# Patient Record
Sex: MALE | Race: BLACK OR AFRICAN AMERICAN | NOT HISPANIC OR LATINO | URBAN - METROPOLITAN AREA
[De-identification: names, ages, dates, MRNs, and addresses within clinical notes are randomized per-mention and may not be internally consistent; named-entity substitution may affect disease eponyms.]

---

## 2022-07-05 ENCOUNTER — EMERGENCY (EMERGENCY)
Facility: HOSPITAL | Age: 77
LOS: 0 days | Discharge: HOME | End: 2022-07-06
Attending: STUDENT IN AN ORGANIZED HEALTH CARE EDUCATION/TRAINING PROGRAM

## 2022-07-05 VITALS
WEIGHT: 169.98 LBS | HEIGHT: 70 IN | TEMPERATURE: 98 F | OXYGEN SATURATION: 97 % | HEART RATE: 72 BPM | RESPIRATION RATE: 17 BRPM | SYSTOLIC BLOOD PRESSURE: 161 MMHG | DIASTOLIC BLOOD PRESSURE: 82 MMHG

## 2022-07-05 DIAGNOSIS — G40.909 EPILEPSY, UNSPECIFIED, NOT INTRACTABLE, WITHOUT STATUS EPILEPTICUS: ICD-10-CM

## 2022-07-05 DIAGNOSIS — R07.9 CHEST PAIN, UNSPECIFIED: ICD-10-CM

## 2022-07-05 DIAGNOSIS — R42 DIZZINESS AND GIDDINESS: ICD-10-CM

## 2022-07-05 DIAGNOSIS — R94.31 ABNORMAL ELECTROCARDIOGRAM [ECG] [EKG]: ICD-10-CM

## 2022-07-05 DIAGNOSIS — Z20.822 CONTACT WITH AND (SUSPECTED) EXPOSURE TO COVID-19: ICD-10-CM

## 2022-07-05 DIAGNOSIS — Z98.890 OTHER SPECIFIED POSTPROCEDURAL STATES: ICD-10-CM

## 2022-07-05 LAB
ALBUMIN SERPL ELPH-MCNC: 3.5 G/DL — SIGNIFICANT CHANGE UP (ref 3.3–5)
ALP SERPL-CCNC: 69 U/L — SIGNIFICANT CHANGE UP (ref 40–120)
ALT FLD-CCNC: 25 U/L — SIGNIFICANT CHANGE UP (ref 12–78)
ANION GAP SERPL CALC-SCNC: 6 MMOL/L — SIGNIFICANT CHANGE UP (ref 5–17)
AST SERPL-CCNC: 40 U/L — HIGH (ref 15–37)
BASOPHILS # BLD AUTO: 0.03 K/UL — SIGNIFICANT CHANGE UP (ref 0–0.2)
BASOPHILS NFR BLD AUTO: 0.3 % — SIGNIFICANT CHANGE UP (ref 0–2)
BILIRUB SERPL-MCNC: 0.4 MG/DL — SIGNIFICANT CHANGE UP (ref 0.2–1.2)
BUN SERPL-MCNC: 15 MG/DL — SIGNIFICANT CHANGE UP (ref 7–23)
CALCIUM SERPL-MCNC: 8.9 MG/DL — SIGNIFICANT CHANGE UP (ref 8.5–10.1)
CHLORIDE SERPL-SCNC: 106 MMOL/L — SIGNIFICANT CHANGE UP (ref 96–108)
CO2 SERPL-SCNC: 24 MMOL/L — SIGNIFICANT CHANGE UP (ref 22–31)
CREAT SERPL-MCNC: 1.13 MG/DL — SIGNIFICANT CHANGE UP (ref 0.5–1.3)
EGFR: 67 ML/MIN/1.73M2 — SIGNIFICANT CHANGE UP
EOSINOPHIL # BLD AUTO: 0.08 K/UL — SIGNIFICANT CHANGE UP (ref 0–0.5)
EOSINOPHIL NFR BLD AUTO: 0.9 % — SIGNIFICANT CHANGE UP (ref 0–6)
GLUCOSE SERPL-MCNC: 156 MG/DL — HIGH (ref 70–99)
HCT VFR BLD CALC: 38.4 % — LOW (ref 39–50)
HGB BLD-MCNC: 12.6 G/DL — LOW (ref 13–17)
IMM GRANULOCYTES NFR BLD AUTO: 0.2 % — SIGNIFICANT CHANGE UP (ref 0–1.5)
LIDOCAIN IGE QN: 74 U/L — SIGNIFICANT CHANGE UP (ref 73–393)
LYMPHOCYTES # BLD AUTO: 1.12 K/UL — SIGNIFICANT CHANGE UP (ref 1–3.3)
LYMPHOCYTES # BLD AUTO: 13 % — SIGNIFICANT CHANGE UP (ref 13–44)
MAGNESIUM SERPL-MCNC: 2.2 MG/DL — SIGNIFICANT CHANGE UP (ref 1.6–2.6)
MCHC RBC-ENTMCNC: 32.1 PG — SIGNIFICANT CHANGE UP (ref 27–34)
MCHC RBC-ENTMCNC: 32.8 G/DL — SIGNIFICANT CHANGE UP (ref 32–36)
MCV RBC AUTO: 98 FL — SIGNIFICANT CHANGE UP (ref 80–100)
MONOCYTES # BLD AUTO: 0.47 K/UL — SIGNIFICANT CHANGE UP (ref 0–0.9)
MONOCYTES NFR BLD AUTO: 5.4 % — SIGNIFICANT CHANGE UP (ref 2–14)
NEUTROPHILS # BLD AUTO: 6.92 K/UL — SIGNIFICANT CHANGE UP (ref 1.8–7.4)
NEUTROPHILS NFR BLD AUTO: 80.2 % — HIGH (ref 43–77)
NRBC # BLD: 0 /100 WBCS — SIGNIFICANT CHANGE UP (ref 0–0)
NT-PROBNP SERPL-SCNC: 51 PG/ML — SIGNIFICANT CHANGE UP (ref 0–450)
PLATELET # BLD AUTO: 166 K/UL — SIGNIFICANT CHANGE UP (ref 150–400)
POTASSIUM SERPL-MCNC: 5.7 MMOL/L — HIGH (ref 3.5–5.3)
POTASSIUM SERPL-SCNC: 5.7 MMOL/L — HIGH (ref 3.5–5.3)
PROT SERPL-MCNC: 7.6 GM/DL — SIGNIFICANT CHANGE UP (ref 6–8.3)
RAPID RVP RESULT: SIGNIFICANT CHANGE UP
RBC # BLD: 3.92 M/UL — LOW (ref 4.2–5.8)
RBC # FLD: 12.2 % — SIGNIFICANT CHANGE UP (ref 10.3–14.5)
SARS-COV-2 RNA SPEC QL NAA+PROBE: SIGNIFICANT CHANGE UP
SODIUM SERPL-SCNC: 136 MMOL/L — SIGNIFICANT CHANGE UP (ref 135–145)
TROPONIN I, HIGH SENSITIVITY RESULT: 7.5 NG/L — SIGNIFICANT CHANGE UP
WBC # BLD: 8.64 K/UL — SIGNIFICANT CHANGE UP (ref 3.8–10.5)
WBC # FLD AUTO: 8.64 K/UL — SIGNIFICANT CHANGE UP (ref 3.8–10.5)

## 2022-07-05 PROCEDURE — 70450 CT HEAD/BRAIN W/O DYE: CPT | Mod: 26,MA

## 2022-07-05 PROCEDURE — 93010 ELECTROCARDIOGRAM REPORT: CPT

## 2022-07-05 PROCEDURE — 71045 X-RAY EXAM CHEST 1 VIEW: CPT | Mod: 26

## 2022-07-05 PROCEDURE — 99285 EMERGENCY DEPT VISIT HI MDM: CPT

## 2022-07-05 RX ORDER — SODIUM CHLORIDE 9 MG/ML
1000 INJECTION INTRAMUSCULAR; INTRAVENOUS; SUBCUTANEOUS ONCE
Refills: 0 | Status: COMPLETED | OUTPATIENT
Start: 2022-07-05 | End: 2022-07-05

## 2022-07-05 RX ADMIN — SODIUM CHLORIDE 1000 MILLILITER(S): 9 INJECTION INTRAMUSCULAR; INTRAVENOUS; SUBCUTANEOUS at 21:04

## 2022-07-05 NOTE — ED ADULT TRIAGE NOTE - PRO INTERPRETER NEED 2
Patient discharged in stable condition. Wife present and driving. All belongings sent home. Dressing to right groin clean/dry/intact. Discharge instructions reviewed and paperwork sent home. Verbalizes understanding of instructions. No questions/concerns at discharge.   English

## 2022-07-05 NOTE — ED ADULT TRIAGE NOTE - CHIEF COMPLAINT QUOTE
BIBA from home as per EMS patient c/o "burning" chest pain at 8am, wife gave pepcid and chest pain relieved. 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 denies dizziness in triage. ambulatory to ems stretcher with cane. f/s 138 as per ems

## 2022-07-05 NOTE — ED PROVIDER NOTE - CLINICAL SUMMARY MEDICAL DECISION MAKING FREE TEXT BOX
77-year-old male with history of seizure disorder and noted EKG with LVH with strain pattern presented to the ED with chest pain described as burning sensation alleviated with Pepcid.  Plan to get cardiac enzymes chest x-ray rule out ACS, patient also complained of dizziness upon standing described as lightheadedness.  Patient has dry mucous membranes on exam neurologically appears intact.  Patient does not have any signs of external trauma no syncope.  Patient did receive IV fluid bolus for likely dehydration as he has admitted to poor hydration at baseline

## 2022-07-05 NOTE — ED PROVIDER NOTE - CARE PROVIDER_API CALL
Emmanuel Neal)  Internal Medicine  [address] [place] [address]  Phone: [phone]  Fax: [phone])-  Follow Up Time: 4-6 Days

## 2022-07-05 NOTE — ED PROVIDER NOTE - OBJECTIVE STATEMENT
77-year-old male history of seizure disorder distant brain surgery on Keppra and Topamax presenting to the ED with chest pain that was earlier noted earlier today described as burning sensation nonexertional nonpleuritic nonpositional alleviated with Pepcid.  Patient states chest pain has been since been resolved, denies any shortness of breath with exertion denies any abdominal pain.  No blood in stools no nausea vomiting diarrhea.  No cough congestion patient also reports dizziness upon standing patient reports he has poor hydration at baseline and does not drink enough water throughout the day.

## 2022-07-05 NOTE — ED PROVIDER NOTE - PATIENT PORTAL LINK FT
You can access the FollowMyHealth Patient Portal offered by [facility] by registering at the following website: [email or web address]. By joining [employer]’s FollowMyHealth portal, you will also be able to view your health information using other applications (apps) compatible with our system.

## 2022-07-05 NOTE — ED PROVIDER NOTE - PHYSICAL EXAMINATION
VITAL SIGNS: I have reviewed nursing notes and confirm.  CONSTITUTIONAL: well-appearing, non-toxic, NAD  SKIN: Warm dry, normal skin turgor  HEAD: NCAT  EYES: EOMI, PERRLA, no scleral icterus  ENT: dry mucous membranes, normal pharynx   NECK: Supple; non tender. Full ROM.   CARD: RRR, no murmurs, rubs or gallops  RESP: clear to ausculation b/l.  No rales, rhonchi, or wheezing.  ABD: soft, + BS, non-tender, non-distended, no rebound or guarding. No CVA tenderness  EXT: Full ROM, no bony tenderness, no pedal edema, no calf tenderness  NEURO: normal motor. normal sensory. CN II-XII intact. Cerebellar testing normal. Normal gait.  PSYCH: Cooperative, appropriate.

## 2022-07-05 NOTE — ED PROVIDER NOTE - NS ED ROS FT
Constitutional: See HPI.  Eyes: No visual changes, eye pain or discharge. No Photophobia  ENMT: No hearing changes, pain, discharge or infections. No neck pain or stiffness. No limited ROM  Cardiac: see hpi   Respiratory: No cough or respiratory distress.   GI: No nausea, vomiting, diarrhea or abdominal pain.  : No dysuria, frequency or burning. No Discharge  MS: No myalgia, muscle weakness, joint pain or back pain.  Neuro: see hpi   Skin: No skin rash.  Except as documented in the HPI, all other systems are negative.

## 2022-07-05 NOTE — ED ADULT NURSE NOTE - OBJECTIVE STATEMENT
Patient A& o x3 came in with complaints of chest pain that started this morning and was relieved by Pepcid. Patient had chest pain later in the day that went away on it's own. Patient then complained of dizziness, weakness, and was shaking. Pt is not currently dizzy but is lying down. Pt ambulates with a cane. Pt had a stroke about 20 years ago and has had seizures but hasn't had one in 5 years but takes medication for seizures.

## 2022-07-06 VITALS — DIASTOLIC BLOOD PRESSURE: 82 MMHG | SYSTOLIC BLOOD PRESSURE: 141 MMHG

## 2022-07-06 LAB — TROPONIN I, HIGH SENSITIVITY RESULT: 8.3 NG/L — SIGNIFICANT CHANGE UP

## 2024-06-21 ENCOUNTER — EMERGENCY (EMERGENCY)
Facility: HOSPITAL | Age: 79
LOS: 0 days | Discharge: ANOTHER HOSPITAL | End: 2024-06-22
Attending: STUDENT IN AN ORGANIZED HEALTH CARE EDUCATION/TRAINING PROGRAM
Payer: MEDICARE

## 2024-06-21 ENCOUNTER — TRANSCRIPTION ENCOUNTER (OUTPATIENT)
Age: 79
End: 2024-06-21

## 2024-06-21 VITALS
RESPIRATION RATE: 18 BRPM | SYSTOLIC BLOOD PRESSURE: 168 MMHG | HEART RATE: 93 BPM | TEMPERATURE: 100 F | OXYGEN SATURATION: 97 % | HEIGHT: 69 IN | WEIGHT: 169.98 LBS | DIASTOLIC BLOOD PRESSURE: 100 MMHG

## 2024-06-21 DIAGNOSIS — R53.1 WEAKNESS: ICD-10-CM

## 2024-06-21 DIAGNOSIS — Z85.841 PERSONAL HISTORY OF MALIGNANT NEOPLASM OF BRAIN: ICD-10-CM

## 2024-06-21 DIAGNOSIS — E78.5 HYPERLIPIDEMIA, UNSPECIFIED: ICD-10-CM

## 2024-06-21 DIAGNOSIS — R35.0 FREQUENCY OF MICTURITION: ICD-10-CM

## 2024-06-21 DIAGNOSIS — Z79.84 LONG TERM (CURRENT) USE OF ORAL HYPOGLYCEMIC DRUGS: ICD-10-CM

## 2024-06-21 DIAGNOSIS — I62.01 NONTRAUMATIC ACUTE SUBDURAL HEMORRHAGE: ICD-10-CM

## 2024-06-21 DIAGNOSIS — I49.3 VENTRICULAR PREMATURE DEPOLARIZATION: ICD-10-CM

## 2024-06-21 DIAGNOSIS — R05.1 ACUTE COUGH: ICD-10-CM

## 2024-06-21 DIAGNOSIS — Z86.69 PERSONAL HISTORY OF OTHER DISEASES OF THE NERVOUS SYSTEM AND SENSE ORGANS: ICD-10-CM

## 2024-06-21 DIAGNOSIS — E11.9 TYPE 2 DIABETES MELLITUS WITHOUT COMPLICATIONS: ICD-10-CM

## 2024-06-21 LAB
ALBUMIN SERPL ELPH-MCNC: 3.4 G/DL — SIGNIFICANT CHANGE UP (ref 3.3–5)
ALP SERPL-CCNC: 61 U/L — SIGNIFICANT CHANGE UP (ref 40–120)
ALT FLD-CCNC: 13 U/L — SIGNIFICANT CHANGE UP (ref 12–78)
ANION GAP SERPL CALC-SCNC: 8 MMOL/L — SIGNIFICANT CHANGE UP (ref 5–17)
APPEARANCE UR: CLEAR — SIGNIFICANT CHANGE UP
APTT BLD: 32.7 SEC — SIGNIFICANT CHANGE UP (ref 24.5–35.6)
AST SERPL-CCNC: 11 U/L — LOW (ref 15–37)
BASOPHILS # BLD AUTO: 0.02 K/UL — SIGNIFICANT CHANGE UP (ref 0–0.2)
BASOPHILS NFR BLD AUTO: 0.3 % — SIGNIFICANT CHANGE UP (ref 0–2)
BILIRUB SERPL-MCNC: 0.5 MG/DL — SIGNIFICANT CHANGE UP (ref 0.2–1.2)
BILIRUB UR-MCNC: NEGATIVE — SIGNIFICANT CHANGE UP
BUN SERPL-MCNC: 15 MG/DL — SIGNIFICANT CHANGE UP (ref 7–23)
CALCIUM SERPL-MCNC: 9.1 MG/DL — SIGNIFICANT CHANGE UP (ref 8.5–10.1)
CHLORIDE SERPL-SCNC: 105 MMOL/L — SIGNIFICANT CHANGE UP (ref 96–108)
CO2 SERPL-SCNC: 24 MMOL/L — SIGNIFICANT CHANGE UP (ref 22–31)
COLOR SPEC: YELLOW — SIGNIFICANT CHANGE UP
CREAT SERPL-MCNC: 1.29 MG/DL — SIGNIFICANT CHANGE UP (ref 0.5–1.3)
DIFF PNL FLD: NEGATIVE — SIGNIFICANT CHANGE UP
EGFR: 56 ML/MIN/1.73M2 — LOW
EOSINOPHIL # BLD AUTO: 0.03 K/UL — SIGNIFICANT CHANGE UP (ref 0–0.5)
EOSINOPHIL NFR BLD AUTO: 0.4 % — SIGNIFICANT CHANGE UP (ref 0–6)
FLUAV AG NPH QL: SIGNIFICANT CHANGE UP
FLUBV AG NPH QL: SIGNIFICANT CHANGE UP
GLUCOSE SERPL-MCNC: 146 MG/DL — HIGH (ref 70–99)
GLUCOSE UR QL: NEGATIVE MG/DL — SIGNIFICANT CHANGE UP
HCT VFR BLD CALC: 36 % — LOW (ref 39–50)
HGB BLD-MCNC: 11.9 G/DL — LOW (ref 13–17)
IMM GRANULOCYTES NFR BLD AUTO: 0.3 % — SIGNIFICANT CHANGE UP (ref 0–0.9)
INR BLD: 0.99 RATIO — SIGNIFICANT CHANGE UP (ref 0.85–1.18)
KETONES UR-MCNC: NEGATIVE MG/DL — SIGNIFICANT CHANGE UP
LACTATE SERPL-SCNC: 1.2 MMOL/L — SIGNIFICANT CHANGE UP (ref 0.7–2)
LEUKOCYTE ESTERASE UR-ACNC: NEGATIVE — SIGNIFICANT CHANGE UP
LYMPHOCYTES # BLD AUTO: 1.46 K/UL — SIGNIFICANT CHANGE UP (ref 1–3.3)
LYMPHOCYTES # BLD AUTO: 18.6 % — SIGNIFICANT CHANGE UP (ref 13–44)
MAGNESIUM SERPL-MCNC: 1.9 MG/DL — SIGNIFICANT CHANGE UP (ref 1.6–2.6)
MCHC RBC-ENTMCNC: 32.2 PG — SIGNIFICANT CHANGE UP (ref 27–34)
MCHC RBC-ENTMCNC: 33.1 G/DL — SIGNIFICANT CHANGE UP (ref 32–36)
MCV RBC AUTO: 97.3 FL — SIGNIFICANT CHANGE UP (ref 80–100)
MONOCYTES # BLD AUTO: 0.77 K/UL — SIGNIFICANT CHANGE UP (ref 0–0.9)
MONOCYTES NFR BLD AUTO: 9.8 % — SIGNIFICANT CHANGE UP (ref 2–14)
NEUTROPHILS # BLD AUTO: 5.53 K/UL — SIGNIFICANT CHANGE UP (ref 1.8–7.4)
NEUTROPHILS NFR BLD AUTO: 70.6 % — SIGNIFICANT CHANGE UP (ref 43–77)
NITRITE UR-MCNC: NEGATIVE — SIGNIFICANT CHANGE UP
NRBC # BLD: 0 /100 WBCS — SIGNIFICANT CHANGE UP (ref 0–0)
PH UR: 7.5 — SIGNIFICANT CHANGE UP (ref 5–8)
PLATELET # BLD AUTO: 203 K/UL — SIGNIFICANT CHANGE UP (ref 150–400)
POTASSIUM SERPL-MCNC: 3.5 MMOL/L — SIGNIFICANT CHANGE UP (ref 3.5–5.3)
POTASSIUM SERPL-SCNC: 3.5 MMOL/L — SIGNIFICANT CHANGE UP (ref 3.5–5.3)
PROT SERPL-MCNC: 7.5 GM/DL — SIGNIFICANT CHANGE UP (ref 6–8.3)
PROT UR-MCNC: NEGATIVE MG/DL — SIGNIFICANT CHANGE UP
PROTHROM AB SERPL-ACNC: 11.9 SEC — SIGNIFICANT CHANGE UP (ref 9.5–13)
RBC # BLD: 3.7 M/UL — LOW (ref 4.2–5.8)
RBC # FLD: 12.1 % — SIGNIFICANT CHANGE UP (ref 10.3–14.5)
SARS-COV-2 RNA SPEC QL NAA+PROBE: SIGNIFICANT CHANGE UP
SODIUM SERPL-SCNC: 137 MMOL/L — SIGNIFICANT CHANGE UP (ref 135–145)
SP GR SPEC: 1.01 — SIGNIFICANT CHANGE UP (ref 1–1.03)
UROBILINOGEN FLD QL: 0.2 MG/DL — SIGNIFICANT CHANGE UP (ref 0.2–1)
WBC # BLD: 7.83 K/UL — SIGNIFICANT CHANGE UP (ref 3.8–10.5)
WBC # FLD AUTO: 7.83 K/UL — SIGNIFICANT CHANGE UP (ref 3.8–10.5)

## 2024-06-21 PROCEDURE — 71045 X-RAY EXAM CHEST 1 VIEW: CPT | Mod: 26

## 2024-06-21 PROCEDURE — 72170 X-RAY EXAM OF PELVIS: CPT | Mod: 26

## 2024-06-21 PROCEDURE — 93010 ELECTROCARDIOGRAM REPORT: CPT

## 2024-06-21 PROCEDURE — 99285 EMERGENCY DEPT VISIT HI MDM: CPT

## 2024-06-21 PROCEDURE — 70450 CT HEAD/BRAIN W/O DYE: CPT | Mod: 26,MC

## 2024-06-21 RX ORDER — LEVETIRACETAM 250 MG/1
1 TABLET, FILM COATED ORAL
Refills: 0 | DISCHARGE

## 2024-06-21 RX ORDER — TOPIRAMATE 25 MG
1 TABLET ORAL
Refills: 0 | DISCHARGE

## 2024-06-21 RX ORDER — PIPERACILLIN AND TAZOBACTAM 4; .5 G/20ML; G/20ML
3.38 INJECTION, POWDER, LYOPHILIZED, FOR SOLUTION INTRAVENOUS ONCE
Refills: 0 | Status: COMPLETED | OUTPATIENT
Start: 2024-06-21 | End: 2024-06-21

## 2024-06-21 RX ORDER — SODIUM CHLORIDE 9 MG/ML
1000 INJECTION INTRAMUSCULAR; INTRAVENOUS; SUBCUTANEOUS ONCE
Refills: 0 | Status: DISCONTINUED | OUTPATIENT
Start: 2024-06-21 | End: 2024-06-21

## 2024-06-21 RX ORDER — ACETAMINOPHEN 500 MG
975 TABLET ORAL ONCE
Refills: 0 | Status: COMPLETED | OUTPATIENT
Start: 2024-06-21 | End: 2024-06-21

## 2024-06-21 RX ORDER — SODIUM CHLORIDE 9 MG/ML
500 INJECTION INTRAMUSCULAR; INTRAVENOUS; SUBCUTANEOUS ONCE
Refills: 0 | Status: COMPLETED | OUTPATIENT
Start: 2024-06-21 | End: 2024-06-21

## 2024-06-21 RX ORDER — METFORMIN HYDROCHLORIDE 850 MG/1
1 TABLET ORAL
Refills: 0 | DISCHARGE

## 2024-06-21 RX ORDER — ROSUVASTATIN CALCIUM 5 MG/1
1 TABLET ORAL
Refills: 0 | DISCHARGE

## 2024-06-21 RX ADMIN — Medication 975 MILLIGRAM(S): at 17:57

## 2024-06-21 RX ADMIN — PIPERACILLIN AND TAZOBACTAM 200 GRAM(S): 4; .5 INJECTION, POWDER, LYOPHILIZED, FOR SOLUTION INTRAVENOUS at 18:00

## 2024-06-21 RX ADMIN — SODIUM CHLORIDE 500 MILLILITER(S): 9 INJECTION INTRAMUSCULAR; INTRAVENOUS; SUBCUTANEOUS at 18:03

## 2024-06-21 NOTE — ED PROVIDER NOTE - PROGRESS NOTE DETAILS
Attending Henry: labs and CXR w/ no emergent findings. had walked pt and found him w/ unsteady gait. daughter Kaykay (ER physician in [place] [phone]) called and provided collateral information. reported that pt has been falling a lot at home. has been occurring for at least the past week, but suspects has been longer and pt has not been telling family. CTH ordered based on this information. CT w/ b/l SDH, acute on chronic. informed pt. reports he has been falling, but denies head strike. Confirms no AC use. Updated daughter and wife of findings and need for NSGY eval. Spoke w/ NSGY at [facility]. Pt accepted ED to ED under Dr. Stockton. Recommded AEDs be given, pt's home evening dose of keppra and topamax ordered. Pt Tier 2 transfer to [facility]. Paperwork signed.

## 2024-06-21 NOTE — ED ADULT NURSE REASSESSMENT NOTE - NS ED NURSE REASSESS COMMENT FT1
cane provided for pt to ambulate. pt taking short shuffling steps with slow, unsteady gait. pt would tip backwards. pt states legs feel weak and feels pain when ambulating. md hari soto. cane provided for pt to ambulate. pt taking short shuffling steps with slow, unsteady gait. pt would tip backwards. pt states legs feel weak and feels pain when ambulating. not dyspneic on exertion. md hari soto.

## 2024-06-21 NOTE — ED ADULT TRIAGE NOTE - CHIEF COMPLAINT QUOTE
patient with generalized weakness x1 week, new onset of incontinent of urine as per EMS. Complaining of bilateral leg pain, denies any trauma.

## 2024-06-21 NOTE — ED PROVIDER NOTE - OBJECTIVE STATEMENT
80 y/o M w/ PMH of DM, HLD, seizures, brain tumor s/p resection presenting w/ weakness. Seen w/ wife. Reports since yesterday has been feeling weak and having difficulty walking as a result. Has been urinating more frequently during this time also. Reports has been having a cough for the past few days as well. Wife is concerned that pt seems to be a little confused as well. Pt reports feeling OK at this time. Denies fevers, chills, headache, dizziness, blurred vision, chest pain, shortness of breath, abdominal pain, n/v/d/c, MSK pain, rash.

## 2024-06-21 NOTE — ED PROVIDER NOTE - CLINICAL SUMMARY MEDICAL DECISION MAKING FREE TEXT BOX
80 y/o M w/ PMH of DM, HLD, seizures, brain tumor s/p resection presenting w/ weakness. Seen w/ wife. Reports since yesterday has been feeling weak and having difficulty walking as a result. Has been urinating more frequently during this time also. Reports has been having a cough for the past few days as well. Wife is concerned that pt seems to be a little confused as well. Pt reports feeling OK at this time. Denies fevers, chills, headache, dizziness, blurred vision, chest pain, shortness of breath, abdominal pain, n/v/d/c, MSK pain, rash. Pt overall well appearing, no acute distress. Neuro intact. Found to be rectally febrile. Suspect UTI vs. viral URI vs. PNA given hx and symptoms. Will perform infectious work up. Do not suspect acute intracranial pathology. Plan for labs, imaging, EKG, IVF, meds. Will reassess the need for additional interventions as clinically warranted. Refer to any progress notes for updates on clinical course and as a continuation of this MDM.

## 2024-06-21 NOTE — ED ADULT NURSE REASSESSMENT NOTE - NS ED NURSE REASSESS COMMENT FT1
per wife, pt takes night time meds: Toprol 100 mg, Keppra 500 mg, Rovastatin 40 mg per wife, pt takes night time meds: Toprol 100 mg, Keppra 500 mg, Rovastatin 40 mg  Per wife, pt had a fall yesterday.

## 2024-06-21 NOTE — ED ADULT NURSE NOTE - OBJECTIVE STATEMENT
Patient AOX4 presents to the ED with wife at bedside for generalized weakness x1 week with difficulty walking, new onset of incontinent of urine as per EMS. Complaining of bilateral leg pain, denies any trauma. Patient's wife states the patient's symptoms started yesterday and worsened today. Patient's wife states pt has a HX of seizures. Pt's wife states pt takes Keppra 250mg, metformin 500, and Rosuvastatin 40mg. Rectal temp 101 F. Pt states he sometimes walks with a cane at home.

## 2024-06-21 NOTE — ED ADULT NURSE REASSESSMENT NOTE - NS ED NURSE REASSESS COMMENT FT1
Handoff report received from [facility] for shift change. Plan of care reviewed. Pt received resting on stretcher. [facility] but un[facility] to at the moment due to bilat leg weakness and pain when ambulating. Continuous cardiac and SpO2 monitoring in progress. VSS. IV site & patency inspected and integrity maintained. left top of hand 18g patent, right ac 20g patent. Denies numbness tingling to bilat lower legs. Pendign dispo. Will continue to monitor. Handoff report received from [facility] for shift change. Plan of care reviewed. Pt received resting on stretcher. [facility] un[facility] to at the moment due to bilat leg weakness and pain when ambulating. Continuous cardiac and SpO2 monitoring in progress. VSS. IV site & patency inspected and integrity maintained. left top of hand 18g patent, right ac 20g patent. Denies numbness tingling to bilat legs. Pendign dispo. Will continue to monitor.

## 2024-06-22 ENCOUNTER — APPOINTMENT (OUTPATIENT)
Dept: NEUROSURGERY | Facility: HOSPITAL | Age: 79
End: 2024-06-22

## 2024-06-22 ENCOUNTER — INPATIENT (INPATIENT)
Facility: HOSPITAL | Age: 79
LOS: 17 days | Discharge: NURSING, REHAB OR LONG-TERM CARE | DRG: 25 | End: 2024-07-10
Attending: NEUROLOGICAL SURGERY | Admitting: NEUROLOGICAL SURGERY
Payer: MEDICARE

## 2024-06-22 ENCOUNTER — RESULT REVIEW (OUTPATIENT)
Age: 79
End: 2024-06-22

## 2024-06-22 VITALS
OXYGEN SATURATION: 97 % | RESPIRATION RATE: 15 BRPM | SYSTOLIC BLOOD PRESSURE: 137 MMHG | DIASTOLIC BLOOD PRESSURE: 84 MMHG | HEART RATE: 65 BPM | TEMPERATURE: 98 F

## 2024-06-22 VITALS
RESPIRATION RATE: 18 BRPM | WEIGHT: 169.98 LBS | HEART RATE: 77 BPM | SYSTOLIC BLOOD PRESSURE: 172 MMHG | OXYGEN SATURATION: 96 % | DIASTOLIC BLOOD PRESSURE: 89 MMHG | TEMPERATURE: 99 F

## 2024-06-22 DIAGNOSIS — I60.9 NONTRAUMATIC SUBARACHNOID HEMORRHAGE, UNSPECIFIED: ICD-10-CM

## 2024-06-22 LAB
ALBUMIN SERPL ELPH-MCNC: 4 G/DL — SIGNIFICANT CHANGE UP (ref 3.3–5)
ALP SERPL-CCNC: 54 U/L — SIGNIFICANT CHANGE UP (ref 40–120)
ALT FLD-CCNC: 9 U/L — LOW (ref 10–45)
ANION GAP SERPL CALC-SCNC: 13 MMOL/L — SIGNIFICANT CHANGE UP (ref 5–17)
ANION GAP SERPL CALC-SCNC: 14 MMOL/L — SIGNIFICANT CHANGE UP (ref 5–17)
APTT BLD: 32.2 SEC — SIGNIFICANT CHANGE UP (ref 24.5–35.6)
AST SERPL-CCNC: 12 U/L — SIGNIFICANT CHANGE UP (ref 10–40)
BASOPHILS # BLD AUTO: 0.03 K/UL — SIGNIFICANT CHANGE UP (ref 0–0.2)
BASOPHILS NFR BLD AUTO: 0.4 % — SIGNIFICANT CHANGE UP (ref 0–2)
BILIRUB SERPL-MCNC: 0.6 MG/DL — SIGNIFICANT CHANGE UP (ref 0.2–1.2)
BLD GP AB SCN SERPL QL: NEGATIVE — SIGNIFICANT CHANGE UP
BUN SERPL-MCNC: 14 MG/DL — SIGNIFICANT CHANGE UP (ref 7–23)
BUN SERPL-MCNC: 17 MG/DL — SIGNIFICANT CHANGE UP (ref 7–23)
CALCIUM SERPL-MCNC: 8.4 MG/DL — SIGNIFICANT CHANGE UP (ref 8.4–10.5)
CALCIUM SERPL-MCNC: 9.1 MG/DL — SIGNIFICANT CHANGE UP (ref 8.4–10.5)
CHLORIDE SERPL-SCNC: 102 MMOL/L — SIGNIFICANT CHANGE UP (ref 96–108)
CHLORIDE SERPL-SCNC: 98 MMOL/L — SIGNIFICANT CHANGE UP (ref 96–108)
CO2 SERPL-SCNC: 19 MMOL/L — LOW (ref 22–31)
CO2 SERPL-SCNC: 19 MMOL/L — LOW (ref 22–31)
CREAT SERPL-MCNC: 1.09 MG/DL — SIGNIFICANT CHANGE UP (ref 0.5–1.3)
CREAT SERPL-MCNC: 1.09 MG/DL — SIGNIFICANT CHANGE UP (ref 0.5–1.3)
EGFR: 69 ML/MIN/1.73M2 — SIGNIFICANT CHANGE UP
EGFR: 69 ML/MIN/1.73M2 — SIGNIFICANT CHANGE UP
EOSINOPHIL # BLD AUTO: 0.08 K/UL — SIGNIFICANT CHANGE UP (ref 0–0.5)
EOSINOPHIL NFR BLD AUTO: 1 % — SIGNIFICANT CHANGE UP (ref 0–6)
GLUCOSE BLDC GLUCOMTR-MCNC: 105 MG/DL — HIGH (ref 70–99)
GLUCOSE BLDC GLUCOMTR-MCNC: 134 MG/DL — HIGH (ref 70–99)
GLUCOSE SERPL-MCNC: 163 MG/DL — HIGH (ref 70–99)
GLUCOSE SERPL-MCNC: 169 MG/DL — HIGH (ref 70–99)
HCT VFR BLD CALC: 35 % — LOW (ref 39–50)
HCT VFR BLD CALC: 35.8 % — LOW (ref 39–50)
HGB BLD-MCNC: 11.1 G/DL — LOW (ref 13–17)
HGB BLD-MCNC: 11.7 G/DL — LOW (ref 13–17)
IMM GRANULOCYTES NFR BLD AUTO: 0.2 % — SIGNIFICANT CHANGE UP (ref 0–0.9)
INR BLD: 1.09 RATIO — SIGNIFICANT CHANGE UP (ref 0.85–1.18)
LYMPHOCYTES # BLD AUTO: 1.24 K/UL — SIGNIFICANT CHANGE UP (ref 1–3.3)
LYMPHOCYTES # BLD AUTO: 15.3 % — SIGNIFICANT CHANGE UP (ref 13–44)
MAGNESIUM SERPL-MCNC: 2.1 MG/DL — SIGNIFICANT CHANGE UP (ref 1.6–2.6)
MCHC RBC-ENTMCNC: 31.6 PG — SIGNIFICANT CHANGE UP (ref 27–34)
MCHC RBC-ENTMCNC: 31.7 GM/DL — LOW (ref 32–36)
MCHC RBC-ENTMCNC: 32.1 PG — SIGNIFICANT CHANGE UP (ref 27–34)
MCHC RBC-ENTMCNC: 32.7 GM/DL — SIGNIFICANT CHANGE UP (ref 32–36)
MCV RBC AUTO: 98.1 FL — SIGNIFICANT CHANGE UP (ref 80–100)
MCV RBC AUTO: 99.7 FL — SIGNIFICANT CHANGE UP (ref 80–100)
MONOCYTES # BLD AUTO: 0.84 K/UL — SIGNIFICANT CHANGE UP (ref 0–0.9)
MONOCYTES NFR BLD AUTO: 10.4 % — SIGNIFICANT CHANGE UP (ref 2–14)
NEUTROPHILS # BLD AUTO: 5.89 K/UL — SIGNIFICANT CHANGE UP (ref 1.8–7.4)
NEUTROPHILS NFR BLD AUTO: 72.7 % — SIGNIFICANT CHANGE UP (ref 43–77)
NRBC # BLD: 0 /100 WBCS — SIGNIFICANT CHANGE UP (ref 0–0)
NRBC # BLD: 0 /100 WBCS — SIGNIFICANT CHANGE UP (ref 0–0)
PHOSPHATE SERPL-MCNC: 4.5 MG/DL — SIGNIFICANT CHANGE UP (ref 2.5–4.5)
PLATELET # BLD AUTO: 192 K/UL — SIGNIFICANT CHANGE UP (ref 150–400)
PLATELET # BLD AUTO: 210 K/UL — SIGNIFICANT CHANGE UP (ref 150–400)
PLATELET RESPONSE ASPIRIN RESULT: 633 ARU — SIGNIFICANT CHANGE UP
POTASSIUM SERPL-MCNC: 3.5 MMOL/L — SIGNIFICANT CHANGE UP (ref 3.5–5.3)
POTASSIUM SERPL-MCNC: 3.9 MMOL/L — SIGNIFICANT CHANGE UP (ref 3.5–5.3)
POTASSIUM SERPL-SCNC: 3.5 MMOL/L — SIGNIFICANT CHANGE UP (ref 3.5–5.3)
POTASSIUM SERPL-SCNC: 3.9 MMOL/L — SIGNIFICANT CHANGE UP (ref 3.5–5.3)
PROT SERPL-MCNC: 7.3 G/DL — SIGNIFICANT CHANGE UP (ref 6–8.3)
PROTHROM AB SERPL-ACNC: 12 SEC — SIGNIFICANT CHANGE UP (ref 9.5–13)
RBC # BLD: 3.51 M/UL — LOW (ref 4.2–5.8)
RBC # BLD: 3.65 M/UL — LOW (ref 4.2–5.8)
RBC # FLD: 12 % — SIGNIFICANT CHANGE UP (ref 10.3–14.5)
RBC # FLD: 12.2 % — SIGNIFICANT CHANGE UP (ref 10.3–14.5)
RH IG SCN BLD-IMP: POSITIVE — SIGNIFICANT CHANGE UP
SODIUM SERPL-SCNC: 130 MMOL/L — LOW (ref 135–145)
SODIUM SERPL-SCNC: 135 MMOL/L — SIGNIFICANT CHANGE UP (ref 135–145)
WBC # BLD: 8.1 K/UL — SIGNIFICANT CHANGE UP (ref 3.8–10.5)
WBC # BLD: 9.45 K/UL — SIGNIFICANT CHANGE UP (ref 3.8–10.5)
WBC # FLD AUTO: 8.1 K/UL — SIGNIFICANT CHANGE UP (ref 3.8–10.5)
WBC # FLD AUTO: 9.45 K/UL — SIGNIFICANT CHANGE UP (ref 3.8–10.5)

## 2024-06-22 PROCEDURE — 70450 CT HEAD/BRAIN W/O DYE: CPT | Mod: 26

## 2024-06-22 PROCEDURE — 93010 ELECTROCARDIOGRAM REPORT: CPT

## 2024-06-22 PROCEDURE — 61781 SCAN PROC CRANIAL INTRA: CPT

## 2024-06-22 PROCEDURE — 88304 TISSUE EXAM BY PATHOLOGIST: CPT | Mod: 26

## 2024-06-22 PROCEDURE — 61312 CRNEC/CRNOT STTL XDRL/SDRL: CPT

## 2024-06-22 PROCEDURE — 70450 CT HEAD/BRAIN W/O DYE: CPT | Mod: 26,MC,77

## 2024-06-22 PROCEDURE — 99291 CRITICAL CARE FIRST HOUR: CPT

## 2024-06-22 PROCEDURE — 93970 EXTREMITY STUDY: CPT | Mod: 26

## 2024-06-22 DEVICE — IMPLANTABLE DEVICE: Type: IMPLANTABLE DEVICE | Site: RIGHT | Status: FUNCTIONAL

## 2024-06-22 DEVICE — SURGIFLO MATRIX WITH THROMBIN KIT: Type: IMPLANTABLE DEVICE | Site: RIGHT | Status: FUNCTIONAL

## 2024-06-22 DEVICE — SURGICEL FIBRILLAR 2 X 4": Type: IMPLANTABLE DEVICE | Site: RIGHT | Status: FUNCTIONAL

## 2024-06-22 DEVICE — COVER LARGE BUR HOLE 18.5MM: Type: IMPLANTABLE DEVICE | Site: RIGHT | Status: FUNCTIONAL

## 2024-06-22 DEVICE — KIT A-LINE 1LUM 20G X 12CM SAFE KIT: Type: IMPLANTABLE DEVICE | Site: RIGHT | Status: FUNCTIONAL

## 2024-06-22 DEVICE — PLATE THINFLAP SHUNT MED 18.5MM: Type: IMPLANTABLE DEVICE | Site: RIGHT | Status: FUNCTIONAL

## 2024-06-22 DEVICE — SURGICEL 2 X 14": Type: IMPLANTABLE DEVICE | Site: RIGHT | Status: FUNCTIONAL

## 2024-06-22 DEVICE — SURGIFOAM PAD 8CM X 12.5CM X 10MM (100): Type: IMPLANTABLE DEVICE | Site: RIGHT | Status: FUNCTIONAL

## 2024-06-22 DEVICE — SCRX-DRIVE 1.5X4MM: Type: IMPLANTABLE DEVICE | Site: RIGHT | Status: FUNCTIONAL

## 2024-06-22 RX ORDER — SODIUM CHLORIDE 0.9 % (FLUSH) 0.9 %
1000 SYRINGE (ML) INJECTION
Refills: 0 | Status: DISCONTINUED | OUTPATIENT
Start: 2024-06-22 | End: 2024-06-23

## 2024-06-22 RX ORDER — LABETALOL HYDROCHLORIDE 300 MG/1
10 TABLET ORAL ONCE
Refills: 0 | Status: COMPLETED | OUTPATIENT
Start: 2024-06-22 | End: 2024-06-22

## 2024-06-22 RX ORDER — SENNOSIDES 8.6 MG
2 TABLET ORAL AT BEDTIME
Refills: 0 | Status: DISCONTINUED | OUTPATIENT
Start: 2024-06-22 | End: 2024-06-22

## 2024-06-22 RX ORDER — ACETAMINOPHEN 325 MG
650 TABLET ORAL EVERY 6 HOURS
Refills: 0 | Status: DISCONTINUED | OUTPATIENT
Start: 2024-06-22 | End: 2024-06-29

## 2024-06-22 RX ORDER — POTASSIUM CHLORIDE 600 MG/1
20 TABLET, FILM COATED, EXTENDED RELEASE ORAL ONCE
Refills: 0 | Status: COMPLETED | OUTPATIENT
Start: 2024-06-22 | End: 2024-06-22

## 2024-06-22 RX ORDER — TOPIRAMATE 50 MG/1
100 TABLET, FILM COATED ORAL
Refills: 0 | Status: DISCONTINUED | OUTPATIENT
Start: 2024-06-22 | End: 2024-07-10

## 2024-06-22 RX ORDER — TOPIRAMATE 25 MG
100 TABLET ORAL ONCE
Refills: 0 | Status: COMPLETED | OUTPATIENT
Start: 2024-06-22 | End: 2024-06-22

## 2024-06-22 RX ORDER — ATORVASTATIN CALCIUM 20 MG/1
80 TABLET, FILM COATED ORAL AT BEDTIME
Refills: 0 | Status: DISCONTINUED | OUTPATIENT
Start: 2024-06-22 | End: 2024-07-10

## 2024-06-22 RX ORDER — POLYETHYLENE GLYCOL 3350 1 G/G
17 POWDER ORAL DAILY
Refills: 0 | Status: DISCONTINUED | OUTPATIENT
Start: 2024-06-22 | End: 2024-06-26

## 2024-06-22 RX ORDER — OXYCODONE HYDROCHLORIDE 100 MG/5ML
5 SOLUTION ORAL EVERY 4 HOURS
Refills: 0 | Status: DISCONTINUED | OUTPATIENT
Start: 2024-06-22 | End: 2024-06-29

## 2024-06-22 RX ORDER — INSULIN LISPRO 100 [IU]/ML
INJECTION, SOLUTION SUBCUTANEOUS EVERY 6 HOURS
Refills: 0 | Status: DISCONTINUED | OUTPATIENT
Start: 2024-06-22 | End: 2024-06-28

## 2024-06-22 RX ORDER — LACOSAMIDE 100 MG/1
50 TABLET, FILM COATED ORAL EVERY 12 HOURS
Refills: 0 | Status: DISCONTINUED | OUTPATIENT
Start: 2024-06-22 | End: 2024-07-03

## 2024-06-22 RX ORDER — SENNOSIDES 8.6 MG
2 TABLET ORAL AT BEDTIME
Refills: 0 | Status: DISCONTINUED | OUTPATIENT
Start: 2024-06-22 | End: 2024-06-26

## 2024-06-22 RX ORDER — LEVETIRACETAM 250 MG/1
500 TABLET, FILM COATED ORAL ONCE
Refills: 0 | Status: COMPLETED | OUTPATIENT
Start: 2024-06-22 | End: 2024-06-22

## 2024-06-22 RX ORDER — CEFAZOLIN 10 G/1
2000 INJECTION, POWDER, FOR SOLUTION INTRAVENOUS EVERY 8 HOURS
Refills: 0 | Status: COMPLETED | OUTPATIENT
Start: 2024-06-22 | End: 2024-06-23

## 2024-06-22 RX ORDER — LEVETIRACETAM 100 MG/ML
250 INJECTION INTRAVENOUS
Refills: 0 | Status: DISCONTINUED | OUTPATIENT
Start: 2024-06-22 | End: 2024-07-10

## 2024-06-22 RX ORDER — BISACODYL 5 MG
5 TABLET, DELAYED RELEASE (ENTERIC COATED) ORAL DAILY
Refills: 0 | Status: DISCONTINUED | OUTPATIENT
Start: 2024-06-22 | End: 2024-07-01

## 2024-06-22 RX ORDER — ONDANSETRON HYDROCHLORIDE 2 MG/ML
4 INJECTION INTRAMUSCULAR; INTRAVENOUS EVERY 6 HOURS
Refills: 0 | Status: DISCONTINUED | OUTPATIENT
Start: 2024-06-22 | End: 2024-07-10

## 2024-06-22 RX ADMIN — Medication 2 TABLET(S): at 21:49

## 2024-06-22 RX ADMIN — LEVETIRACETAM 250 MILLIGRAM(S): 100 INJECTION INTRAVENOUS at 21:36

## 2024-06-22 RX ADMIN — Medication 100 MILLIGRAM(S): at 00:12

## 2024-06-22 RX ADMIN — LACOSAMIDE 110 MILLIGRAM(S): 100 TABLET, FILM COATED ORAL at 08:12

## 2024-06-22 RX ADMIN — CEFAZOLIN 100 MILLIGRAM(S): 10 INJECTION, POWDER, FOR SOLUTION INTRAVENOUS at 21:11

## 2024-06-22 RX ADMIN — TOPIRAMATE 100 MILLIGRAM(S): 50 TABLET, FILM COATED ORAL at 21:49

## 2024-06-22 RX ADMIN — ATORVASTATIN CALCIUM 80 MILLIGRAM(S): 20 TABLET, FILM COATED ORAL at 21:36

## 2024-06-22 RX ADMIN — LACOSAMIDE 110 MILLIGRAM(S): 100 TABLET, FILM COATED ORAL at 18:43

## 2024-06-22 RX ADMIN — LEVETIRACETAM 500 MILLIGRAM(S): 250 TABLET, FILM COATED ORAL at 00:12

## 2024-06-22 RX ADMIN — POTASSIUM CHLORIDE 20 MILLIEQUIVALENT(S): 600 TABLET, FILM COATED, EXTENDED RELEASE ORAL at 21:34

## 2024-06-22 RX ADMIN — Medication 75 MILLILITER(S): at 18:43

## 2024-06-22 RX ADMIN — Medication 1 APPLICATION(S): at 07:33

## 2024-06-22 RX ADMIN — LABETALOL HYDROCHLORIDE 10 MILLIGRAM(S): 300 TABLET ORAL at 07:34

## 2024-06-22 NOTE — ED ADULT NURSE NOTE - OBJECTIVE STATEMENT
78 yo male PMH DM, HLD, Seizures, CVA, Brain tumor, A&ox3 BIBEMS as SDH transfer.  PT family reports pt has been having generalized weakness x few days, had a fall yesterday unknown LOC, has been incontinent since fall.  PT went to [facility] for symptoms, found to have SDH Right side 8mm with midline shift.  {T was transferred for Neurosurgery.  PT has 20G right ac and 18G left hand.  Pt received tylenol, zosyn and keppra at [facility].  Breathing even and unlabored, abdomen soft nontender, no pedal edema, able to speak in clear sentences, no facial droop, pulses, motor, sensory intact. Pt denies chest pain, palpitations, shortness of breath, headache, visual disturbances, numbness/tingling, fever, chills, diaphoresis,  nausea, vomiting, constipation, diarrhea, or urinary symptoms.

## 2024-06-22 NOTE — PROGRESS NOTE ADULT - SUBJECTIVE AND OBJECTIVE BOX
NSICU PROGRESS NOTE     HPI   78yo male with pmhx SOC for benign ependymoma, seizures, DM, HLD, aspirin non compliant, presented to VS for AMS, gait difficulties and falls for a week, found with acute on chronic R SDH, L SDH, parafalcince acute SDH.     12h events   admitted to nsicu     Exam   Alert, awake, oriented on self, place and year, PERRL, EOMI, gaze midline, face symmetric   RUE 5/5  LUE pronator drift not touching bed  RLE 5/5  LLE 5/5    VITALS:   Vital Signs Last 24 Hrs  T(C): 36.9 (22 Jun 2024 06:15), Max: 37.2 (22 Jun 2024 03:23)  T(F): 98.4 (22 Jun 2024 06:15), Max: 99 (22 Jun 2024 03:23)  HR: 77 (22 Jun 2024 06:15) (72 - 77)  BP: 154/92 (22 Jun 2024 06:15) (154/92 - 172/89)  BP(mean): 117 (22 Jun 2024 06:15) (117 - 117)  RR: 13 (22 Jun 2024 06:15) (13 - 18)  SpO2: 95% (22 Jun 2024 06:15) (95% - 97%)    Parameters below as of 22 Jun 2024 06:15  Patient On (Oxygen Delivery Method): room air      CAPILLARY BLOOD GLUCOSE        I&O's Summary      Respiratory:        LABS:                        11.7   8.10  )-----------( 210      ( 22 Jun 2024 04:43 )             35.8     06-22    135  |  102  |  14  ----------------------------<  163<H>  3.5   |  19<L>  |  1.09        MEDICATION LEVELS:     IVF FLUIDS/MEDICATIONS:   MEDICATIONS  (STANDING):  chlorhexidine 4% Liquid 1 Application(s) Topical daily  lacosamide IVPB 50 milliGRAM(s) IV Intermittent every 12 hours  polyethylene glycol 3350 17 Gram(s) Oral daily  senna 2 Tablet(s) Oral at bedtime    MEDICATIONS  (PRN):  acetaminophen     Tablet .. 650 milliGRAM(s) Oral every 6 hours PRN Temp greater or equal to 38C (100.4F), Mild Pain (1 - 3)       NSICU PROGRESS NOTE     HPI   80yo male with pmhx SOC for benign ependymoma, seizures, DM, HLD, aspirin non compliant, presented to VS for AMS, gait difficulties and falls for a week, found with acute on chronic R SDH, L SDH, parafalcince acute SDH.     12h events   admitted to nsicu   pending OR today     Exam   Alert, awake, oriented on self, place and year, PERRL, EOMI, gaze midline, L ptosis chronic, face symmetric   RUE 5/5  LUE pronator drift not touching bed  RLE 5/5  LLE 5/5    VITALS:   Vital Signs Last 24 Hrs  T(C): 36.9 (22 Jun 2024 06:15), Max: 37.2 (22 Jun 2024 03:23)  T(F): 98.4 (22 Jun 2024 06:15), Max: 99 (22 Jun 2024 03:23)  HR: 77 (22 Jun 2024 06:15) (72 - 77)  BP: 154/92 (22 Jun 2024 06:15) (154/92 - 172/89)  BP(mean): 117 (22 Jun 2024 06:15) (117 - 117)  RR: 13 (22 Jun 2024 06:15) (13 - 18)  SpO2: 95% (22 Jun 2024 06:15) (95% - 97%)    Parameters below as of 22 Jun 2024 06:15  Patient On (Oxygen Delivery Method): room air      CAPILLARY BLOOD GLUCOSE        I&O's Summary      Respiratory:        LABS:                        11.7   8.10  )-----------( 210      ( 22 Jun 2024 04:43 )             35.8     06-22    135  |  102  |  14  ----------------------------<  163<H>  3.5   |  19<L>  |  1.09        MEDICATION LEVELS:     IVF FLUIDS/MEDICATIONS:   MEDICATIONS  (STANDING):  chlorhexidine 4% Liquid 1 Application(s) Topical daily  lacosamide IVPB 50 milliGRAM(s) IV Intermittent every 12 hours  polyethylene glycol 3350 17 Gram(s) Oral daily  senna 2 Tablet(s) Oral at bedtime    MEDICATIONS  (PRN):  acetaminophen     Tablet .. 650 milliGRAM(s) Oral every 6 hours PRN Temp greater or equal to 38C (100.4F), Mild Pain (1 - 3)       NSICU PROGRESS NOTE     HPI   78yo male with pmhx SOC for benign ependymoma, seizures, DM, HLD, aspirin non compliant, presented to VS for AMS, gait difficulties and falls for a week, found with acute on chronic R SDH, L SDH, parafalcince acute SDH.     12h events   admitted to nsicu   pending OR today     Exam   Alert, awake, oriented on self, place and year, PERRL, EOMI, gaze midline, L ptosis chronic, face symmetric   RUE 5/5  LUE pronator drift not touching bed  RLE flexors 4/5, extensors 5/5  LLE flexors 4/5, extensors 5/5    VITALS:   Vital Signs Last 24 Hrs  T(C): 36.9 (22 Jun 2024 06:15), Max: 37.2 (22 Jun 2024 03:23)  T(F): 98.4 (22 Jun 2024 06:15), Max: 99 (22 Jun 2024 03:23)  HR: 77 (22 Jun 2024 06:15) (72 - 77)  BP: 154/92 (22 Jun 2024 06:15) (154/92 - 172/89)  BP(mean): 117 (22 Jun 2024 06:15) (117 - 117)  RR: 13 (22 Jun 2024 06:15) (13 - 18)  SpO2: 95% (22 Jun 2024 06:15) (95% - 97%)    Parameters below as of 22 Jun 2024 06:15  Patient On (Oxygen Delivery Method): room air      CAPILLARY BLOOD GLUCOSE        I&O's Summary      Respiratory:        LABS:                        11.7   8.10  )-----------( 210      ( 22 Jun 2024 04:43 )             35.8     06-22    135  |  102  |  14  ----------------------------<  163<H>  3.5   |  19<L>  |  1.09        MEDICATION LEVELS:     IVF FLUIDS/MEDICATIONS:   MEDICATIONS  (STANDING):  chlorhexidine 4% Liquid 1 Application(s) Topical daily  lacosamide IVPB 50 milliGRAM(s) IV Intermittent every 12 hours  polyethylene glycol 3350 17 Gram(s) Oral daily  senna 2 Tablet(s) Oral at bedtime    MEDICATIONS  (PRN):  acetaminophen     Tablet .. 650 milliGRAM(s) Oral every 6 hours PRN Temp greater or equal to 38C (100.4F), Mild Pain (1 - 3)

## 2024-06-22 NOTE — CHART NOTE - NSCHARTNOTEFT_GEN_A_CORE
CAPRINI SCORE [CLOT] Score on Admission for     AGE RELATED RISK FACTORS                                                       MOBILITY RELATED FACTORS  [ ] Age 41-60 years                                            (1 Point)                  [ ] Bed rest                                                        (1 Point)  [ ] Age: 61-74 years                                           (2 Points)                 [ ] Plaster cast                                                   (2 Points)  [ xxxxx] Age= 75 years                                              (3 Points)                 [ ] Bed bound for more than 72 hours                 (2 Points)    DISEASE RELATED RISK FACTORS                                               GENDER SPECIFIC FACTORS  [ ] Edema in the lower extremities                       (1 Point)                  [ ] Pregnancy                                                     (1 Point)  [ ] Varicose veins                                               (1 Point)                  [ ] Post-partum < 6 weeks                                   (1 Point)             [ ] BMI > 25 Kg/m2                                            (1 Point)                  [ ] Hormonal therapy  or oral contraception          (1 Point)                 [ ] Sepsis (in the previous month)                        (1 Point)                  [ ] History of pregnancy complications                 (1 point)  [ ] Pneumonia or serious lung disease                                               [ ] Unexplained or recurrent                     (1 Point)           (in the previous month)                               (1 Point)  [ ] Abnormal pulmonary function test                     (1 Point)                 SURGERY RELATED RISK FACTORS (include planned surgeries)  [ ] Acute myocardial infarction                              (1 Point)                 [ ]  Section                                             (1 Point)  [ ] Congestive heart failure (in the previous month)  (1 Point)         [ ] Minor surgery                                                  (1 Point)   [ ] Inflammatory bowel disease                             (1 Point)                 [ ] Arthroscopic surgery                                        (2 Points)  [ ] Central venous access                                      (2 Points)                [ ] General surgery lasting more than 45 minutes   (2 Points)       [ ] Stroke (in the previous month)                          (5 Points)               [ ] Elective arthroplasty                                         (5 Points)            [ ] current or past malignancy                              (2 Points)                                                                                                       HEMATOLOGY RELATED FACTORS                                                 TRAUMA RELATED RISK FACTORS  [ ] Prior episodes of VTE                                     (3 Points)                [ ] Fracture of the hip, pelvis, or leg                       (5 Points)  [ ] Positive family history for VTE                         (3 Points)                 [ ] Acute spinal cord injury (in the previous month)  (5 Points)  [ ] Prothrombin 54584 A                                     (3 Points)                 [ ] Paralysis  (less than 1 month)                             (5 Points)  [ ] Factor V Leiden                                             (3 Points)                  [ ] Multiple Trauma within 1 month                        (5 Points)  [ ] Lupus anticoagulants                                     (3 Points)                                                           [ ] Anticardiolipin antibodies                               (3 Points)                                                       [ ] High homocysteine in the blood                      (3 Points)                                             [ ] Other congenital or acquired thrombophilia      (3 Points)                                                [ ] Heparin induced thrombocytopenia                  (3 Points)                                          Total Score [      3    ]    Risk:  Very low 0   Low 1 to 2   Moderate 3 to 4   High =5       VTE Prophylasix Recommednations:  [ ] mechanical pneumatic compression devices                                      [ ] contraindicated: _____________________  [ ] chemo prophylasix                                                                                   [ ] contraindicated _____________________    **** HIGH LIKELIHOOD DVT PRESENT ON ADMISSION  [ ] (please order LE dopplers within 24 hours of admission)

## 2024-06-22 NOTE — ED PROVIDER NOTE - PHYSICAL EXAMINATION
General: well-appearing, no acute distress  Head: Atraumatic, normocephalic  Eyes: EOM grossly in tact, no scleral icterus, no discharge  Neurology: A&Ox 3  Respiratory: normal respiratory effort  CV: Extremities warm and well perfused  Extremities: no deformities  Skin: warm and dry. No rashes

## 2024-06-22 NOTE — H&P ADULT - ATTENDING COMMENTS
Pt seen and examined with family at bedside.  Agree with above resident evaluation and assessment.  Pt is alert and conversant, BEST well with no drift.  Pt with one fall that the family is aware of.  Pt presents with gait difficulty and said that he has had some headaches in the past week. 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  There is acute blood in the interhemispheric fissure.  Plan is for right george holes and drainage of SDH and likely postoperative MMAE in the attempt to reduce risk of recurrence.  R/B/A discussed by team. Pt and family agree to proceed,

## 2024-06-22 NOTE — PROGRESS NOTE ADULT - ASSESSMENT
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     PLAN     N  # R acute on chronic SDH, L acute SDH, R parafalcine acute SDH with mass effect from the R with global atrophy   MMAE at some point per NS   vimpat per NS  #Pain: Tylenol and oxycodone  #Activity: [] OOB as tolerated [] Bedrest [] PT [] OT [] PMNR    CV   #-160mmHg  #TTE     P   #    R   #Strict I+Os   #IVL   #3%     GI   #Diet:   #Senna miralax  Last BM PTA     ID  afebrile    E  Goal euglycemia (-180)  #A1c     H  #DVT ppx:   SCDs  Chemoppx held   LED pending       #CODE STATUS: FULL CODE     #DISPOSITION: ICU    #CRITICAL  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     PLAN     N  # R acute on chronic SDH, L acute SDH, R parafalcine acute SDH with mass effect from the R with global atrophy   MMAE at some point per NS   vimpat per NS  #Pain: Tylenol and oxycodone  #Activity: [] OOB as tolerated [x] Bedrest [] PT [] OT [] PMNR    CV   #-160mmHg  #TTE     P   #RA    R   #Strict I+Os   #IVF    GI   #Diet: npo   #Senna miralax  Last BM PTA     ID  afebrile    E  Goal euglycemia (-180)  #A1c pending     H  #DVT ppx:   SCDs  Chemoppx held   LED pending       #CODE STATUS: FULL CODE     #DISPOSITION: ICU    #CRITICAL

## 2024-06-22 NOTE — BRIEF OPERATIVE NOTE - NSICDXBRIEFPROCEDURE_GEN_ALL_CORE_FT
PROCEDURES:  Craniotomy, emergent, for subdural hematoma evacuation 22-Jun-2024 17:42:23  Annalisa Joshi

## 2024-06-22 NOTE — H&P ADULT - HISTORY OF PRESENT ILLNESS
Karly Anderson   79M Hx SOC for tumor (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 No AC/AP, coags/plt wnl  Exam: AOx3, PERRL, EOMI, no facial, ?/very slight L drift, BEST 5/5, gait testing deferred

## 2024-06-22 NOTE — ED PROVIDER NOTE - ATTENDING CONTRIBUTION TO CARE
I, Shmuel Almeida, performed a history and physical exam of the patient and discussed their management with the resident and/or advanced care provider. I reviewed the resident and/or advanced care provider's note and agree with the documented findings and plan of care. I was present and available for all procedures.    80 y/o M w/ PMH of DM, HLD, seizures, brain tumor s/p resection presented to [facility] initially with generalized weakness.  Reports since yesterday has been feeling weak and having difficulty walking as a result. He had a fall yesterday. Pt states it was mechanical, but family states he complained of weakness. unknown LOC but was responsive immediately after. Has been urinating more frequently during this time also. Reports has been having a cough for the past few days as well. Wife is concerned that pt seems to be a little confused as well. No fever, chills, dizziness or chest pain. Pt transferred for a SDH found on CT imaging.     labs and CXR w/ no emergent findings. had walked pt and found him w/ unsteady gait. daughter Kaykay (ER physician in [place] [phone]) called and provided collateral information. reported that pt has been falling a lot at home. has been occurring for at least the past week, but suspects has been longer and pt has not been telling family. CTH ordered based on this information. CT w/ b/l SDH, acute on chronic. informed pt. reports he has been falling, but denies head strike. Confirms no AC use. Updated daughter and wife of findings and need for NSGY eval. Spoke w/ NSGY at [facility]. Pt accepted ED to ED under Dr. Stockton. Recommded AEDs be given, pt's home evening dose of keppra and topamax ordered. Pt Tier 2 transfer to [facility]. Paperwork signed.    Well appearing and in NAD, head normal appearing atraumatic, trachea midline, no respiratory distress, lungs cta bilaterally, rrr no murmurs, soft NT ND abdomen, no visible extremity deformities, Alert and oriented, non focal neuro exam, skin warm and dry, normal affect and mood, no leg swelling, calf ttp or jvd    Patient presenting with subdural with shift otherwise neuro intact exam mild and otherwise patient ANO 3 will evaluate with neurosurgery with labs including play response unit reverse aspirin if needed anticipate admission to neurosurgical service for further management discussed with daughter at bedside who is discussing with family agreed with plan, repeat CT head

## 2024-06-22 NOTE — ED PROVIDER NOTE - CLINICAL SUMMARY MEDICAL DECISION MAKING FREE TEXT BOX
80 y/o M w/ PMH of DM, HLD, seizures, brain tumor s/p resection presented to [facility] initially with generalized weakness. known SDH found at S. neurosurgery aware of pt. plan for likely surgery and admission under neurosurgical team. will send pre-op labs. ICH order set ordered. TBA,. 80 y/o M w/ PMH of DM, HLD, seizures, brain tumor s/p resection presented to [facility] initially with generalized weakness. known SDH found at S. neurosurgery aware of pt. plan for likely surgery and admission under neurosurgical team. will send pre-op labs. ICH order set ordered. TBA,.    pettet attending- see attending attestation for my mdm

## 2024-06-22 NOTE — ED ADULT NURSE NOTE - NSFALLHARMRISKINTERV_ED_ALL_ED

## 2024-06-22 NOTE — ED ADULT NURSE NOTE - NS ED NURSE DISCH DISPOSITION
"Chief Complaint   Patient presents with     Physical       Initial BP (!) 158/106   Pulse 83   Temp 97.7  F (36.5  C) (Tympanic)   Ht 1.581 m (5' 2.25\")   Wt 59 kg (130 lb)   SpO2 99%   BMI 23.59 kg/m   Estimated body mass index is 23.59 kg/m  as calculated from the following:    Height as of this encounter: 1.581 m (5' 2.25\").    Weight as of this encounter: 59 kg (130 lb).  Medication Reconciliation: complete       SAAD Fabian MA    " Admitted

## 2024-06-22 NOTE — H&P ADULT - ASSESSMENT
Karly Anderson   79M Hx SOC for tumor (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 No AC/AP, coags/plt wnl  Exam: AOx3, PERRL, EOMI, no facial, ?/very slight L drift, BEST 5/5, gait testing deferred  -interval CTH, ARU  -add on for R george holes vs mini craniotomy in AM, staff with Dr. tim for consideration of MMAE   -vimpat load 50 bid x7d  -hold AC/AP, notify provider for -180

## 2024-06-22 NOTE — ED PROVIDER NOTE - NSICDXPASTMEDICALHX_GEN_ALL_CORE_FT
PAST MEDICAL HISTORY:  Brain tumor     DM2 (diabetes mellitus, type 2)     HTN (hypertension)     Pulmonary embolism     Seizures

## 2024-06-22 NOTE — PRE-ANESTHESIA EVALUATION ADULT - NSANTHPMHFT_GEN_ALL_CORE
78 yo M w/ PMHx of benign ependymoma s/p SOC, seizures, DM, HLD was told to start ASA by cardiologist but likely not taking presented to [facility] for confusion/difficulty ambulating and falls since last week xfer for CTH w/ 1.9cm R mixed density convexity SDH, and smaller L convexity and interhemispheric aSDHs. Admitted to NSICU w/ acute on chronic SDH's. Now to OR for [place] Holes vs. Mini-craniotomy.    Denies active CP/SOB/Orthopnea  Denies hx of MI/CHF

## 2024-06-23 LAB
A1C WITH ESTIMATED AVERAGE GLUCOSE RESULT: 6.4 % — HIGH (ref 4–5.6)
ANION GAP SERPL CALC-SCNC: 13 MMOL/L — SIGNIFICANT CHANGE UP (ref 5–17)
BUN SERPL-MCNC: 17 MG/DL — SIGNIFICANT CHANGE UP (ref 7–23)
CALCIUM SERPL-MCNC: 8.4 MG/DL — SIGNIFICANT CHANGE UP (ref 8.4–10.5)
CHLORIDE SERPL-SCNC: 103 MMOL/L — SIGNIFICANT CHANGE UP (ref 96–108)
CHOLEST SERPL-MCNC: 243 MG/DL — HIGH
CO2 SERPL-SCNC: 20 MMOL/L — LOW (ref 22–31)
CREAT SERPL-MCNC: 1.09 MG/DL — SIGNIFICANT CHANGE UP (ref 0.5–1.3)
CULTURE RESULTS: SIGNIFICANT CHANGE UP
EGFR: 69 ML/MIN/1.73M2 — SIGNIFICANT CHANGE UP
ESTIMATED AVERAGE GLUCOSE: 137 MG/DL — HIGH (ref 68–114)
GLUCOSE BLDC GLUCOMTR-MCNC: 108 MG/DL — HIGH (ref 70–99)
GLUCOSE BLDC GLUCOMTR-MCNC: 115 MG/DL — HIGH (ref 70–99)
GLUCOSE BLDC GLUCOMTR-MCNC: 124 MG/DL — HIGH (ref 70–99)
GLUCOSE BLDC GLUCOMTR-MCNC: 179 MG/DL — HIGH (ref 70–99)
GLUCOSE SERPL-MCNC: 134 MG/DL — HIGH (ref 70–99)
HDLC SERPL-MCNC: 54 MG/DL — SIGNIFICANT CHANGE UP
LIPID PNL WITH DIRECT LDL SERPL: 175 MG/DL — HIGH
MAGNESIUM SERPL-MCNC: 2.2 MG/DL — SIGNIFICANT CHANGE UP (ref 1.6–2.6)
NON HDL CHOLESTEROL: 190 MG/DL — HIGH
PHOSPHATE SERPL-MCNC: 4.8 MG/DL — HIGH (ref 2.5–4.5)
POTASSIUM SERPL-MCNC: 4 MMOL/L — SIGNIFICANT CHANGE UP (ref 3.5–5.3)
POTASSIUM SERPL-SCNC: 4 MMOL/L — SIGNIFICANT CHANGE UP (ref 3.5–5.3)
SODIUM SERPL-SCNC: 136 MMOL/L — SIGNIFICANT CHANGE UP (ref 135–145)
SPECIMEN SOURCE: SIGNIFICANT CHANGE UP
TRIGL SERPL-MCNC: 85 MG/DL — SIGNIFICANT CHANGE UP
TSH SERPL-MCNC: 1.69 UIU/ML — SIGNIFICANT CHANGE UP (ref 0.27–4.2)

## 2024-06-23 PROCEDURE — 70450 CT HEAD/BRAIN W/O DYE: CPT | Mod: 26

## 2024-06-23 PROCEDURE — 99291 CRITICAL CARE FIRST HOUR: CPT

## 2024-06-23 RX ORDER — DEXTROSE MONOHYDRATE AND SODIUM CHLORIDE 5; .3 G/100ML; G/100ML
1000 INJECTION, SOLUTION INTRAVENOUS
Refills: 0 | Status: DISCONTINUED | OUTPATIENT
Start: 2024-06-23 | End: 2024-06-24

## 2024-06-23 RX ORDER — NEOMYCIN/POLYMYXIN B/DEXAMETHA 3.5-10K-.1
1 OINTMENT (GRAM) OPHTHALMIC (EYE) EVERY 8 HOURS
Refills: 0 | Status: DISCONTINUED | OUTPATIENT
Start: 2024-06-23 | End: 2024-07-08

## 2024-06-23 RX ORDER — LABETALOL HYDROCHLORIDE 300 MG/1
10 TABLET ORAL ONCE
Refills: 0 | Status: COMPLETED | OUTPATIENT
Start: 2024-06-23 | End: 2024-06-23

## 2024-06-23 RX ORDER — CAPTOPRIL 25 MG
12.5 TABLET ORAL EVERY 8 HOURS
Refills: 0 | Status: DISCONTINUED | OUTPATIENT
Start: 2024-06-23 | End: 2024-06-26

## 2024-06-23 RX ORDER — PILOCARPINE HCL 4 %
1 DROPS OPHTHALMIC (EYE)
Refills: 0 | Status: DISCONTINUED | OUTPATIENT
Start: 2024-06-23 | End: 2024-07-10

## 2024-06-23 RX ADMIN — TOPIRAMATE 100 MILLIGRAM(S): 50 TABLET, FILM COATED ORAL at 05:29

## 2024-06-23 RX ADMIN — TOPIRAMATE 100 MILLIGRAM(S): 50 TABLET, FILM COATED ORAL at 17:04

## 2024-06-23 RX ADMIN — LACOSAMIDE 110 MILLIGRAM(S): 100 TABLET, FILM COATED ORAL at 05:42

## 2024-06-23 RX ADMIN — LACOSAMIDE 110 MILLIGRAM(S): 100 TABLET, FILM COATED ORAL at 17:04

## 2024-06-23 RX ADMIN — LABETALOL HYDROCHLORIDE 10 MILLIGRAM(S): 300 TABLET ORAL at 14:29

## 2024-06-23 RX ADMIN — Medication 75 MILLILITER(S): at 07:11

## 2024-06-23 RX ADMIN — Medication 1 DROP(S): at 13:09

## 2024-06-23 RX ADMIN — ATORVASTATIN CALCIUM 80 MILLIGRAM(S): 20 TABLET, FILM COATED ORAL at 21:49

## 2024-06-23 RX ADMIN — Medication 1 APPLICATION(S): at 11:16

## 2024-06-23 RX ADMIN — INSULIN LISPRO 2: 100 INJECTION, SOLUTION SUBCUTANEOUS at 17:07

## 2024-06-23 RX ADMIN — CEFAZOLIN 100 MILLIGRAM(S): 10 INJECTION, POWDER, FOR SOLUTION INTRAVENOUS at 05:24

## 2024-06-23 RX ADMIN — Medication 1 DROP(S): at 10:48

## 2024-06-23 RX ADMIN — LEVETIRACETAM 250 MILLIGRAM(S): 100 INJECTION INTRAVENOUS at 05:29

## 2024-06-23 RX ADMIN — LABETALOL HYDROCHLORIDE 10 MILLIGRAM(S): 300 TABLET ORAL at 08:25

## 2024-06-23 RX ADMIN — LEVETIRACETAM 250 MILLIGRAM(S): 100 INJECTION INTRAVENOUS at 17:03

## 2024-06-23 RX ADMIN — Medication 2 TABLET(S): at 21:49

## 2024-06-23 RX ADMIN — Medication 1 DROP(S): at 17:03

## 2024-06-23 RX ADMIN — Medication 1 DROP(S): at 21:49

## 2024-06-23 RX ADMIN — Medication 12.5 MILLIGRAM(S): at 21:49

## 2024-06-23 RX ADMIN — Medication 12.5 MILLIGRAM(S): at 14:29

## 2024-06-23 NOTE — PROGRESS NOTE ADULT - SUBJECTIVE AND OBJECTIVE BOX
NSICU PROGRESS NOTE     HPI   80yo male with pmhx SOC for benign ependymoma, seizures, DM, HLD, aspirin non compliant, presented to VS for AMS, gait difficulties and falls for a week, found with acute on chronic R SDH, L SDH, parafalcince acute SDH.     12h events   admitted to nsicu   06/22: R crani for subdural hematoma evac    Exam   Alert, awake, oriented on self, place and year, PERRL, EOMI, gaze midline, L ptosis chronic, face symmetric   RUE 5/5  LUE pronator drift not touching bed  RLE flexors 4/5, extensors 5/5  LLE flexors 4/5, extensors 5/5  RESP: No respiratory distress, no use of accessory muscles; CTA b/l, no WRR  CV: RRR, +S1S2, no MRG; no JVD; no peripheral edema  GI: Soft, NT, ND, no rebound, no guarding; no palpable masses; no hepatosplenomegaly; no hernia palpated    ICU Vital Signs Last 24 Hrs  T(C): 36.5 (22 Jun 2024 23:00), Max: 37.2 (22 Jun 2024 03:23)  T(F): 97.7 (22 Jun 2024 23:00), Max: 99 (22 Jun 2024 03:23)  HR: 67 (23 Jun 2024 00:00) (66 - 83)  BP: 167/72 (22 Jun 2024 14:00) (151/72 - 172/89)  BP(mean): 103 (22 Jun 2024 14:00) (102 - 117)  ABP: 117/50 (23 Jun 2024 00:00) (117/50 - 171/83)  ABP(mean): 70 (23 Jun 2024 00:00) (70 - 114)  RR: 14 (23 Jun 2024 00:00) (12 - 23)  SpO2: 98% (23 Jun 2024 00:00) (92% - 100%)    O2 Parameters below as of 22 Jun 2024 23:00  Patient On (Oxygen Delivery Method): nasal cannula  O2 Flow (L/min): 2      I&O's Summary    21 Jun 2024 07:01  -  22 Jun 2024 07:00  --------------------------------------------------------  IN: 0 mL / OUT: 600 mL / NET: -600 mL    22 Jun 2024 07:01  -  23 Jun 2024 00:41  --------------------------------------------------------  IN: 675 mL / OUT: 800 mL / NET: -125 mL                          11.1   9.45  )-----------( 192      ( 22 Jun 2024 19:37 )             35.0   06-22    130<L>  |  98  |  17  ----------------------------<  169<H>  3.9   |  19<L>  |  1.09    Ca    8.4      22 Jun 2024 19:37  Phos  4.5     06-22  Mg     2.1     06-22    TPro  7.3  /  Alb  4.0  /  TBili  0.6  /  DBili  x   /  AST  12  /  ALT  9<L>  /  AlkPhos  54  06-22          MEDICATION LEVELS:     IVF FLUIDS/MEDICATIONS:   MEDICATIONS  (STANDING):  chlorhexidine 4% Liquid 1 Application(s) Topical daily  lacosamide IVPB 50 milliGRAM(s) IV Intermittent every 12 hours  polyethylene glycol 3350 17 Gram(s) Oral daily  senna 2 Tablet(s) Oral at bedtime    MEDICATIONS  (PRN):  acetaminophen     Tablet .. 650 milliGRAM(s) Oral every 6 hours PRN Temp greater or equal to 38C (100.4F), Mild Pain (1 - 3)

## 2024-06-23 NOTE — PROGRESS NOTE ADULT - ASSESSMENT
Karly Anderson   79M Hx SOC for tumor (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 No AC/AP, coags/plt wnl  Exam: AOx3, PERRL, EOMI, no facial, ?/very slight L drift, BEST 5/5, gait testing deferred    Now s/p R crani for SDH evacuation     CTH in AM   keep NPO until AM   MMAE w/ TL Mon 6/24 vs Tuesday   LED-p   TTE-p

## 2024-06-23 NOTE — PROGRESS NOTE ADULT - ASSESSMENT
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     PLAN     N  # R acute on chronic SDH, L acute SDH, R parafalcine acute SDH with mass effect from the R with global atrophy   s/p right crani for SDH evac, 1 SD drain  MMAE at some point per NS   vimpat per NS  #Pain: Tylenol and oxycodone  #Activity: [] OOB as tolerated [x] Bedrest [] PT [] OT [] PMNR    CV   #-160mmHg  #TTE     P   #RA    R   #Strict I+Os   #IVF    GI   #Diet: npo   #Senna miralax  Last BM PTA     ID  afebrile    E  Goal euglycemia (-180)  #A1c pending     H  #DVT ppx:   SCDs  Chemoppx held   LED pending       #CODE STATUS: FULL CODE     #DISPOSITION: ICU    #CRITICAL  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     PLAN     N  NeuroQ2, VS Q2  # R acute on chronic SDH, L acute SDH, R parafalcine acute SDH with mass effect from the R with global atrophy   s/p right crani for SDH evac, 1 SD drain  MMAE at some point per NS   vimpat per NS  #Pain: Tylenol and oxycodone  #Activity: [] OOB as tolerated [x] Bedrest [] PT [] OT [] PMNR    CV   #-160mmHg  #TTE     P   #RA    R   #Strict I+Os   #IVF    GI   #Diet: npo for MMAE tomorrow   #Senna miralax  Last BM PTA     ID  afebrile    E  Goal euglycemia (-180)  #A1c pending     H  #DVT ppx:   SCDs  Chemoppx held   LED pending       #CODE STATUS: FULL CODE     #DISPOSITION: ICU    #CRITICAL  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     PLAN     N  NeuroQ2, VS Q2  rCTH: acute heme in the post-op bed  # R acute on chronic SDH, L acute SDH, R parafalcine acute SDH with mass effect from the R with global atrophy   s/p right crani for SDH evac, 1 SD drain  MMAE at some point per NS   vimpat per NS  #Pain: Tylenol and oxycodone  #Activity: [] OOB as tolerated [x] Bedrest [] PT [] OT [] PMNR    CV   #-160mmHg  #TTE     P   #RA    R   #Strict I+Os   #IVF    GI   #Diet: npo for MMAE tomorrow   #Senna miralax  Last BM PTA     ID  afebrile    E  Goal euglycemia (-180)  #A1c pending     H  #DVT ppx:   SCDs  Chemoppx held   LED pending       #CODE STATUS: FULL CODE     #DISPOSITION: ICU    #CRITICAL

## 2024-06-23 NOTE — PROGRESS NOTE ADULT - SUBJECTIVE AND OBJECTIVE BOX
Patient seen and examined at bedside.    --Anticoagulation--    T(C): 36.5 (06-22-24 @ 23:00), Max: 37.2 (06-22-24 @ 03:23)  HR: 67 (06-23-24 @ 00:00) (66 - 83)  BP: 167/72 (06-22-24 @ 14:00) (151/72 - 172/89)  RR: 14 (06-23-24 @ 00:00) (12 - 23)  SpO2: 98% (06-23-24 @ 00:00) (92% - 100%)  Wt(kg): --    Exam: AOx3, EOMI, no facial, midline gaze, L: eye ptosis (baseline), BEST 5/5

## 2024-06-23 NOTE — PROGRESS NOTE ADULT - SUBJECTIVE AND OBJECTIVE BOX
NSCU ATTENDING -- ADDITIONAL PROGRESS NOTE    Nighttime rounds were performed -- please refer to earlier Progress Note for HPI details.    T(C): 38 (06-23-24 @ 19:00), Max: 38 (06-23-24 @ 19:00)  HR: 90 (06-23-24 @ 22:00) (66 - 92)  BP: --  RR: 20 (06-23-24 @ 22:00) (12 - 22)  SpO2: 93% (06-23-24 @ 22:00) (93% - 99%)  Wt(kg): --    Relevant labwork and imaging reviewed.    pre-op for MMA embo in AM.  NPO after midnight, send labs.  CT brain in AM.  restart IVF when NPO.

## 2024-06-24 ENCOUNTER — RESULT REVIEW (OUTPATIENT)
Age: 79
End: 2024-06-24

## 2024-06-24 ENCOUNTER — APPOINTMENT (OUTPATIENT)
Dept: NEUROSURGERY | Facility: HOSPITAL | Age: 79
End: 2024-06-24

## 2024-06-24 LAB
ANION GAP SERPL CALC-SCNC: 14 MMOL/L — SIGNIFICANT CHANGE UP (ref 5–17)
ANION GAP SERPL CALC-SCNC: 16 MMOL/L — SIGNIFICANT CHANGE UP (ref 5–17)
APTT BLD: 27.9 SEC — SIGNIFICANT CHANGE UP (ref 24.5–35.6)
BUN SERPL-MCNC: 15 MG/DL — SIGNIFICANT CHANGE UP (ref 7–23)
BUN SERPL-MCNC: 18 MG/DL — SIGNIFICANT CHANGE UP (ref 7–23)
CALCIUM SERPL-MCNC: 8.5 MG/DL — SIGNIFICANT CHANGE UP (ref 8.4–10.5)
CALCIUM SERPL-MCNC: 8.5 MG/DL — SIGNIFICANT CHANGE UP (ref 8.4–10.5)
CHLORIDE SERPL-SCNC: 101 MMOL/L — SIGNIFICANT CHANGE UP (ref 96–108)
CHLORIDE SERPL-SCNC: 99 MMOL/L — SIGNIFICANT CHANGE UP (ref 96–108)
CO2 SERPL-SCNC: 16 MMOL/L — LOW (ref 22–31)
CO2 SERPL-SCNC: 19 MMOL/L — LOW (ref 22–31)
CREAT SERPL-MCNC: 0.96 MG/DL — SIGNIFICANT CHANGE UP (ref 0.5–1.3)
CREAT SERPL-MCNC: 0.98 MG/DL — SIGNIFICANT CHANGE UP (ref 0.5–1.3)
EGFR: 78 ML/MIN/1.73M2 — SIGNIFICANT CHANGE UP
EGFR: 80 ML/MIN/1.73M2 — SIGNIFICANT CHANGE UP
GLUCOSE BLDC GLUCOMTR-MCNC: 119 MG/DL — HIGH (ref 70–99)
GLUCOSE BLDC GLUCOMTR-MCNC: 119 MG/DL — HIGH (ref 70–99)
GLUCOSE BLDC GLUCOMTR-MCNC: 132 MG/DL — HIGH (ref 70–99)
GLUCOSE BLDC GLUCOMTR-MCNC: 148 MG/DL — HIGH (ref 70–99)
GLUCOSE BLDC GLUCOMTR-MCNC: 149 MG/DL — HIGH (ref 70–99)
GLUCOSE SERPL-MCNC: 138 MG/DL — HIGH (ref 70–99)
GLUCOSE SERPL-MCNC: 166 MG/DL — HIGH (ref 70–99)
HCT VFR BLD CALC: 32.7 % — LOW (ref 39–50)
HCT VFR BLD CALC: 35.6 % — LOW (ref 39–50)
HGB BLD-MCNC: 11 G/DL — LOW (ref 13–17)
HGB BLD-MCNC: 11 G/DL — LOW (ref 13–17)
INR BLD: 1.1 RATIO — SIGNIFICANT CHANGE UP (ref 0.85–1.18)
MAGNESIUM SERPL-MCNC: 2.3 MG/DL — SIGNIFICANT CHANGE UP (ref 1.6–2.6)
MAGNESIUM SERPL-MCNC: 2.3 MG/DL — SIGNIFICANT CHANGE UP (ref 1.6–2.6)
MCHC RBC-ENTMCNC: 30.9 GM/DL — LOW (ref 32–36)
MCHC RBC-ENTMCNC: 31.1 PG — SIGNIFICANT CHANGE UP (ref 27–34)
MCHC RBC-ENTMCNC: 31.8 PG — SIGNIFICANT CHANGE UP (ref 27–34)
MCHC RBC-ENTMCNC: 33.6 GM/DL — SIGNIFICANT CHANGE UP (ref 32–36)
MCV RBC AUTO: 100.6 FL — HIGH (ref 80–100)
MCV RBC AUTO: 94.5 FL — SIGNIFICANT CHANGE UP (ref 80–100)
MRSA PCR RESULT.: SIGNIFICANT CHANGE UP
NRBC # BLD: 0 /100 WBCS — SIGNIFICANT CHANGE UP (ref 0–0)
NRBC # BLD: 0 /100 WBCS — SIGNIFICANT CHANGE UP (ref 0–0)
OSMOLALITY SERPL: 276 MOSMOL/KG — LOW (ref 280–301)
OSMOLALITY UR: 565 MOS/KG — SIGNIFICANT CHANGE UP (ref 300–900)
PHOSPHATE SERPL-MCNC: 1.6 MG/DL — LOW (ref 2.5–4.5)
PHOSPHATE SERPL-MCNC: 2.8 MG/DL — SIGNIFICANT CHANGE UP (ref 2.5–4.5)
PLATELET # BLD AUTO: 174 K/UL — SIGNIFICANT CHANGE UP (ref 150–400)
PLATELET # BLD AUTO: 175 K/UL — SIGNIFICANT CHANGE UP (ref 150–400)
POTASSIUM SERPL-MCNC: 3.8 MMOL/L — SIGNIFICANT CHANGE UP (ref 3.5–5.3)
POTASSIUM SERPL-MCNC: 4.1 MMOL/L — SIGNIFICANT CHANGE UP (ref 3.5–5.3)
POTASSIUM SERPL-SCNC: 3.8 MMOL/L — SIGNIFICANT CHANGE UP (ref 3.5–5.3)
POTASSIUM SERPL-SCNC: 4.1 MMOL/L — SIGNIFICANT CHANGE UP (ref 3.5–5.3)
PROTHROM AB SERPL-ACNC: 12.1 SEC — SIGNIFICANT CHANGE UP (ref 9.5–13)
RBC # BLD: 3.46 M/UL — LOW (ref 4.2–5.8)
RBC # BLD: 3.54 M/UL — LOW (ref 4.2–5.8)
RBC # FLD: 11.9 % — SIGNIFICANT CHANGE UP (ref 10.3–14.5)
RBC # FLD: 12 % — SIGNIFICANT CHANGE UP (ref 10.3–14.5)
S AUREUS DNA NOSE QL NAA+PROBE: SIGNIFICANT CHANGE UP
SODIUM SERPL-SCNC: 132 MMOL/L — LOW (ref 135–145)
SODIUM SERPL-SCNC: 133 MMOL/L — LOW (ref 135–145)
SODIUM UR-SCNC: 136 MMOL/L — SIGNIFICANT CHANGE UP
TROPONIN T, HIGH SENSITIVITY RESULT: 46 NG/L — SIGNIFICANT CHANGE UP (ref 0–51)
WBC # BLD: 11.6 K/UL — HIGH (ref 3.8–10.5)
WBC # BLD: 13.38 K/UL — HIGH (ref 3.8–10.5)
WBC # FLD AUTO: 11.6 K/UL — HIGH (ref 3.8–10.5)
WBC # FLD AUTO: 13.38 K/UL — HIGH (ref 3.8–10.5)

## 2024-06-24 PROCEDURE — 70450 CT HEAD/BRAIN W/O DYE: CPT | Mod: 26

## 2024-06-24 PROCEDURE — 36224 PLACE CATH CAROTD ART: CPT | Mod: 58,RT

## 2024-06-24 PROCEDURE — 75898 FOLLOW-UP ANGIOGRAPHY: CPT | Mod: 26

## 2024-06-24 PROCEDURE — 93306 TTE W/DOPPLER COMPLETE: CPT | Mod: 26

## 2024-06-24 PROCEDURE — 99291 CRITICAL CARE FIRST HOUR: CPT

## 2024-06-24 PROCEDURE — 36227 PLACE CATH XTRNL CAROTID: CPT | Mod: 58,RT

## 2024-06-24 PROCEDURE — 75894 X-RAYS TRANSCATH THERAPY: CPT | Mod: 26

## 2024-06-24 PROCEDURE — 93010 ELECTROCARDIOGRAM REPORT: CPT

## 2024-06-24 PROCEDURE — 61624 TCAT PERM OCCLS/EMBOLJ CNS: CPT | Mod: 58

## 2024-06-24 PROCEDURE — 76376 3D RENDER W/INTRP POSTPROCES: CPT | Mod: 26

## 2024-06-24 RX ORDER — SODIUM CHLORIDE 0.9 % (FLUSH) 0.9 %
500 SYRINGE (ML) INJECTION ONCE
Refills: 0 | Status: COMPLETED | OUTPATIENT
Start: 2024-06-24 | End: 2024-06-24

## 2024-06-24 RX ORDER — AMIODARONE HYDROCHLORIDE 50 MG/ML
150 INJECTION, SOLUTION INTRAVENOUS ONCE
Refills: 0 | Status: DISCONTINUED | OUTPATIENT
Start: 2024-06-24 | End: 2024-06-25

## 2024-06-24 RX ORDER — POTASSIUM PHOSPHATE, MONOBASIC POTASSIUM PHOSPHATE, DIBASIC INJECTION, 236; 224 MG/ML; MG/ML
30 SOLUTION, CONCENTRATE INTRAVENOUS ONCE
Refills: 0 | Status: COMPLETED | OUTPATIENT
Start: 2024-06-24 | End: 2024-06-24

## 2024-06-24 RX ORDER — AMIODARONE HYDROCHLORIDE 50 MG/ML
1 INJECTION, SOLUTION INTRAVENOUS
Qty: 450 | Refills: 0 | Status: DISCONTINUED | OUTPATIENT
Start: 2024-06-24 | End: 2024-06-25

## 2024-06-24 RX ORDER — DILTIAZEM HYDROCHLORIDE 240 MG/1
30 CAPSULE, EXTENDED RELEASE ORAL EVERY 6 HOURS
Refills: 0 | Status: DISCONTINUED | OUTPATIENT
Start: 2024-06-24 | End: 2024-06-25

## 2024-06-24 RX ORDER — SODIUM CHLORIDE 0.9 % (FLUSH) 0.9 %
1000 SYRINGE (ML) INJECTION
Refills: 0 | Status: DISCONTINUED | OUTPATIENT
Start: 2024-06-24 | End: 2024-06-24

## 2024-06-24 RX ORDER — AMIODARONE HYDROCHLORIDE 50 MG/ML
0.5 INJECTION, SOLUTION INTRAVENOUS
Qty: 450 | Refills: 0 | Status: DISCONTINUED | OUTPATIENT
Start: 2024-06-24 | End: 2024-06-25

## 2024-06-24 RX ORDER — POTASSIUM CHLORIDE 600 MG/1
20 TABLET, FILM COATED, EXTENDED RELEASE ORAL ONCE
Refills: 0 | Status: COMPLETED | OUTPATIENT
Start: 2024-06-24 | End: 2024-06-24

## 2024-06-24 RX ORDER — DILTIAZEM HYDROCHLORIDE 240 MG/1
10 CAPSULE, EXTENDED RELEASE ORAL ONCE
Refills: 0 | Status: COMPLETED | OUTPATIENT
Start: 2024-06-24 | End: 2024-06-24

## 2024-06-24 RX ADMIN — POTASSIUM CHLORIDE 20 MILLIEQUIVALENT(S): 600 TABLET, FILM COATED, EXTENDED RELEASE ORAL at 02:39

## 2024-06-24 RX ADMIN — Medication 60 MILLILITER(S): at 03:47

## 2024-06-24 RX ADMIN — Medication 12.5 MILLIGRAM(S): at 05:15

## 2024-06-24 RX ADMIN — Medication 1 DROP(S): at 17:34

## 2024-06-24 RX ADMIN — Medication 1 APPLICATION(S): at 11:21

## 2024-06-24 RX ADMIN — DEXTROSE MONOHYDRATE AND SODIUM CHLORIDE 75 MILLILITER(S): 5; .3 INJECTION, SOLUTION INTRAVENOUS at 00:10

## 2024-06-24 RX ADMIN — Medication 12.5 MILLIGRAM(S): at 20:50

## 2024-06-24 RX ADMIN — LEVETIRACETAM 250 MILLIGRAM(S): 100 INJECTION INTRAVENOUS at 05:15

## 2024-06-24 RX ADMIN — TOPIRAMATE 100 MILLIGRAM(S): 50 TABLET, FILM COATED ORAL at 05:15

## 2024-06-24 RX ADMIN — Medication 1 DROP(S): at 05:15

## 2024-06-24 RX ADMIN — POTASSIUM PHOSPHATE, MONOBASIC POTASSIUM PHOSPHATE, DIBASIC INJECTION, 83.33 MILLIMOLE(S): 236; 224 SOLUTION, CONCENTRATE INTRAVENOUS at 11:23

## 2024-06-24 RX ADMIN — DILTIAZEM HYDROCHLORIDE 10 MILLIGRAM(S): 240 CAPSULE, EXTENDED RELEASE ORAL at 22:11

## 2024-06-24 RX ADMIN — LACOSAMIDE 110 MILLIGRAM(S): 100 TABLET, FILM COATED ORAL at 17:34

## 2024-06-24 RX ADMIN — LEVETIRACETAM 250 MILLIGRAM(S): 100 INJECTION INTRAVENOUS at 17:34

## 2024-06-24 RX ADMIN — POTASSIUM PHOSPHATE, MONOBASIC POTASSIUM PHOSPHATE, DIBASIC INJECTION, 83.33 MILLIMOLE(S): 236; 224 SOLUTION, CONCENTRATE INTRAVENOUS at 02:40

## 2024-06-24 RX ADMIN — TOPIRAMATE 100 MILLIGRAM(S): 50 TABLET, FILM COATED ORAL at 17:34

## 2024-06-24 RX ADMIN — LACOSAMIDE 110 MILLIGRAM(S): 100 TABLET, FILM COATED ORAL at 05:15

## 2024-06-24 RX ADMIN — DILTIAZEM HYDROCHLORIDE 30 MILLIGRAM(S): 240 CAPSULE, EXTENDED RELEASE ORAL at 22:29

## 2024-06-24 RX ADMIN — Medication 1 DROP(S): at 21:52

## 2024-06-24 RX ADMIN — Medication 500 MILLILITER(S): at 22:10

## 2024-06-24 RX ADMIN — Medication 1 DROP(S): at 05:16

## 2024-06-24 RX ADMIN — ATORVASTATIN CALCIUM 80 MILLIGRAM(S): 20 TABLET, FILM COATED ORAL at 21:52

## 2024-06-24 NOTE — PROGRESS NOTE ADULT - SUBJECTIVE AND OBJECTIVE BOX
NSCU ATTENDING -- ADDITIONAL PROGRESS NOTE    Nighttime rounds were performed -- please refer to earlier Progress Note for HPI details.    T(C): 38 (06-24-24 @ 19:00), Max: 38 (06-24-24 @ 19:00)  HR: 88 (06-24-24 @ 20:00) (77 - 97)  BP: 179/82 (06-24-24 @ 20:00) (141/67 - 179/82)  RR: 25 (06-24-24 @ 20:00) (12 - 25)  SpO2: 96% (06-24-24 @ 20:00) (90% - 100%)  Wt(kg): --    Relevant labwork and imaging reviewed.    CHIEF COMPLAINT: SDH    s/p MMAE today.  post-angio CBC, CMP pending.  called to room for tachycardia.  monitor initially showed sinus tach to 120s with frequent PACs, then transitioned to what appears to be a.flutter at 2:1 / locked in at 160 BPM.      s/p IV dilt 10 mg x 2, revert to sinus tach with PACs.  give 30 mg PO dilt now x 1, f/u cardiac enzymes.    ATTENDING STATEMENT:    Patient is critically ill, requiring critical care services.     Attending: I have personally and independently provided 60 minutes of critical care services.  This excludes any time spent on separate procedures or teaching.

## 2024-06-24 NOTE — CHART NOTE - NSCHARTNOTEFT_GEN_A_CORE
Interventional Neuro- Radiology   Procedure Note    Procedure: Catheter Cerebral Angiography with Right MMA embolization   Pre- Procedure Diagnosis: Right subdural hematoma  Post- Procedure Diagnosis: Right subdural hematoma s/p Right MMA embolization     : Dr. Matt Reese  Fellow: Dr. Nieves Mclaughlin  Physician Assistant: Jeb Posey PA-C    RN: Matt Davey   Tech: John Barlow    Anesthesia: (MAC) Dr. Leo Bowser    I/Os:  Fluids: 100 cc  Morgan: 200 cc  Contrast: 58 cc  Estimated Blood Loss: <10cc      Preliminary Report:  The procedure was done under MAC. Initially a 6/5 short sheath was exchanged for a previously placed right radial artery a-line. When an attempt was made to advance the diagnostic catheter this was unsuccessful due to vascular tortuosity. This site was then abandoned and closed.  Radial sheath d/c'ed, hemostasis maintained, no bleeding or hematoma, quickclot radial dressing placed at 15:40 hours. Next, a 5 Ivorian Arrowflex long sheath was placed to the right femoral artery and examination of right internal carotid artery/ right external carotid artery/ and right common carotid artery via selective cerebral angiography was done. The right MMA was embolized with Embosphere particles and coils. Official dictated report to follow.    Patient tolerated procedure well, vital signs stable, hemodynamically stable, no change in neurological status compared to baseline. Results discussed with patient and their family. Groin sheath d/c'ed, manual compression held to hemostasis, no active bleeding, no hematoma, Vascade applied, quick clot and safeguard balloon dressing applied at 16:25 hours.  Patient was transferred back to NSCU for further care/ monitoring.     Vascular access site: right femoral artery, right radial artery  Ultrasound guidance- yes  Fluoroscopy before procedure- yes   Fluoroscopy to confirm correct needle position after puncture and before advancing sheath- yes  Femoral artery angiography before sheath removal- yes  Closure device used- Vascade  Closure device appropriately deployed- yes  Manual pressure- held for 10 minutes until hemostasis, no active bleeding or hematoma (Vascade was deployed and right groin pressure was held by STELLA Posey) (Right wrist pressure was applied by Dr. Mclaughlin)  Safeguard dressing applied- yes, applied at 16:25 hours.

## 2024-06-24 NOTE — PROGRESS NOTE ADULT - SUBJECTIVE AND OBJECTIVE BOX
NSICU PROGRESS NOTE     HPI   78yo male with pmhx SOC for benign ependymoma, seizures, DM, HLD, aspirin non compliant, presented to VS for AMS, gait difficulties and falls for a week, found with acute on chronic R SDH, L SDH, parafalcince acute SDH.     12h events   naeon     Exam   Alert, awake, oriented on self, place and year, PERRL, EOMI, gaze midline, L ptosis chronic, face symmetric   RUE 5/5  LUE pronator drift not touching bed  RLE flexors 4/5, extensors 5/5  LLE flexors 4/5, extensors 5/5    VITALS:   Vital Signs Last 24 Hrs  T(C): 36.9 (24 Jun 2024 07:00), Max: 38 (23 Jun 2024 19:00)  T(F): 98.5 (24 Jun 2024 07:00), Max: 100.4 (23 Jun 2024 19:00)  HR: 97 (24 Jun 2024 07:00) (76 - 97)  BP: 152/65 (24 Jun 2024 07:00) (141/67 - 165/767)  BP(mean): 93 (24 Jun 2024 07:00) (93 - 109)  RR: 16 (24 Jun 2024 07:00) (13 - 22)  SpO2: 98% (24 Jun 2024 07:00) (90% - 98%)    Parameters below as of 24 Jun 2024 07:00  Patient On (Oxygen Delivery Method): room air      CAPILLARY BLOOD GLUCOSE      POCT Blood Glucose.: 149 mg/dL (24 Jun 2024 05:13)  POCT Blood Glucose.: 148 mg/dL (24 Jun 2024 00:09)  POCT Blood Glucose.: 179 mg/dL (23 Jun 2024 17:02)  POCT Blood Glucose.: 115 mg/dL (23 Jun 2024 11:03)    I&O's Summary    23 Jun 2024 07:01  -  24 Jun 2024 07:00  --------------------------------------------------------  IN: 1266.5 mL / OUT: 1671 mL / NET: -404.5 mL        Respiratory:        LABS:                        11.0   11.60 )-----------( 175      ( 24 Jun 2024 00:12 )             32.7     06-24    132<L>  |  99  |  18  ----------------------------<  166<H>  3.8   |  19<L>  |  0.96        MEDICATION LEVELS:     IVF FLUIDS/MEDICATIONS:   MEDICATIONS  (STANDING):  atorvastatin 80 milliGRAM(s) Oral at bedtime  captopril 12.5 milliGRAM(s) Oral every 8 hours  chlorhexidine 4% Liquid 1 Application(s) Topical daily  dexamethasone/neomycin/polymyxin Suspension 1 Drop(s) Both EYES every 8 hours  insulin lispro (ADMELOG) corrective regimen sliding scale   SubCutaneous every 6 hours  lacosamide IVPB 50 milliGRAM(s) IV Intermittent every 12 hours  levETIRAcetam 250 milliGRAM(s) Oral two times a day  pilocarpine 2% Solution 1 Drop(s) Right EYE two times a day  polyethylene glycol 3350 17 Gram(s) Oral daily  senna 2 Tablet(s) Oral at bedtime  topiramate 100 milliGRAM(s) Oral two times a day    MEDICATIONS  (PRN):  acetaminophen     Tablet .. 650 milliGRAM(s) Oral every 6 hours PRN Temp greater or equal to 38C (100.4F), Mild Pain (1 - 3)  bisacodyl 5 milliGRAM(s) Oral daily PRN Constipation  ondansetron Injectable 4 milliGRAM(s) IV Push every 6 hours PRN Nausea and/or Vomiting  oxyCODONE    IR 5 milliGRAM(s) Oral every 4 hours PRN Moderate Pain (4 - 6)       NSICU PROGRESS NOTE     HPI   78yo male with pmhx SOC for benign ependymoma, seizures, DM, HLD, aspirin non compliant, presented to VS for AMS, gait difficulties and falls for a week, found with acute on chronic R SDH, L SDH, parafalcince acute SDH.     NSICU course:  6/22 R SDH evacuation     12h events   naeon     Exam   Alert, awake, oriented on self, place and year, PERRL, EOMI, gaze midline, L ptosis chronic, face symmetric   RUE 5/5  LUE pronator drift not touching bed  RLE flexors 4/5, extensors 5/5  LLE flexors 4/5, extensors 5/5    VITALS:   Vital Signs Last 24 Hrs  T(C): 36.9 (24 Jun 2024 07:00), Max: 38 (23 Jun 2024 19:00)  T(F): 98.5 (24 Jun 2024 07:00), Max: 100.4 (23 Jun 2024 19:00)  HR: 97 (24 Jun 2024 07:00) (76 - 97)  BP: 152/65 (24 Jun 2024 07:00) (141/67 - 165/767)  BP(mean): 93 (24 Jun 2024 07:00) (93 - 109)  RR: 16 (24 Jun 2024 07:00) (13 - 22)  SpO2: 98% (24 Jun 2024 07:00) (90% - 98%)    Parameters below as of 24 Jun 2024 07:00  Patient On (Oxygen Delivery Method): room air      CAPILLARY BLOOD GLUCOSE      POCT Blood Glucose.: 149 mg/dL (24 Jun 2024 05:13)  POCT Blood Glucose.: 148 mg/dL (24 Jun 2024 00:09)  POCT Blood Glucose.: 179 mg/dL (23 Jun 2024 17:02)  POCT Blood Glucose.: 115 mg/dL (23 Jun 2024 11:03)    I&O's Summary    23 Jun 2024 07:01  -  24 Jun 2024 07:00  --------------------------------------------------------  IN: 1266.5 mL / OUT: 1671 mL / NET: -404.5 mL        Respiratory:        LABS:                        11.0   11.60 )-----------( 175      ( 24 Jun 2024 00:12 )             32.7     06-24    132<L>  |  99  |  18  ----------------------------<  166<H>  3.8   |  19<L>  |  0.96        MEDICATION LEVELS:     IVF FLUIDS/MEDICATIONS:   MEDICATIONS  (STANDING):  atorvastatin 80 milliGRAM(s) Oral at bedtime  captopril 12.5 milliGRAM(s) Oral every 8 hours  chlorhexidine 4% Liquid 1 Application(s) Topical daily  dexamethasone/neomycin/polymyxin Suspension 1 Drop(s) Both EYES every 8 hours  insulin lispro (ADMELOG) corrective regimen sliding scale   SubCutaneous every 6 hours  lacosamide IVPB 50 milliGRAM(s) IV Intermittent every 12 hours  levETIRAcetam 250 milliGRAM(s) Oral two times a day  pilocarpine 2% Solution 1 Drop(s) Right EYE two times a day  polyethylene glycol 3350 17 Gram(s) Oral daily  senna 2 Tablet(s) Oral at bedtime  topiramate 100 milliGRAM(s) Oral two times a day    MEDICATIONS  (PRN):  acetaminophen     Tablet .. 650 milliGRAM(s) Oral every 6 hours PRN Temp greater or equal to 38C (100.4F), Mild Pain (1 - 3)  bisacodyl 5 milliGRAM(s) Oral daily PRN Constipation  ondansetron Injectable 4 milliGRAM(s) IV Push every 6 hours PRN Nausea and/or Vomiting  oxyCODONE    IR 5 milliGRAM(s) Oral every 4 hours PRN Moderate Pain (4 - 6)

## 2024-06-24 NOTE — PROGRESS NOTE ADULT - ASSESSMENT
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     PLAN     N  # R acute on chronic SDH, L acute SDH, R parafalcine acute SDH with mass effect from the R with global atrophy   MMAE at some point per NS   vimpat per NS  #Pain: Tylenol and oxycodone  #Activity: [] OOB as tolerated [x] Bedrest [] PT [] OT [] PMNR    CV   #-160mmHg  #TTE     P   #RA    R   #Strict I+Os   #IVF    GI   #Diet: npo   #Senna miralax  Last BM PTA     ID  afebrile    E  Goal euglycemia (-180)  #A1c pending     H  #DVT ppx:   SCDs  Chemoppx held   LED pending       #CODE STATUS: FULL CODE     #DISPOSITION: ICU    #CRITICAL  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     PLAN     N  # R acute on chronic SDH, L acute SDH, R parafalcine acute SDH with mass effect from the R with global atrophy   MMAE at some point per NS   drain per NS  vimpat per NS  #Pain: Tylenol and oxycodone  #Activity: [] OOB as tolerated [] Bedrest [x] PT [x] OT [] PMNR    CV   #-160mmHg  #TTE     P   #RA    R   #Strict I+Os   #IVF    GI   #Diet: npo   #Senna miralax  Last BM PTA     ID  afebrile    E  Goal euglycemia (-180)  #A1c 6.4    H  #DVT ppx:   SCDs  Chemoppx held per NS  LED 6/22 no dvt      #CODE STATUS: FULL CODE     #DISPOSITION: ICU    #CRITICAL

## 2024-06-24 NOTE — PRE-ANESTHESIA EVALUATION ADULT - NSANTHOSAYNRD_GEN_A_CORE
No. YENNIFER screening performed.  STOP BANG Legend: 0-2 = LOW Risk; 3-4 = INTERMEDIATE Risk; 5-8 = HIGH Risk
No. YENNIFER screening performed.  STOP BANG Legend: 0-2 = LOW Risk; 3-4 = INTERMEDIATE Risk; 5-8 = HIGH Risk

## 2024-06-24 NOTE — PROGRESS NOTE ADULT - ASSESSMENT
Karly Anderson   79M Hx SOC for tumor (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 No AC/AP, coags/plt wnl  Exam: AOx3, PERRL, EOMI, no facial, ?/very slight L drift, BEST 5/5, gait testing deferred    Now s/p R crani for SDH evacuation     Preop for MMAE w/ TL Mon 6/24 vs Tuesday   TTE-p  Karly Anderson   79M Hx SOC for tumor (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 No AC/AP, coags/plt wnl  Exam: AOx3, PERRL, EOMI, no facial, ?/very slight L drift, BEST 5/5, gait testing deferred    Now s/p R crani for SDH evacuation     Monitor drain output  Preop for MMAE w/ TL Mon 6/24 vs Tuesday   TTE-p

## 2024-06-25 DIAGNOSIS — R94.31 ABNORMAL ELECTROCARDIOGRAM [ECG] [EKG]: ICD-10-CM

## 2024-06-25 DIAGNOSIS — E11.9 TYPE 2 DIABETES MELLITUS WITHOUT COMPLICATIONS: ICD-10-CM

## 2024-06-25 DIAGNOSIS — S06.5XAA TRAUMATIC SUBDURAL HEMORRHAGE WITH LOSS OF CONSCIOUSNESS STATUS UNKNOWN, INITIAL ENCOUNTER: ICD-10-CM

## 2024-06-25 DIAGNOSIS — I48.0 PAROXYSMAL ATRIAL FIBRILLATION: ICD-10-CM

## 2024-06-25 LAB
ANION GAP SERPL CALC-SCNC: 14 MMOL/L — SIGNIFICANT CHANGE UP (ref 5–17)
BUN SERPL-MCNC: 21 MG/DL — SIGNIFICANT CHANGE UP (ref 7–23)
CA-I BLD-SCNC: 1.09 MMOL/L — LOW (ref 1.15–1.33)
CALCIUM SERPL-MCNC: 8.7 MG/DL — SIGNIFICANT CHANGE UP (ref 8.4–10.5)
CHLORIDE SERPL-SCNC: 102 MMOL/L — SIGNIFICANT CHANGE UP (ref 96–108)
CO2 SERPL-SCNC: 19 MMOL/L — LOW (ref 22–31)
CREAT SERPL-MCNC: 1.06 MG/DL — SIGNIFICANT CHANGE UP (ref 0.5–1.3)
EGFR: 71 ML/MIN/1.73M2 — SIGNIFICANT CHANGE UP
GAS PNL BLDA: SIGNIFICANT CHANGE UP
GLUCOSE BLDC GLUCOMTR-MCNC: 147 MG/DL — HIGH (ref 70–99)
GLUCOSE BLDC GLUCOMTR-MCNC: 149 MG/DL — HIGH (ref 70–99)
GLUCOSE BLDC GLUCOMTR-MCNC: 188 MG/DL — HIGH (ref 70–99)
GLUCOSE BLDC GLUCOMTR-MCNC: 213 MG/DL — HIGH (ref 70–99)
GLUCOSE SERPL-MCNC: 143 MG/DL — HIGH (ref 70–99)
HCT VFR BLD CALC: 32.2 % — LOW (ref 39–50)
HGB BLD-MCNC: 10.6 G/DL — LOW (ref 13–17)
MAGNESIUM SERPL-MCNC: 2.5 MG/DL — SIGNIFICANT CHANGE UP (ref 1.6–2.6)
MCHC RBC-ENTMCNC: 31.8 PG — SIGNIFICANT CHANGE UP (ref 27–34)
MCHC RBC-ENTMCNC: 32.9 GM/DL — SIGNIFICANT CHANGE UP (ref 32–36)
MCV RBC AUTO: 96.7 FL — SIGNIFICANT CHANGE UP (ref 80–100)
NRBC # BLD: 0 /100 WBCS — SIGNIFICANT CHANGE UP (ref 0–0)
PHOSPHATE SERPL-MCNC: 3.3 MG/DL — SIGNIFICANT CHANGE UP (ref 2.5–4.5)
PLATELET # BLD AUTO: 175 K/UL — SIGNIFICANT CHANGE UP (ref 150–400)
POTASSIUM SERPL-MCNC: 3.9 MMOL/L — SIGNIFICANT CHANGE UP (ref 3.5–5.3)
POTASSIUM SERPL-SCNC: 3.9 MMOL/L — SIGNIFICANT CHANGE UP (ref 3.5–5.3)
RBC # BLD: 3.33 M/UL — LOW (ref 4.2–5.8)
RBC # FLD: 12.4 % — SIGNIFICANT CHANGE UP (ref 10.3–14.5)
SODIUM SERPL-SCNC: 135 MMOL/L — SIGNIFICANT CHANGE UP (ref 135–145)
SURGICAL PATHOLOGY STUDY: SIGNIFICANT CHANGE UP
T3 SERPL-MCNC: 54 NG/DL — LOW (ref 80–200)
T4 AB SER-ACNC: 5.3 UG/DL — SIGNIFICANT CHANGE UP (ref 4.6–12)
T4 FREE SERPL-MCNC: 1 NG/DL — SIGNIFICANT CHANGE UP (ref 0.9–1.8)
TSH SERPL-MCNC: 2.45 UIU/ML — SIGNIFICANT CHANGE UP (ref 0.27–4.2)
WBC # BLD: 7.05 K/UL — SIGNIFICANT CHANGE UP (ref 3.8–10.5)
WBC # FLD AUTO: 7.05 K/UL — SIGNIFICANT CHANGE UP (ref 3.8–10.5)

## 2024-06-25 PROCEDURE — 99291 CRITICAL CARE FIRST HOUR: CPT

## 2024-06-25 PROCEDURE — 74018 RADEX ABDOMEN 1 VIEW: CPT | Mod: 26

## 2024-06-25 PROCEDURE — 71275 CT ANGIOGRAPHY CHEST: CPT | Mod: 26

## 2024-06-25 PROCEDURE — 70450 CT HEAD/BRAIN W/O DYE: CPT | Mod: 26

## 2024-06-25 PROCEDURE — 71045 X-RAY EXAM CHEST 1 VIEW: CPT | Mod: 26

## 2024-06-25 RX ORDER — SODIUM CHLORIDE 0.9 % (FLUSH) 0.9 %
500 SYRINGE (ML) INJECTION ONCE
Refills: 0 | Status: DISCONTINUED | OUTPATIENT
Start: 2024-06-25 | End: 2024-06-25

## 2024-06-25 RX ORDER — DILTIAZEM HYDROCHLORIDE 240 MG/1
60 CAPSULE, EXTENDED RELEASE ORAL EVERY 6 HOURS
Refills: 0 | Status: DISCONTINUED | OUTPATIENT
Start: 2024-06-25 | End: 2024-06-28

## 2024-06-25 RX ORDER — SODIUM CHLORIDE 0.9 % (FLUSH) 0.9 %
1000 SYRINGE (ML) INJECTION ONCE
Refills: 0 | Status: COMPLETED | OUTPATIENT
Start: 2024-06-25 | End: 2024-06-25

## 2024-06-25 RX ORDER — PIPERACILLIN SODIUM AND TAZOBACTAM SODIUM 3; .375 G/15ML; G/15ML
3.38 INJECTION, POWDER, LYOPHILIZED, FOR SOLUTION INTRAVENOUS ONCE
Refills: 0 | Status: COMPLETED | OUTPATIENT
Start: 2024-06-26 | End: 2024-06-26

## 2024-06-25 RX ORDER — DILTIAZEM HYDROCHLORIDE 240 MG/1
5 CAPSULE, EXTENDED RELEASE ORAL ONCE
Refills: 0 | Status: COMPLETED | OUTPATIENT
Start: 2024-06-25 | End: 2024-06-25

## 2024-06-25 RX ORDER — CALCIUM GLUCONATE 98 MG/ML
1 INJECTION, SOLUTION INTRAVENOUS ONCE
Refills: 0 | Status: COMPLETED | OUTPATIENT
Start: 2024-06-25 | End: 2024-06-25

## 2024-06-25 RX ORDER — PIPERACILLIN SODIUM AND TAZOBACTAM SODIUM 3; .375 G/15ML; G/15ML
3.38 INJECTION, POWDER, LYOPHILIZED, FOR SOLUTION INTRAVENOUS ONCE
Refills: 0 | Status: COMPLETED | OUTPATIENT
Start: 2024-06-25 | End: 2024-06-25

## 2024-06-25 RX ORDER — ACETAMINOPHEN 325 MG
1000 TABLET ORAL ONCE
Refills: 0 | Status: COMPLETED | OUTPATIENT
Start: 2024-06-25 | End: 2024-06-25

## 2024-06-25 RX ORDER — PIPERACILLIN SODIUM AND TAZOBACTAM SODIUM 3; .375 G/15ML; G/15ML
3.38 INJECTION, POWDER, LYOPHILIZED, FOR SOLUTION INTRAVENOUS EVERY 8 HOURS
Refills: 0 | Status: DISCONTINUED | OUTPATIENT
Start: 2024-06-26 | End: 2024-06-29

## 2024-06-25 RX ORDER — AMIODARONE HYDROCHLORIDE 50 MG/ML
150 INJECTION, SOLUTION INTRAVENOUS ONCE
Refills: 0 | Status: DISCONTINUED | OUTPATIENT
Start: 2024-06-25 | End: 2024-06-25

## 2024-06-25 RX ORDER — HEPARIN SODIUM 50 [USP'U]/ML
5000 INJECTION, SOLUTION INTRAVENOUS EVERY 12 HOURS
Refills: 0 | Status: DISCONTINUED | OUTPATIENT
Start: 2024-06-25 | End: 2024-07-10

## 2024-06-25 RX ORDER — SOD PHOS DI, MONO/K PHOS MONO 250 MG
1 TABLET ORAL ONCE
Refills: 0 | Status: COMPLETED | OUTPATIENT
Start: 2024-06-25 | End: 2024-06-25

## 2024-06-25 RX ORDER — POTASSIUM CHLORIDE 600 MG/1
40 TABLET, FILM COATED, EXTENDED RELEASE ORAL ONCE
Refills: 0 | Status: COMPLETED | OUTPATIENT
Start: 2024-06-25 | End: 2024-06-25

## 2024-06-25 RX ORDER — DILTIAZEM HYDROCHLORIDE 240 MG/1
30 CAPSULE, EXTENDED RELEASE ORAL ONCE
Refills: 0 | Status: COMPLETED | OUTPATIENT
Start: 2024-06-25 | End: 2024-06-25

## 2024-06-25 RX ORDER — SODIUM CHLORIDE 0.9 % (FLUSH) 0.9 %
1000 SYRINGE (ML) INJECTION
Refills: 0 | Status: DISCONTINUED | OUTPATIENT
Start: 2024-06-25 | End: 2024-06-27

## 2024-06-25 RX ADMIN — LEVETIRACETAM 250 MILLIGRAM(S): 100 INJECTION INTRAVENOUS at 05:16

## 2024-06-25 RX ADMIN — Medication 12.5 MILLIGRAM(S): at 05:16

## 2024-06-25 RX ADMIN — HEPARIN SODIUM 5000 UNIT(S): 50 INJECTION, SOLUTION INTRAVENOUS at 17:11

## 2024-06-25 RX ADMIN — INSULIN LISPRO 4: 100 INJECTION, SOLUTION SUBCUTANEOUS at 23:26

## 2024-06-25 RX ADMIN — DILTIAZEM HYDROCHLORIDE 30 MILLIGRAM(S): 240 CAPSULE, EXTENDED RELEASE ORAL at 05:16

## 2024-06-25 RX ADMIN — TOPIRAMATE 100 MILLIGRAM(S): 50 TABLET, FILM COATED ORAL at 05:15

## 2024-06-25 RX ADMIN — Medication 50 MILLILITER(S): at 19:44

## 2024-06-25 RX ADMIN — CALCIUM GLUCONATE 100 GRAM(S): 98 INJECTION, SOLUTION INTRAVENOUS at 23:23

## 2024-06-25 RX ADMIN — Medication 1 DROP(S): at 05:18

## 2024-06-25 RX ADMIN — POLYETHYLENE GLYCOL 3350 17 GRAM(S): 1 POWDER ORAL at 11:18

## 2024-06-25 RX ADMIN — Medication 400 MILLIGRAM(S): at 22:22

## 2024-06-25 RX ADMIN — Medication 1 DROP(S): at 21:19

## 2024-06-25 RX ADMIN — POTASSIUM CHLORIDE 40 MILLIEQUIVALENT(S): 600 TABLET, FILM COATED, EXTENDED RELEASE ORAL at 11:17

## 2024-06-25 RX ADMIN — PIPERACILLIN SODIUM AND TAZOBACTAM SODIUM 200 GRAM(S): 3; .375 INJECTION, POWDER, LYOPHILIZED, FOR SOLUTION INTRAVENOUS at 23:56

## 2024-06-25 RX ADMIN — Medication 12.5 MILLIGRAM(S): at 21:18

## 2024-06-25 RX ADMIN — DILTIAZEM HYDROCHLORIDE 30 MILLIGRAM(S): 240 CAPSULE, EXTENDED RELEASE ORAL at 23:55

## 2024-06-25 RX ADMIN — INSULIN LISPRO 2: 100 INJECTION, SOLUTION SUBCUTANEOUS at 11:17

## 2024-06-25 RX ADMIN — LEVETIRACETAM 250 MILLIGRAM(S): 100 INJECTION INTRAVENOUS at 17:11

## 2024-06-25 RX ADMIN — Medication 1000 MILLIGRAM(S): at 22:37

## 2024-06-25 RX ADMIN — DILTIAZEM HYDROCHLORIDE 30 MILLIGRAM(S): 240 CAPSULE, EXTENDED RELEASE ORAL at 23:21

## 2024-06-25 RX ADMIN — DILTIAZEM HYDROCHLORIDE 30 MILLIGRAM(S): 240 CAPSULE, EXTENDED RELEASE ORAL at 17:14

## 2024-06-25 RX ADMIN — Medication 1 DROP(S): at 05:19

## 2024-06-25 RX ADMIN — Medication 1000 MILLILITER(S): at 22:23

## 2024-06-25 RX ADMIN — Medication 1 DROP(S): at 17:11

## 2024-06-25 RX ADMIN — DILTIAZEM HYDROCHLORIDE 30 MILLIGRAM(S): 240 CAPSULE, EXTENDED RELEASE ORAL at 11:18

## 2024-06-25 RX ADMIN — DILTIAZEM HYDROCHLORIDE 5 MILLIGRAM(S): 240 CAPSULE, EXTENDED RELEASE ORAL at 21:37

## 2024-06-25 RX ADMIN — Medication 1 PACKET(S): at 05:15

## 2024-06-25 RX ADMIN — ATORVASTATIN CALCIUM 80 MILLIGRAM(S): 20 TABLET, FILM COATED ORAL at 21:18

## 2024-06-25 RX ADMIN — Medication 1 DROP(S): at 13:03

## 2024-06-25 RX ADMIN — LACOSAMIDE 110 MILLIGRAM(S): 100 TABLET, FILM COATED ORAL at 05:26

## 2024-06-25 RX ADMIN — LACOSAMIDE 110 MILLIGRAM(S): 100 TABLET, FILM COATED ORAL at 17:11

## 2024-06-25 RX ADMIN — Medication 12.5 MILLIGRAM(S): at 13:04

## 2024-06-25 RX ADMIN — Medication 1 APPLICATION(S): at 11:18

## 2024-06-25 NOTE — PHYSICAL THERAPY INITIAL EVALUATION ADULT - ADDITIONAL COMMENTS
Patient states he lives with his wife in a house with 3 steps to enter, +handrail. Patient reports he has a first floor setup. +reading eyeglasses.

## 2024-06-25 NOTE — CONSULT NOTE ADULT - SUBJECTIVE AND OBJECTIVE BOX
CHIEF COMPLAINT:    HISTORY OF PRESENT ILLNESS:  79M Hx SOC for tumor (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 No AC/AP, coags/plt wnl  Now s/p R crani for SDH evacuation   Afib RVR overnight s/p Dilt IV   Now in SR     PAST MEDICAL & SURGICAL HISTORY:  DM2 (diabetes mellitus, type 2)      HTN (hypertension)      Seizures      Brain tumor      Pulmonary embolism              MEDICATIONS:  captopril 12.5 milliGRAM(s) Oral every 8 hours  diltiazem    Tablet 30 milliGRAM(s) Oral every 6 hours        acetaminophen     Tablet .. 650 milliGRAM(s) Oral every 6 hours PRN  lacosamide IVPB 50 milliGRAM(s) IV Intermittent every 12 hours  levETIRAcetam 250 milliGRAM(s) Oral two times a day  ondansetron Injectable 4 milliGRAM(s) IV Push every 6 hours PRN  oxyCODONE    IR 5 milliGRAM(s) Oral every 4 hours PRN  topiramate 100 milliGRAM(s) Oral two times a day    bisacodyl 5 milliGRAM(s) Oral daily PRN  polyethylene glycol 3350 17 Gram(s) Oral daily  senna 2 Tablet(s) Oral at bedtime    atorvastatin 80 milliGRAM(s) Oral at bedtime  insulin lispro (ADMELOG) corrective regimen sliding scale   SubCutaneous every 6 hours    chlorhexidine 4% Liquid 1 Application(s) Topical daily  dexamethasone/neomycin/polymyxin Suspension 1 Drop(s) Both EYES every 8 hours  pilocarpine 2% Solution 1 Drop(s) Right EYE two times a day      FAMILY HISTORY:      SOCIAL HISTORY:    [ ] Non-smoker  [ ] Smoker  [ ] Alcohol    Allergies    No Known Allergies    Intolerances    	    REVIEW OF SYSTEMS:  CONSTITUTIONAL: No fever, weight loss, + fatigue  EYES: No eye pain, visual disturbances, or discharge  ENMT:  No difficulty hearing, tinnitus, vertigo; No sinus or throat pain  NECK: No pain or stiffness  RESPIRATORY: No cough, wheezing, chills or hemoptysis; No Shortness of Breath  CARDIOVASCULAR: No chest pain, palpitations, passing out, dizziness, or leg swelling  GASTROINTESTINAL: No abdominal or epigastric pain. No nausea, vomiting, or hematemesis; No diarrhea or constipation. No melena or hematochezia.  GENITOURINARY: No dysuria, frequency, hematuria, or incontinence  NEUROLOGICAL: No headaches, memory loss, loss of strength, numbness, or tremors  SKIN: No itching, burning, rashes, or lesions   LYMPH Nodes: No enlarged glands  ENDOCRINE: No heat or cold intolerance; No hair loss  MUSCULOSKELETAL: No joint pain or swelling; No muscle, back, or extremity pain  PSYCHIATRIC: No depression, anxiety, mood swings, or difficulty sleeping  HEME/LYMPH: No easy bruising, or bleeding gums  ALLERY AND IMMUNOLOGIC: No hives or eczema	    [ ] All others negative	  [ ] Unable to obtain    PHYSICAL EXAM:  T(C): 37.1 (06-25-24 @ 03:00), Max: 38 (06-24-24 @ 19:00)  HR: 76 (06-25-24 @ 04:00) (76 - 167)  BP: 133/61 (06-25-24 @ 04:00) (129/58 - 179/82)  RR: 14 (06-25-24 @ 04:00) (12 - 25)  SpO2: 100% (06-25-24 @ 04:00) (90% - 100%)  Wt(kg): --  I&O's Summary    24 Jun 2024 07:01  -  25 Jun 2024 07:00  --------------------------------------------------------  IN: 183 mL / OUT: 950 mL / NET: -767 mL        Appearance: NAD  HEENT:   Dry oral mucosa, PERRL, EOMI	  Lymphatic: No lymphadenopathy  Cardiovascular: Normal S1 S2, No JVD, No murmurs, No edema  Respiratory: Lungs clear to auscultation	  Psychiatry: A & O x 3, sleepy   Gastrointestinal:  Soft, Non-tender, + BS	  Skin: No rashes, No ecchymoses, No cyanosis	  Neurologic: AOx3, EOMI, no facial, midline gaze, L: eye ptosis (baseline), BEST 5/5, R wrist and groin soft/warm/intact, R radial and dp 2+  Extremities: Normal range of motion, No clubbing, cyanosis or edema  Vascular: Peripheral pulses palpable 2+ bilaterally    TELEMETRY: 	  SR, PAF  ECG:  SR Lateral TWI 	  RADIOLOGY:  < from: CT Head No Cont (06.24.24 @ 09:46) >    ACC: [id number] EXAM:  CT BRAIN   ORDERED BY: ADALGISA MONTALVO     PROCEDURE DATE:  06/24/2024          INTERPRETATION:  .    CLINICAL INFORMATION: Subdural hematoma. Follow-up.    TECHNIQUE: Multiple axial CT images of the head were obtained without   contrast portably with the [employer] scanner.    COMPARISON: Prior head CT exam from 6/23/2024.    FINDINGS: There is redemonstration of right-sided parietal craniotomy   changes and placement of a right-sided posterior parietal approach   subdural drainage catheter into a mixed attenuation right-sided convexity   subdural hematoma which is mixed with gas. Overall size and imaging   characteristics of the subdural hemorrhage appear unchanged. Additional   subdural hemorrhage is seen along the rightparafalcine location with   tiny amounts of subdural hemorrhage layering along the right tentorial   leaflet. Overall mass effect upon the right cervical hemisphere is again   seen with shift of the midline structures from right to left appears   grossly unchanged measuring approximately 5.6 mm. No herniation is seen.    Tiny amounts of layering intraventricular hemorrhage appear unchanged.    Encephalomalacia and gliosis within the left inferior cerebellar   hemisphere and cerebellar vermis are again seen. Suboccipital craniotomy   post cervical changes are again seen.    There is no hydrocephalus.    The paranasal sinuses and tympanomastoid cavities are clear. The orbits   appear unremarkable apart from bilateral cataract removal.    IMPRESSION: Grossly stable follow-up CT exam.    --- End of Report ---            HAO COTTON MD; Attending Radiologist  This document has been electronically signed. Jun 24 2024 10:19AM    < end of copied text >  < from: CT Head No Cont (06.22.24 @ 04:37) >    ACC: [id number] EXAM:  CT BRAIN   ORDERED BY: KATT REED     PROCEDURE DATE:  06/22/2024          INTERPRETATION:  CLINICAL INFORMATION: Generalized weakness, found to   have subdural hematoma found on CT imaging. Evaluate known hematoma.   History of brain tumor status post resection.    COMPARISON: CT head 6/21/2024 and 7/5/2022 found on archive.    CONTRAST:  IV Contrast: NONE  Complications: None reported at time of study completion    TECHNIQUE:  Serial axial images were obtained from the skull base to the   vertex using multi-slice helical technique. Sagittal and coronal   reformats were obtained.    FINDINGS:    VENTRICLES AND SULCI: See below.  INTRA-AXIAL AND EXTRA-AXIAL: Stable mixed density right holohemispheric   subdural hematoma measuring up to 1.7 cm in thickness (601-64. Stable   mass effect upon the underlying brain parenchyma stable degree of   effacement of the right lateral ventricle and third ventricle.   Right-to-left subfalcine herniation measures approximately9 mm,   previously 7 mm.    Stable right parafalcine subdural hematoma measuring approximately 7 mm   in thickness anteriorly.    Stable left posterior convexity subdural hematoma measuring 7 mm in   thickness.    No new intracranial hemorrhage.    Encephalomalacia of the left cerebellum with adjacent craniectomy likely   due to prior tumor resection. Probable arachnoid cyst in the left middle   cranial fossa. There are periventricular and subcortical white matter   hypodensities, consistent withmicrovascular type changes.    VISUALIZED SINUSES:  Clear.  TYMPANOMASTOID CAVITIES:  Clear.  VISUALIZED ORBITS: Bilateral lens replacement.  CALVARIUM: See above. Subgaleal lipoma of the right frontal scalp.    MISCELLANEOUS: None.      IMPRESSION:  Stable volume of acute on chronic right convexity subdural hematoma with   9 mm right to left midline shift, previously 7 mm.  Stable left posterior convexity subdural hematoma.          --- End of Report ---          OSCAR BENITES MD; Resident Radiologist  This document has been electronically signed.  GRACIE ALMAZAN MD; Attending Radiologist  This document has been electronically signed. Jun 22 2024  5:24AM    < end of copied text >    OTHER: 	  	  LABS:	 	    CARDIAC MARKERS:      < from: TTE W or WO Ultrasound Enhancing Agent (06.24.24 @ 09:28) >    TRANSTHORACIC ECHOCARDIOGRAM REPORT  ________________________________________________________________________________                                      _______       Pt. Name:       RODRIGUE CARROLL Study Date:    6/24/2024  MRN:            [id number]    YOB: 1945  Accession #:    [id number]     Age:           79 years  Account#:       [id number]  Gender:        M  Visit ID#  Heart Rate:     89 bpm        Height:        69.00 in (175.26 cm)  Rhythm:         sinus rhythm  Weight:      170.00 lb (77.11 kg)  Blood Pressure: 151/82 mmHg   BSA/BMI:       1.93 m² / 25.10 kg/m²  ________________________________________________________________________________________  Referring Physician:    [id number] Fuad Goodman  Interpreting Physician: Nita Hauser  Primary Sonographer:    Lovely BECKER    CPT:               3D RECONST W/O WKSTATION - 45180.m;ECHO TTE WO CON COMP W                     DOPP - 06908.m  Indication(s):     Syncope and collapse - R55  Procedure:         Transthoracic echocardiogram with 2-D, M-mode and complete                     spectral and color flow Doppler. Real time and full volume                     3-dimensional imaging performed at the echo machine.  Ordering Location: [facility] [place]  Admission Status:  Inpatient  Study Information: Image quality for this study is adequate.    _______________________________________________________________________________________     CONCLUSIONS:      1. Left ventricular cavity is normal in size. Left ventricular wall thickness is normal. Left ventricular systolic function is normal with an ejection fraction of 67 % by 3D. There are no regional wall motion abnormalities seen.   2. Normal left ventricular diastolic function, with normal filling pressure.   3. Normal right ventricular cavity size and normal systolic function.   4. Mild mitral regurgitation.   5. Normal left and right atrial size.   6. No pericardial effusion seen.   7. Pulmonary artery systolic pressure could not be estimated.   8. No prior echocardiogram is available for comparison.    < end of copied text >                              11.0   13.38 )-----------( 174      ( 24 Jun 2024 23:27 )             35.6     06-24    133<L>  |  101  |  15  ----------------------------<  138<H>  4.1   |  16<L>  |  0.98    Ca    8.5      24 Jun 2024 23:27  Phos  2.8     06-24  Mg     2.3     06-24      proBNP:   Lipid Profile:   HgA1c:   TSH:

## 2024-06-25 NOTE — PHYSICAL THERAPY INITIAL EVALUATION ADULT - NSPTDISCHREC_GEN_A_CORE
If pt d/c home would require home PT, assist with all mobility/ADLs, & DME: RW, 3:1 commode, & polyfly w/c with: anti-tippers, seat belt, swing away leg rests, & removable arm rests./Sub-acute Rehab

## 2024-06-25 NOTE — PROGRESS NOTE ADULT - SUBJECTIVE AND OBJECTIVE BOX
Patient seen and examined at bedside.    --Anticoagulation--    T(C): 36.5 (06-22-24 @ 23:00), Max: 37.2 (06-22-24 @ 03:23)  HR: 67 (06-23-24 @ 00:00) (66 - 83)  BP: 167/72 (06-22-24 @ 14:00) (151/72 - 172/89)  RR: 14 (06-23-24 @ 00:00) (12 - 23)  SpO2: 98% (06-23-24 @ 00:00) (92% - 100%)  Wt(kg): --    Exam: AOx3, EOMI, no facial, midline gaze, L: eye ptosis (baseline), BEST 5/5, R wrist and groin soft/warm/intact, R radial and dp 2+

## 2024-06-25 NOTE — OCCUPATIONAL THERAPY INITIAL EVALUATION ADULT - ADDITIONAL COMMENTS
Patient states he lives with his wife in a house with 3 steps to enter, +handrail. Patient reports he has a first floor setup. +reading eyeglasses. pt reports lives in private home with spouse, 3 steps to enter. Prior to admission Ind with ADLs/ambulating, no AD/DME. Owns RW.

## 2024-06-25 NOTE — PROGRESS NOTE ADULT - ASSESSMENT
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     PLAN     N  # R acute on chronic SDH, L acute SDH, R parafalcine acute SDH with mass effect from the R with global atrophy   6/24 R MMAE at some point per NS   subdural drain per NS to pull today   #seizures  keppra 250bid   topiramate 100mg bid  vimpat 50mg bid  #Pain: Tylenol and oxycodone  #Activity: [] OOB as tolerated [] Bedrest [x] PT [x] OT [] PMNR    CV   #tachycardia o/n started diltiazem  #-160mmHg  #TTE     P   #RA    R   #Strict I+Os   #IVF    GI   #Diet: CCD  #Senna miralax  Last BM PTA     ID  afebrile    E  Goal euglycemia (-180)  #A1c 6.4    H  #DVT ppx:   SCDs  Chemoppx held per NS  LED 6/22 no dvt      #CODE STATUS: FULL CODE     #DISPOSITION: TO FLOOR    #NOT CRITICAL

## 2024-06-25 NOTE — PHYSICAL THERAPY INITIAL EVALUATION ADULT - PERTINENT HX OF CURRENT PROBLEM, REHAB EVAL
Pt is a 79M Hx SOC for tumor (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 No AC/AP, coags/plt wnl. Now s/p R crani for SDH evacuation on 6/22/24. Afib RVR overnight s/p Dilt IV. Now in SR. Exam: AOx3, PERRL, EOMI, no facial, ?/very slight L drift, BEST 5/5, gait testing deferred.

## 2024-06-25 NOTE — PHYSICAL THERAPY INITIAL EVALUATION ADULT - GENERAL OBSERVATIONS, REHAB EVAL
Pt received semisupine in bed with +cardiac monitor, +pulse ox, +BP cuff, +IV, and +condom catheter. NAD noted.

## 2024-06-25 NOTE — CONSULT NOTE ADULT - ASSESSMENT
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 No AC/AP, coags/plt wnl  Now s/p R crani for SDH evacuation   Afib RVR overnight s/p Dilt IV   Now in SR

## 2024-06-25 NOTE — PROGRESS NOTE ADULT - SUBJECTIVE AND OBJECTIVE BOX
NSCU ATTENDING -- ADDITIONAL PROGRESS NOTE    Nighttime rounds were performed -- please refer to earlier Progress Note for HPI details.    T(C): 37.6 (06-25-24 @ 19:00), Max: 37.7 (06-25-24 @ 15:00)  HR: 95 (06-25-24 @ 19:00) (69 - 163)  BP: 137/61 (06-25-24 @ 19:00) (123/59 - 147/67)  RR: 20 (06-25-24 @ 19:00) (10 - 20)  SpO2: 93% (06-25-24 @ 19:00) (93% - 100%)  Wt(kg): --    Relevant labwork and imaging reviewed.    back in rapid a.fib/flutter tonight, s/p IV dilt push.  also negative >1.5L, bolus 1L x 1 here.  also febrile, give IV tylenol now, send cultures.

## 2024-06-25 NOTE — PROGRESS NOTE ADULT - ASSESSMENT
Karly Anderson   79M Hx SOC for tumor (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 No AC/AP, coags/plt wnl  Exam: AOx3, PERRL, EOMI, no facial, ?/very slight L drift, BEST 5/5, gait testing deferred    Now s/p R crani for SDH evacuation   Post pull CTH pending, delayed 2/2 cardiac instability   Cardiology consulted, appreciate NSCU care

## 2024-06-25 NOTE — PROGRESS NOTE ADULT - SUBJECTIVE AND OBJECTIVE BOX
NSICU PROGRESS NOTE     HPI   78yo male with pmhx SOC for benign ependymoma, seizures, DM, HLD, aspirin non compliant, presented to VS for AMS, gait difficulties and falls for a week, found with acute on chronic R SDH, L SDH, parafalcince acute SDH.     NSICU course:  6/22 R SDH evacuation     12h events   s/p R MMAE with arrythmia during angio whish self resolved   tachycardia o/n started diltiazem    Exam   Alert, awake, oriented on self, place and year, PERRL, EOMI, gaze midline, L ptosis chronic, face symmetric   RUE 5/5  LUE pronator drift not touching bed  RLE flexors 4/5, extensors 5/5  LLE flexors 4/5, extensors 5/5      VITALS:   Vital Signs Last 24 Hrs  T(C): 36.7 (25 Jun 2024 07:00), Max: 38 (24 Jun 2024 19:00)  T(F): 98 (25 Jun 2024 07:00), Max: 100.4 (24 Jun 2024 19:00)  HR: 73 (25 Jun 2024 09:00) (69 - 167)  BP: 129/64 (25 Jun 2024 08:34) (123/59 - 179/82)  BP(mean): 89 (25 Jun 2024 08:00) (82 - 122)  RR: 14 (25 Jun 2024 09:00) (10 - 25)  SpO2: 100% (25 Jun 2024 09:00) (90% - 100%)    Parameters below as of 25 Jun 2024 08:34  Patient On (Oxygen Delivery Method): room air      CAPILLARY BLOOD GLUCOSE      POCT Blood Glucose.: 149 mg/dL (25 Jun 2024 05:14)  POCT Blood Glucose.: 132 mg/dL (24 Jun 2024 23:26)  POCT Blood Glucose.: 119 mg/dL (24 Jun 2024 17:36)  POCT Blood Glucose.: 119 mg/dL (24 Jun 2024 11:25)    I&O's Summary    24 Jun 2024 07:01  -  25 Jun 2024 07:00  --------------------------------------------------------  IN: 233 mL / OUT: 1950 mL / NET: -1717 mL        Respiratory:        LABS:                        11.0   13.38 )-----------( 174      ( 24 Jun 2024 23:27 )             35.6     06-25    135  |  102  |  21  ----------------------------<  143<H>  3.9   |  19<L>  |  1.06        MEDICATION LEVELS:     IVF FLUIDS/MEDICATIONS:   MEDICATIONS  (STANDING):  atorvastatin 80 milliGRAM(s) Oral at bedtime  captopril 12.5 milliGRAM(s) Oral every 8 hours  chlorhexidine 4% Liquid 1 Application(s) Topical daily  dexamethasone/neomycin/polymyxin Suspension 1 Drop(s) Both EYES every 8 hours  diltiazem    Tablet 30 milliGRAM(s) Oral every 6 hours  heparin   Injectable 5000 Unit(s) SubCutaneous every 12 hours  insulin lispro (ADMELOG) corrective regimen sliding scale   SubCutaneous every 6 hours  lacosamide IVPB 50 milliGRAM(s) IV Intermittent every 12 hours  levETIRAcetam 250 milliGRAM(s) Oral two times a day  pilocarpine 2% Solution 1 Drop(s) Right EYE two times a day  polyethylene glycol 3350 17 Gram(s) Oral daily  potassium chloride    Tablet ER 40 milliEquivalent(s) Oral once  senna 2 Tablet(s) Oral at bedtime  topiramate 100 milliGRAM(s) Oral two times a day    MEDICATIONS  (PRN):  acetaminophen     Tablet .. 650 milliGRAM(s) Oral every 6 hours PRN Temp greater or equal to 38C (100.4F), Mild Pain (1 - 3)  bisacodyl 5 milliGRAM(s) Oral daily PRN Constipation  ondansetron Injectable 4 milliGRAM(s) IV Push every 6 hours PRN Nausea and/or Vomiting  oxyCODONE    IR 5 milliGRAM(s) Oral every 4 hours PRN Moderate Pain (4 - 6)

## 2024-06-25 NOTE — OCCUPATIONAL THERAPY INITIAL EVALUATION ADULT - PERTINENT HX OF CURRENT PROBLEM, REHAB EVAL
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 No AC/AP, coags/plt wnl. Exam: AOx3, PERRL, EOMI, no facial, ?/very slight L drift, BEST 5/5, gait testing deferred. Now s/p R crani for SDH evacuation 6/22

## 2024-06-26 LAB
ADD ON TEST-SPECIMEN IN LAB: SIGNIFICANT CHANGE UP
ADD ON TEST-SPECIMEN IN LAB: SIGNIFICANT CHANGE UP
ALBUMIN SERPL ELPH-MCNC: 3 G/DL — LOW (ref 3.3–5)
ALP SERPL-CCNC: 64 U/L — SIGNIFICANT CHANGE UP (ref 40–120)
ALT FLD-CCNC: 25 U/L — SIGNIFICANT CHANGE UP (ref 10–45)
ANION GAP SERPL CALC-SCNC: 15 MMOL/L — SIGNIFICANT CHANGE UP (ref 5–17)
ANION GAP SERPL CALC-SCNC: 16 MMOL/L — SIGNIFICANT CHANGE UP (ref 5–17)
APPEARANCE UR: CLEAR — SIGNIFICANT CHANGE UP
AST SERPL-CCNC: 32 U/L — SIGNIFICANT CHANGE UP (ref 10–40)
BACTERIA # UR AUTO: NEGATIVE /HPF — SIGNIFICANT CHANGE UP
BILIRUB DIRECT SERPL-MCNC: 0.2 MG/DL — SIGNIFICANT CHANGE UP (ref 0–0.3)
BILIRUB INDIRECT FLD-MCNC: 0.4 MG/DL — SIGNIFICANT CHANGE UP (ref 0.2–1)
BILIRUB SERPL-MCNC: 0.6 MG/DL — SIGNIFICANT CHANGE UP (ref 0.2–1.2)
BILIRUB UR-MCNC: NEGATIVE — SIGNIFICANT CHANGE UP
BUN SERPL-MCNC: 40 MG/DL — HIGH (ref 7–23)
BUN SERPL-MCNC: 41 MG/DL — HIGH (ref 7–23)
CALCIUM SERPL-MCNC: 7.8 MG/DL — LOW (ref 8.4–10.5)
CALCIUM SERPL-MCNC: 8.4 MG/DL — SIGNIFICANT CHANGE UP (ref 8.4–10.5)
CAST: 17 /LPF — HIGH (ref 0–4)
CHLORIDE SERPL-SCNC: 103 MMOL/L — SIGNIFICANT CHANGE UP (ref 96–108)
CHLORIDE SERPL-SCNC: 104 MMOL/L — SIGNIFICANT CHANGE UP (ref 96–108)
CO2 SERPL-SCNC: 13 MMOL/L — LOW (ref 22–31)
CO2 SERPL-SCNC: 15 MMOL/L — LOW (ref 22–31)
COLOR SPEC: YELLOW — SIGNIFICANT CHANGE UP
CREAT SERPL-MCNC: 1.39 MG/DL — HIGH (ref 0.5–1.3)
CREAT SERPL-MCNC: 1.59 MG/DL — HIGH (ref 0.5–1.3)
CULTURE RESULTS: SIGNIFICANT CHANGE UP
CULTURE RESULTS: SIGNIFICANT CHANGE UP
DIFF PNL FLD: ABNORMAL
EGFR: 44 ML/MIN/1.73M2 — LOW
EGFR: 52 ML/MIN/1.73M2 — LOW
GAS PNL BLDA: SIGNIFICANT CHANGE UP
GLUCOSE BLDC GLUCOMTR-MCNC: 168 MG/DL — HIGH (ref 70–99)
GLUCOSE BLDC GLUCOMTR-MCNC: 183 MG/DL — HIGH (ref 70–99)
GLUCOSE BLDC GLUCOMTR-MCNC: 186 MG/DL — HIGH (ref 70–99)
GLUCOSE BLDC GLUCOMTR-MCNC: 243 MG/DL — HIGH (ref 70–99)
GLUCOSE BLDC GLUCOMTR-MCNC: 245 MG/DL — HIGH (ref 70–99)
GLUCOSE SERPL-MCNC: 224 MG/DL — HIGH (ref 70–99)
GLUCOSE SERPL-MCNC: 235 MG/DL — HIGH (ref 70–99)
GLUCOSE UR QL: NEGATIVE MG/DL — SIGNIFICANT CHANGE UP
HYALINE CASTS # UR AUTO: PRESENT
KETONES UR-MCNC: NEGATIVE MG/DL — SIGNIFICANT CHANGE UP
LEUKOCYTE ESTERASE UR-ACNC: NEGATIVE — SIGNIFICANT CHANGE UP
MAGNESIUM SERPL-MCNC: 2.4 MG/DL — SIGNIFICANT CHANGE UP (ref 1.6–2.6)
NITRITE UR-MCNC: NEGATIVE — SIGNIFICANT CHANGE UP
PH UR: 5.5 — SIGNIFICANT CHANGE UP (ref 5–8)
PHOSPHATE SERPL-MCNC: 2 MG/DL — LOW (ref 2.5–4.5)
POTASSIUM SERPL-MCNC: 3.9 MMOL/L — SIGNIFICANT CHANGE UP (ref 3.5–5.3)
POTASSIUM SERPL-MCNC: 4 MMOL/L — SIGNIFICANT CHANGE UP (ref 3.5–5.3)
POTASSIUM SERPL-SCNC: 3.9 MMOL/L — SIGNIFICANT CHANGE UP (ref 3.5–5.3)
POTASSIUM SERPL-SCNC: 4 MMOL/L — SIGNIFICANT CHANGE UP (ref 3.5–5.3)
PROT SERPL-MCNC: 6.6 G/DL — SIGNIFICANT CHANGE UP (ref 6–8.3)
PROT UR-MCNC: 30 MG/DL
RBC CASTS # UR COMP ASSIST: 3 /HPF — SIGNIFICANT CHANGE UP (ref 0–4)
SODIUM SERPL-SCNC: 132 MMOL/L — LOW (ref 135–145)
SODIUM SERPL-SCNC: 134 MMOL/L — LOW (ref 135–145)
SP GR SPEC: >1.03 — HIGH (ref 1–1.03)
SPECIMEN SOURCE: SIGNIFICANT CHANGE UP
SPECIMEN SOURCE: SIGNIFICANT CHANGE UP
SQUAMOUS # UR AUTO: 3 /HPF — SIGNIFICANT CHANGE UP (ref 0–5)
TROPONIN T, HIGH SENSITIVITY RESULT: 42 NG/L — SIGNIFICANT CHANGE UP (ref 0–51)
UROBILINOGEN FLD QL: 1 MG/DL — SIGNIFICANT CHANGE UP (ref 0.2–1)
WBC UR QL: 2 /HPF — SIGNIFICANT CHANGE UP (ref 0–5)

## 2024-06-26 PROCEDURE — 93010 ELECTROCARDIOGRAM REPORT: CPT

## 2024-06-26 PROCEDURE — 99291 CRITICAL CARE FIRST HOUR: CPT

## 2024-06-26 RX ORDER — AMIODARONE HYDROCHLORIDE 50 MG/ML
150 INJECTION, SOLUTION INTRAVENOUS ONCE
Refills: 0 | Status: COMPLETED | OUTPATIENT
Start: 2024-06-26 | End: 2024-06-26

## 2024-06-26 RX ORDER — AMIODARONE HYDROCHLORIDE 50 MG/ML
400 INJECTION, SOLUTION INTRAVENOUS
Refills: 0 | Status: DISCONTINUED | OUTPATIENT
Start: 2024-06-27 | End: 2024-07-02

## 2024-06-26 RX ORDER — AMIODARONE HYDROCHLORIDE 50 MG/ML
0.5 INJECTION, SOLUTION INTRAVENOUS
Qty: 450 | Refills: 0 | Status: DISCONTINUED | OUTPATIENT
Start: 2024-06-26 | End: 2024-06-26

## 2024-06-26 RX ORDER — SODIUM CHLORIDE 0.9 % (FLUSH) 0.9 %
500 SYRINGE (ML) INJECTION ONCE
Refills: 0 | Status: COMPLETED | OUTPATIENT
Start: 2024-06-26 | End: 2024-06-26

## 2024-06-26 RX ORDER — DILTIAZEM HYDROCHLORIDE 240 MG/1
5 CAPSULE, EXTENDED RELEASE ORAL ONCE
Refills: 0 | Status: COMPLETED | OUTPATIENT
Start: 2024-06-26 | End: 2024-06-26

## 2024-06-26 RX ORDER — AMIODARONE HYDROCHLORIDE 50 MG/ML
0.5 INJECTION, SOLUTION INTRAVENOUS
Qty: 450 | Refills: 0 | Status: DISCONTINUED | OUTPATIENT
Start: 2024-06-26 | End: 2024-06-27

## 2024-06-26 RX ORDER — INSULIN LISPRO 100 [IU]/ML
2 INJECTION, SOLUTION SUBCUTANEOUS
Refills: 0 | Status: DISCONTINUED | OUTPATIENT
Start: 2024-06-26 | End: 2024-06-28

## 2024-06-26 RX ORDER — AMIODARONE HYDROCHLORIDE 50 MG/ML
1 INJECTION, SOLUTION INTRAVENOUS
Qty: 450 | Refills: 0 | Status: DISCONTINUED | OUTPATIENT
Start: 2024-06-26 | End: 2024-06-26

## 2024-06-26 RX ORDER — ACETAMINOPHEN 325 MG
1000 TABLET ORAL ONCE
Refills: 0 | Status: COMPLETED | OUTPATIENT
Start: 2024-06-26 | End: 2024-06-26

## 2024-06-26 RX ORDER — AMIODARONE HYDROCHLORIDE 50 MG/ML
1 INJECTION, SOLUTION INTRAVENOUS
Qty: 450 | Refills: 0 | Status: DISCONTINUED | OUTPATIENT
Start: 2024-06-26 | End: 2024-06-27

## 2024-06-26 RX ADMIN — DILTIAZEM HYDROCHLORIDE 60 MILLIGRAM(S): 240 CAPSULE, EXTENDED RELEASE ORAL at 11:59

## 2024-06-26 RX ADMIN — Medication 1 DROP(S): at 13:05

## 2024-06-26 RX ADMIN — AMIODARONE HYDROCHLORIDE 16.7 MG/MIN: 50 INJECTION, SOLUTION INTRAVENOUS at 19:20

## 2024-06-26 RX ADMIN — Medication 1 DROP(S): at 17:43

## 2024-06-26 RX ADMIN — PIPERACILLIN SODIUM AND TAZOBACTAM SODIUM 3.38 GRAM(S): 3; .375 INJECTION, POWDER, LYOPHILIZED, FOR SOLUTION INTRAVENOUS at 16:45

## 2024-06-26 RX ADMIN — AMIODARONE HYDROCHLORIDE 600 MILLIGRAM(S): 50 INJECTION, SOLUTION INTRAVENOUS at 01:35

## 2024-06-26 RX ADMIN — DILTIAZEM HYDROCHLORIDE 60 MILLIGRAM(S): 240 CAPSULE, EXTENDED RELEASE ORAL at 22:20

## 2024-06-26 RX ADMIN — TOPIRAMATE 100 MILLIGRAM(S): 50 TABLET, FILM COATED ORAL at 17:06

## 2024-06-26 RX ADMIN — HEPARIN SODIUM 5000 UNIT(S): 50 INJECTION, SOLUTION INTRAVENOUS at 05:22

## 2024-06-26 RX ADMIN — LACOSAMIDE 110 MILLIGRAM(S): 100 TABLET, FILM COATED ORAL at 05:23

## 2024-06-26 RX ADMIN — Medication 500 MILLILITER(S): at 00:37

## 2024-06-26 RX ADMIN — INSULIN LISPRO 4: 100 INJECTION, SOLUTION SUBCUTANEOUS at 05:19

## 2024-06-26 RX ADMIN — Medication 63.75 MILLIMOLE(S): at 04:00

## 2024-06-26 RX ADMIN — ATORVASTATIN CALCIUM 80 MILLIGRAM(S): 20 TABLET, FILM COATED ORAL at 21:21

## 2024-06-26 RX ADMIN — DILTIAZEM HYDROCHLORIDE 60 MILLIGRAM(S): 240 CAPSULE, EXTENDED RELEASE ORAL at 17:06

## 2024-06-26 RX ADMIN — Medication 1 APPLICATION(S): at 11:59

## 2024-06-26 RX ADMIN — INSULIN LISPRO 2: 100 INJECTION, SOLUTION SUBCUTANEOUS at 23:04

## 2024-06-26 RX ADMIN — LACOSAMIDE 110 MILLIGRAM(S): 100 TABLET, FILM COATED ORAL at 17:42

## 2024-06-26 RX ADMIN — PIPERACILLIN SODIUM AND TAZOBACTAM SODIUM 3.38 GRAM(S): 3; .375 INJECTION, POWDER, LYOPHILIZED, FOR SOLUTION INTRAVENOUS at 12:09

## 2024-06-26 RX ADMIN — PIPERACILLIN SODIUM AND TAZOBACTAM SODIUM 25 GRAM(S): 3; .375 INJECTION, POWDER, LYOPHILIZED, FOR SOLUTION INTRAVENOUS at 04:00

## 2024-06-26 RX ADMIN — Medication 400 MILLIGRAM(S): at 19:35

## 2024-06-26 RX ADMIN — Medication 1 DROP(S): at 05:20

## 2024-06-26 RX ADMIN — PIPERACILLIN SODIUM AND TAZOBACTAM SODIUM 25 GRAM(S): 3; .375 INJECTION, POWDER, LYOPHILIZED, FOR SOLUTION INTRAVENOUS at 16:18

## 2024-06-26 RX ADMIN — TOPIRAMATE 100 MILLIGRAM(S): 50 TABLET, FILM COATED ORAL at 05:21

## 2024-06-26 RX ADMIN — Medication 50 MILLILITER(S): at 12:00

## 2024-06-26 RX ADMIN — INSULIN LISPRO 2: 100 INJECTION, SOLUTION SUBCUTANEOUS at 17:05

## 2024-06-26 RX ADMIN — DILTIAZEM HYDROCHLORIDE 60 MILLIGRAM(S): 240 CAPSULE, EXTENDED RELEASE ORAL at 05:22

## 2024-06-26 RX ADMIN — INSULIN LISPRO 2: 100 INJECTION, SOLUTION SUBCUTANEOUS at 11:57

## 2024-06-26 RX ADMIN — HEPARIN SODIUM 5000 UNIT(S): 50 INJECTION, SOLUTION INTRAVENOUS at 17:07

## 2024-06-26 RX ADMIN — INSULIN LISPRO 2 UNIT(S): 100 INJECTION, SOLUTION SUBCUTANEOUS at 17:05

## 2024-06-26 RX ADMIN — Medication 50 MILLILITER(S): at 21:20

## 2024-06-26 RX ADMIN — LEVETIRACETAM 250 MILLIGRAM(S): 100 INJECTION INTRAVENOUS at 17:06

## 2024-06-26 RX ADMIN — PIPERACILLIN SODIUM AND TAZOBACTAM SODIUM 25 GRAM(S): 3; .375 INJECTION, POWDER, LYOPHILIZED, FOR SOLUTION INTRAVENOUS at 21:21

## 2024-06-26 RX ADMIN — AMIODARONE HYDROCHLORIDE 16.7 MG/MIN: 50 INJECTION, SOLUTION INTRAVENOUS at 12:00

## 2024-06-26 RX ADMIN — AMIODARONE HYDROCHLORIDE 33.3 MG/MIN: 50 INJECTION, SOLUTION INTRAVENOUS at 02:00

## 2024-06-26 RX ADMIN — LEVETIRACETAM 250 MILLIGRAM(S): 100 INJECTION INTRAVENOUS at 05:22

## 2024-06-26 RX ADMIN — DILTIAZEM HYDROCHLORIDE 5 MILLIGRAM(S): 240 CAPSULE, EXTENDED RELEASE ORAL at 01:25

## 2024-06-26 RX ADMIN — PIPERACILLIN SODIUM AND TAZOBACTAM SODIUM 25 GRAM(S): 3; .375 INJECTION, POWDER, LYOPHILIZED, FOR SOLUTION INTRAVENOUS at 10:00

## 2024-06-26 RX ADMIN — Medication 1000 MILLIGRAM(S): at 19:50

## 2024-06-26 RX ADMIN — Medication 1 DROP(S): at 21:21

## 2024-06-26 NOTE — DIETITIAN INITIAL EVALUATION ADULT - NSPROEDAABILITYLEARN_GEN_A_NUR
· Baseline hemoglobin 10-12's  Microcytic hypochromic  · Hb stable  · B12,folate, TSH normal   · Monitor  oriented x3, in ICU. will follow up as able oriented x3, in ICU. PO intake encouraged, preferences obtained. will follow up with DM education as able/warranted.

## 2024-06-26 NOTE — DIETITIAN INITIAL EVALUATION ADULT - REASON INDICATOR FOR ASSESSMENT
seen for length of stay. information obtained from pt, team during interdisciplinary rounds, RN, speech pathologist, electronic medical record

## 2024-06-26 NOTE — SWALLOW BEDSIDE ASSESSMENT ADULT - COMMENTS
6/25: CTH: Interval removal of subdural drainage catheter and right MMA embolization since 6/24/2024. Unchanged residual subdural hemorrhage and mild midline shift.    Pt unknown to this service

## 2024-06-26 NOTE — PROGRESS NOTE ADULT - CRITICAL CARE ATTENDING COMMENT
# R acute on chronic SDH, L acute SDH, R parafalcine acute SDH with mass effect from the R with global atrophy s/p R crani for SDH evacuation   6/24 R MMAE at some point per NS   neuro checks q 4 hr   vimpat 50 mg BID , and keppra 500 mg BID    pulm desat, has infiltrated, atelectais, CTE protocol overnight no PE , chest PT   1 episode of vomiting, has diarrhea , abd xray ilius pending official read, stop stool softner  MIKAELA likley contarst induced, NS 50 ml/hr , avoid nephrotoxic medication   Paroxysmal atrial fibrillation. Cont with amiodarone to completion then start 400 PO BID, and diltiazem 60 q 6 hr ,   febrile overnight on zosyn , pancultured   hyperglycemia, ISS, pre-meals 2 units   heprain 5000 units q 12 hr for chemorpophylaxis     30 critical care time

## 2024-06-26 NOTE — PROGRESS NOTE ADULT - SUBJECTIVE AND OBJECTIVE BOX
NSICU PROGRESS NOTE     HPI   80yo male with pmhx SOC for benign ependymoma, seizures, DM, HLD, aspirin non compliant, presented to VS for AMS, gait difficulties and falls for a week, found with acute on chronic R SDH, L SDH, parafalcince acute SDH.     NSICU course:  6/22 R SDH evacuation     12h events   increased diltiazem adn started amiodarone started overnight for afib rvr 160  fever sent culture  rectal tube     Exam   neuro: Alert, awake, oriented on self, place and year, PERRL, EOMI, gaze midline, L ptosis chronic, face symmetric   RUE 5/5  LUE pronator drift not touching bed  RLE flexors 4/5, extensors 5/5  LLE flexors 4/5, extensors 5/5  General:  calm  HEENT:  MMM  Cards:  RRR  Respiratory:  no respiratory distress  Adomen:  soft, distended   Extremities:  no edema  Skin:  warm/dry      VITALS:   Vital Signs Last 24 Hrs  T(C): 36.8 (26 Jun 2024 07:00), Max: 37.7 (25 Jun 2024 15:00)  T(F): 98.2 (26 Jun 2024 07:00), Max: 99.9 (25 Jun 2024 15:00)  HR: 64 (26 Jun 2024 10:15) (61 - 156)  BP: 117/59 (26 Jun 2024 10:15) (86/53 - 147/67)  BP(mean): 82 (26 Jun 2024 10:15) (65 - 96)  RR: 19 (26 Jun 2024 10:15) (12 - 23)  SpO2: 95% (26 Jun 2024 10:15) (91% - 97%)    Parameters below as of 25 Jun 2024 19:00  Patient On (Oxygen Delivery Method): nasal cannula  O2 Flow (L/min): 4    CAPILLARY BLOOD GLUCOSE      POCT Blood Glucose.: 245 mg/dL (26 Jun 2024 05:17)  POCT Blood Glucose.: 243 mg/dL (26 Jun 2024 05:15)  POCT Blood Glucose.: 213 mg/dL (25 Jun 2024 23:20)  POCT Blood Glucose.: 147 mg/dL (25 Jun 2024 16:25)    I&O's Summary    25 Jun 2024 07:01  -  26 Jun 2024 07:00  --------------------------------------------------------  IN: 2646.5 mL / OUT: 1600 mL / NET: 1046.5 mL    26 Jun 2024 07:01  -  26 Jun 2024 11:14  --------------------------------------------------------  IN: 216.7 mL / OUT: 0 mL / NET: 216.7 mL        Respiratory:    ABG - ( 26 Jun 2024 03:56 )  pH, Arterial: 7.32  pH, Blood: x     /  pCO2: 30    /  pO2: 129   / HCO3: 16    / Base Excess: -9.5  /  SaO2: 98.8                LABS:                        10.6   7.05  )-----------( 175      ( 25 Jun 2024 23:35 )             32.2     06-26    132<L>  |  104  |  40<H>  ----------------------------<  224<H>  3.9   |  13<L>  |  1.39<H>        MEDICATION LEVELS:     IVF FLUIDS/MEDICATIONS:   MEDICATIONS  (STANDING):  aMIOdarone Infusion 0.999 mG/Min (33.3 mL/Hr) IV Continuous <Continuous>  aMIOdarone Infusion 0.501 mG/Min (16.7 mL/Hr) IV Continuous <Continuous>  atorvastatin 80 milliGRAM(s) Oral at bedtime  chlorhexidine 4% Liquid 1 Application(s) Topical daily  dexamethasone/neomycin/polymyxin Suspension 1 Drop(s) Both EYES every 8 hours  diltiazem    Tablet 60 milliGRAM(s) Oral every 6 hours  heparin   Injectable 5000 Unit(s) SubCutaneous every 12 hours  insulin lispro (ADMELOG) corrective regimen sliding scale   SubCutaneous every 6 hours  lacosamide IVPB 50 milliGRAM(s) IV Intermittent every 12 hours  levETIRAcetam 250 milliGRAM(s) Oral two times a day  pilocarpine 2% Solution 1 Drop(s) Right EYE two times a day  piperacillin/tazobactam IVPB.- 3.375 Gram(s) IV Intermittent once  piperacillin/tazobactam IVPB.- 3.375 Gram(s) IV Intermittent once  piperacillin/tazobactam IVPB.. 3.375 Gram(s) IV Intermittent every 8 hours  polyethylene glycol 3350 17 Gram(s) Oral daily  senna 2 Tablet(s) Oral at bedtime  sodium chloride 0.9%. 1000 milliLiter(s) (50 mL/Hr) IV Continuous <Continuous>  topiramate 100 milliGRAM(s) Oral two times a day    MEDICATIONS  (PRN):  acetaminophen     Tablet .. 650 milliGRAM(s) Oral every 6 hours PRN Temp greater or equal to 38C (100.4F), Mild Pain (1 - 3)  bisacodyl 5 milliGRAM(s) Oral daily PRN Constipation  ondansetron Injectable 4 milliGRAM(s) IV Push every 6 hours PRN Nausea and/or Vomiting  oxyCODONE    IR 5 milliGRAM(s) Oral every 4 hours PRN Moderate Pain (4 - 6)

## 2024-06-26 NOTE — DIETITIAN INITIAL EVALUATION ADULT - ADD RECOMMEND
1) Will continue to monitor PO intake, weight, labs, skin, GI status and diet 2) RD to add No Sugar Added Mighty Shake supplement twice/daily to aid in meeting nutrient needs

## 2024-06-26 NOTE — DIETITIAN INITIAL EVALUATION ADULT - NSFNSGIIOFT_GEN_A_CORE
06-25-24 @ 07:01  -  06-26-24 @ 07:00  --------------------------------------------------------  OUT:    Rectal Tube (mL): 500 mL  Total OUT: 500 mL    Total NET: -500 mL

## 2024-06-26 NOTE — SWALLOW BEDSIDE ASSESSMENT ADULT - ASR SWALLOW ASPIRATION MONITOR
Monitor for s/s aspiration/laryngeal penetration. If noted:  D/C p.o. intake, provide non-oral nutrition/hydration/meds, and contact this service @ [phone]/change of breathing pattern/cough/gurgly voice/fever/pneumonia/throat clearing/upper respiratory infection

## 2024-06-26 NOTE — DIETITIAN INITIAL EVALUATION ADULT - PERTINENT MEDS FT
MEDICATIONS  (STANDING):  aMIOdarone Infusion 0.999 mG/Min (33.3 mL/Hr) IV Continuous <Continuous>  aMIOdarone Infusion 0.501 mG/Min (16.7 mL/Hr) IV Continuous <Continuous>  atorvastatin 80 milliGRAM(s) Oral at bedtime  chlorhexidine 4% Liquid 1 Application(s) Topical daily  dexamethasone/neomycin/polymyxin Suspension 1 Drop(s) Both EYES every 8 hours  diltiazem    Tablet 60 milliGRAM(s) Oral every 6 hours  heparin   Injectable 5000 Unit(s) SubCutaneous every 12 hours  insulin lispro (ADMELOG) corrective regimen sliding scale   SubCutaneous every 6 hours  lacosamide IVPB 50 milliGRAM(s) IV Intermittent every 12 hours  levETIRAcetam 250 milliGRAM(s) Oral two times a day  pilocarpine 2% Solution 1 Drop(s) Right EYE two times a day  piperacillin/tazobactam IVPB.- 3.375 Gram(s) IV Intermittent once  piperacillin/tazobactam IVPB.- 3.375 Gram(s) IV Intermittent once  piperacillin/tazobactam IVPB.. 3.375 Gram(s) IV Intermittent every 8 hours  polyethylene glycol 3350 17 Gram(s) Oral daily  senna 2 Tablet(s) Oral at bedtime  sodium chloride 0.9%. 1000 milliLiter(s) (50 mL/Hr) IV Continuous <Continuous>  topiramate 100 milliGRAM(s) Oral two times a day    MEDICATIONS  (PRN):  acetaminophen     Tablet .. 650 milliGRAM(s) Oral every 6 hours PRN Temp greater or equal to 38C (100.4F), Mild Pain (1 - 3)  bisacodyl 5 milliGRAM(s) Oral daily PRN Constipation  ondansetron Injectable 4 milliGRAM(s) IV Push every 6 hours PRN Nausea and/or Vomiting  oxyCODONE    IR 5 milliGRAM(s) Oral every 4 hours PRN Moderate Pain (4 - 6)  .  height below per writer observation.

## 2024-06-26 NOTE — SWALLOW BEDSIDE ASSESSMENT ADULT - SWALLOW EVAL: DIAGNOSIS
Pt is 78 y/o M admitted for SDH. Now presenting with a grossly functional oropharyngeal swallow sequence. No overt signs of laryngeal penetration/aspiration observed on exam.

## 2024-06-26 NOTE — DIETITIAN INITIAL EVALUATION ADULT - ORAL INTAKE PTA/DIET HISTORY
visited pt at bedside this morning, oriented x3 per team. reports good PO intake PTA. does not report to follow a therapeutic diet PTA. eats 3 meals/day.   visited pt at bedside this morning, oriented x3 per team. reports good PO intake PTA. does not report to follow a therapeutic diet PTA. eats 3 meals/day, unable to describe specifics at this time.

## 2024-06-26 NOTE — PROGRESS NOTE ADULT - SUBJECTIVE AND OBJECTIVE BOX
SUBJECTIVE:   Mary Strauss is a 61 year old female who presents to clinic today for the following health issues:    HPI  Presents with a cough x 3 weeks. Was into the clinic 1 week ago for BP check and was evaluated for the cough at that time. Has been taking OTC cough medication with no improvement. Reports she may have had a low grade temp. Has a HA, hasn't been eating or drinking well, no belly pain. Has SOB, can't stand, no sinus congestion. No history of COPD, doesn't have inhalers. Is currently in treatment, roommate is ill as well. Is due to get out of treatment in just over a week. Daily smoker. Was binge drinking.     There are no active problems to display for this patient.    History reviewed. No pertinent past medical history.   History reviewed. No pertinent surgical history.  History reviewed. No pertinent family history.  Social History     Tobacco Use     Smoking status: Current Every Day Smoker     Smokeless tobacco: Never Used   Substance Use Topics     Alcohol use: Not Currently     Social History     Social History Narrative     Not on file     Current Outpatient Medications   Medication Sig Dispense Refill     Prenatal Vit-Fe Fumarate-FA (PRENATAL MULTIVITAMIN W/IRON) 27-0.8 MG tablet Take 1 tablet by mouth       Allergies   Allergen Reactions     Bee Venom Swelling     Throat swelled up, unable to talk for 2 days.       Review of Systems   Constitutional: Positive for appetite change, fatigue, fever and unexpected weight change.   HENT: Positive for congestion. Negative for ear pain.    Eyes: Negative.    Respiratory: Positive for cough, shortness of breath and wheezing.    Cardiovascular: Negative for chest pain.   Gastrointestinal: Negative for abdominal pain.   Genitourinary: Negative.    Musculoskeletal: Negative.    Skin: Negative.    Allergic/Immunologic: Negative for environmental allergies.   Neurological: Positive for weakness and headaches.   Hematological: Negative.   Patient seen and examined at bedside.    --Anticoagulation--  heparin   Injectable 5000 Unit(s) SubCutaneous every 12 hours    T(C): 37 (06-25-24 @ 23:00), Max: 37.7 (06-25-24 @ 15:00)  HR: 96 (06-25-24 @ 23:00) (69 - 96)  BP: 113/61 (06-25-24 @ 23:00) (113/61 - 147/67)  RR: 18 (06-25-24 @ 23:00) (10 - 20)  SpO2: 95% (06-25-24 @ 23:00) (93% - 100%)  Wt(kg): --    Exam: AOx3, PERRL, EOMI, no facial, no drift, BEST 5/5 "  Psychiatric/Behavioral:        Currently in treatment for ETOH abuse, recent DUI        OBJECTIVE:     /60   Pulse 101   Temp 98.6  F (37  C) (Tympanic)   Resp 20   Ht 1.6 m (5' 3\")   Wt 43.5 kg (95 lb 14.4 oz)   LMP  (LMP Unknown)   SpO2 99%   BMI 16.99 kg/m    Body mass index is 16.99 kg/m .  Physical Exam  Vitals signs and nursing note reviewed.   Constitutional:       General: She is not in acute distress.     Appearance: She is well-groomed and underweight. She is ill-appearing. She is not toxic-appearing.   HENT:      Head: Normocephalic.      Jaw: There is normal jaw occlusion. No trismus.      Right Ear: Tympanic membrane normal.      Left Ear: Tympanic membrane normal.      Nose: Nose normal.      Mouth/Throat:      Lips: Pink. No lesions.      Mouth: Mucous membranes are moist.      Pharynx: Oropharynx is clear.   Eyes:      General: Lids are normal. No scleral icterus.     Conjunctiva/sclera: Conjunctivae normal.   Neck:      Musculoskeletal: Normal range of motion and neck supple.   Cardiovascular:      Rate and Rhythm: Regular rhythm. Tachycardia present.   Pulmonary:      Effort: No respiratory distress.      Breath sounds: No decreased air movement. No rhonchi.   Lymphadenopathy:      Cervical: No cervical adenopathy.   Skin:     General: Skin is warm and dry.   Neurological:      Mental Status: She is alert and oriented to person, place, and time.   Psychiatric:         Behavior: Behavior is cooperative.         Thought Content: Thought content normal.         Diagnostic Test Results:  No results found for this or any previous visit (from the past 24 hour(s)).    ASSESSMENT/PLAN:       ICD-10-CM    1. Dyspnea, unspecified type R06.00    2. Decreased appetite R63.0         PLAN:  Pt here in follow up for cough from 11/19/19, worsening symptoms with weight loss and SOB.   Will have pt transferred to ED for further evaluation and management.   Report given to triage nurse. LPN to " transport via wheelchair.   I explained my diagnostic considerations and recommendations to pt who voiced understanding and agreement with the treatment plan. All questions were answered. We discussed potential side effects of any prescribed or recommended therapies, as well as expectations for response to treatments.    Disclaimer:  This note consists of words and symbols derived from keyboarding, dictation, or using voice recognition software. As a result, there may be errors in the script that have gone undetected. Please consider this when interpreting information found in this note.      SOO Judge, NP-C  11/25/2019 at 10:46 AM  [facility]

## 2024-06-26 NOTE — SWALLOW BEDSIDE ASSESSMENT ADULT - SLP GENERAL OBSERVATIONS
Pt asleep in bed, woke to verbal stimulation. Able to communicate needs and follow directions, oriented x3. 4L O2 via NC. Pleasant and cooperative. Clear vocal quality.

## 2024-06-26 NOTE — DIETITIAN INITIAL EVALUATION ADULT - OTHER INFO
-PMHx of DM2 noted, Hgb A1c 6.4%. insulin regimen ordered to maintain glycemic control.   -BUN/Cr elevated   -6/22 s/p crani for subdural hematoma

## 2024-06-26 NOTE — DIETITIAN INITIAL EVALUATION ADULT - REASON FOR ADMISSION
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

## 2024-06-26 NOTE — PROGRESS NOTE ADULT - ASSESSMENT
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     PLAN     N  # R acute on chronic SDH, L acute SDH, R parafalcine acute SDH with mass effect from the R with global atrophy   6/24 R MMAE at some point per NS   subdural drain per NS to pull today   #seizures  keppra 250bid   topiramate 100mg bid  vimpat 50mg bid  #Pain: Tylenol and oxycodone  #Activity: [] OOB as tolerated [] Bedrest [x] PT [x] OT [] PMNR    CV   #tachycardia, aflutter RVR 160s, diltiazem with amiodarone drip and per card transition to po  #-160mmHg  #TTE 67%    P   #NC 5L   CTA chest no PE   #pulm infiltrates    R   #Strict I+Os   #IVF  #MIKAELA  contrast induced     GI   #Diet: CCD  #Senna miralax  Last BM PTA   #distended abdomen: kub enlarged bowels     ID  afebrile     E  Goal euglycemia (-180)  #A1c 6.4   iss   add NPH 2u premeals - until off amiodarone drip     H  #DVT ppx:   SCDs  Chemoppx held per NS  LED 6/22 no dvt      #CODE STATUS: FULL CODE     #DISPOSITION: TO FLOOR    #NOT CRITICAL

## 2024-06-26 NOTE — SWALLOW BEDSIDE ASSESSMENT ADULT - H & P REVIEW
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 No AC/AP, coags/plt wnl Exam: AOx3, PERRL, EOMI, no facial, ?/very slight L drift, BEST 5/5, gait testing deferred. Pt admitted for SDH./yes

## 2024-06-26 NOTE — CHART NOTE - NSCHARTNOTEFT_GEN_A_CORE
Patient continues to have intermittent a-flutter with RVR, however non-sustained. This writer discussed risks of benefits of amiodarone with Dr. Greco vs. increased lindsay blockade. Per Dr. Greco will increase Cardizem to 60mg q6 hours PO and amiodarone. Patient remains hemodynamically stable, denying chest pain, shortness of breath. Patient continues to have intermittent a-flutter with RVR, however non-sustained. This writer discussed risks of benefits of amiodarone with Dr. Greco vs. increased lindsay blockade. Per Dr. Greco will increase Cardizem to 60mg q6 hours PO and amiodarone. Patient remains hemodynamically stable, denying chest pain, shortness of breath.      Additional:  0120 hours: Pt. now sustaining a-flutter w/ RBR rate ~160, Dr. Greco advised. Per Dr. Greco will now load with amiodarone 150mg followed by maintenance infusion. Patient continues to have intermittent a-flutter with RVR, however non-sustained. This writer discussed risks of benefits of amiodarone with Dr. Greco vs. increased lindsay blockade. Per Dr. Greco will increase Cardizem to 60mg q6 hours PO and amiodarone. Patient remains hemodynamically stable, denying chest pain, shortness of breath.      Additional:  0120 hours: Pt. now sustaining a-flutter w/ RVR rate ~160, Dr. Greco advised. Per Dr. Greco will now load with amiodarone 150mg followed by maintenance infusion.    Additional:  0209 hours: Patient continuing to sustain a-flutter w/ RVR rate ~160, electrical cardioversion discussed w/ Dr. Greco. This writer advised to continue to monitor pt. on amiodarone gtt and defer electrical cardioversion at this time

## 2024-06-26 NOTE — DIETITIAN INITIAL EVALUATION ADULT - PERTINENT LABORATORY DATA
06-26    132<L>  |  104  |  40<H>  ----------------------------<  224<H>  3.9   |  13<L>  |  1.39<H>    Ca    7.8<L>      26 Jun 2024 02:19  Phos  2.0     06-25  Mg     2.4     06-25    POCT Blood Glucose.: 245 mg/dL (06-26-24 @ 05:17)  A1C with Estimated Average Glucose Result: 6.4 % (06-22-24 @ 19:37)

## 2024-06-26 NOTE — PROGRESS NOTE ADULT - SUBJECTIVE AND OBJECTIVE BOX
Subjective: Patient seen and examined. No new events except as noted.   remains in NSCU   Afib RVR overnight started amdiodarone       REVIEW OF SYSTEMS:    CONSTITUTIONAL: No weakness, fevers or chills  EYES/ENT: No visual changes;  No vertigo or throat pain   NECK: No pain or stiffness  RESPIRATORY: No cough, wheezing, hemoptysis; No shortness of breath  CARDIOVASCULAR: No chest pain or palpitations  GASTROINTESTINAL: No abdominal or epigastric pain. No nausea, vomiting, or hematemesis; No diarrhea or constipation. No melena or hematochezia.  GENITOURINARY: No dysuria, frequency or hematuria  NEUROLOGICAL: No numbness or weakness  SKIN: No itching, burning, rashes, or lesions   All other review of systems is negative unless indicated above.    MEDICATIONS:  MEDICATIONS  (STANDING):  aMIOdarone Infusion 0.999 mG/Min (33.3 mL/Hr) IV Continuous <Continuous>  aMIOdarone Infusion 0.501 mG/Min (16.7 mL/Hr) IV Continuous <Continuous>  atorvastatin 80 milliGRAM(s) Oral at bedtime  chlorhexidine 4% Liquid 1 Application(s) Topical daily  dexamethasone/neomycin/polymyxin Suspension 1 Drop(s) Both EYES every 8 hours  diltiazem    Tablet 60 milliGRAM(s) Oral every 6 hours  heparin   Injectable 5000 Unit(s) SubCutaneous every 12 hours  insulin lispro (ADMELOG) corrective regimen sliding scale   SubCutaneous every 6 hours  lacosamide IVPB 50 milliGRAM(s) IV Intermittent every 12 hours  levETIRAcetam 250 milliGRAM(s) Oral two times a day  pilocarpine 2% Solution 1 Drop(s) Right EYE two times a day  piperacillin/tazobactam IVPB.- 3.375 Gram(s) IV Intermittent once  piperacillin/tazobactam IVPB.- 3.375 Gram(s) IV Intermittent once  piperacillin/tazobactam IVPB.. 3.375 Gram(s) IV Intermittent every 8 hours  polyethylene glycol 3350 17 Gram(s) Oral daily  senna 2 Tablet(s) Oral at bedtime  sodium chloride 0.9%. 1000 milliLiter(s) (50 mL/Hr) IV Continuous <Continuous>  topiramate 100 milliGRAM(s) Oral two times a day      PHYSICAL EXAM:  T(C): 36.8 (06-26-24 @ 07:00), Max: 37.7 (06-25-24 @ 15:00)  HR: 65 (06-26-24 @ 07:30) (65 - 156)  BP: 128/58 (06-26-24 @ 07:30) (86/53 - 147/67)  RR: 13 (06-26-24 @ 07:30) (12 - 23)  SpO2: 95% (06-26-24 @ 07:30) (91% - 100%)  Wt(kg): --  I&O's Summary    25 Jun 2024 07:01  -  26 Jun 2024 07:00  --------------------------------------------------------  IN: 2646.5 mL / OUT: 1600 mL / NET: 1046.5 mL    26 Jun 2024 07:01  -  26 Jun 2024 08:43  --------------------------------------------------------  IN: 83.3 mL / OUT: 0 mL / NET: 83.3 mL            Appearance: NAD  HEENT:   Dry oral mucosa, PERRL, EOMI	  Lymphatic: No lymphadenopathy  Cardiovascular: Normal S1 S2, No JVD, No murmurs, No edema  Respiratory: Lungs clear to auscultation	  Psychiatry: A & O x 3, sleepy   Gastrointestinal:  Soft, Non-tender, + BS	  Skin: No rashes, No ecchymoses, No cyanosis	  Neurologic: AOx3, EOMI, no facial, midline gaze, L: eye ptosis (baseline), BEST 5/5, R wrist and groin soft/warm/intact, R radial and dp 2+  Extremities: Normal range of motion, No clubbing, cyanosis or edema  Vascular: Peripheral pulses palpable 2+ bilaterally      LABS:    CARDIAC MARKERS:                                10.6   7.05  )-----------( 175      ( 25 Jun 2024 23:35 )             32.2     06-26    132<L>  |  104  |  40<H>  ----------------------------<  224<H>  3.9   |  13<L>  |  1.39<H>    Ca    7.8<L>      26 Jun 2024 02:19  Phos  2.0     06-25  Mg     2.4     06-25      proBNP:   Lipid Profile:   HgA1c:   TSH: Thyroid Stimulating Hormone, Serum: 2.45 uIU/mL (06-25 @ 08:53)        TELEMETRY: 	 AF   ECG:  	  RADIOLOGY:   DIAGNOSTIC TESTING:  [ ] Echocardiogram:  [ ]  Catheterization:  [ ] Stress Test:    OTHER:

## 2024-06-26 NOTE — SWALLOW BEDSIDE ASSESSMENT ADULT - SLP PERTINENT HISTORY OF CURRENT PROBLEM
Plan is for right george holes and drainage of SDH and likely postoperative MMAE in the attempt to reduce risk of recurrence.  R/B/A discussed by team. Pt and family agree to proceed,  6/24: Now s/p R crani for SDH evacuation and s/p s/p MMAE today

## 2024-06-26 NOTE — PROGRESS NOTE ADULT - SUBJECTIVE AND OBJECTIVE BOX
NSICU PROGRESS NOTE     HPI   78yo male with pmhx SOC for benign ependymoma, seizures, DM, HLD, aspirin non compliant, presented to VS for AMS, gait difficulties and falls for a week, found with acute on chronic R SDH, L SDH, parafalcince acute SDH.     NSICU course:  6/22 R SDH evacuation     12h events   increased diltiazem adn started amiodarone started overnight for afib rvr 160  fever sent culture  rectal tube     Exam   neuro: Alert, awake, oriented on self, place and year, PERRL, EOMI, gaze midline, L ptosis chronic, face symmetric   RUE 5/5  LUE pronator drift not touching bed  RLE flexors 4/5, extensors 5/5  LLE flexors 4/5, extensors 5/5  General:  calm  HEENT:  MMM  Cards:  RRR  Respiratory:  no respiratory distress  Adomen:  soft, distended   Extremities:  no edema  Skin:  warm/dry      VITALS:   Vital Signs Last 24 Hrs  T(C): 36.8 (26 Jun 2024 07:00), Max: 37.7 (25 Jun 2024 15:00)  T(F): 98.2 (26 Jun 2024 07:00), Max: 99.9 (25 Jun 2024 15:00)  HR: 64 (26 Jun 2024 10:15) (61 - 156)  BP: 117/59 (26 Jun 2024 10:15) (86/53 - 147/67)  BP(mean): 82 (26 Jun 2024 10:15) (65 - 96)  RR: 19 (26 Jun 2024 10:15) (12 - 23)  SpO2: 95% (26 Jun 2024 10:15) (91% - 97%)    Parameters below as of 25 Jun 2024 19:00  Patient On (Oxygen Delivery Method): nasal cannula  O2 Flow (L/min): 4    CAPILLARY BLOOD GLUCOSE      POCT Blood Glucose.: 245 mg/dL (26 Jun 2024 05:17)  POCT Blood Glucose.: 243 mg/dL (26 Jun 2024 05:15)  POCT Blood Glucose.: 213 mg/dL (25 Jun 2024 23:20)  POCT Blood Glucose.: 147 mg/dL (25 Jun 2024 16:25)    I&O's Summary    25 Jun 2024 07:01  -  26 Jun 2024 07:00  --------------------------------------------------------  IN: 2646.5 mL / OUT: 1600 mL / NET: 1046.5 mL    26 Jun 2024 07:01  -  26 Jun 2024 11:14  --------------------------------------------------------  IN: 216.7 mL / OUT: 0 mL / NET: 216.7 mL        Respiratory:    ABG - ( 26 Jun 2024 03:56 )  pH, Arterial: 7.32  pH, Blood: x     /  pCO2: 30    /  pO2: 129   / HCO3: 16    / Base Excess: -9.5  /  SaO2: 98.8                LABS:                        10.6   7.05  )-----------( 175      ( 25 Jun 2024 23:35 )             32.2     06-26    132<L>  |  104  |  40<H>  ----------------------------<  224<H>  3.9   |  13<L>  |  1.39<H>        MEDICATION LEVELS:     IVF FLUIDS/MEDICATIONS:   MEDICATIONS  (STANDING):  aMIOdarone Infusion 0.999 mG/Min (33.3 mL/Hr) IV Continuous <Continuous>  aMIOdarone Infusion 0.501 mG/Min (16.7 mL/Hr) IV Continuous <Continuous>  atorvastatin 80 milliGRAM(s) Oral at bedtime  chlorhexidine 4% Liquid 1 Application(s) Topical daily  dexamethasone/neomycin/polymyxin Suspension 1 Drop(s) Both EYES every 8 hours  diltiazem    Tablet 60 milliGRAM(s) Oral every 6 hours  heparin   Injectable 5000 Unit(s) SubCutaneous every 12 hours  insulin lispro (ADMELOG) corrective regimen sliding scale   SubCutaneous every 6 hours  lacosamide IVPB 50 milliGRAM(s) IV Intermittent every 12 hours  levETIRAcetam 250 milliGRAM(s) Oral two times a day  pilocarpine 2% Solution 1 Drop(s) Right EYE two times a day  piperacillin/tazobactam IVPB.- 3.375 Gram(s) IV Intermittent once  piperacillin/tazobactam IVPB.- 3.375 Gram(s) IV Intermittent once  piperacillin/tazobactam IVPB.. 3.375 Gram(s) IV Intermittent every 8 hours  polyethylene glycol 3350 17 Gram(s) Oral daily  senna 2 Tablet(s) Oral at bedtime  sodium chloride 0.9%. 1000 milliLiter(s) (50 mL/Hr) IV Continuous <Continuous>  topiramate 100 milliGRAM(s) Oral two times a day    MEDICATIONS  (PRN):  acetaminophen     Tablet .. 650 milliGRAM(s) Oral every 6 hours PRN Temp greater or equal to 38C (100.4F), Mild Pain (1 - 3)  bisacodyl 5 milliGRAM(s) Oral daily PRN Constipation  ondansetron Injectable 4 milliGRAM(s) IV Push every 6 hours PRN Nausea and/or Vomiting  oxyCODONE    IR 5 milliGRAM(s) Oral every 4 hours PRN Moderate Pain (4 - 6)

## 2024-06-27 ENCOUNTER — RESULT REVIEW (OUTPATIENT)
Age: 79
End: 2024-06-27

## 2024-06-27 LAB
ANION GAP SERPL CALC-SCNC: 11 MMOL/L — SIGNIFICANT CHANGE UP (ref 5–17)
ANION GAP SERPL CALC-SCNC: 11 MMOL/L — SIGNIFICANT CHANGE UP (ref 5–17)
BUN SERPL-MCNC: 24 MG/DL — HIGH (ref 7–23)
BUN SERPL-MCNC: 28 MG/DL — HIGH (ref 7–23)
CALCIUM SERPL-MCNC: 8.1 MG/DL — LOW (ref 8.4–10.5)
CALCIUM SERPL-MCNC: 8.1 MG/DL — LOW (ref 8.4–10.5)
CHLORIDE SERPL-SCNC: 107 MMOL/L — SIGNIFICANT CHANGE UP (ref 96–108)
CHLORIDE SERPL-SCNC: 109 MMOL/L — HIGH (ref 96–108)
CK MB CFR SERPL CALC: 3.4 NG/ML — SIGNIFICANT CHANGE UP (ref 0–6.7)
CO2 SERPL-SCNC: 16 MMOL/L — LOW (ref 22–31)
CO2 SERPL-SCNC: 18 MMOL/L — LOW (ref 22–31)
CREAT SERPL-MCNC: 1.37 MG/DL — HIGH (ref 0.5–1.3)
CREAT SERPL-MCNC: 1.4 MG/DL — HIGH (ref 0.5–1.3)
EGFR: 51 ML/MIN/1.73M2 — LOW
EGFR: 52 ML/MIN/1.73M2 — LOW
GLUCOSE BLDC GLUCOMTR-MCNC: 176 MG/DL — HIGH (ref 70–99)
GLUCOSE BLDC GLUCOMTR-MCNC: 183 MG/DL — HIGH (ref 70–99)
GLUCOSE BLDC GLUCOMTR-MCNC: 200 MG/DL — HIGH (ref 70–99)
GLUCOSE BLDC GLUCOMTR-MCNC: 205 MG/DL — HIGH (ref 70–99)
GLUCOSE BLDC GLUCOMTR-MCNC: 224 MG/DL — HIGH (ref 70–99)
GLUCOSE SERPL-MCNC: 164 MG/DL — HIGH (ref 70–99)
GLUCOSE SERPL-MCNC: 203 MG/DL — HIGH (ref 70–99)
HCT VFR BLD CALC: 27.3 % — LOW (ref 39–50)
HCT VFR BLD CALC: 29.9 % — LOW (ref 39–50)
HGB BLD-MCNC: 8.6 G/DL — LOW (ref 13–17)
HGB BLD-MCNC: 9.2 G/DL — LOW (ref 13–17)
MAGNESIUM SERPL-MCNC: 2.2 MG/DL — SIGNIFICANT CHANGE UP (ref 1.6–2.6)
MAGNESIUM SERPL-MCNC: 2.4 MG/DL — SIGNIFICANT CHANGE UP (ref 1.6–2.6)
MCHC RBC-ENTMCNC: 30.8 GM/DL — LOW (ref 32–36)
MCHC RBC-ENTMCNC: 31 PG — SIGNIFICANT CHANGE UP (ref 27–34)
MCHC RBC-ENTMCNC: 31.4 PG — SIGNIFICANT CHANGE UP (ref 27–34)
MCHC RBC-ENTMCNC: 31.5 GM/DL — LOW (ref 32–36)
MCV RBC AUTO: 100.7 FL — HIGH (ref 80–100)
MCV RBC AUTO: 99.6 FL — SIGNIFICANT CHANGE UP (ref 80–100)
NRBC # BLD: 0 /100 WBCS — SIGNIFICANT CHANGE UP (ref 0–0)
NRBC # BLD: 0 /100 WBCS — SIGNIFICANT CHANGE UP (ref 0–0)
NT-PROBNP SERPL-SCNC: 1649 PG/ML — HIGH (ref 0–300)
NT-PROBNP SERPL-SCNC: 2352 PG/ML — HIGH (ref 0–300)
PHOSPHATE SERPL-MCNC: 1.5 MG/DL — LOW (ref 2.5–4.5)
PHOSPHATE SERPL-MCNC: 1.7 MG/DL — LOW (ref 2.5–4.5)
PLATELET # BLD AUTO: 168 K/UL — SIGNIFICANT CHANGE UP (ref 150–400)
PLATELET # BLD AUTO: 188 K/UL — SIGNIFICANT CHANGE UP (ref 150–400)
POTASSIUM SERPL-MCNC: 3.8 MMOL/L — SIGNIFICANT CHANGE UP (ref 3.5–5.3)
POTASSIUM SERPL-MCNC: 4.1 MMOL/L — SIGNIFICANT CHANGE UP (ref 3.5–5.3)
POTASSIUM SERPL-SCNC: 3.8 MMOL/L — SIGNIFICANT CHANGE UP (ref 3.5–5.3)
POTASSIUM SERPL-SCNC: 4.1 MMOL/L — SIGNIFICANT CHANGE UP (ref 3.5–5.3)
RBC # BLD: 2.74 M/UL — LOW (ref 4.2–5.8)
RBC # BLD: 2.97 M/UL — LOW (ref 4.2–5.8)
RBC # FLD: 12.7 % — SIGNIFICANT CHANGE UP (ref 10.3–14.5)
RBC # FLD: 12.9 % — SIGNIFICANT CHANGE UP (ref 10.3–14.5)
SODIUM SERPL-SCNC: 136 MMOL/L — SIGNIFICANT CHANGE UP (ref 135–145)
SODIUM SERPL-SCNC: 136 MMOL/L — SIGNIFICANT CHANGE UP (ref 135–145)
TROPONIN T, HIGH SENSITIVITY RESULT: 37 NG/L — SIGNIFICANT CHANGE UP (ref 0–51)
TROPONIN T, HIGH SENSITIVITY RESULT: 41 NG/L — SIGNIFICANT CHANGE UP (ref 0–51)
WBC # BLD: 10.74 K/UL — HIGH (ref 3.8–10.5)
WBC # BLD: 11.72 K/UL — HIGH (ref 3.8–10.5)
WBC # FLD AUTO: 10.74 K/UL — HIGH (ref 3.8–10.5)
WBC # FLD AUTO: 11.72 K/UL — HIGH (ref 3.8–10.5)

## 2024-06-27 PROCEDURE — 99233 SBSQ HOSP IP/OBS HIGH 50: CPT

## 2024-06-27 PROCEDURE — 71045 X-RAY EXAM CHEST 1 VIEW: CPT | Mod: 26

## 2024-06-27 PROCEDURE — 93306 TTE W/DOPPLER COMPLETE: CPT | Mod: 26

## 2024-06-27 RX ORDER — ACETAMINOPHEN 325 MG
1000 TABLET ORAL ONCE
Refills: 0 | Status: COMPLETED | OUTPATIENT
Start: 2024-06-27 | End: 2024-06-27

## 2024-06-27 RX ORDER — PANTOPRAZOLE SODIUM 40 MG/10ML
40 INJECTION, POWDER, FOR SOLUTION INTRAVENOUS EVERY 12 HOURS
Refills: 0 | Status: DISCONTINUED | OUTPATIENT
Start: 2024-06-27 | End: 2024-07-10

## 2024-06-27 RX ORDER — FUROSEMIDE 10 MG/ML
20 INJECTION, SOLUTION INTRAMUSCULAR; INTRAVENOUS ONCE
Refills: 0 | Status: COMPLETED | OUTPATIENT
Start: 2024-06-27 | End: 2024-06-27

## 2024-06-27 RX ORDER — DEXTROSE MONOHYDRATE AND SODIUM CHLORIDE 5; .3 G/100ML; G/100ML
500 INJECTION, SOLUTION INTRAVENOUS ONCE
Refills: 0 | Status: COMPLETED | OUTPATIENT
Start: 2024-06-27 | End: 2024-06-27

## 2024-06-27 RX ORDER — DILTIAZEM HYDROCHLORIDE 240 MG/1
5 CAPSULE, EXTENDED RELEASE ORAL ONCE
Refills: 0 | Status: COMPLETED | OUTPATIENT
Start: 2024-06-27 | End: 2024-06-27

## 2024-06-27 RX ORDER — MAGNESIUM SULFATE 100 %
1 POWDER (GRAM) MISCELLANEOUS ONCE
Refills: 0 | Status: COMPLETED | OUTPATIENT
Start: 2024-06-27 | End: 2024-06-27

## 2024-06-27 RX ORDER — METOPROLOL TARTRATE 50 MG
5 TABLET ORAL ONCE
Refills: 0 | Status: COMPLETED | OUTPATIENT
Start: 2024-06-27 | End: 2024-06-27

## 2024-06-27 RX ORDER — ACETAMINOPHEN 325 MG
1000 TABLET ORAL ONCE
Refills: 0 | Status: COMPLETED | OUTPATIENT
Start: 2024-06-27 | End: 2024-06-28

## 2024-06-27 RX ORDER — SOD PHOS DI, MONO/K PHOS MONO 250 MG
1 TABLET ORAL THREE TIMES A DAY
Refills: 0 | Status: DISCONTINUED | OUTPATIENT
Start: 2024-06-27 | End: 2024-07-10

## 2024-06-27 RX ORDER — METOPROLOL TARTRATE 50 MG
12.5 TABLET ORAL EVERY 12 HOURS
Refills: 0 | Status: DISCONTINUED | OUTPATIENT
Start: 2024-06-27 | End: 2024-06-27

## 2024-06-27 RX ORDER — METOPROLOL TARTRATE 50 MG
25 TABLET ORAL EVERY 12 HOURS
Refills: 0 | Status: DISCONTINUED | OUTPATIENT
Start: 2024-06-27 | End: 2024-06-28

## 2024-06-27 RX ORDER — POTASSIUM CHLORIDE 600 MG/1
20 TABLET, FILM COATED, EXTENDED RELEASE ORAL ONCE
Refills: 0 | Status: COMPLETED | OUTPATIENT
Start: 2024-06-27 | End: 2024-06-27

## 2024-06-27 RX ORDER — POTASSIUM PHOSPHATE, MONOBASIC POTASSIUM PHOSPHATE, DIBASIC INJECTION, 236; 224 MG/ML; MG/ML
30 SOLUTION, CONCENTRATE INTRAVENOUS ONCE
Refills: 0 | Status: COMPLETED | OUTPATIENT
Start: 2024-06-27 | End: 2024-06-27

## 2024-06-27 RX ADMIN — INSULIN LISPRO 4: 100 INJECTION, SOLUTION SUBCUTANEOUS at 12:00

## 2024-06-27 RX ADMIN — AMIODARONE HYDROCHLORIDE 400 MILLIGRAM(S): 50 INJECTION, SOLUTION INTRAVENOUS at 02:56

## 2024-06-27 RX ADMIN — Medication 5 MILLIGRAM(S): at 04:06

## 2024-06-27 RX ADMIN — INSULIN LISPRO 2 UNIT(S): 100 INJECTION, SOLUTION SUBCUTANEOUS at 17:01

## 2024-06-27 RX ADMIN — HEPARIN SODIUM 5000 UNIT(S): 50 INJECTION, SOLUTION INTRAVENOUS at 17:10

## 2024-06-27 RX ADMIN — Medication 100 GRAM(S): at 23:17

## 2024-06-27 RX ADMIN — PIPERACILLIN SODIUM AND TAZOBACTAM SODIUM 25 GRAM(S): 3; .375 INJECTION, POWDER, LYOPHILIZED, FOR SOLUTION INTRAVENOUS at 05:20

## 2024-06-27 RX ADMIN — LACOSAMIDE 110 MILLIGRAM(S): 100 TABLET, FILM COATED ORAL at 17:22

## 2024-06-27 RX ADMIN — Medication 1000 MILLIGRAM(S): at 16:45

## 2024-06-27 RX ADMIN — Medication 1 DROP(S): at 05:20

## 2024-06-27 RX ADMIN — TOPIRAMATE 100 MILLIGRAM(S): 50 TABLET, FILM COATED ORAL at 05:19

## 2024-06-27 RX ADMIN — INSULIN LISPRO 2 UNIT(S): 100 INJECTION, SOLUTION SUBCUTANEOUS at 08:02

## 2024-06-27 RX ADMIN — Medication 1 DROP(S): at 13:36

## 2024-06-27 RX ADMIN — DEXTROSE MONOHYDRATE AND SODIUM CHLORIDE 500 MILLILITER(S): 5; .3 INJECTION, SOLUTION INTRAVENOUS at 05:19

## 2024-06-27 RX ADMIN — Medication 1 DROP(S): at 17:04

## 2024-06-27 RX ADMIN — INSULIN LISPRO 2: 100 INJECTION, SOLUTION SUBCUTANEOUS at 23:11

## 2024-06-27 RX ADMIN — Medication 5 MILLIGRAM(S): at 16:22

## 2024-06-27 RX ADMIN — HEPARIN SODIUM 5000 UNIT(S): 50 INJECTION, SOLUTION INTRAVENOUS at 05:19

## 2024-06-27 RX ADMIN — POTASSIUM PHOSPHATE, MONOBASIC POTASSIUM PHOSPHATE, DIBASIC INJECTION, 83.33 MILLIMOLE(S): 236; 224 SOLUTION, CONCENTRATE INTRAVENOUS at 02:56

## 2024-06-27 RX ADMIN — DILTIAZEM HYDROCHLORIDE 60 MILLIGRAM(S): 240 CAPSULE, EXTENDED RELEASE ORAL at 23:15

## 2024-06-27 RX ADMIN — Medication 1 DROP(S): at 23:16

## 2024-06-27 RX ADMIN — PIPERACILLIN SODIUM AND TAZOBACTAM SODIUM 25 GRAM(S): 3; .375 INJECTION, POWDER, LYOPHILIZED, FOR SOLUTION INTRAVENOUS at 13:37

## 2024-06-27 RX ADMIN — POTASSIUM CHLORIDE 20 MILLIEQUIVALENT(S): 600 TABLET, FILM COATED, EXTENDED RELEASE ORAL at 02:57

## 2024-06-27 RX ADMIN — PANTOPRAZOLE SODIUM 40 MILLIGRAM(S): 40 INJECTION, POWDER, FOR SOLUTION INTRAVENOUS at 18:25

## 2024-06-27 RX ADMIN — DILTIAZEM HYDROCHLORIDE 5 MILLIGRAM(S): 240 CAPSULE, EXTENDED RELEASE ORAL at 17:21

## 2024-06-27 RX ADMIN — Medication 25 MILLIGRAM(S): at 16:51

## 2024-06-27 RX ADMIN — ATORVASTATIN CALCIUM 80 MILLIGRAM(S): 20 TABLET, FILM COATED ORAL at 23:00

## 2024-06-27 RX ADMIN — INSULIN LISPRO 2: 100 INJECTION, SOLUTION SUBCUTANEOUS at 05:21

## 2024-06-27 RX ADMIN — PIPERACILLIN SODIUM AND TAZOBACTAM SODIUM 25 GRAM(S): 3; .375 INJECTION, POWDER, LYOPHILIZED, FOR SOLUTION INTRAVENOUS at 23:00

## 2024-06-27 RX ADMIN — Medication 1 PACKET(S): at 23:00

## 2024-06-27 RX ADMIN — Medication 400 MILLIGRAM(S): at 16:26

## 2024-06-27 RX ADMIN — LEVETIRACETAM 250 MILLIGRAM(S): 100 INJECTION INTRAVENOUS at 05:19

## 2024-06-27 RX ADMIN — DILTIAZEM HYDROCHLORIDE 60 MILLIGRAM(S): 240 CAPSULE, EXTENDED RELEASE ORAL at 11:59

## 2024-06-27 RX ADMIN — LACOSAMIDE 110 MILLIGRAM(S): 100 TABLET, FILM COATED ORAL at 05:20

## 2024-06-27 RX ADMIN — Medication 85 MILLIMOLE(S): at 23:16

## 2024-06-27 RX ADMIN — INSULIN LISPRO 2: 100 INJECTION, SOLUTION SUBCUTANEOUS at 17:02

## 2024-06-27 RX ADMIN — LEVETIRACETAM 250 MILLIGRAM(S): 100 INJECTION INTRAVENOUS at 17:03

## 2024-06-27 RX ADMIN — AMIODARONE HYDROCHLORIDE 400 MILLIGRAM(S): 50 INJECTION, SOLUTION INTRAVENOUS at 17:03

## 2024-06-27 RX ADMIN — FUROSEMIDE 20 MILLIGRAM(S): 10 INJECTION, SOLUTION INTRAMUSCULAR; INTRAVENOUS at 09:30

## 2024-06-27 RX ADMIN — DILTIAZEM HYDROCHLORIDE 60 MILLIGRAM(S): 240 CAPSULE, EXTENDED RELEASE ORAL at 17:06

## 2024-06-27 RX ADMIN — TOPIRAMATE 100 MILLIGRAM(S): 50 TABLET, FILM COATED ORAL at 17:08

## 2024-06-27 RX ADMIN — Medication 1 APPLICATION(S): at 12:04

## 2024-06-27 RX ADMIN — Medication 5 MILLIGRAM(S): at 16:34

## 2024-06-27 RX ADMIN — DILTIAZEM HYDROCHLORIDE 60 MILLIGRAM(S): 240 CAPSULE, EXTENDED RELEASE ORAL at 05:19

## 2024-06-27 NOTE — PROGRESS NOTE ADULT - ASSESSMENT
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     PLAN     N  # R acute on chronic SDH, L acute SDH, R parafalcine acute SDH with mass effect from the R with global atrophy   6/24 R MMAE at some point per NS   subdural drain per NS to pull today   #seizures  keppra 250bid   topiramate 100mg bid  vimpat 50mg bid  #Pain: Tylenol and oxycodone  #Activity: [] OOB as tolerated [] Bedrest [x] PT [x] OT [] PMNR    CV   #tachycardia, afib/flutter, 4 to 1 flutter, with pauses,would need to switch ccb to bb, diltiazem 60mg q6h with amiodarone 400mg bid po  #-160mmHg  #TTE 67%  lasix 20mg daily    P   #NC 5L   CTA chest no PE   #pulm infiltrates    R   #Strict I+Os   #IVF  #MIKAELA contrast induced     GI   #Diet: CCD  #Senna miralax  Last BM rectal tube to remove   #distended abdomen: kub enlarged bowels     ID  afebrile     E  Goal euglycemia (-180)  #A1c 6.4   iss   NPH 2u premeals    H  #DVT ppx:   SCDs  Chemoppx held per NS  LED 6/22 no dvt      #CODE STATUS: FULL CODE     #DISPOSITION: TO FLOOR    #NOT CRITICAL  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     PLAN     N  # R acute on chronic SDH, L acute SDH, R parafalcine acute SDH with mass effect from the R with global atrophy   6/24 R MMAE at some point per NS   subdural drain per NS to pull today   #seizures  keppra 250bid   topiramate 100mg bid  vimpat 50mg bid  #Pain: Tylenol and oxycodone  #Activity: [] OOB as tolerated [] Bedrest [x] PT [x] OT [] PMNR    CV   #tachycardia, afib/flutter, 4 to 1 flutter, with pauses,would need to switch ccb to bb, diltiazem 60mg q6h with amiodarone 400mg bid po, Metoprolol 25 mg BID  Cardiology on board, f/u reccs  EP reccomends to c/w current management   #-160mmHg  #TTE 67%  lasix 20mg daily    P   #NC 3L   CTA chest no PE   #pulm infiltrates    R   #Strict I+Os   #IVF  #MIKAELA contrast induced     GI   #Diet: CCD  #Senna miralax  Last BM rectal tube to remove   #distended abdomen: kub enlarged bowels     ID  afebrile     E  Goal euglycemia (-180)  #A1c 6.4   iss   NPH 2u premeals    H  #DVT ppx: heparin 5000 Q12  SCDs  LED 6/22 no dvt      #CODE STATUS: FULL CODE     #DISPOSITION: TO FLOOR    #NOT CRITICAL

## 2024-06-27 NOTE — PROGRESS NOTE ADULT - SUBJECTIVE AND OBJECTIVE BOX
Patient seen and examined at bedside.    --Anticoagulation--  heparin   Injectable 5000 Unit(s) SubCutaneous every 12 hours    T(C): 37 (06-25-24 @ 23:00), Max: 37.7 (06-25-24 @ 15:00)  HR: 96 (06-25-24 @ 23:00) (69 - 96)  BP: 113/61 (06-25-24 @ 23:00) (113/61 - 147/67)  RR: 18 (06-25-24 @ 23:00) (10 - 20)  SpO2: 95% (06-25-24 @ 23:00) (93% - 100%)  Wt(kg): --    Exam: AOx3, PERRL, EOMI, no facial, no drift, BEST 5/5

## 2024-06-27 NOTE — PROGRESS NOTE ADULT - SUBJECTIVE AND OBJECTIVE BOX
NSICU PROGRESS NOTE     HPI   80yo male with pmhx SOC for benign ependymoma, seizures, DM, HLD, aspirin non compliant, presented to VS for AMS, gait difficulties and falls for a week, found with acute on chronic R SDH, L SDH, parafalcince acute SDH.     NSICU course:  6/22 R SDH evacuation     12h events   overnight afib/flutter, 4 to 1 flutter, with pauses, s/p 1 dose metoprolol with good responses, would need to switch ccb to bb     Exam   neuro: Alert, awake, oriented on self, place and year, PERRL, EOMI, gaze midline, L ptosis chronic, face symmetric   RUE 5/5  LUE pronator drift not touching bed  RLE flexors 4/5, extensors 5/5  LLE flexors 4/5, extensors 5/5  General:  calm  HEENT:  MMM  Cards:  RRR  Respiratory:  no respiratory distress  Adomen:  soft, distended   Extremities:  no edema  Skin:  warm/dry    VITALS:   Vital Signs Last 24 Hrs  T(C): 36.9 (27 Jun 2024 07:00), Max: 38.2 (26 Jun 2024 19:00)  T(F): 98.4 (27 Jun 2024 07:00), Max: 100.8 (26 Jun 2024 19:00)  HR: 63 (27 Jun 2024 07:00) (59 - 154)  BP: 112/65 (27 Jun 2024 07:00) (94/50 - 159/74)  BP(mean): 83 (27 Jun 2024 07:00) (66 - 106)  RR: 20 (27 Jun 2024 07:00) (11 - 25)  SpO2: 95% (27 Jun 2024 07:00) (89% - 97%)    Parameters below as of 26 Jun 2024 19:00  Patient On (Oxygen Delivery Method): nasal cannula  O2 Flow (L/min): 4    CAPILLARY BLOOD GLUCOSE      POCT Blood Glucose.: 224 mg/dL (27 Jun 2024 07:55)  POCT Blood Glucose.: 176 mg/dL (27 Jun 2024 05:18)  POCT Blood Glucose.: 186 mg/dL (26 Jun 2024 22:51)  POCT Blood Glucose.: 168 mg/dL (26 Jun 2024 16:12)  POCT Blood Glucose.: 183 mg/dL (26 Jun 2024 11:37)    I&O's Summary    26 Jun 2024 07:01  -  27 Jun 2024 07:00  --------------------------------------------------------  IN: 2547.2 mL / OUT: 2050 mL / NET: 497.2 mL    27 Jun 2024 07:01  -  27 Jun 2024 09:51  --------------------------------------------------------  IN: 108.3 mL / OUT: 0 mL / NET: 108.3 mL        Respiratory:    ABG - ( 26 Jun 2024 03:56 )  pH, Arterial: 7.32  pH, Blood: x     /  pCO2: 30    /  pO2: 129   / HCO3: 16    / Base Excess: -9.5  /  SaO2: 98.8                LABS:                        9.2    10.74 )-----------( 168      ( 27 Jun 2024 00:54 )             29.9     06-27    136  |  109<H>  |  28<H>  ----------------------------<  164<H>  3.8   |  16<L>  |  1.37<H>        MEDICATION LEVELS:     IVF FLUIDS/MEDICATIONS:   MEDICATIONS  (STANDING):  aMIOdarone    Tablet 400 milliGRAM(s) Oral two times a day  aMIOdarone Infusion 0.999 mG/Min (33.3 mL/Hr) IV Continuous <Continuous>  aMIOdarone Infusion 0.501 mG/Min (16.7 mL/Hr) IV Continuous <Continuous>  atorvastatin 80 milliGRAM(s) Oral at bedtime  chlorhexidine 4% Liquid 1 Application(s) Topical daily  dexamethasone/neomycin/polymyxin Suspension 1 Drop(s) Both EYES every 8 hours  diltiazem    Tablet 60 milliGRAM(s) Oral every 6 hours  furosemide   Injectable 20 milliGRAM(s) IV Push once  heparin   Injectable 5000 Unit(s) SubCutaneous every 12 hours  insulin lispro (ADMELOG) corrective regimen sliding scale   SubCutaneous every 6 hours  insulin lispro Injectable (ADMELOG) 2 Unit(s) SubCutaneous two times a day with meals  lacosamide IVPB 50 milliGRAM(s) IV Intermittent every 12 hours  levETIRAcetam 250 milliGRAM(s) Oral two times a day  pilocarpine 2% Solution 1 Drop(s) Right EYE two times a day  piperacillin/tazobactam IVPB.. 3.375 Gram(s) IV Intermittent every 8 hours  topiramate 100 milliGRAM(s) Oral two times a day    MEDICATIONS  (PRN):  acetaminophen     Tablet .. 650 milliGRAM(s) Oral every 6 hours PRN Temp greater or equal to 38C (100.4F), Mild Pain (1 - 3)  bisacodyl 5 milliGRAM(s) Oral daily PRN Constipation  ondansetron Injectable 4 milliGRAM(s) IV Push every 6 hours PRN Nausea and/or Vomiting  oxyCODONE    IR 5 milliGRAM(s) Oral every 4 hours PRN Moderate Pain (4 - 6)       NSICU PROGRESS NOTE     HPI   78yo male with pmhx SOC for benign ependymoma, seizures, DM, HLD, aspirin non compliant, presented to VS for AMS, gait difficulties and falls for a week, found with acute on chronic R SDH, L SDH, parafalcince acute SDH.     NSICU course:  6/22 R SDH evacuation     12h events   overnight afib/flutter, 4 to 1 flutter, with pauses, s/p 1 dose metoprolol with good responses, would need to switch ccb to bb     Exam   neuro: Alert, awake, oriented on self, place and year, PERRL, EOMI, gaze midline, L ptosis chronic, face symmetric   RUE 5/5  LUE pronator drift not touching bed  RLE flexors 5/5, extensors 5/5  LLE flexors 5/5, extensors 5/5  General:  calm  HEENT:  MMM  Cards:  RRR  Respiratory:  no respiratory distress  Adomen:  soft, distended   Extremities:  no edema  Skin:  warm/dry    VITALS:   Vital Signs Last 24 Hrs  T(C): 36.9 (27 Jun 2024 07:00), Max: 38.2 (26 Jun 2024 19:00)  T(F): 98.4 (27 Jun 2024 07:00), Max: 100.8 (26 Jun 2024 19:00)  HR: 63 (27 Jun 2024 07:00) (59 - 154)  BP: 112/65 (27 Jun 2024 07:00) (94/50 - 159/74)  BP(mean): 83 (27 Jun 2024 07:00) (66 - 106)  RR: 20 (27 Jun 2024 07:00) (11 - 25)  SpO2: 95% (27 Jun 2024 07:00) (89% - 97%)    Parameters below as of 26 Jun 2024 19:00  Patient On (Oxygen Delivery Method): nasal cannula  O2 Flow (L/min): 4    CAPILLARY BLOOD GLUCOSE      POCT Blood Glucose.: 224 mg/dL (27 Jun 2024 07:55)  POCT Blood Glucose.: 176 mg/dL (27 Jun 2024 05:18)  POCT Blood Glucose.: 186 mg/dL (26 Jun 2024 22:51)  POCT Blood Glucose.: 168 mg/dL (26 Jun 2024 16:12)  POCT Blood Glucose.: 183 mg/dL (26 Jun 2024 11:37)    I&O's Summary    26 Jun 2024 07:01  -  27 Jun 2024 07:00  --------------------------------------------------------  IN: 2547.2 mL / OUT: 2050 mL / NET: 497.2 mL    27 Jun 2024 07:01  -  27 Jun 2024 09:51  --------------------------------------------------------  IN: 108.3 mL / OUT: 0 mL / NET: 108.3 mL        Respiratory:    ABG - ( 26 Jun 2024 03:56 )  pH, Arterial: 7.32  pH, Blood: x     /  pCO2: 30    /  pO2: 129   / HCO3: 16    / Base Excess: -9.5  /  SaO2: 98.8                LABS:                        9.2    10.74 )-----------( 168      ( 27 Jun 2024 00:54 )             29.9     06-27    136  |  109<H>  |  28<H>  ----------------------------<  164<H>  3.8   |  16<L>  |  1.37<H>        MEDICATION LEVELS:     IVF FLUIDS/MEDICATIONS:   MEDICATIONS  (STANDING):  aMIOdarone    Tablet 400 milliGRAM(s) Oral two times a day  aMIOdarone Infusion 0.999 mG/Min (33.3 mL/Hr) IV Continuous <Continuous>  aMIOdarone Infusion 0.501 mG/Min (16.7 mL/Hr) IV Continuous <Continuous>  atorvastatin 80 milliGRAM(s) Oral at bedtime  chlorhexidine 4% Liquid 1 Application(s) Topical daily  dexamethasone/neomycin/polymyxin Suspension 1 Drop(s) Both EYES every 8 hours  diltiazem    Tablet 60 milliGRAM(s) Oral every 6 hours  furosemide   Injectable 20 milliGRAM(s) IV Push once  heparin   Injectable 5000 Unit(s) SubCutaneous every 12 hours  insulin lispro (ADMELOG) corrective regimen sliding scale   SubCutaneous every 6 hours  insulin lispro Injectable (ADMELOG) 2 Unit(s) SubCutaneous two times a day with meals  lacosamide IVPB 50 milliGRAM(s) IV Intermittent every 12 hours  levETIRAcetam 250 milliGRAM(s) Oral two times a day  pilocarpine 2% Solution 1 Drop(s) Right EYE two times a day  piperacillin/tazobactam IVPB.. 3.375 Gram(s) IV Intermittent every 8 hours  topiramate 100 milliGRAM(s) Oral two times a day    MEDICATIONS  (PRN):  acetaminophen     Tablet .. 650 milliGRAM(s) Oral every 6 hours PRN Temp greater or equal to 38C (100.4F), Mild Pain (1 - 3)  bisacodyl 5 milliGRAM(s) Oral daily PRN Constipation  ondansetron Injectable 4 milliGRAM(s) IV Push every 6 hours PRN Nausea and/or Vomiting  oxyCODONE    IR 5 milliGRAM(s) Oral every 4 hours PRN Moderate Pain (4 - 6)

## 2024-06-27 NOTE — PROGRESS NOTE ADULT - SUBJECTIVE AND OBJECTIVE BOX
NSICU PROGRESS NOTE     HPI   78yo male with pmhx SOC for benign ependymoma, seizures, DM, HLD, aspirin non compliant, presented to VS for AMS, gait difficulties and falls for a week, found with acute on chronic R SDH, L SDH, parafalcince acute SDH.     NSICU course:  6/22 R SDH evacuation     12h events   overnight afib/flutter, 4 to 1 flutter, with pauses, s/p 1 dose metoprolol with good responses, would need to switch ccb to bb     Exam   neuro: Alert, awake, oriented on self, place and year, PERRL, EOMI, gaze midline, L ptosis chronic, face symmetric   RUE 5/5  LUE pronator drift not touching bed  RLE flexors 4/5, extensors 5/5  LLE flexors 4/5, extensors 5/5  General:  calm  HEENT:  MMM  Cards:  RRR  Respiratory:  no respiratory distress  Adomen:  soft, distended   Extremities:  no edema  Skin:  warm/dry    VITALS:   Vital Signs Last 24 Hrs  T(C): 36.9 (27 Jun 2024 07:00), Max: 38.2 (26 Jun 2024 19:00)  T(F): 98.4 (27 Jun 2024 07:00), Max: 100.8 (26 Jun 2024 19:00)  HR: 63 (27 Jun 2024 07:00) (59 - 154)  BP: 112/65 (27 Jun 2024 07:00) (94/50 - 159/74)  BP(mean): 83 (27 Jun 2024 07:00) (66 - 106)  RR: 20 (27 Jun 2024 07:00) (11 - 25)  SpO2: 95% (27 Jun 2024 07:00) (89% - 97%)    Parameters below as of 26 Jun 2024 19:00  Patient On (Oxygen Delivery Method): nasal cannula  O2 Flow (L/min): 4    CAPILLARY BLOOD GLUCOSE      POCT Blood Glucose.: 224 mg/dL (27 Jun 2024 07:55)  POCT Blood Glucose.: 176 mg/dL (27 Jun 2024 05:18)  POCT Blood Glucose.: 186 mg/dL (26 Jun 2024 22:51)  POCT Blood Glucose.: 168 mg/dL (26 Jun 2024 16:12)  POCT Blood Glucose.: 183 mg/dL (26 Jun 2024 11:37)    I&O's Summary    26 Jun 2024 07:01  -  27 Jun 2024 07:00  --------------------------------------------------------  IN: 2547.2 mL / OUT: 2050 mL / NET: 497.2 mL    27 Jun 2024 07:01  -  27 Jun 2024 09:51  --------------------------------------------------------  IN: 108.3 mL / OUT: 0 mL / NET: 108.3 mL        Respiratory:    ABG - ( 26 Jun 2024 03:56 )  pH, Arterial: 7.32  pH, Blood: x     /  pCO2: 30    /  pO2: 129   / HCO3: 16    / Base Excess: -9.5  /  SaO2: 98.8                LABS:                        9.2    10.74 )-----------( 168      ( 27 Jun 2024 00:54 )             29.9     06-27    136  |  109<H>  |  28<H>  ----------------------------<  164<H>  3.8   |  16<L>  |  1.37<H>        MEDICATION LEVELS:     IVF FLUIDS/MEDICATIONS:   MEDICATIONS  (STANDING):  aMIOdarone    Tablet 400 milliGRAM(s) Oral two times a day  aMIOdarone Infusion 0.999 mG/Min (33.3 mL/Hr) IV Continuous <Continuous>  aMIOdarone Infusion 0.501 mG/Min (16.7 mL/Hr) IV Continuous <Continuous>  atorvastatin 80 milliGRAM(s) Oral at bedtime  chlorhexidine 4% Liquid 1 Application(s) Topical daily  dexamethasone/neomycin/polymyxin Suspension 1 Drop(s) Both EYES every 8 hours  diltiazem    Tablet 60 milliGRAM(s) Oral every 6 hours  furosemide   Injectable 20 milliGRAM(s) IV Push once  heparin   Injectable 5000 Unit(s) SubCutaneous every 12 hours  insulin lispro (ADMELOG) corrective regimen sliding scale   SubCutaneous every 6 hours  insulin lispro Injectable (ADMELOG) 2 Unit(s) SubCutaneous two times a day with meals  lacosamide IVPB 50 milliGRAM(s) IV Intermittent every 12 hours  levETIRAcetam 250 milliGRAM(s) Oral two times a day  pilocarpine 2% Solution 1 Drop(s) Right EYE two times a day  piperacillin/tazobactam IVPB.. 3.375 Gram(s) IV Intermittent every 8 hours  topiramate 100 milliGRAM(s) Oral two times a day    MEDICATIONS  (PRN):  acetaminophen     Tablet .. 650 milliGRAM(s) Oral every 6 hours PRN Temp greater or equal to 38C (100.4F), Mild Pain (1 - 3)  bisacodyl 5 milliGRAM(s) Oral daily PRN Constipation  ondansetron Injectable 4 milliGRAM(s) IV Push every 6 hours PRN Nausea and/or Vomiting  oxyCODONE    IR 5 milliGRAM(s) Oral every 4 hours PRN Moderate Pain (4 - 6)

## 2024-06-27 NOTE — PROGRESS NOTE ADULT - ASSESSMENT
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 No AC/AP, coags/plt wnl Now s/p R crani for SDH evacuation. Afib RVR overnight s/p Dilt IV, now in SR.      #Paroxysmal atrial fibrillation  -sp iv dilt with conversion to SR   -Continue with amiodarone 400 PO BID  -Continue po dilt for now   -Consider adding low dose metoprolol 25mg qd  -Echo nml lv fxn, no wma, mild MR   -Unable to give a/c at this time given acute SDH    #Acute subdural hematoma   -s/p R crani for SDH evacuation  -post op mgmt per neuro    #DM  -Mgmt per med    #Abnormal EKG  -Atrial bigem on tele/ekg   -Asymptomatic at present time      d/w daughter at bedside

## 2024-06-27 NOTE — PROGRESS NOTE ADULT - ASSESSMENT
S&S-p  PT/OT rec'd BRENDON  defer to ICU/Cardiology for management of multifocal atrial tachycardia, xfer to 4C once stable

## 2024-06-27 NOTE — PROGRESS NOTE ADULT - SUBJECTIVE AND OBJECTIVE BOX
CARDIOLOGY FOLLOW UP - Dr. Bass  DATE OF SERVICE: 6/27/24    CC  No cv complaints     REVIEW OF SYSTEMS:  CONSTITUTIONAL: No fever, weight loss, or fatigue  RESPIRATORY: No cough, wheezing, chills or hemoptysis; No Shortness of Breath  CARDIOVASCULAR: No chest pain, palpitations, passing out, dizziness, or leg swelling  GASTROINTESTINAL: No abdominal or epigastric pain. No nausea, vomiting, or hematemesis; No diarrhea or constipation. No melena or hematochezia.  VASCULAR: No edema     PHYSICAL EXAM:  T(C): 36.9 (06-27-24 @ 10:00), Max: 38.2 (06-26-24 @ 19:00)  HR: 81 (06-27-24 @ 10:00) (59 - 154)  BP: 126/69 (06-27-24 @ 10:00) (94/50 - 159/74)  RR: 21 (06-27-24 @ 10:00) (11 - 25)  SpO2: 96% (06-27-24 @ 10:00) (89% - 97%)  Wt(kg): --  I&O's Summary    26 Jun 2024 07:01  -  27 Jun 2024 07:00  --------------------------------------------------------  IN: 2547.2 mL / OUT: 2050 mL / NET: 497.2 mL    27 Jun 2024 07:01  -  27 Jun 2024 11:52  --------------------------------------------------------  IN: 216.6 mL / OUT: 0 mL / NET: 216.6 mL        Appearance: elderly male 	  Cardiovascular: Normal S1 S2,RRR, No JVD, No murmurs  Respiratory: Lungs clear to auscultation b/l 	  Gastrointestinal:  Soft, Non-tender, + BS	  Extremities: Normal range of motion, No clubbing, cyanosis or edema      Home Medications:  Keppra 250 mg oral tablet: 1 tab(s) orally every 12 hours (22 Jun 2024 18:38)  metFORMIN 500 mg oral tablet: 1 tab(s) orally every 12 hours (22 Jun 2024 18:38)  rosuvastatin 40 mg oral capsule: 1 cap(s) orally once a day (22 Jun 2024 18:38)  Topamax 100 mg oral tablet: 1 tab(s) orally every 12 hours (22 Jun 2024 18:37)      MEDICATIONS  (STANDING):  aMIOdarone    Tablet 400 milliGRAM(s) Oral two times a day  atorvastatin 80 milliGRAM(s) Oral at bedtime  chlorhexidine 4% Liquid 1 Application(s) Topical daily  dexamethasone/neomycin/polymyxin Suspension 1 Drop(s) Both EYES every 8 hours  diltiazem    Tablet 60 milliGRAM(s) Oral every 6 hours  heparin   Injectable 5000 Unit(s) SubCutaneous every 12 hours  insulin lispro (ADMELOG) corrective regimen sliding scale   SubCutaneous every 6 hours  insulin lispro Injectable (ADMELOG) 2 Unit(s) SubCutaneous two times a day with meals  lacosamide IVPB 50 milliGRAM(s) IV Intermittent every 12 hours  levETIRAcetam 250 milliGRAM(s) Oral two times a day  pilocarpine 2% Solution 1 Drop(s) Right EYE two times a day  piperacillin/tazobactam IVPB.. 3.375 Gram(s) IV Intermittent every 8 hours  topiramate 100 milliGRAM(s) Oral two times a day      TELEMETRY: Atrial bigeminy 60s 	    ECG:  	  RADIOLOGY:   DIAGNOSTIC TESTING:  [x] Echocardiogram:  < from: TTE W or WO Ultrasound Enhancing Agent (06.24.24 @ 09:28) >  CONCLUSIONS:      1. Left ventricular cavity is normal in size. Left ventricular wall thickness is normal. Left ventricular systolic function is normal with an ejection fraction of 67 % by 3D. There are no regional wall motion abnormalities seen.   2. Normal left ventricular diastolic function, with normal filling pressure.   3. Normal right ventricular cavity size and normal systolic function.   4. Mild mitral regurgitation.   5. Normal left and right atrial size.   6. No pericardial effusion seen.   7. Pulmonary artery systolic pressure could not be estimated.   8. No prior echocardiogram is available for comparison.    < end of copied text >    [ ]  Catheterization:  [ ] Stress Test:    OTHER: 	    LABS:	 	    Troponin T, High Sensitivity Result: 37 ng/L [0 - 51] (06-27 @ 00:54)  CKMB Units: 3.4 ng/mL [0.0 - 6.7] (06-27 @ 00:54)  Troponin T, High Sensitivity Result: 42 ng/L [0 - 51] (06-26 @ 02:19)  Troponin T, High Sensitivity Result: 46 ng/L [0 - 51] (06-24 @ 23:27)                          9.2    10.74 )-----------( 168      ( 27 Jun 2024 00:54 )             29.9     06-27    136  |  109<H>  |  28<H>  ----------------------------<  164<H>  3.8   |  16<L>  |  1.37<H>    Ca    8.1<L>      27 Jun 2024 00:54  Phos  1.7     06-27  Mg     2.4     06-27    TPro  6.6  /  Alb  3.0<L>  /  TBili  0.6  /  DBili  0.2  /  AST  32  /  ALT  25  /  AlkPhos  64  06-26

## 2024-06-28 LAB
ANION GAP SERPL CALC-SCNC: 12 MMOL/L — SIGNIFICANT CHANGE UP (ref 5–17)
ANION GAP SERPL CALC-SCNC: 9 MMOL/L — SIGNIFICANT CHANGE UP (ref 5–17)
APPEARANCE UR: CLEAR — SIGNIFICANT CHANGE UP
BACTERIA # UR AUTO: NEGATIVE /HPF — SIGNIFICANT CHANGE UP
BILIRUB UR-MCNC: NEGATIVE — SIGNIFICANT CHANGE UP
BUN SERPL-MCNC: 14 MG/DL — SIGNIFICANT CHANGE UP (ref 7–23)
BUN SERPL-MCNC: 21 MG/DL — SIGNIFICANT CHANGE UP (ref 7–23)
CALCIUM SERPL-MCNC: 8.1 MG/DL — LOW (ref 8.4–10.5)
CALCIUM SERPL-MCNC: 8.5 MG/DL — SIGNIFICANT CHANGE UP (ref 8.4–10.5)
CAST: 2 /LPF — SIGNIFICANT CHANGE UP (ref 0–4)
CHLORIDE SERPL-SCNC: 104 MMOL/L — SIGNIFICANT CHANGE UP (ref 96–108)
CHLORIDE SERPL-SCNC: 106 MMOL/L — SIGNIFICANT CHANGE UP (ref 96–108)
CO2 SERPL-SCNC: 18 MMOL/L — LOW (ref 22–31)
CO2 SERPL-SCNC: 18 MMOL/L — LOW (ref 22–31)
COLOR SPEC: YELLOW — SIGNIFICANT CHANGE UP
CREAT SERPL-MCNC: 1.14 MG/DL — SIGNIFICANT CHANGE UP (ref 0.5–1.3)
CREAT SERPL-MCNC: 1.2 MG/DL — SIGNIFICANT CHANGE UP (ref 0.5–1.3)
DIFF PNL FLD: NEGATIVE — SIGNIFICANT CHANGE UP
EGFR: 62 ML/MIN/1.73M2 — SIGNIFICANT CHANGE UP
EGFR: 65 ML/MIN/1.73M2 — SIGNIFICANT CHANGE UP
FLUAV AG NPH QL: SIGNIFICANT CHANGE UP
FLUBV AG NPH QL: SIGNIFICANT CHANGE UP
GLUCOSE BLDC GLUCOMTR-MCNC: 139 MG/DL — HIGH (ref 70–99)
GLUCOSE BLDC GLUCOMTR-MCNC: 193 MG/DL — HIGH (ref 70–99)
GLUCOSE BLDC GLUCOMTR-MCNC: 204 MG/DL — HIGH (ref 70–99)
GLUCOSE BLDC GLUCOMTR-MCNC: 248 MG/DL — HIGH (ref 70–99)
GLUCOSE BLDC GLUCOMTR-MCNC: 263 MG/DL — HIGH (ref 70–99)
GLUCOSE SERPL-MCNC: 183 MG/DL — HIGH (ref 70–99)
GLUCOSE SERPL-MCNC: 266 MG/DL — HIGH (ref 70–99)
GLUCOSE UR QL: NEGATIVE MG/DL — SIGNIFICANT CHANGE UP
GRAM STN FLD: ABNORMAL
HCT VFR BLD CALC: 27.8 % — LOW (ref 39–50)
HGB BLD-MCNC: 9.1 G/DL — LOW (ref 13–17)
KETONES UR-MCNC: NEGATIVE MG/DL — SIGNIFICANT CHANGE UP
LEUKOCYTE ESTERASE UR-ACNC: NEGATIVE — SIGNIFICANT CHANGE UP
MAGNESIUM SERPL-MCNC: 2.3 MG/DL — SIGNIFICANT CHANGE UP (ref 1.6–2.6)
MAGNESIUM SERPL-MCNC: 2.7 MG/DL — HIGH (ref 1.6–2.6)
MCHC RBC-ENTMCNC: 32.2 PG — SIGNIFICANT CHANGE UP (ref 27–34)
MCHC RBC-ENTMCNC: 32.7 GM/DL — SIGNIFICANT CHANGE UP (ref 32–36)
MCV RBC AUTO: 98.2 FL — SIGNIFICANT CHANGE UP (ref 80–100)
NITRITE UR-MCNC: NEGATIVE — SIGNIFICANT CHANGE UP
NRBC # BLD: 0 /100 WBCS — SIGNIFICANT CHANGE UP (ref 0–0)
PH UR: 7.5 — SIGNIFICANT CHANGE UP (ref 5–8)
PHOSPHATE SERPL-MCNC: 2.3 MG/DL — LOW (ref 2.5–4.5)
PHOSPHATE SERPL-MCNC: 2.6 MG/DL — SIGNIFICANT CHANGE UP (ref 2.5–4.5)
PLATELET # BLD AUTO: 245 K/UL — SIGNIFICANT CHANGE UP (ref 150–400)
POTASSIUM SERPL-MCNC: 3.7 MMOL/L — SIGNIFICANT CHANGE UP (ref 3.5–5.3)
POTASSIUM SERPL-MCNC: 4.6 MMOL/L — SIGNIFICANT CHANGE UP (ref 3.5–5.3)
POTASSIUM SERPL-SCNC: 3.7 MMOL/L — SIGNIFICANT CHANGE UP (ref 3.5–5.3)
POTASSIUM SERPL-SCNC: 4.6 MMOL/L — SIGNIFICANT CHANGE UP (ref 3.5–5.3)
PROCALCITONIN SERPL-MCNC: 5.98 NG/ML — HIGH (ref 0.02–0.1)
PROT UR-MCNC: 30 MG/DL
RBC # BLD: 2.83 M/UL — LOW (ref 4.2–5.8)
RBC # FLD: 12.8 % — SIGNIFICANT CHANGE UP (ref 10.3–14.5)
RBC CASTS # UR COMP ASSIST: 2 /HPF — SIGNIFICANT CHANGE UP (ref 0–4)
RSV RNA NPH QL NAA+NON-PROBE: SIGNIFICANT CHANGE UP
SARS-COV-2 RNA SPEC QL NAA+PROBE: SIGNIFICANT CHANGE UP
SODIUM SERPL-SCNC: 133 MMOL/L — LOW (ref 135–145)
SODIUM SERPL-SCNC: 134 MMOL/L — LOW (ref 135–145)
SP GR SPEC: 1.02 — SIGNIFICANT CHANGE UP (ref 1–1.03)
SPECIMEN SOURCE: SIGNIFICANT CHANGE UP
SQUAMOUS # UR AUTO: 0 /HPF — SIGNIFICANT CHANGE UP (ref 0–5)
UROBILINOGEN FLD QL: 4 MG/DL (ref 0.2–1)
WBC # BLD: 14.35 K/UL — HIGH (ref 3.8–10.5)
WBC # FLD AUTO: 14.35 K/UL — HIGH (ref 3.8–10.5)
WBC UR QL: 0 /HPF — SIGNIFICANT CHANGE UP (ref 0–5)

## 2024-06-28 PROCEDURE — 99233 SBSQ HOSP IP/OBS HIGH 50: CPT

## 2024-06-28 PROCEDURE — 71045 X-RAY EXAM CHEST 1 VIEW: CPT | Mod: 26

## 2024-06-28 PROCEDURE — 99223 1ST HOSP IP/OBS HIGH 75: CPT | Mod: GC

## 2024-06-28 PROCEDURE — 93970 EXTREMITY STUDY: CPT | Mod: 26

## 2024-06-28 RX ORDER — DEXTROSE 30 % IN WATER 30 %
15 VIAL (ML) INTRAVENOUS ONCE
Refills: 0 | Status: DISCONTINUED | OUTPATIENT
Start: 2024-06-28 | End: 2024-07-10

## 2024-06-28 RX ORDER — DEXTROSE MONOHYDRATE AND SODIUM CHLORIDE 5; .3 G/100ML; G/100ML
1000 INJECTION, SOLUTION INTRAVENOUS
Refills: 0 | Status: DISCONTINUED | OUTPATIENT
Start: 2024-06-28 | End: 2024-07-10

## 2024-06-28 RX ORDER — DEXTROSE 30 % IN WATER 30 %
25 VIAL (ML) INTRAVENOUS ONCE
Refills: 0 | Status: DISCONTINUED | OUTPATIENT
Start: 2024-06-28 | End: 2024-07-10

## 2024-06-28 RX ORDER — INSULIN GLARGINE 100 [IU]/ML
12 INJECTION, SOLUTION SUBCUTANEOUS AT BEDTIME
Refills: 0 | Status: DISCONTINUED | OUTPATIENT
Start: 2024-06-28 | End: 2024-07-02

## 2024-06-28 RX ORDER — METOPROLOL TARTRATE 50 MG
25 TABLET ORAL ONCE
Refills: 0 | Status: COMPLETED | OUTPATIENT
Start: 2024-06-28 | End: 2024-06-29

## 2024-06-28 RX ORDER — DEXTROSE 30 % IN WATER 30 %
12.5 VIAL (ML) INTRAVENOUS ONCE
Refills: 0 | Status: DISCONTINUED | OUTPATIENT
Start: 2024-06-28 | End: 2024-07-10

## 2024-06-28 RX ORDER — INSULIN LISPRO 100 [IU]/ML
2 INJECTION, SOLUTION SUBCUTANEOUS
Refills: 0 | Status: DISCONTINUED | OUTPATIENT
Start: 2024-06-28 | End: 2024-06-28

## 2024-06-28 RX ORDER — POTASSIUM CHLORIDE 600 MG/1
40 TABLET, FILM COATED, EXTENDED RELEASE ORAL ONCE
Refills: 0 | Status: COMPLETED | OUTPATIENT
Start: 2024-06-28 | End: 2024-06-29

## 2024-06-28 RX ORDER — METOPROLOL TARTRATE 50 MG
50 TABLET ORAL
Refills: 0 | Status: DISCONTINUED | OUTPATIENT
Start: 2024-06-28 | End: 2024-07-10

## 2024-06-28 RX ORDER — GLUCAGON HYDROCHLORIDE 1 MG/ML
1 INJECTION, POWDER, FOR SOLUTION INTRAMUSCULAR; INTRAVENOUS; SUBCUTANEOUS ONCE
Refills: 0 | Status: DISCONTINUED | OUTPATIENT
Start: 2024-06-28 | End: 2024-07-10

## 2024-06-28 RX ORDER — DEXTROSE MONOHYDRATE 100 MG/ML
125 INJECTION, SOLUTION INTRAVENOUS ONCE
Refills: 0 | Status: DISCONTINUED | OUTPATIENT
Start: 2024-06-28 | End: 2024-07-10

## 2024-06-28 RX ORDER — INSULIN LISPRO 100 [IU]/ML
INJECTION, SOLUTION SUBCUTANEOUS
Refills: 0 | Status: DISCONTINUED | OUTPATIENT
Start: 2024-06-28 | End: 2024-07-10

## 2024-06-28 RX ORDER — SOD PHOS DI, MONO/K PHOS MONO 250 MG
1 TABLET ORAL ONCE
Refills: 0 | Status: COMPLETED | OUTPATIENT
Start: 2024-06-28 | End: 2024-06-29

## 2024-06-28 RX ORDER — INSULIN LISPRO 100 [IU]/ML
4 INJECTION, SOLUTION SUBCUTANEOUS
Refills: 0 | Status: DISCONTINUED | OUTPATIENT
Start: 2024-06-28 | End: 2024-06-30

## 2024-06-28 RX ORDER — INSULIN GLARGINE 100 [IU]/ML
8 INJECTION, SOLUTION SUBCUTANEOUS AT BEDTIME
Refills: 0 | Status: DISCONTINUED | OUTPATIENT
Start: 2024-06-28 | End: 2024-06-28

## 2024-06-28 RX ORDER — ACETAMINOPHEN 325 MG
1000 TABLET ORAL ONCE
Refills: 0 | Status: COMPLETED | OUTPATIENT
Start: 2024-06-28 | End: 2024-06-28

## 2024-06-28 RX ADMIN — Medication 1 DROP(S): at 05:05

## 2024-06-28 RX ADMIN — INSULIN GLARGINE 12 UNIT(S): 100 INJECTION, SOLUTION SUBCUTANEOUS at 22:32

## 2024-06-28 RX ADMIN — Medication 650 MILLIGRAM(S): at 10:46

## 2024-06-28 RX ADMIN — HEPARIN SODIUM 5000 UNIT(S): 50 INJECTION, SOLUTION INTRAVENOUS at 05:05

## 2024-06-28 RX ADMIN — INSULIN LISPRO 2: 100 INJECTION, SOLUTION SUBCUTANEOUS at 05:12

## 2024-06-28 RX ADMIN — AMIODARONE HYDROCHLORIDE 400 MILLIGRAM(S): 50 INJECTION, SOLUTION INTRAVENOUS at 05:04

## 2024-06-28 RX ADMIN — Medication 1 PACKET(S): at 05:06

## 2024-06-28 RX ADMIN — INSULIN LISPRO 2 UNIT(S): 100 INJECTION, SOLUTION SUBCUTANEOUS at 17:46

## 2024-06-28 RX ADMIN — ATORVASTATIN CALCIUM 80 MILLIGRAM(S): 20 TABLET, FILM COATED ORAL at 21:13

## 2024-06-28 RX ADMIN — PANTOPRAZOLE SODIUM 40 MILLIGRAM(S): 40 INJECTION, POWDER, FOR SOLUTION INTRAVENOUS at 17:33

## 2024-06-28 RX ADMIN — Medication 1 PACKET(S): at 21:13

## 2024-06-28 RX ADMIN — DILTIAZEM HYDROCHLORIDE 60 MILLIGRAM(S): 240 CAPSULE, EXTENDED RELEASE ORAL at 05:04

## 2024-06-28 RX ADMIN — TOPIRAMATE 100 MILLIGRAM(S): 50 TABLET, FILM COATED ORAL at 17:42

## 2024-06-28 RX ADMIN — INSULIN LISPRO 4: 100 INJECTION, SOLUTION SUBCUTANEOUS at 21:28

## 2024-06-28 RX ADMIN — INSULIN LISPRO 6: 100 INJECTION, SOLUTION SUBCUTANEOUS at 17:46

## 2024-06-28 RX ADMIN — Medication 1 DROP(S): at 21:13

## 2024-06-28 RX ADMIN — Medication 25 MILLIGRAM(S): at 05:06

## 2024-06-28 RX ADMIN — Medication 25 MILLIGRAM(S): at 17:33

## 2024-06-28 RX ADMIN — PIPERACILLIN SODIUM AND TAZOBACTAM SODIUM 25 GRAM(S): 3; .375 INJECTION, POWDER, LYOPHILIZED, FOR SOLUTION INTRAVENOUS at 05:03

## 2024-06-28 RX ADMIN — Medication 1 DROP(S): at 14:13

## 2024-06-28 RX ADMIN — PIPERACILLIN SODIUM AND TAZOBACTAM SODIUM 25 GRAM(S): 3; .375 INJECTION, POWDER, LYOPHILIZED, FOR SOLUTION INTRAVENOUS at 14:12

## 2024-06-28 RX ADMIN — LEVETIRACETAM 250 MILLIGRAM(S): 100 INJECTION INTRAVENOUS at 05:04

## 2024-06-28 RX ADMIN — PIPERACILLIN SODIUM AND TAZOBACTAM SODIUM 25 GRAM(S): 3; .375 INJECTION, POWDER, LYOPHILIZED, FOR SOLUTION INTRAVENOUS at 21:13

## 2024-06-28 RX ADMIN — HEPARIN SODIUM 5000 UNIT(S): 50 INJECTION, SOLUTION INTRAVENOUS at 17:33

## 2024-06-28 RX ADMIN — TOPIRAMATE 100 MILLIGRAM(S): 50 TABLET, FILM COATED ORAL at 05:04

## 2024-06-28 RX ADMIN — PANTOPRAZOLE SODIUM 40 MILLIGRAM(S): 40 INJECTION, POWDER, FOR SOLUTION INTRAVENOUS at 05:06

## 2024-06-28 RX ADMIN — Medication 400 MILLIGRAM(S): at 15:48

## 2024-06-28 RX ADMIN — INSULIN LISPRO 4: 100 INJECTION, SOLUTION SUBCUTANEOUS at 11:40

## 2024-06-28 RX ADMIN — Medication 400 MILLIGRAM(S): at 00:56

## 2024-06-28 RX ADMIN — LACOSAMIDE 110 MILLIGRAM(S): 100 TABLET, FILM COATED ORAL at 17:32

## 2024-06-28 RX ADMIN — DILTIAZEM HYDROCHLORIDE 60 MILLIGRAM(S): 240 CAPSULE, EXTENDED RELEASE ORAL at 11:41

## 2024-06-28 RX ADMIN — INSULIN LISPRO 2 UNIT(S): 100 INJECTION, SOLUTION SUBCUTANEOUS at 08:40

## 2024-06-28 RX ADMIN — Medication 1 PACKET(S): at 14:13

## 2024-06-28 RX ADMIN — DILTIAZEM HYDROCHLORIDE 60 MILLIGRAM(S): 240 CAPSULE, EXTENDED RELEASE ORAL at 17:32

## 2024-06-28 RX ADMIN — Medication 1 APPLICATION(S): at 11:47

## 2024-06-28 RX ADMIN — LEVETIRACETAM 250 MILLIGRAM(S): 100 INJECTION INTRAVENOUS at 17:33

## 2024-06-28 RX ADMIN — LACOSAMIDE 110 MILLIGRAM(S): 100 TABLET, FILM COATED ORAL at 05:14

## 2024-06-28 RX ADMIN — Medication 1 DROP(S): at 17:33

## 2024-06-28 RX ADMIN — AMIODARONE HYDROCHLORIDE 400 MILLIGRAM(S): 50 INJECTION, SOLUTION INTRAVENOUS at 17:33

## 2024-06-28 NOTE — PROGRESS NOTE ADULT - ASSESSMENT
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     PLAN     N  # R acute on chronic SDH, L acute SDH, R parafalcine acute SDH with mass effect from the R with global atrophy   6/24 R MMAE at some point per NS   #seizures  keppra 250bid   topiramate 100mg bid  vimpat 50mg bid  #Pain: Tylenol and oxycodone  #Activity: [] OOB as tolerated [] Bedrest [x] PT [x] OT [] PMNR    CV   #tachycardia, afib/flutter, 4 to 1 flutter, with pauses, diltiazem 60mg q6h with amiodarone 400mg bid po  #-160mmHg  #TTE 67%  lasix 20mg daily    P   #NC 5L   CTA chest no PE   #pulm infiltrates    R   #Strict I+Os   #IVF  #MIKAELA contrast induced     GI   #Diet: CCD  #Senna miralax  Last BM rectal tube to remove   #distended abdomen: kub enlarged bowels     ID  afebrile     E  Goal euglycemia (-180)  #A1c 6.4   iss   NPH 2u premeals    H  #DVT ppx:   SCDs  Chemoppx held per NS  LED 6/22 no dvt      #CODE STATUS: FULL CODE     #DISPOSITION: TO FLOOR    #NOT CRITICAL  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     PLAN     N  # R acute on chronic SDH, L acute SDH, R parafalcine acute SDH with mass effect from the R with global atrophy   6/24 R MMAE at some point per NS   #seizures  keppra 250bid   topiramate 100mg bid  vimpat 50mg bid  #Pain: Tylenol and oxycodone  #Activity: [] OOB as tolerated [] Bedrest [x] PT [x] OT [] PMNR    CV   #tachycardia, afib/flutter, 4 to 1 flutter, with pauses, diltiazem 60mg q6h with amiodarone 400mg bid po - secondary to fever   #-160mmHg  #TTE 67%  lasix 20mg daily    P   #NC 5L   CTA chest no PE   #pulm infiltrates, fu sputum cultures, procal     R   #Strict I+Os   #IVF  #MIKAELA contrast induced   #hyponatremia     GI   #Diet: CCD  #Senna miralax  Last BM rectal tube to remove   #distended abdomen: kub enlarged bowels     ID  #fever, likley aspiration pneumonia   procal   cont zosyn   MRSA swab negative     E  Goal euglycemia (-180)  #A1c 6.4   iss   lispro 2U premeals    H  #DVT ppx:   SCDs  Chemoppx held per NS  LED 6/22 no dvt  #UE swelling: pending doppler       #CODE STATUS: FULL CODE     #DISPOSITION: TO medicine     #NOT CRITICAL

## 2024-06-28 NOTE — CONSULT NOTE ADULT - SUBJECTIVE AND OBJECTIVE BOX
[facility] DIVISION OF INFECTIOUS DISEASES  GODWIN Hutchinson S. Shah, Y. Patel, G. 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  (2024 06:07)  Patient seen and examined at bedside this morning, wife and daughter at bedside. Patient currently reports he has no pain or complaints, resting comfortably, getting chest PT. Per family, patient does not complain and usually says he is fine. Patient noted with RUE swelling, PIV not being use for the past 24h per daughter. Patient has an cough, per daughter sounds wet but not expectorating much. She feels he is better after diuresis yesterday and with chest pt. He denies chest pain, dyspnea, abdominal pain, nausea, vomiting, diarrhea, dysuria.   ROS: 14 point review of systems completed, pertinent positives and negatives as per HPI.    Allergies: No Known Allergies    PMH -- DM2 (diabetes mellitus, type 2)  HTN (hypertension)  Seizures  Brain tumor  Pulmonary embolism    PSH -- no known past surgeries   FH -- noncontributory   Social History -- denies tobacco, alcohol or illicit drug use    Physical Exam--  Vital Signs Last 24 Hrs  T(F): 99.5 (2024 07:00), Max: 101.3 (2024 15:00)  HR: 90 (2024 07:00) (74 - 134)  BP: 125/58 (2024 07:00) (99/58 - 165/68)  RR: 18 (2024 07:00) (13 - 25)  SpO2: 97% (2024 07:00) (93% - 98%)  General: no acute distress  HEENT: NC/AT, EOMI, anicteric  Lungs: decreased breath sounds b/l   Heart: S1, S2 present, normal rate  Abdomen: Soft. Nondistended. Nontender.  Neuro: awake, alert, answers/follows   Extremities: RUE edema with cordlike swelling with TTP in R antecubital area  Skin: Warm. Dry. No visible rash.   Lines: RUE PIV not being used    Laboratory & Imaging Data--  CBC:                       8.6    11.72 )-----------( 188      ( 2024 16:51 )             27.3     WBC Count: 11.72 K/uL (24 @ 16:51)  WBC Count: 10.74 K/uL (24 @ 00:54)  WBC Count: 7.05 K/uL (24 @ 23:35)  WBC Count: 13.38 K/uL (24 @ 23:27)  WBC Count: 11.60 K/uL (24 @ 00:12)  WBC Count: 9.45 K/uL (24 @ 19:37)  WBC Count: 8.10 K/uL (24 @ 04:43)    CMP:     133<L>  |  106  |  21  ----------------------------<  183<H>  4.6   |  18<L>  |  1.20    Ca    8.1<L>      2024 00:34  Phos  2.3       Mg     2.7           Urinalysis Basic - ( 2024 00:34 )  Color: Yellow / Appearance: Clear / S.017 / pH: x  Gluc: 183 mg/dL / Ketone: Negative mg/dL  / Bili: Negative / Urobili: 4.0 mg/dL   Blood: x / Protein: 30 mg/dL / Nitrite: Negative   Leuk Esterase: Negative / RBC: 2 /HPF / WBC 0 /HPF   Sq Epi: x / Non Sq Epi: 0 /HPF / Bacteria: Negative /HPF    Microbiology: reviewed  Culture - Blood (collected 24 @ 22:17)  Source: .Blood Blood-Peripheral  Preliminary Report (24 @ 03:02):    No growth at 48 Hours    Culture - Blood (collected 24 @ 22:17)  Source: .Blood Blood-Peripheral  Preliminary Report (24 @ 03:02):    No growth at 48 Hours    Radiology--reviewed    < from: Xray Chest 1 View- PORTABLE-Urgent (Xray Chest 1 View- PORTABLE-Urgent .) (24 @ 01:25) >  IMPRESSION:  Redemonstrated bibasilar opacities with slight increase on the right.    < end of copied text >    < from: Xray Chest 1 View- PORTABLE-Urgent (Xray Chest 1 View- PORTABLE-Urgent .) (24 @ 10:28) >  IMPRESSION:  Interval partial clearing of bilateral basilar opacities.    < end of copied text >    < from: Xray Chest 1 View- PORTABLE-Urgent (Xray Chest 1 View- PORTABLE-Urgent .) (24 @ 23:32) >    IMPRESSION:  Patchy bibasilar opacities which may represent atelectasis versus   infection.    < end of copied text >    Active Medications--  acetaminophen     Tablet .. 650 milliGRAM(s) Oral every 6 hours PRN  aMIOdarone    Tablet 400 milliGRAM(s) Oral two times a day  atorvastatin 80 milliGRAM(s) Oral at bedtime  bisacodyl 5 milliGRAM(s) Oral daily PRN  chlorhexidine 4% Liquid 1 Application(s) Topical daily  dexamethasone/neomycin/polymyxin Suspension 1 Drop(s) Both EYES every 8 hours  diltiazem    Tablet 60 milliGRAM(s) Oral every 6 hours  heparin   Injectable 5000 Unit(s) SubCutaneous every 12 hours  insulin lispro (ADMELOG) corrective regimen sliding scale   SubCutaneous Before meals and at bedtime  insulin lispro Injectable (ADMELOG) 2 Unit(s) SubCutaneous two times a day with meals  lacosamide IVPB 50 milliGRAM(s) IV Intermittent every 12 hours  levETIRAcetam 250 milliGRAM(s) Oral two times a day  metoprolol tartrate 25 milliGRAM(s) Oral every 12 hours  ondansetron Injectable 4 milliGRAM(s) IV Push every 6 hours PRN  oxyCODONE    IR 5 milliGRAM(s) Oral every 4 hours PRN  pantoprazole    Tablet 40 milliGRAM(s) Oral every 12 hours  pilocarpine 2% Solution 1 Drop(s) Right EYE two times a day  piperacillin/tazobactam IVPB.. 3.375 Gram(s) IV Intermittent every 8 hours  potassium phosphate / sodium phosphate Powder (PHOS-NaK) 1 Packet(s) Oral three times a day  topiramate 100 milliGRAM(s) Oral two times a day    Current Antimicrobials:   piperacillin/tazobactam IVPB.. 3.375 Gram(s) IV Intermittent every 8 hours    Prior/Completed Antimicrobials:  ceFAZolin   IVPB  piperacillin/tazobactam IVPB.  piperacillin/tazobactam IVPB.-  piperacillin/tazobactam IVPB.-  piperacillin/tazobactam IVPB.- Principal Discharge DX:	Abdominal pain

## 2024-06-28 NOTE — PROGRESS NOTE ADULT - ASSESSMENT
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     PLAN     N  # R acute on chronic SDH, L acute SDH, R parafalcine acute SDH with mass effect from the R with global atrophy   6/24 R MMAE at some point per NS   #seizures  keppra 250bid   topiramate 100mg bid  vimpat 50mg bid  #Pain: Tylenol and oxycodone  #Activity: [] OOB as tolerated [] Bedrest [x] PT [x] OT [] PMNR    CV   #tachycardia, afib/flutter, 4 to 1 flutter, with pauses, diltiazem 60mg q6h with amiodarone 400mg bid po - secondary to fever   #-160mmHg  #TTE 67%  lasix 20mg daily    P   #NC 5L   CTA chest no PE   #pulm infiltrates, fu sputum cultures, procal     R   #Strict I+Os   #IVF  #MIKAELA contrast induced   #hyponatremia     GI   #Diet: CCD  #Senna miralax  Last BM rectal tube to remove   #distended abdomen: kub enlarged bowels     ID  #fever, likley aspiration pneumonia   procal   cont zosyn   MRSA swab negative     E  Goal euglycemia (-180)  #A1c 6.4   iss   lispro 2U premeals    H  #DVT ppx:   SCDs  Chemoppx held per NS  LED 6/22 no dvt  #UE swelling: pending doppler       #CODE STATUS: FULL CODE     #DISPOSITION: TO medicine     #NOT CRITICAL

## 2024-06-28 NOTE — CONSULT NOTE ADULT - ATTENDING COMMENTS
Patient seen at bedside. I spoke with the patient, his wife and his daughter (who is an Emergency Room Physician at [employer]). SDH, not a candidate for oral anticoagulation. Typical appearing atrial flutter (noted on ECG) and atrial fibrillation (noted on Tele). Paroxysmal in nature with brief asymptomatic conversion pauses < 5 seconds. I would favor continuing Amiodarone with the goal of suppressing AF/AFl. If the patient remains in AF/AFl for > 48 hours then need to stop Amio as want to avoid chemical cardioversion with Amiodarone. Given that the patient is asymptomatic in AFl / AF I would recommend stopping CCB to try to minimize risk of exacerbating conversion pauses. Briefly introduced the idea of an ablation / Watchman however this would need to be done many months from now when the patient can safely tolerate oral anticoagulation. EP will follow.    BERNARDINO Gutierrez

## 2024-06-28 NOTE — CONSULT NOTE ADULT - ASSESSMENT
Patient is a 79 year old male with PMH of SOC for benign ependymoma, seizures, DM, HLD, aspirin non compliant, presented to [facility] for AMS, gait difficulties and falls for a week.     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, L SDH, R parafalcince acute SDH with mass effect  S/p R SDH evacuation 6/22   Aspiration pneumonia   Fevers due to above had improved, now febrile last night  Suspect likely RUE thrombophlebitis; r/o DVT    CXR with bibasilar opacities with slight increase on the R  Has cough, no dyspnea or pain, no increased O2 requirements, on zosyn since 6/25 pm  RUE edema with cord like swelling in antecubital area with TTP  abd benign, nontender  UA negative for pyuria    Recommendations:   Follow up UE and LE duplex   Follow Bcx - in process x2   Continue on zosyn for now -- can plan to complete 5-7d course pending above   Monitor temps/WBC  Aspiration precautions   Continue rest of care per primary team    Over the weekend Dr. Luis Fernando Thomson will be covering for our group. If you have any questions, concerns or new micro data, please reach out to them at [phone].    D/w wife and daughter at bedside; JAMES Falcon M.D.  [facility], Division of Infectious Diseases  [phone]  After 5pm on weekdays and all day on weekends - please call [phone] Patient is a 79 year old male with PMH of SOC for benign ependymoma, seizures, DM, HLD, aspirin non compliant, presented to VS for AMS, gait difficulties and falls for a week.     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, L SDH, R parafalcince acute SDH with mass effect  S/p R SDH evacuation 6/22   Aspiration pneumonia   Fevers initially likely due to above improved, now febrile 6/28 overnight  RUE edema - suspect likely RUE thrombophlebitis; r/o DVT  Leukocytosis     CXR with bibasilar opacities with slight increase on the R  Has cough, no dyspnea or pain, no increased O2 requirements, on zosyn since 6/25 pm  RUE edema with cord like swelling in antecubital area with TTP  abd benign, nontender  UA negative for pyuria    Recommendations:   Follow up UE and LE duplex   Follow Bcx - in process x2   Continue on zosyn for now -- can plan to complete 5-7d course pending above   Monitor temps/WBC  Aspiration precautions   Continue rest of care per primary team    Over the weekend Dr. Luis Fernando Thomson will be covering for our group. If you have any questions, concerns or new micro data, please reach out to them at [phone].    D/w wife and daughter at bedside; JAMES Falcon M.D.  [facility], Division of Infectious Diseases  [phone]  After 5pm on weekdays and all day on weekends - please call [phone] Patient is a 79 year old male with PMH of SOC for benign ependymoma, seizures, DM, HLD, aspirin non compliant, presented to VS for AMS, gait difficulties and falls for a week.     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, L SDH, R parafalcince acute SDH with mass effect  S/p R SDH evacuation 6/22   Aspiration pneumonia   Fevers initially likely due to above improved, now febrile 6/28 overnight  RUE edema - suspect likely RUE thrombophlebitis; r/o DVT  Leukocytosis     CXR with bibasilar opacities with slight increase on the R  Has cough, no dyspnea or pain, no increased O2 requirements, on zosyn since 6/25 pm  RUE edema with cord like swelling in antecubital area with TTP  R sided incision healing with no swelling or sign of infection  abd benign, nontender  UA negative for pyuria    Recommendations:   Follow up UE and LE duplex   Follow Bcx - in process x2   Continue on zosyn for now -- can plan to complete 5-7d course pending above   Monitor temps/WBC  Aspiration precautions   Continue rest of care per primary team    Over the weekend Dr. Luis Fernando Thomson will be covering for our group. If you have any questions, concerns or new micro data, please reach out to them at [phone].    D/w wife and daughter at bedside; JAMES Falcon M.D.  [facility], Division of Infectious Diseases  [phone]  After 5pm on weekdays and all day on weekends - please call [phone]

## 2024-06-28 NOTE — PROGRESS NOTE ADULT - SUBJECTIVE AND OBJECTIVE BOX
NSICU PROGRESS NOTE     HPI   78yo male with pmhx SOC for benign ependymoma, seizures, DM, HLD, aspirin non compliant, presented to VS for AMS, gait difficulties and falls for a week, found with acute on chronic R SDH, L SDH, parafalcince acute SDH.     NSICU course:  6/22 R SDH evacuation     12h events   naeon    Exam   neuro: Alert, awake, oriented on self, place and year, PERRL, EOMI, gaze midline, L ptosis chronic, face symmetric   RUE 5/5  LUE pronator drift not touching bed  RLE flexors 4/5, extensors 5/5  LLE flexors 4/5, extensors 5/5  General:  calm  HEENT:  MMM  Cards:  RRR  Respiratory:  no respiratory distress  Adomen:  soft, distended   Extremities:  no edema  Skin:  warm/dry    VITALS:   Vital Signs Last 24 Hrs  T(C): 37.5 (28 Jun 2024 07:00), Max: 38.5 (27 Jun 2024 15:00)  T(F): 99.5 (28 Jun 2024 07:00), Max: 101.3 (27 Jun 2024 15:00)  HR: 90 (28 Jun 2024 07:00) (74 - 134)  BP: 125/58 (28 Jun 2024 07:00) (99/58 - 165/68)  BP(mean): 84 (28 Jun 2024 07:00) (76 - 110)  RR: 18 (28 Jun 2024 07:00) (13 - 25)  SpO2: 97% (28 Jun 2024 07:00) (93% - 98%)    Parameters below as of 28 Jun 2024 07:00  Patient On (Oxygen Delivery Method): nasal cannula  O2 Flow (L/min): 2    CAPILLARY BLOOD GLUCOSE      POCT Blood Glucose.: 139 mg/dL (28 Jun 2024 08:38)  POCT Blood Glucose.: 193 mg/dL (28 Jun 2024 05:07)  POCT Blood Glucose.: 183 mg/dL (27 Jun 2024 23:08)  POCT Blood Glucose.: 200 mg/dL (27 Jun 2024 16:59)  POCT Blood Glucose.: 205 mg/dL (27 Jun 2024 11:31)    I&O's Summary    27 Jun 2024 07:01  -  28 Jun 2024 07:00  --------------------------------------------------------  IN: 2351.4 mL / OUT: 2400 mL / NET: -48.6 mL        Respiratory:        LABS:                        8.6    11.72 )-----------( 188      ( 27 Jun 2024 16:51 )             27.3     06-28    133<L>  |  106  |  21  ----------------------------<  183<H>  4.6   |  18<L>  |  1.20        MEDICATION LEVELS:     IVF FLUIDS/MEDICATIONS:   MEDICATIONS  (STANDING):  aMIOdarone    Tablet 400 milliGRAM(s) Oral two times a day  atorvastatin 80 milliGRAM(s) Oral at bedtime  chlorhexidine 4% Liquid 1 Application(s) Topical daily  dexamethasone/neomycin/polymyxin Suspension 1 Drop(s) Both EYES every 8 hours  diltiazem    Tablet 60 milliGRAM(s) Oral every 6 hours  heparin   Injectable 5000 Unit(s) SubCutaneous every 12 hours  insulin lispro (ADMELOG) corrective regimen sliding scale   SubCutaneous every 6 hours  insulin lispro Injectable (ADMELOG) 2 Unit(s) SubCutaneous two times a day with meals  lacosamide IVPB 50 milliGRAM(s) IV Intermittent every 12 hours  levETIRAcetam 250 milliGRAM(s) Oral two times a day  metoprolol tartrate 25 milliGRAM(s) Oral every 12 hours  pantoprazole    Tablet 40 milliGRAM(s) Oral every 12 hours  pilocarpine 2% Solution 1 Drop(s) Right EYE two times a day  piperacillin/tazobactam IVPB.. 3.375 Gram(s) IV Intermittent every 8 hours  potassium phosphate / sodium phosphate Powder (PHOS-NaK) 1 Packet(s) Oral three times a day  topiramate 100 milliGRAM(s) Oral two times a day    MEDICATIONS  (PRN):  acetaminophen     Tablet .. 650 milliGRAM(s) Oral every 6 hours PRN Temp greater or equal to 38C (100.4F), Mild Pain (1 - 3)  bisacodyl 5 milliGRAM(s) Oral daily PRN Constipation  ondansetron Injectable 4 milliGRAM(s) IV Push every 6 hours PRN Nausea and/or Vomiting  oxyCODONE    IR 5 milliGRAM(s) Oral every 4 hours PRN Moderate Pain (4 - 6)

## 2024-06-28 NOTE — CONSULT NOTE ADULT - SUBJECTIVE AND OBJECTIVE BOX
Derek Hirsch MD  Cardiology Fellow  [phone]  All Cardiology service information can be found 24/7 on [email or web address], password: [email or web address]    Patient seen and evaluated at bedside    HPI:  79M Hx SOC for tumor (per daughter who is ED attending was benign ependymoma), seizures, DM, HLD presenting with frequent falls, found to have a SDH on CTH, now s/p evacuation on 6/22. Course c/b RUE thrombophlebitis as well as fever, possibly 2/2 aspiration PNA. EP consulted due to new onset atrial flutter, started on metop, dilt and amiodarone. On telemetry appears to be in atrial flutter with variable block, as well as paroxysmal conversion pauses up to 4.5 seconds and atrial bigeminy.     TTE 6/27/24 with EF 55-60%, normal LA size.     PMHx:   DM2 (diabetes mellitus, type 2)    HTN (hypertension)    Seizures    Brain tumor    Pulmonary embolism        PSHx:       Allergies:  No Known Allergies      Current Medications:   acetaminophen     Tablet .. 650 milliGRAM(s) Oral every 6 hours PRN  aMIOdarone    Tablet 400 milliGRAM(s) Oral two times a day  atorvastatin 80 milliGRAM(s) Oral at bedtime  bisacodyl 5 milliGRAM(s) Oral daily PRN  chlorhexidine 4% Liquid 1 Application(s) Topical daily  dexamethasone/neomycin/polymyxin Suspension 1 Drop(s) Both EYES every 8 hours  diltiazem    Tablet 60 milliGRAM(s) Oral every 6 hours  heparin   Injectable 5000 Unit(s) SubCutaneous every 12 hours  insulin lispro (ADMELOG) corrective regimen sliding scale   SubCutaneous Before meals and at bedtime  insulin lispro Injectable (ADMELOG) 2 Unit(s) SubCutaneous two times a day with meals  lacosamide IVPB 50 milliGRAM(s) IV Intermittent every 12 hours  levETIRAcetam 250 milliGRAM(s) Oral two times a day  metoprolol tartrate 25 milliGRAM(s) Oral every 12 hours  ondansetron Injectable 4 milliGRAM(s) IV Push every 6 hours PRN  oxyCODONE    IR 5 milliGRAM(s) Oral every 4 hours PRN  pantoprazole    Tablet 40 milliGRAM(s) Oral every 12 hours  pilocarpine 2% Solution 1 Drop(s) Right EYE two times a day  piperacillin/tazobactam IVPB.. 3.375 Gram(s) IV Intermittent every 8 hours  potassium phosphate / sodium phosphate Powder (PHOS-NaK) 1 Packet(s) Oral three times a day  topiramate 100 milliGRAM(s) Oral two times a day      FAMILY HISTORY:        REVIEW OF SYSTEMS:  CONSTITUTIONAL: No weakness, fevers or chills  EYES/ENT: No visual changes;  No dysphagia  NECK: No pain or stiffness  RESPIRATORY: No cough, wheezing, hemoptysis; No shortness of breath  CARDIOVASCULAR: No chest pain or palpitations; No lower extremity edema  GASTROINTESTINAL: No abdominal or epigastric pain. No nausea, vomiting, or hematemesis; No diarrhea or constipation. No melena or hematochezia.  BACK: No back pain  GENITOURINARY: No dysuria, frequency or hematuria  NEUROLOGICAL: No numbness or weakness  SKIN: No itching, burning, rashes, or lesions   All other review of systems is negative unless indicated above.    Physical Exam:  T(F): 100.2 (06-28), Max: 101.1 (06-28)  HR: 115 (06-28) (74 - 134)  BP: 150/84 (06-28) (99/58 - 152/88)  RR: 13 (06-28)  SpO2: 94% (06-28)  GEN: NAD  HEENT: EOMI, clear sclera  PULM: CTA b/l, no wheeze  CV: RRR S1 S2, no murmur, no JVD  ABD: S, NT, ND  EXT: WWP, no edema  PSYCH: normal affect  SKIN: No rash    CXR: Personally reviewed    Labs: Personally reviewed                        8.6    11.72 )-----------( 188      ( 27 Jun 2024 16:51 )             27.3     06-28    133<L>  |  106  |  21  ----------------------------<  183<H>  4.6   |  18<L>  |  1.20    Ca    8.1<L>      28 Jun 2024 00:34  Phos  2.3     06-28  Mg     2.7     06-28          CARDIAC MARKERS ( 27 Jun 2024 16:51 )  41 ng/L / x     / x     / x     / x     / x      CARDIAC MARKERS ( 27 Jun 2024 00:54 )  37 ng/L / x     / x     / x     / x     / 3.4 ng/mL  CARDIAC MARKERS ( 26 Jun 2024 02:19 )  42 ng/L / x     / x     / x     / x     / x      CARDIAC MARKERS ( 24 Jun 2024 23:27 )  46 ng/L / x     / x     / x     / x     / x                  Thyroid Stimulating Hormone, Serum: 2.45 uIU/mL (06-25 @ 08:53)  Thyroid Stimulating Hormone, Serum: 1.69 uIU/mL (06-22 @ 19:37)

## 2024-06-28 NOTE — PROGRESS NOTE ADULT - ATTENDING COMMENTS
Agree with above.
PT seen on 6/26. PT alert and awake, conversant, BEST with no drift, wound intact.  Continue monitoring,  On amiodarone drip for new onset afib and transitioning to PO.
Agree with above.
78 yo man with h/o SOC for benign ependymoma, seizures, DM and HLD, admitted 6/22 with ataxia, CTH with a 1.7cm R chronic SDH with 9mm MLS.     Interval events:  S/p R crani for SDH evacuation yesterday.   CTH this AM with improved size of SDH.   SD RADHA output 80cc.     On exam, alert, oriented to self, hospital and year, speech is fluent, briskly follows commands, PERRL, EOMI, face symmetric except for L ptosis (baseline), no PD, no LE drift    plan for MMA embo tomorrow  monitor SD RADHA output    c/w home AEDs  NPO after MN for MMA embo  hold Lov ppx for POD 1    Patient seen and examined by attending on 6/23/2024.    Patient is critically ill due to a large SDH s/p crani for evacuation and at high risk for neurological deterioration or death due to: midline shift of brain, at risk of re-accumulation of SDH requiring close neurologic monitoring.
Agree with above.
78 yo man with h/o SOC for benign ependymoma, seizures, DM and HLD, admitted 6/22 with ataxia, CTH with a 1.7cm R chronic SDH with 9mm MLS.     On exam, alert, oriented to self, hospital and year, speech is fluent, briskly follows commands, PERRL, EOMI, face symmetric except for L ptosis (baseline), no PD, no LE drift, 4+ in b/l HF, b/l DF 5/5     plan for evacuation of SDH today  c/w home AEDs  NPO for OR  hold Lov for OR     Patient seen and examined by attending on 6/22/2024.    Patient is critically ill due to a large SDH and at high risk for neurological deterioration or death due to: midline shift of brain, requiring close monitoring and SDH evacuation.

## 2024-06-28 NOTE — PROGRESS NOTE ADULT - ASSESSMENT
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 No AC/AP, coags/plt wnl Now s/p R crani for SDH evacuation. Afib RVR overnight s/p Dilt IV, now in SR.      #Paroxysmal atrial fibrillation  -sp iv dilt with conversion to SR   -Continue with amiodarone 400 PO BID  -Continue po dilt and metoprolol as ordered  -Recommend EP eval   -Echo nml lv fxn, no wma, mild MR   -Unable to give a/c at this time given acute SDH    #Acute subdural hematoma   -s/p R crani for SDH evacuation  -post op mgmt per neuro    #DM  -Mgmt per med    #Abnormal EKG  -Atrial bigem on tele/ekg   -Asymptomatic at present time         79M Hx SOC for tumor (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 No AC/AP, coags/plt wnl Now s/p R crani for SDH evacuation. Afib RVR overnight s/p Dilt IV, now in SR.      #Paroxysmal atrial fibrillation  -sp iv dilt with conversion to SR   -Continue with amiodarone 400 PO BID  -Continue po dilt and metoprolol as ordered  -Tele with episode of SVT vs Aflutter - now atrial bigem. Also with frequent, brief pauses   -Recommend EP eval   -Echo nml lv fxn, no wma, mild MR   -Unable to give a/c at this time given acute SDH    #Acute subdural hematoma   -s/p R crani for SDH evacuation  -post op mgmt per neuro    #DM  -Mgmt per med    #Abnormal EKG  -Atrial bigem on tele/ekg   -Asymptomatic at present time      d/w daughter at bedside

## 2024-06-28 NOTE — PROGRESS NOTE ADULT - SUBJECTIVE AND OBJECTIVE BOX
NSICU PROGRESS NOTE     HPI   80yo male with pmhx SOC for benign ependymoma, seizures, DM, HLD, aspirin non compliant, presented to VS for AMS, gait difficulties and falls for a week, found with acute on chronic R SDH, L SDH, parafalcince acute SDH.     NSICU course:  6/22 R SDH evacuation     12h events   naeon    Exam   neuro: Alert, awake, oriented on self, place and year, PERRL, EOMI, gaze midline, L ptosis chronic, face symmetric   RUE 5/5  LUE pronator drift not touching bed  RLE flexors 4/5, extensors 5/5  LLE flexors 4/5, extensors 5/5  General:  calm  HEENT:  MMM  Cards:  RRR  Respiratory:  no respiratory distress  Adomen:  soft, distended   Extremities:  no edema  Skin:  warm/dry    VITALS:   Vital Signs Last 24 Hrs  T(C): 37.5 (28 Jun 2024 07:00), Max: 38.5 (27 Jun 2024 15:00)  T(F): 99.5 (28 Jun 2024 07:00), Max: 101.3 (27 Jun 2024 15:00)  HR: 90 (28 Jun 2024 07:00) (74 - 134)  BP: 125/58 (28 Jun 2024 07:00) (99/58 - 165/68)  BP(mean): 84 (28 Jun 2024 07:00) (76 - 110)  RR: 18 (28 Jun 2024 07:00) (13 - 25)  SpO2: 97% (28 Jun 2024 07:00) (93% - 98%)    Parameters below as of 28 Jun 2024 07:00  Patient On (Oxygen Delivery Method): nasal cannula  O2 Flow (L/min): 2    CAPILLARY BLOOD GLUCOSE      POCT Blood Glucose.: 139 mg/dL (28 Jun 2024 08:38)  POCT Blood Glucose.: 193 mg/dL (28 Jun 2024 05:07)  POCT Blood Glucose.: 183 mg/dL (27 Jun 2024 23:08)  POCT Blood Glucose.: 200 mg/dL (27 Jun 2024 16:59)  POCT Blood Glucose.: 205 mg/dL (27 Jun 2024 11:31)    I&O's Summary    27 Jun 2024 07:01  -  28 Jun 2024 07:00  --------------------------------------------------------  IN: 2351.4 mL / OUT: 2400 mL / NET: -48.6 mL        Respiratory:        LABS:                        8.6    11.72 )-----------( 188      ( 27 Jun 2024 16:51 )             27.3     06-28    133<L>  |  106  |  21  ----------------------------<  183<H>  4.6   |  18<L>  |  1.20        MEDICATION LEVELS:     IVF FLUIDS/MEDICATIONS:   MEDICATIONS  (STANDING):  aMIOdarone    Tablet 400 milliGRAM(s) Oral two times a day  atorvastatin 80 milliGRAM(s) Oral at bedtime  chlorhexidine 4% Liquid 1 Application(s) Topical daily  dexamethasone/neomycin/polymyxin Suspension 1 Drop(s) Both EYES every 8 hours  diltiazem    Tablet 60 milliGRAM(s) Oral every 6 hours  heparin   Injectable 5000 Unit(s) SubCutaneous every 12 hours  insulin lispro (ADMELOG) corrective regimen sliding scale   SubCutaneous every 6 hours  insulin lispro Injectable (ADMELOG) 2 Unit(s) SubCutaneous two times a day with meals  lacosamide IVPB 50 milliGRAM(s) IV Intermittent every 12 hours  levETIRAcetam 250 milliGRAM(s) Oral two times a day  metoprolol tartrate 25 milliGRAM(s) Oral every 12 hours  pantoprazole    Tablet 40 milliGRAM(s) Oral every 12 hours  pilocarpine 2% Solution 1 Drop(s) Right EYE two times a day  piperacillin/tazobactam IVPB.. 3.375 Gram(s) IV Intermittent every 8 hours  potassium phosphate / sodium phosphate Powder (PHOS-NaK) 1 Packet(s) Oral three times a day  topiramate 100 milliGRAM(s) Oral two times a day    MEDICATIONS  (PRN):  acetaminophen     Tablet .. 650 milliGRAM(s) Oral every 6 hours PRN Temp greater or equal to 38C (100.4F), Mild Pain (1 - 3)  bisacodyl 5 milliGRAM(s) Oral daily PRN Constipation  ondansetron Injectable 4 milliGRAM(s) IV Push every 6 hours PRN Nausea and/or Vomiting  oxyCODONE    IR 5 milliGRAM(s) Oral every 4 hours PRN Moderate Pain (4 - 6)

## 2024-06-28 NOTE — PROGRESS NOTE ADULT - SUBJECTIVE AND OBJECTIVE BOX
CARDIOLOGY FOLLOW UP - Dr. Bass  DATE OF SERVICE: 6/28/24    CC  No cv complaints     REVIEW OF SYSTEMS:  CONSTITUTIONAL: No fever, weight loss, or fatigue  RESPIRATORY: No cough, wheezing, chills or hemoptysis; No Shortness of Breath  CARDIOVASCULAR: No chest pain, palpitations, passing out, dizziness, or leg swelling  GASTROINTESTINAL: No abdominal or epigastric pain. No nausea, vomiting, or hematemesis; No diarrhea or constipation. No melena or hematochezia.  VASCULAR: No edema     PHYSICAL EXAM:  T(C): 38.1 (06-28-24 @ 11:00), Max: 38.5 (06-27-24 @ 15:00)  HR: 94 (06-28-24 @ 11:00) (74 - 134)  BP: 131/69 (06-28-24 @ 11:00) (99/58 - 165/68)  RR: 17 (06-28-24 @ 11:00) (13 - 25)  SpO2: 95% (06-28-24 @ 11:00) (93% - 98%)  Wt(kg): --  I&O's Summary    27 Jun 2024 07:01  -  28 Jun 2024 07:00  --------------------------------------------------------  IN: 2616.4 mL / OUT: 2400 mL / NET: 216.4 mL    28 Jun 2024 07:01  -  28 Jun 2024 11:46  --------------------------------------------------------  IN: 265 mL / OUT: 0 mL / NET: 265 mL        Appearance: Elderly male 	  Cardiovascular: Normal S1 S2,RRR, No JVD, No murmurs  Respiratory: Lungs clear to auscultation b/l   Gastrointestinal:  Soft, Non-tender, + BS	  Extremities: Normal range of motion, No clubbing, cyanosis or edema      Home Medications:  Keppra 250 mg oral tablet: 1 tab(s) orally every 12 hours (22 Jun 2024 18:38)  metFORMIN 500 mg oral tablet: 1 tab(s) orally every 12 hours (22 Jun 2024 18:38)  rosuvastatin 40 mg oral capsule: 1 cap(s) orally once a day (22 Jun 2024 18:38)  Topamax 100 mg oral tablet: 1 tab(s) orally every 12 hours (22 Jun 2024 18:37)      MEDICATIONS  (STANDING):  aMIOdarone    Tablet 400 milliGRAM(s) Oral two times a day  atorvastatin 80 milliGRAM(s) Oral at bedtime  chlorhexidine 4% Liquid 1 Application(s) Topical daily  dexamethasone/neomycin/polymyxin Suspension 1 Drop(s) Both EYES every 8 hours  diltiazem    Tablet 60 milliGRAM(s) Oral every 6 hours  heparin   Injectable 5000 Unit(s) SubCutaneous every 12 hours  insulin lispro (ADMELOG) corrective regimen sliding scale   SubCutaneous Before meals and at bedtime  insulin lispro Injectable (ADMELOG) 2 Unit(s) SubCutaneous two times a day with meals  lacosamide IVPB 50 milliGRAM(s) IV Intermittent every 12 hours  levETIRAcetam 250 milliGRAM(s) Oral two times a day  metoprolol tartrate 25 milliGRAM(s) Oral every 12 hours  pantoprazole    Tablet 40 milliGRAM(s) Oral every 12 hours  pilocarpine 2% Solution 1 Drop(s) Right EYE two times a day  piperacillin/tazobactam IVPB.. 3.375 Gram(s) IV Intermittent every 8 hours  potassium phosphate / sodium phosphate Powder (PHOS-NaK) 1 Packet(s) Oral three times a day  topiramate 100 milliGRAM(s) Oral two times a day      TELEMETRY: Atrial bigeminy, frequent pauses, episode of SVT 	    ECG:  	  RADIOLOGY:   DIAGNOSTIC TESTING:  [ ] Echocardiogram:  [ ]  Catheterization:  [ ] Stress Test:    OTHER: 	    LABS:	 	    Troponin T, High Sensitivity Result: 41 ng/L [0 - 51] (06-27 @ 16:51)  Troponin T, High Sensitivity Result: 37 ng/L [0 - 51] (06-27 @ 00:54)  CKMB Units: 3.4 ng/mL [0.0 - 6.7] (06-27 @ 00:54)  Troponin T, High Sensitivity Result: 42 ng/L [0 - 51] (06-26 @ 02:19)  Troponin T, High Sensitivity Result: 46 ng/L [0 - 51] (06-24 @ 23:27)                          8.6    11.72 )-----------( 188      ( 27 Jun 2024 16:51 )             27.3     06-28    133<L>  |  106  |  21  ----------------------------<  183<H>  4.6   |  18<L>  |  1.20    Ca    8.1<L>      28 Jun 2024 00:34  Phos  2.3     06-28  Mg     2.7     06-28

## 2024-06-29 DIAGNOSIS — R13.10 DYSPHAGIA, UNSPECIFIED: ICD-10-CM

## 2024-06-29 DIAGNOSIS — J69.0 PNEUMONITIS DUE TO INHALATION OF FOOD AND VOMIT: ICD-10-CM

## 2024-06-29 DIAGNOSIS — Z29.9 ENCOUNTER FOR PROPHYLACTIC MEASURES, UNSPECIFIED: ICD-10-CM

## 2024-06-29 LAB
ANION GAP SERPL CALC-SCNC: 10 MMOL/L — SIGNIFICANT CHANGE UP (ref 5–17)
BUN SERPL-MCNC: 12 MG/DL — SIGNIFICANT CHANGE UP (ref 7–23)
CALCIUM SERPL-MCNC: 8.6 MG/DL — SIGNIFICANT CHANGE UP (ref 8.4–10.5)
CHLORIDE SERPL-SCNC: 103 MMOL/L — SIGNIFICANT CHANGE UP (ref 96–108)
CO2 SERPL-SCNC: 19 MMOL/L — LOW (ref 22–31)
CREAT SERPL-MCNC: 1.11 MG/DL — SIGNIFICANT CHANGE UP (ref 0.5–1.3)
EGFR: 68 ML/MIN/1.73M2 — SIGNIFICANT CHANGE UP
GLUCOSE BLDC GLUCOMTR-MCNC: 140 MG/DL — HIGH (ref 70–99)
GLUCOSE BLDC GLUCOMTR-MCNC: 149 MG/DL — HIGH (ref 70–99)
GLUCOSE BLDC GLUCOMTR-MCNC: 166 MG/DL — HIGH (ref 70–99)
GLUCOSE BLDC GLUCOMTR-MCNC: 190 MG/DL — HIGH (ref 70–99)
GLUCOSE SERPL-MCNC: 169 MG/DL — HIGH (ref 70–99)
HCT VFR BLD CALC: 28.6 % — LOW (ref 39–50)
HGB BLD-MCNC: 9.1 G/DL — LOW (ref 13–17)
MAGNESIUM SERPL-MCNC: 2.2 MG/DL — SIGNIFICANT CHANGE UP (ref 1.6–2.6)
MCHC RBC-ENTMCNC: 31.1 PG — SIGNIFICANT CHANGE UP (ref 27–34)
MCHC RBC-ENTMCNC: 31.8 GM/DL — LOW (ref 32–36)
MCV RBC AUTO: 97.6 FL — SIGNIFICANT CHANGE UP (ref 80–100)
NRBC # BLD: 0 /100 WBCS — SIGNIFICANT CHANGE UP (ref 0–0)
PHOSPHATE SERPL-MCNC: 2.4 MG/DL — LOW (ref 2.5–4.5)
PLATELET # BLD AUTO: 270 K/UL — SIGNIFICANT CHANGE UP (ref 150–400)
POTASSIUM SERPL-MCNC: 3.9 MMOL/L — SIGNIFICANT CHANGE UP (ref 3.5–5.3)
POTASSIUM SERPL-SCNC: 3.9 MMOL/L — SIGNIFICANT CHANGE UP (ref 3.5–5.3)
RBC # BLD: 2.93 M/UL — LOW (ref 4.2–5.8)
RBC # FLD: 12.7 % — SIGNIFICANT CHANGE UP (ref 10.3–14.5)
SODIUM SERPL-SCNC: 132 MMOL/L — LOW (ref 135–145)
WBC # BLD: 13.58 K/UL — HIGH (ref 3.8–10.5)
WBC # FLD AUTO: 13.58 K/UL — HIGH (ref 3.8–10.5)

## 2024-06-29 PROCEDURE — 99233 SBSQ HOSP IP/OBS HIGH 50: CPT

## 2024-06-29 PROCEDURE — 99223 1ST HOSP IP/OBS HIGH 75: CPT

## 2024-06-29 PROCEDURE — 71045 X-RAY EXAM CHEST 1 VIEW: CPT | Mod: 26

## 2024-06-29 RX ORDER — ACETAMINOPHEN 325 MG
650 TABLET ORAL ONCE
Refills: 0 | Status: COMPLETED | OUTPATIENT
Start: 2024-06-29 | End: 2024-06-29

## 2024-06-29 RX ORDER — MEROPENEM 500 MG/1
1000 INJECTION, POWDER, FOR SOLUTION INTRAVENOUS EVERY 8 HOURS
Refills: 0 | Status: DISCONTINUED | OUTPATIENT
Start: 2024-06-29 | End: 2024-07-01

## 2024-06-29 RX ADMIN — INSULIN LISPRO 4 UNIT(S): 100 INJECTION, SOLUTION SUBCUTANEOUS at 12:09

## 2024-06-29 RX ADMIN — Medication 1 DROP(S): at 05:27

## 2024-06-29 RX ADMIN — INSULIN LISPRO 4 UNIT(S): 100 INJECTION, SOLUTION SUBCUTANEOUS at 08:25

## 2024-06-29 RX ADMIN — Medication 1 PACKET(S): at 05:27

## 2024-06-29 RX ADMIN — POTASSIUM CHLORIDE 40 MILLIEQUIVALENT(S): 600 TABLET, FILM COATED, EXTENDED RELEASE ORAL at 11:11

## 2024-06-29 RX ADMIN — Medication 50 MILLIGRAM(S): at 05:27

## 2024-06-29 RX ADMIN — Medication 1 PACKET(S): at 11:12

## 2024-06-29 RX ADMIN — LACOSAMIDE 110 MILLIGRAM(S): 100 TABLET, FILM COATED ORAL at 05:24

## 2024-06-29 RX ADMIN — Medication 1 DROP(S): at 16:35

## 2024-06-29 RX ADMIN — PANTOPRAZOLE SODIUM 40 MILLIGRAM(S): 40 INJECTION, POWDER, FOR SOLUTION INTRAVENOUS at 18:04

## 2024-06-29 RX ADMIN — AMIODARONE HYDROCHLORIDE 400 MILLIGRAM(S): 50 INJECTION, SOLUTION INTRAVENOUS at 18:04

## 2024-06-29 RX ADMIN — PIPERACILLIN SODIUM AND TAZOBACTAM SODIUM 25 GRAM(S): 3; .375 INJECTION, POWDER, LYOPHILIZED, FOR SOLUTION INTRAVENOUS at 14:12

## 2024-06-29 RX ADMIN — Medication 1 PACKET(S): at 14:12

## 2024-06-29 RX ADMIN — INSULIN LISPRO 2: 100 INJECTION, SOLUTION SUBCUTANEOUS at 16:36

## 2024-06-29 RX ADMIN — Medication 1 DROP(S): at 22:42

## 2024-06-29 RX ADMIN — MEROPENEM 100 MILLIGRAM(S): 500 INJECTION, POWDER, FOR SOLUTION INTRAVENOUS at 22:42

## 2024-06-29 RX ADMIN — INSULIN GLARGINE 12 UNIT(S): 100 INJECTION, SOLUTION SUBCUTANEOUS at 22:42

## 2024-06-29 RX ADMIN — LEVETIRACETAM 250 MILLIGRAM(S): 100 INJECTION INTRAVENOUS at 05:27

## 2024-06-29 RX ADMIN — HEPARIN SODIUM 5000 UNIT(S): 50 INJECTION, SOLUTION INTRAVENOUS at 05:28

## 2024-06-29 RX ADMIN — Medication 1 DROP(S): at 18:11

## 2024-06-29 RX ADMIN — TOPIRAMATE 100 MILLIGRAM(S): 50 TABLET, FILM COATED ORAL at 18:04

## 2024-06-29 RX ADMIN — LACOSAMIDE 110 MILLIGRAM(S): 100 TABLET, FILM COATED ORAL at 17:08

## 2024-06-29 RX ADMIN — PANTOPRAZOLE SODIUM 40 MILLIGRAM(S): 40 INJECTION, POWDER, FOR SOLUTION INTRAVENOUS at 05:26

## 2024-06-29 RX ADMIN — Medication 650 MILLIGRAM(S): at 17:59

## 2024-06-29 RX ADMIN — Medication 50 MILLIGRAM(S): at 18:04

## 2024-06-29 RX ADMIN — Medication 25 MILLIGRAM(S): at 23:00

## 2024-06-29 RX ADMIN — HEPARIN SODIUM 5000 UNIT(S): 50 INJECTION, SOLUTION INTRAVENOUS at 18:03

## 2024-06-29 RX ADMIN — PIPERACILLIN SODIUM AND TAZOBACTAM SODIUM 25 GRAM(S): 3; .375 INJECTION, POWDER, LYOPHILIZED, FOR SOLUTION INTRAVENOUS at 06:13

## 2024-06-29 RX ADMIN — Medication 1 DROP(S): at 05:28

## 2024-06-29 RX ADMIN — ATORVASTATIN CALCIUM 80 MILLIGRAM(S): 20 TABLET, FILM COATED ORAL at 22:42

## 2024-06-29 RX ADMIN — INSULIN LISPRO 4 UNIT(S): 100 INJECTION, SOLUTION SUBCUTANEOUS at 16:36

## 2024-06-29 RX ADMIN — TOPIRAMATE 100 MILLIGRAM(S): 50 TABLET, FILM COATED ORAL at 05:27

## 2024-06-29 RX ADMIN — Medication 1 PACKET(S): at 22:41

## 2024-06-29 RX ADMIN — AMIODARONE HYDROCHLORIDE 400 MILLIGRAM(S): 50 INJECTION, SOLUTION INTRAVENOUS at 05:27

## 2024-06-29 RX ADMIN — LEVETIRACETAM 250 MILLIGRAM(S): 100 INJECTION INTRAVENOUS at 18:04

## 2024-06-29 NOTE — CONSULT NOTE ADULT - PROBLEM SELECTOR RECOMMENDATION 4
- CXR with bibasilar opacities with slight increase on the R    - BC 6/28 NGTD    - 6/28 UE Duplex: No evidence of deep venous thrombosis in either upper extremity. There is superficial vein thrombosis of the right basilic vein at the antecubital fossa and right cephalic vein to the mid upper arm level.  - 6/22 LE Doppler: No evidence of deep venous thrombosis in either lower extremity.    - Continue with Zosyn, ID following
Will need to obtain prior outpt EKGs from his cardiologist   Asymptomatic at present time

## 2024-06-29 NOTE — CONSULT NOTE ADULT - ASSESSMENT
79M Hx SOC for tumor (per daughter who is ED attending was benign ependymoma), seizures, DM, HLD presenting with frequent falls, found to have a SDH on CTH, now s/p evacuation on 6/22. Course c/b RUE thrombophlebitis as well as fever, possibly 2/2 aspiration PNA and AFib/ Aflutter.

## 2024-06-29 NOTE — PROVIDER CONTACT NOTE (OTHER) - RECOMMENDATIONS
EKG, labs, cardiovert, Provider to assess pt at bedside
please come and assess
Per provider Chest x-ray to be completed. Tylenol suppository administered. Temp reassessed at 99.1

## 2024-06-29 NOTE — PROGRESS NOTE ADULT - ASSESSMENT
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     Hospital course:  6/22 R crani SDH evacuation   6/24 R MMAE  6/28 tx to floor from [facility], afib/aflutter- HRs up to 140s, on amio for now, lopressor increased, EP saw  6/29 cards wise stable, EP following cont amio/lopressor 50bid; now on puree diet/thick fluids, spiked, ID changed zosyn to meropenem    PLAN     NEURO  # R acute on chronic SDH, L acute SDH, R parafalcine acute SDH with mass effect from the R with global atrophy   s/p right crani and MMAE for SDH   #seizures  keppra 250bid   topiramate 100mg bid  vimpat 50mg bid  #Pain: Tylenol and oxycodone  #Activity: [] OOB as tolerated [] Bedrest [x] PT [x] OT [] PMNR    CV   #tachycardia, afib/flutter, 4 to 1 flutter, with pauses, diltiazem stopped per EP, lopressor 50mg BID started; cont amio 400mg BID  cont tele monitoring  #-160mmHg  #TTE 67%  lasix PRN    PULM  RA sat 95%  CTA chest no PE   #pulm infiltrates on CXR, fu sputum cultures, on zosyn this am, ID switched to meropenem after spiking this afternoon  diet downgraded with low threshold for NPO; speech and swallow reordered     Renal  #Strict I+Os   #IVL  #MIKAELA now resolved, contrast induced- will trend  #hyponatremia- will trend    GI   #Diet: CCD, now puree with thick liquids due to coughing/gurgling and repeat speech/swallow  #Senna miralax  rectal tube removed  #distended abdomen: kub enlarged bowels     ID  #fever, likley aspiration pneumonia   zosyn changed to meropenem per ID, rpt CXR ordered, f/u all cultures just sent in last 24hrs- NGTD  MRSA swab negative     Endo  Goal euglycemia (-180)  #A1c 6.4   Basal/Bolus insulins, adjust as needed    Heme  #DVT ppx: SQH and SCDs  LED 6/22 no dvt  #UE swelling: no DVT however superficial vein thrombosis, will elevate and monitor    DISPO: BRENDON upon d/c  wife a bedside and physician daughter via phone updated, questions answered, they verbalized understanding  appreciate EP and hospitalist and cardiology and ID consults  f/u am labs    will discuss with Dr Goodman  [id number]

## 2024-06-29 NOTE — PROGRESS NOTE ADULT - SUBJECTIVE AND OBJECTIVE BOX
CARDIOLOGY FOLLOW UP NOTE - DR. MAZA    Patient Name: RODRIGUE CARROLL    Date of Service: 06-29-24 @ 15:45    Patient seen and examined    Subjective:    cv: denies chest pain, dyspnea, palpitations, dizziness  pulmonary: denies cough  GI: denies abdominal pain, nausea, vomiting  vascular/legs: no edema   skin: no rash  ROS: otherwise negative   overnight events:      PHYSICAL EXAM:  T(C): 36.9 (06-29-24 @ 12:20), Max: 38 (06-28-24 @ 23:00)  HR: 71 (06-29-24 @ 12:20) (71 - 147)  BP: 134/74 (06-29-24 @ 12:20) (133/92 - 146/80)  RR: 18 (06-29-24 @ 12:20) (16 - 21)  SpO2: 95% (06-29-24 @ 12:20) (92% - 97%)  Wt(kg): --  I&O's Summary    28 Jun 2024 07:01  -  29 Jun 2024 07:00  --------------------------------------------------------  IN: 1475 mL / OUT: 3100 mL / NET: -1625 mL      Daily     Daily     Appearance: Normal	  Cardiovascular: Normal S1 S2,RRR, No JVD, No murmurs  Respiratory: Lungs clear to auscultation	  Gastrointestinal:  Soft, Non-tender, + BS	  Extremities: Normal range of motion, No clubbing, cyanosis or edema      Home Medications:  Keppra 250 mg oral tablet: 1 tab(s) orally every 12 hours (22 Jun 2024 18:38)  metFORMIN 500 mg oral tablet: 1 tab(s) orally every 12 hours (22 Jun 2024 18:38)  rosuvastatin 40 mg oral capsule: 1 cap(s) orally once a day (22 Jun 2024 18:38)  Topamax 100 mg oral tablet: 1 tab(s) orally every 12 hours (22 Jun 2024 18:37)      MEDICATIONS  (STANDING):  aMIOdarone    Tablet 400 milliGRAM(s) Oral two times a day  atorvastatin 80 milliGRAM(s) Oral at bedtime  dexamethasone/neomycin/polymyxin Suspension 1 Drop(s) Both EYES every 8 hours  dextrose 10% Bolus 125 milliLiter(s) IV Bolus once  dextrose 5%. 1000 milliLiter(s) (100 mL/Hr) IV Continuous <Continuous>  dextrose 5%. 1000 milliLiter(s) (50 mL/Hr) IV Continuous <Continuous>  dextrose 50% Injectable 25 Gram(s) IV Push once  dextrose 50% Injectable 12.5 Gram(s) IV Push once  glucagon  Injectable 1 milliGRAM(s) IntraMuscular once  heparin   Injectable 5000 Unit(s) SubCutaneous every 12 hours  insulin glargine Injectable (LANTUS) 12 Unit(s) SubCutaneous at bedtime  insulin lispro (ADMELOG) corrective regimen sliding scale   SubCutaneous Before meals and at bedtime  insulin lispro Injectable (ADMELOG) 4 Unit(s) SubCutaneous three times a day before meals  lacosamide IVPB 50 milliGRAM(s) IV Intermittent every 12 hours  levETIRAcetam 250 milliGRAM(s) Oral two times a day  metoprolol tartrate 50 milliGRAM(s) Oral two times a day  metoprolol tartrate 25 milliGRAM(s) Oral once  pantoprazole    Tablet 40 milliGRAM(s) Oral every 12 hours  pilocarpine 2% Solution 1 Drop(s) Right EYE two times a day  piperacillin/tazobactam IVPB.. 3.375 Gram(s) IV Intermittent every 8 hours  potassium phosphate / sodium phosphate Powder (PHOS-NaK) 1 Packet(s) Oral three times a day  topiramate 100 milliGRAM(s) Oral two times a day      TELEMETRY: 	    ECG:  	  RADIOLOGY:   DIAGNOSTIC TESTING:  [ ] Echocardiogram:  [ ] Catheterization:  [ ] Stress Test:    OTHER: 	    LABS:	 	    CARDIAC MARKERS:        Troponin T, High Sensitivity Result: 41 ng/L (06-27 @ 16:51)  Troponin T, High Sensitivity Result: 37 ng/L (06-27 @ 00:54)  Troponin T, High Sensitivity Result: 42 ng/L (06-26 @ 02:19)  Troponin T, High Sensitivity Result: 46 ng/L (06-24 @ 23:27)                                9.1    13.58 )-----------( 270      ( 29 Jun 2024 06:38 )             28.6     06-29    132<L>  |  103  |  12  ----------------------------<  169<H>  3.9   |  19<L>  |  1.11    Ca    8.6      29 Jun 2024 06:38  Phos  2.4     06-29  Mg     2.2     06-29      proBNP:     Lipid Profile:   HgA1c:     Creatinine: 1.11 mg/dL (06-29-24 @ 06:38)  Creatinine: 1.14 mg/dL (06-28-24 @ 22:38)  Creatinine: 1.20 mg/dL (06-28-24 @ 00:34)  Creatinine: 1.40 mg/dL (06-27-24 @ 16:51)  Creatinine: 1.37 mg/dL (06-27-24 @ 00:54)

## 2024-06-29 NOTE — CONSULT NOTE ADULT - PROBLEM SELECTOR RECOMMENDATION 9
- R acute on chronic SDH, L acute SDH, R parafalcine acute SDH with mass effect from the R with global atrophy     - S/p evacuation on 6/22    - c/w keppra 250bid, topiramate 100mg bid, vimpat 50mg bid
Now in SR  Check thyroid panel   if stable and any recurrent PAF on Tele, Start amiodarone gtt   cont with cardizem as ordered   Obviously unable to be on AC for now

## 2024-06-29 NOTE — PROGRESS NOTE ADULT - ASSESSMENT
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 No AC/AP, coags/plt wnl Now s/p R crani for SDH evacuation. Afib RVR overnight s/p Dilt IV, now in SR.      #Paroxysmal atrial fibrillation  -sp iv dilt with conversion to SR   -Continue with amiodarone per eps   -cont tele   -Continue po metoprolol per eps   -Echo nml lv fxn, no wma, mild MR   -remains off a/c at this time given acute SDH    #Acute subdural hematoma   -s/p R crani for SDH evacuation  -post op mgmt per neuro    #DM  -Mgmt per med    d/w family at bedside    35 minutes spent on total encounter; more than 50% of the visit was spent counseling and/or coordinating care by the attending physician.

## 2024-06-29 NOTE — CONSULT NOTE ADULT - PROBLEM SELECTOR RECOMMENDATION 2
Now s/p R crani for SDH evacuation  Orders per NSCU   CT head pending   aspiration precautions
- A1c 6.4   - Glargine 12 U qhs  - Lispro 4U premeal  - iss    HLD  - C/w statin

## 2024-06-29 NOTE — CONSULT NOTE ADULT - PROBLEM SELECTOR RECOMMENDATION 3
RISS
- s/p amio gtt on 6/26    - Continue with Amio 400mg bid (6/27-, transition to Amio 200 mg qd on 7/4, Metoprolol tartrate 50mg bid. Monitor on Tele.    - Pt cannot safely tolerate AC for now given recent NSGY procedure/ ADH, NSGY to clarify when can be initiated     - EP following d/w Dr. Gutierrez, [facility] d/c'd

## 2024-06-29 NOTE — PROVIDER CONTACT NOTE (OTHER) - ASSESSMENT
Pt has a 101.3 rectal temp.  PT remains neurologically unchanged and denies any pain or discomfort. VS assessed and WDL. Pt on antibiotics for PNE.
Pt went into Afib with HR in the 160s-170s. Pt has no pain or discomfort
Patient assessed at bedside. Pt reman neurologically unchanged. Patient remains hemodynamically stable, denying chest pain and shortness of breath.

## 2024-06-29 NOTE — PROGRESS NOTE ADULT - SUBJECTIVE AND OBJECTIVE BOX
[facility] INFECTIOUS DISEASE  Luis Fernando Thomson MD PhD, Luias Odom MD, Catina Leung MD, Kenton Falcon MD, Maxi Toscano MD  and providing coverage with Fabio Salinas MD  Providing Infectious Disease Consultations at [facility], [facility], [facility], [facility]    Office# [phone] to schedule follow up appointments  Answering Service for urgent calls or New Consults [phone]  Cell# to text for urgent issues Luis Fernando Thomson [phone]     infectious diseases progress note:    RODRIGUE CARROLL is a 79y y. o. Male patient    Overnight and events of the last 24hrs reviewed    Allergies    No Known Allergies    Intolerances        ANTIBIOTICS/RELEVANT:  antimicrobials  piperacillin/tazobactam IVPB.. 3.375 Gram(s) IV Intermittent every 8 hours    immunologic:    OTHER:  acetaminophen  Suppository .. 650 milliGRAM(s) Rectal once  aMIOdarone    Tablet 400 milliGRAM(s) Oral two times a day  atorvastatin 80 milliGRAM(s) Oral at bedtime  bisacodyl 5 milliGRAM(s) Oral daily PRN  dexamethasone/neomycin/polymyxin Suspension 1 Drop(s) Both EYES every 8 hours  dextrose 10% Bolus 125 milliLiter(s) IV Bolus once  dextrose 5%. 1000 milliLiter(s) IV Continuous <Continuous>  dextrose 5%. 1000 milliLiter(s) IV Continuous <Continuous>  dextrose 50% Injectable 25 Gram(s) IV Push once  dextrose 50% Injectable 12.5 Gram(s) IV Push once  dextrose Oral Gel 15 Gram(s) Oral once PRN  glucagon  Injectable 1 milliGRAM(s) IntraMuscular once  heparin   Injectable 5000 Unit(s) SubCutaneous every 12 hours  insulin glargine Injectable (LANTUS) 12 Unit(s) SubCutaneous at bedtime  insulin lispro (ADMELOG) corrective regimen sliding scale   SubCutaneous Before meals and at bedtime  insulin lispro Injectable (ADMELOG) 4 Unit(s) SubCutaneous three times a day before meals  lacosamide IVPB 50 milliGRAM(s) IV Intermittent every 12 hours  levETIRAcetam 250 milliGRAM(s) Oral two times a day  metoprolol tartrate 25 milliGRAM(s) Oral once  metoprolol tartrate 50 milliGRAM(s) Oral two times a day  ondansetron Injectable 4 milliGRAM(s) IV Push every 6 hours PRN  pantoprazole    Tablet 40 milliGRAM(s) Oral every 12 hours  pilocarpine 2% Solution 1 Drop(s) Right EYE two times a day  potassium phosphate / sodium phosphate Powder (PHOS-NaK) 1 Packet(s) Oral three times a day  topiramate 100 milliGRAM(s) Oral two times a day      Objective:  Vital Signs Last 24 Hrs  T(C): 38.5 (29 Jun 2024 17:02), Max: 38.5 (29 Jun 2024 17:02)  T(F): 101.3 (29 Jun 2024 17:02), Max: 101.3 (29 Jun 2024 17:02)  HR: 146 (29 Jun 2024 17:01) (71 - 147)  BP: 159/90 (29 Jun 2024 17:01) (133/92 - 159/90)  BP(mean): 95 (28 Jun 2024 23:00) (94 - 95)  RR: 18 (29 Jun 2024 17:01) (16 - 20)  SpO2: 95% (29 Jun 2024 17:01) (94% - 97%)    Parameters below as of 29 Jun 2024 12:20  Patient On (Oxygen Delivery Method): room air        T(C): 38.5 (06-29-24 @ 17:02), Max: 38.5 (06-29-24 @ 17:02)  T(C): 38.5 (06-29-24 @ 17:02), Max: 38.5 (06-27-24 @ 15:00)  T(C): 38.5 (06-29-24 @ 17:02), Max: 38.5 (06-27-24 @ 15:00)    PHYSICAL EXAM:  HEENT: NC atraumatic  Neck: supple  Respiratory: no accessory muscle use, breathing comfortably but noted crackles  Cardiovascular: distant  Gastrointestinal: normal appearing, nondistended  Extremities: no clubbing, no cyanosis,  Neuro: pt confused but pleasant      LABS:                          9.1    13.58 )-----------( 270      ( 29 Jun 2024 06:38 )             28.6       WBC  13.58 06-29 @ 06:38  14.35 06-28 @ 22:38  11.72 06-27 @ 16:51  10.74 06-27 @ 00:54  7.05 06-25 @ 23:35  13.38 06-24 @ 23:27  11.60 06-24 @ 00:12  9.45 06-22 @ 19:37      06-29    132<L>  |  103  |  12  ----------------------------<  169<H>  3.9   |  19<L>  |  1.11    Ca    8.6      29 Jun 2024 06:38  Phos  2.4     06-29  Mg     2.2     06-29        Creatinine: 1.11 mg/dL (06-29-24 @ 06:38)  Creatinine: 1.14 mg/dL (06-28-24 @ 22:38)  Creatinine: 1.20 mg/dL (06-28-24 @ 00:34)  Creatinine: 1.40 mg/dL (06-27-24 @ 16:51)  Creatinine: 1.37 mg/dL (06-27-24 @ 00:54)  Creatinine: 1.39 mg/dL (06-26-24 @ 02:19)  Creatinine: 1.59 mg/dL (06-25-24 @ 23:35)  Creatinine: 1.06 mg/dL (06-25-24 @ 08:52)  Creatinine: 0.98 mg/dL (06-24-24 @ 23:27)  Creatinine: 0.96 mg/dL (06-24-24 @ 00:12)  Creatinine: 1.09 mg/dL (06-23-24 @ 01:11)  Creatinine: 1.09 mg/dL (06-22-24 @ 19:37)        INFLAMMATORY MARKERS      MICROBIOLOGY:    Culture - Sputum . (06.28.24 @ 15:57)    Gram Stain:   Few polymorphonuclear leukocytes per low power field  Moderate Squamous epithelial cells per low power field  Moderate Gram Positive Cocci in Pairs and Chains per oil power field  Rare Gram Negative Coccobacilli per oil power field  Rare Gram Negative Rods per oil power field  Results consistent with oropharyngeal contamination   Specimen Source: .Sputum Sputum   Culture Results:   Normal Respiratory Marian present        RADIOLOGY & ADDITIONAL STUDIES:  < from: Xray Chest 1 View- PORTABLE-Urgent (Xray Chest 1 View- PORTABLE-Urgent .) (06.28.24 @ 01:25) >    ACC: [id number] EXAM:  XR CHEST PORTABLE URGENT 1V   ORDERED BY: PETRA TALLEY     PROCEDURE DATE:  06/28/2024          INTERPRETATION:  EXAMINATION: XR CHEST URGENT    CLINICAL INDICATION: febrile    TECHNIQUE: Single frontal portable view of the chest from 6/28/2024 1:25   AM    COMPARISON: Chest x-ray 6/27/2024.    FINDINGS:    The heart size is not accurately assessed on this projection.  Redemonstrated bibasilar opacities, right greater than left. Opacities at   the right cardiophrenic angle appear more prominent on this projection   compared to prior.  There is no pneumothorax or pleural effusion.    IMPRESSION:  Redemonstrated bibasilar opacities with slight increase on the right.    < end of copied text >

## 2024-06-29 NOTE — PROVIDER CONTACT NOTE (OTHER) - BACKGROUND
s/p R crani for SDH evacuation
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 S/P craniotomy for evacuation
pt went into afib and converted back by himself in angio case 6/25

## 2024-06-29 NOTE — PROGRESS NOTE ADULT - ASSESSMENT
79M with PMH of benign ependymoma, seizures, DM, HLD presenting with a SDH on CTH, now s/p evacuation on 6/22/24. EP consulted for new onset atrial flutter/fib. Patient is not on A/C due to SDH. He remains w/ paroxysms of AF/AFL to 150's w/ SR with atrial bigeminy.     1) AF/AFL, paroxysmal  2) Atrial bigeminy   3) SDH s/p evacuation 6/22/24     Plan:   -Continue Amiodarone 400 BIDx1 week (started 6/27) to maintain SR, so far has received 1600mg orally. Prior was on Amio gtt on 6/26. If patient remains in AF/AFL >48h, then stop Amiodarone. No role for DCCV given patient is paroxysmal. After 1 week, transition to 200mg qd. While on Amio will need monitoring of LFT's, TFT's and yearly opthalmologic f/u and PFT's. TSH wnl on this admission  -Neuro/NSGY to clarify when full dose A/C can be initiated  -Continue BB, d/c CCB  -Briefly introduced the idea of an ablation / Watchman however at this time not a candidate given patient cannot safely tolerate oral anticoagulation  -Continue tele monitoring   -Repletion of lytes for K>4 and Mg>2  79M with PMH of benign ependymoma, seizures, DM, HLD presenting with a SDH on CTH, now s/p evacuation on 6/22/24. EP consulted for new onset atrial flutter/fib. Patient is not on A/C due to SDH. He remains w/ paroxysms of AF/AFL to 150's w/ SR with atrial bigeminy.     1) AF/AFL, paroxysmal  2) Atrial bigeminy   3) SDH s/p evacuation 6/22/24     Plan:   -Continue Amiodarone 400 BIDx1 week (started 6/27) to maintain SR, so far has received 1600mg orally. Prior was on Amio gtt on 6/26. If patient remains in AF/AFL >48h, then stop Amiodarone. No role for DCCV given patient is paroxysmal and can not tolerate anticoagulation. After 1 week, transition to 200mg qd. While on Amio will need monitoring of LFT's, TFT's and yearly opthalmologic f/u and PFT's. TSH wnl on this admission  -Neuro/NSGY to clarify when full dose A/C can be initiated  -Continue BB, d/c CCB  -Briefly introduced the idea of an ablation / Watchman however at this time not a candidate given patient cannot safely tolerate oral anticoagulation  -Continue tele monitoring   -Repletion of lytes for K>4 and Mg>2

## 2024-06-29 NOTE — PROVIDER CONTACT NOTE (OTHER) - REASON
Afib. HR in 160s-170s
Pt rectal temp is 101.7
Patient continuing to sustain a-flutter w/ RVR rate ~160

## 2024-06-29 NOTE — CONSULT NOTE ADULT - SUBJECTIVE AND OBJECTIVE BOX
Chief Complaint:    HPI:  Karly Anderson   79M Hx SOC for tumor (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       PAST MEDICAL & SURGICAL HISTORY:  DM2 (diabetes mellitus, type 2)  HTN (hypertension)  Seizures  Brain tumor  Pulmonary embolism      Review of Systems:   ENMT:  No sinus or throat pain  NECK: No pain or stiffness  RESPIRATORY: No wheezing, chills or hemoptysis; No shortness of breath  CARDIOVASCULAR: No chest pain, palpitations  GASTROINTESTINAL: No abdominal or epigastric pain. No nausea, vomiting  NEUROLOGICAL: No numbness  PSYCHIATRIC: No depression, anxiety, mood swings, or difficulty sleeping  HEME/LYMPH: No easy bruising, or bleeding gums  ALLERY AND IMMUNOLOGIC: No hives or eczema    Allergies  No Known Allergies      Social History:     FAMILY HISTORY:      Home Medications:  Keppra 250 mg oral tablet: 1 tab(s) orally every 12 hours (22 Jun 2024 18:38)  metFORMIN 500 mg oral tablet: 1 tab(s) orally every 12 hours (22 Jun 2024 18:38)  rosuvastatin 40 mg oral capsule: 1 cap(s) orally once a day (22 Jun 2024 18:38)  Topamax 100 mg oral tablet: 1 tab(s) orally every 12 hours (22 Jun 2024 18:37)      MEDICATIONS  (STANDING):  aMIOdarone    Tablet 400 milliGRAM(s) Oral two times a day  atorvastatin 80 milliGRAM(s) Oral at bedtime  dexamethasone/neomycin/polymyxin Suspension 1 Drop(s) Both EYES every 8 hours  dextrose 10% Bolus 125 milliLiter(s) IV Bolus once  dextrose 5%. 1000 milliLiter(s) (100 mL/Hr) IV Continuous <Continuous>  dextrose 5%. 1000 milliLiter(s) (50 mL/Hr) IV Continuous <Continuous>  dextrose 50% Injectable 25 Gram(s) IV Push once  dextrose 50% Injectable 12.5 Gram(s) IV Push once  glucagon  Injectable 1 milliGRAM(s) IntraMuscular once  heparin   Injectable 5000 Unit(s) SubCutaneous every 12 hours  insulin glargine Injectable (LANTUS) 12 Unit(s) SubCutaneous at bedtime  insulin lispro (ADMELOG) corrective regimen sliding scale   SubCutaneous Before meals and at bedtime  insulin lispro Injectable (ADMELOG) 4 Unit(s) SubCutaneous three times a day before meals  lacosamide IVPB 50 milliGRAM(s) IV Intermittent every 12 hours  levETIRAcetam 250 milliGRAM(s) Oral two times a day  metoprolol tartrate 25 milliGRAM(s) Oral once  metoprolol tartrate 50 milliGRAM(s) Oral two times a day  pantoprazole    Tablet 40 milliGRAM(s) Oral every 12 hours  pilocarpine 2% Solution 1 Drop(s) Right EYE two times a day  piperacillin/tazobactam IVPB.. 3.375 Gram(s) IV Intermittent every 8 hours  potassium phosphate / sodium phosphate Powder (PHOS-NaK) 1 Packet(s) Oral three times a day  topiramate 100 milliGRAM(s) Oral two times a day    MEDICATIONS  (PRN):  acetaminophen     Tablet .. 650 milliGRAM(s) Oral every 6 hours PRN Temp greater or equal to 38C (100.4F), Mild Pain (1 - 3)  bisacodyl 5 milliGRAM(s) Oral daily PRN Constipation  dextrose Oral Gel 15 Gram(s) Oral once PRN Blood Glucose LESS THAN 70 milliGRAM(s)/deciliter  ondansetron Injectable 4 milliGRAM(s) IV Push every 6 hours PRN Nausea and/or Vomiting      CAPILLARY BLOOD GLUCOSE      POCT Blood Glucose.: 149 mg/dL (29 Jun 2024 11:14)  POCT Blood Glucose.: 140 mg/dL (29 Jun 2024 07:38)  POCT Blood Glucose.: 248 mg/dL (28 Jun 2024 21:15)  POCT Blood Glucose.: 263 mg/dL (28 Jun 2024 17:44)    I&O's Summary    28 Jun 2024 07:01  -  29 Jun 2024 07:00  --------------------------------------------------------  IN: 1475 mL / OUT: 3100 mL / NET: -1625 mL        PHYSICAL EXAM:  Vital Signs Last 24 Hrs  T(C): 36.9 (29 Jun 2024 12:20), Max: 38 (28 Jun 2024 23:00)  T(F): 98.4 (29 Jun 2024 12:20), Max: 100.4 (28 Jun 2024 23:00)  HR: 71 (29 Jun 2024 12:20) (71 - 147)  BP: 134/74 (29 Jun 2024 12:20) (133/92 - 150/84)  BP(mean): 95 (28 Jun 2024 23:00) (89 - 110)  RR: 18 (29 Jun 2024 12:20) (13 - 21)  SpO2: 95% (29 Jun 2024 12:20) (92% - 97%)    Parameters below as of 29 Jun 2024 12:20  Patient On (Oxygen Delivery Method): room air      GENERAL: NAD, some gurgling sounds from the throat noted  HEAD:  Atraumatic, Normocephalic  EYES: conjunctiva and sclera clear  NECK: Supple  CHEST/LUNG: Clear to auscultation bilaterally; No wheeze  HEART: Regular rate and rhythm  ABDOMEN: Soft, Nontender  EXTREMITIES:  2+ Peripheral Pulses, No edema  PSYCH: Alert  NEUROLOGY: moves all extremities  SKIN: No noted rashes    LABS:                        9.1    13.58 )-----------( 270      ( 29 Jun 2024 06:38 )             28.6     06-29    132<L>  |  103  |  12  ----------------------------<  169<H>  3.9   |  19<L>  |  1.11    Ca    8.6      29 Jun 2024 06:38  Phos  2.4     06-29  Mg     2.2     06-29            Urinalysis Basic - ( 29 Jun 2024 06:38 )    Color: x / Appearance: x / SG: x / pH: x  Gluc: 169 mg/dL / Ketone: x  / Bili: x / Urobili: x   Blood: x / Protein: x / Nitrite: x   Leuk Esterase: x / RBC: x / WBC x   Sq Epi: x / Non Sq Epi: x / Bacteria: x        RADIOLOGY & ADDITIONAL TESTS:    Imaging Personally Reviewed:    EKG Personally Reviewed:    Consultant(s) Notes Reviewed:      Care Discussed with Consultants/Other Providers:

## 2024-06-29 NOTE — PROGRESS NOTE ADULT - ASSESSMENT
Patient is a 79 year old male with PMH of SOC for benign ependymoma, seizures, DM, HLD, aspirin non compliant, presented to VS for AMS, gait difficulties and falls for a week.     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, L SDH, R parafalcince acute SDH with mass effect  S/p R SDH evacuation 6/22   Aspiration pneumonia   Fevers initially likely due to above improved, now febrile   RUE edema - suspect likely RUE thrombophlebitis; r/o DVT  Leukocytosis     CXR with bibasilar opacities with slight increase on the R  Has cough, no dyspnea or pain, no increased O2 requirements, on zosyn since 6/25 pm  RUE edema with cord like swelling in antecubital area with TTP  R sided incision healing with no swelling or sign of infection  abd benign, nontender  UA negative for pyuria    Recommendations:   Follow up UE and LE duplex   Follow Bcx - in process x2   Zosyn --> will escalate to Meropenem 6/29    Thank you for consulting us and involving us in the management of this most interesting and challenging case.  We will follow along in the care of this patient. Please call us at [phone] or text me directly on my cell# at [phone] with any concerns.

## 2024-06-29 NOTE — PROVIDER CONTACT NOTE (OTHER) - SITUATION
Pt went into Afib with HR in the 160s-170s
Pt has a 101.3 rectal temp.  PT remains neurologically unchanged and denies any pain or discomfort. VS assessed and WDL. Pt on antibiotics for PNE.
Patient continuing to sustain a-flutter w/ RVR rate ~160

## 2024-06-29 NOTE — PROGRESS NOTE ADULT - SUBJECTIVE AND OBJECTIVE BOX
24H hour events: atrial bigeminy with paroxysmal AF/AFL    MEDICATIONS:  aMIOdarone    Tablet 400 milliGRAM(s) Oral two times a day  heparin   Injectable 5000 Unit(s) SubCutaneous every 12 hours  metoprolol tartrate 25 milliGRAM(s) Oral once  metoprolol tartrate 50 milliGRAM(s) Oral two times a day    piperacillin/tazobactam IVPB.. 3.375 Gram(s) IV Intermittent every 8 hours      acetaminophen     Tablet .. 650 milliGRAM(s) Oral every 6 hours PRN  lacosamide IVPB 50 milliGRAM(s) IV Intermittent every 12 hours  levETIRAcetam 250 milliGRAM(s) Oral two times a day  ondansetron Injectable 4 milliGRAM(s) IV Push every 6 hours PRN  topiramate 100 milliGRAM(s) Oral two times a day    bisacodyl 5 milliGRAM(s) Oral daily PRN  pantoprazole    Tablet 40 milliGRAM(s) Oral every 12 hours    atorvastatin 80 milliGRAM(s) Oral at bedtime  dextrose 50% Injectable 25 Gram(s) IV Push once  dextrose 50% Injectable 12.5 Gram(s) IV Push once  dextrose Oral Gel 15 Gram(s) Oral once PRN  glucagon  Injectable 1 milliGRAM(s) IntraMuscular once  insulin glargine Injectable (LANTUS) 12 Unit(s) SubCutaneous at bedtime  insulin lispro (ADMELOG) corrective regimen sliding scale   SubCutaneous Before meals and at bedtime  insulin lispro Injectable (ADMELOG) 4 Unit(s) SubCutaneous three times a day before meals    dexamethasone/neomycin/polymyxin Suspension 1 Drop(s) Both EYES every 8 hours  dextrose 10% Bolus 125 milliLiter(s) IV Bolus once  dextrose 5%. 1000 milliLiter(s) IV Continuous <Continuous>  dextrose 5%. 1000 milliLiter(s) IV Continuous <Continuous>  pilocarpine 2% Solution 1 Drop(s) Right EYE two times a day  potassium phosphate / sodium phosphate Powder (PHOS-NaK) 1 Packet(s) Oral three times a day      REVIEW OF SYSTEMS:  See HPI, otherwise ROS negative.    PHYSICAL EXAM:  T(C): 36.9 (06-29-24 @ 12:20), Max: 38 (06-28-24 @ 23:00)  HR: 71 (06-29-24 @ 12:20) (71 - 147)  BP: 134/74 (06-29-24 @ 12:20) (133/92 - 150/84)  RR: 18 (06-29-24 @ 12:20) (13 - 21)  SpO2: 95% (06-29-24 @ 12:20) (92% - 97%)  Wt(kg): --  I&O's Summary    28 Jun 2024 07:01  -  29 Jun 2024 07:00  --------------------------------------------------------  IN: 1475 mL / OUT: 3100 mL / NET: -1625 mL        Appearance: Sleeping. NAD	  Cardiovascular: +S1S2 RRR no m/g/r  Respiratory: CTA B/L	  Psychiatry: A & O x 3, Mood & affect appropriate  Gastrointestinal:  Soft, NT. ND. +BS	  Skin: No rashes	masses or lesions  Neurologic: Non-focal  Extremities: No edema BLE  Vascular: Peripheral pulses palpable 2+ bilaterally      LABS:	 	    CBC Full  -  ( 29 Jun 2024 06:38 )  WBC Count : 13.58 K/uL  Hemoglobin : 9.1 g/dL  Hematocrit : 28.6 %  Platelet Count - Automated : 270 K/uL  Mean Cell Volume : 97.6 fl  Mean Cell Hemoglobin : 31.1 pg  Mean Cell Hemoglobin Concentration : 31.8 gm/dL  Auto Neutrophil # : x  Auto Lymphocyte # : x  Auto Monocyte # : x  Auto Eosinophil # : x  Auto Basophil # : x  Auto Neutrophil % : x  Auto Lymphocyte % : x  Auto Monocyte % : x  Auto Eosinophil % : x  Auto Basophil % : x    06-29    132<L>  |  103  |  12  ----------------------------<  169<H>  3.9   |  19<L>  |  1.11  06-28    134<L>  |  104  |  14  ----------------------------<  266<H>  3.7   |  18<L>  |  1.14    Ca    8.6      29 Jun 2024 06:38  Ca    8.5      28 Jun 2024 22:38  Phos  2.4     06-29  Phos  2.6     06-28  Mg     2.2     06-29  Mg     2.3     06-28    TSH: Thyroid Stimulating Hormone, Serum: 2.45 uIU/mL (06.25.24 @ 08:53)  TELEMETRY: currently AFL to 150's >> SR w/ atrial bigem 6/29 13:08 no conversion pause noted    24H hour events: atrial bigeminy with paroxysmal AF/AFL    MEDICATIONS:  aMIOdarone    Tablet 400 milliGRAM(s) Oral two times a day  heparin   Injectable 5000 Unit(s) SubCutaneous every 12 hours  metoprolol tartrate 25 milliGRAM(s) Oral once  metoprolol tartrate 50 milliGRAM(s) Oral two times a day  piperacillin/tazobactam IVPB.. 3.375 Gram(s) IV Intermittent every 8 hours  acetaminophen     Tablet .. 650 milliGRAM(s) Oral every 6 hours PRN  lacosamide IVPB 50 milliGRAM(s) IV Intermittent every 12 hours  levETIRAcetam 250 milliGRAM(s) Oral two times a day  ondansetron Injectable 4 milliGRAM(s) IV Push every 6 hours PRN  topiramate 100 milliGRAM(s) Oral two times a day  bisacodyl 5 milliGRAM(s) Oral daily PRN  pantoprazole    Tablet 40 milliGRAM(s) Oral every 12 hours  atorvastatin 80 milliGRAM(s) Oral at bedtime  insulin glargine Injectable (LANTUS) 12 Unit(s) SubCutaneous at bedtime  insulin lispro (ADMELOG) corrective regimen sliding scale   SubCutaneous Before meals and at bedtime  insulin lispro Injectable (ADMELOG) 4 Unit(s) SubCutaneous three times a day before meals  dexamethasone/neomycin/polymyxin Suspension 1 Drop(s) Both EYES every 8 hours  pilocarpine 2% Solution 1 Drop(s) Right EYE two times a day  potassium phosphate / sodium phosphate Powder (PHOS-NaK) 1 Packet(s) Oral three times a day    REVIEW OF SYSTEMS:  See HPI, otherwise ROS negative.    PHYSICAL EXAM:  T(C): 36.9 (06-29-24 @ 12:20), Max: 38 (06-28-24 @ 23:00)  HR: 71 (06-29-24 @ 12:20) (71 - 147)  BP: 134/74 (06-29-24 @ 12:20) (133/92 - 150/84)  RR: 18 (06-29-24 @ 12:20) (13 - 21)  SpO2: 95% (06-29-24 @ 12:20) (92% - 97%)  Wt(kg): --  I&O's Summary    28 Jun 2024 07:01  -  29 Jun 2024 07:00  --------------------------------------------------------  IN: 1475 mL / OUT: 3100 mL / NET: -1625 mL    Appearance: Sleeping. NAD, staples R scalp	  Cardiovascular: +S1S2 RRR no m/g/r  Respiratory: CTA B/L	  Gastrointestinal:  Soft, NT. ND. +BS	  Skin: No rashes	masses or lesions  Neurologic: Non-focal  Extremities: No edema BLE  Vascular: Peripheral pulses palpable 2+ bilaterally    LABS:	 	    CBC Full  -  ( 29 Jun 2024 06:38 )  WBC Count : 13.58 K/uL  Hemoglobin : 9.1 g/dL  Hematocrit : 28.6 %  Platelet Count - Automated : 270 K/uL  Mean Cell Volume : 97.6 fl  Mean Cell Hemoglobin : 31.1 pg  Mean Cell Hemoglobin Concentration : 31.8 gm/dL    06-29    132<L>  |  103  |  12  ----------------------------<  169<H>  3.9   |  19<L>  |  1.11  06-28    134<L>  |  104  |  14  ----------------------------<  266<H>  3.7   |  18<L>  |  1.14    Ca    8.6      29 Jun 2024 06:38  Ca    8.5      28 Jun 2024 22:38  Phos  2.4     06-29  Phos  2.6     06-28  Mg     2.2     06-29  Mg     2.3     06-28    TSH: Thyroid Stimulating Hormone, Serum: 2.45 uIU/mL (06.25.24 @ 08:53)  TELEMETRY: currently AFL to 150's >> SR w/ atrial bigem 6/29 13:08 no conversion pause noted

## 2024-06-29 NOTE — PROGRESS NOTE ADULT - SUBJECTIVE AND OBJECTIVE BOX
CC: patient in NAD, on tele, HRs up to 140s overnight, on lopressor and amio for afib/aflutter- EP following; no a/c due to SDHs    OVERNIGHT: [facility]    Vital Signs Last 24 Hrs  T(C): 38.5 (29 Jun 2024 17:02), Max: 38.5 (29 Jun 2024 17:02)  T(F): 101.3 (29 Jun 2024 17:02), Max: 101.3 (29 Jun 2024 17:02)  HR: 146 (29 Jun 2024 17:01) (71 - 147)  BP: 159/90 (29 Jun 2024 17:01) (133/92 - 159/90)  BP(mean): 95 (28 Jun 2024 23:00) (94 - 95)  RR: 18 (29 Jun 2024 17:01) (16 - 20)  SpO2: 95% (29 Jun 2024 17:01) (94% - 97%)    Parameters below as of 29 Jun 2024 12:20  Patient On (Oxygen Delivery Method): room air    Exam   neuro: Alert, awake, oriented to self, place and year with choices, PERRL, EOMI, gaze midline, L ptosis chronic, face symmetric   RUE 5/5  LUE pronator drift not touching bed  RLE flexors 4/5, extensors 5/5  LLE flexors 4/5, extensors 5/5  General:  calm  HEENT:  MMM  Cards:  RRR  Respiratory:  no respiratory distress  Adomen:  soft, distended   Extremities:  no edema  Skin:  warm/dry  Incision; + staples c/d/i    LABS:                                             9.1    13.58 )-----------( 270      ( 29 Jun 2024 06:38 )             28.6   06-29    132<L>  |  103  |  12  ----------------------------<  169<H>  3.9   |  19<L>  |  1.11    Ca    8.6      29 Jun 2024 06:38  Phos  2.4     06-29  Mg     2.2     06-29    blood cultures testing    MEDICATIONS  (STANDING):  aMIOdarone    Tablet 400 milliGRAM(s) Oral two times a day  atorvastatin 80 milliGRAM(s) Oral at bedtime  dexamethasone/neomycin/polymyxin Suspension 1 Drop(s) Both EYES every 8 hours  dextrose 10% Bolus 125 milliLiter(s) IV Bolus once  dextrose 5%. 1000 milliLiter(s) (100 mL/Hr) IV Continuous <Continuous>  dextrose 5%. 1000 milliLiter(s) (50 mL/Hr) IV Continuous <Continuous>  dextrose 50% Injectable 25 Gram(s) IV Push once  dextrose 50% Injectable 12.5 Gram(s) IV Push once  glucagon  Injectable 1 milliGRAM(s) IntraMuscular once  heparin   Injectable 5000 Unit(s) SubCutaneous every 12 hours  insulin glargine Injectable (LANTUS) 12 Unit(s) SubCutaneous at bedtime  insulin lispro (ADMELOG) corrective regimen sliding scale   SubCutaneous Before meals and at bedtime  insulin lispro Injectable (ADMELOG) 4 Unit(s) SubCutaneous three times a day before meals  lacosamide IVPB 50 milliGRAM(s) IV Intermittent every 12 hours  levETIRAcetam 250 milliGRAM(s) Oral two times a day  meropenem  IVPB 1000 milliGRAM(s) IV Intermittent every 8 hours  metoprolol tartrate 25 milliGRAM(s) Oral once  metoprolol tartrate 50 milliGRAM(s) Oral two times a day  pantoprazole    Tablet 40 milliGRAM(s) Oral every 12 hours  pilocarpine 2% Solution 1 Drop(s) Right EYE two times a day  potassium phosphate / sodium phosphate Powder (PHOS-NaK) 1 Packet(s) Oral three times a day  topiramate 100 milliGRAM(s) Oral two times a day    MEDICATIONS  (PRN):  bisacodyl 5 milliGRAM(s) Oral daily PRN Constipation  dextrose Oral Gel 15 Gram(s) Oral once PRN Blood Glucose LESS THAN 70 milliGRAM(s)/deciliter  ondansetron Injectable 4 milliGRAM(s) IV Push every 6 hours PRN Nausea and/or Vomiting    IMAGING:   < from: [facility] Duplex Upper Ext Vein Scan, Bilat (06.28.24 @ 12:18) >  IMPRESSION:  No evidence of deep venous thrombosis in either upper extremity.    There is superficial vein thrombosis of the right basilic vein at the   antecubital fossa and right cephalic vein to the mid upper arm level.    < end of copied text >      < from: Xray Chest 1 View- PORTABLE-Urgent (Xray Chest 1 View- PORTABLE-Urgent .) (06.28.24 @ 01:25) >  IMPRESSION:  Redemonstrated bibasilar opacities with slight increase on the right.      < end of copied text >

## 2024-06-29 NOTE — CONSULT NOTE ADULT - PROBLEM SELECTOR RECOMMENDATION 6
DVT ppx: Heparin SQ  Diet: Pureed, speech and swallow pending  D/w pt, wife and daughter (ER attending) at bedside    Will monitor stool output for constipation

## 2024-06-30 LAB
ALBUMIN SERPL ELPH-MCNC: 2.8 G/DL — LOW (ref 3.3–5)
ALP SERPL-CCNC: 133 U/L — HIGH (ref 40–120)
ALT FLD-CCNC: 34 U/L — SIGNIFICANT CHANGE UP (ref 10–45)
ANION GAP SERPL CALC-SCNC: 11 MMOL/L — SIGNIFICANT CHANGE UP (ref 5–17)
AST SERPL-CCNC: 19 U/L — SIGNIFICANT CHANGE UP (ref 10–40)
BASOPHILS # BLD AUTO: 0.08 K/UL — SIGNIFICANT CHANGE UP (ref 0–0.2)
BASOPHILS NFR BLD AUTO: 0.6 % — SIGNIFICANT CHANGE UP (ref 0–2)
BILIRUB SERPL-MCNC: 0.5 MG/DL — SIGNIFICANT CHANGE UP (ref 0.2–1.2)
BUN SERPL-MCNC: 14 MG/DL — SIGNIFICANT CHANGE UP (ref 7–23)
CALCIUM SERPL-MCNC: 8.5 MG/DL — SIGNIFICANT CHANGE UP (ref 8.4–10.5)
CHLORIDE SERPL-SCNC: 106 MMOL/L — SIGNIFICANT CHANGE UP (ref 96–108)
CO2 SERPL-SCNC: 20 MMOL/L — LOW (ref 22–31)
CREAT SERPL-MCNC: 1.11 MG/DL — SIGNIFICANT CHANGE UP (ref 0.5–1.3)
CULTURE RESULTS: ABNORMAL
EGFR: 68 ML/MIN/1.73M2 — SIGNIFICANT CHANGE UP
EOSINOPHIL # BLD AUTO: 0.37 K/UL — SIGNIFICANT CHANGE UP (ref 0–0.5)
EOSINOPHIL NFR BLD AUTO: 2.8 % — SIGNIFICANT CHANGE UP (ref 0–6)
GLUCOSE BLDC GLUCOMTR-MCNC: 155 MG/DL — HIGH (ref 70–99)
GLUCOSE BLDC GLUCOMTR-MCNC: 166 MG/DL — HIGH (ref 70–99)
GLUCOSE BLDC GLUCOMTR-MCNC: 166 MG/DL — HIGH (ref 70–99)
GLUCOSE BLDC GLUCOMTR-MCNC: 253 MG/DL — HIGH (ref 70–99)
GLUCOSE SERPL-MCNC: 153 MG/DL — HIGH (ref 70–99)
HCT VFR BLD CALC: 29.6 % — LOW (ref 39–50)
HGB BLD-MCNC: 9.2 G/DL — LOW (ref 13–17)
IMM GRANULOCYTES NFR BLD AUTO: 1.8 % — HIGH (ref 0–0.9)
LYMPHOCYTES # BLD AUTO: 1.84 K/UL — SIGNIFICANT CHANGE UP (ref 1–3.3)
LYMPHOCYTES # BLD AUTO: 14.1 % — SIGNIFICANT CHANGE UP (ref 13–44)
MCHC RBC-ENTMCNC: 31.1 GM/DL — LOW (ref 32–36)
MCHC RBC-ENTMCNC: 31.5 PG — SIGNIFICANT CHANGE UP (ref 27–34)
MCV RBC AUTO: 101.4 FL — HIGH (ref 80–100)
MONOCYTES # BLD AUTO: 1.06 K/UL — HIGH (ref 0–0.9)
MONOCYTES NFR BLD AUTO: 8.1 % — SIGNIFICANT CHANGE UP (ref 2–14)
NEUTROPHILS # BLD AUTO: 9.44 K/UL — HIGH (ref 1.8–7.4)
NEUTROPHILS NFR BLD AUTO: 72.6 % — SIGNIFICANT CHANGE UP (ref 43–77)
NRBC # BLD: 1 /100 WBCS — HIGH (ref 0–0)
PLATELET # BLD AUTO: 314 K/UL — SIGNIFICANT CHANGE UP (ref 150–400)
POTASSIUM SERPL-MCNC: 4.1 MMOL/L — SIGNIFICANT CHANGE UP (ref 3.5–5.3)
POTASSIUM SERPL-SCNC: 4.1 MMOL/L — SIGNIFICANT CHANGE UP (ref 3.5–5.3)
PROCALCITONIN SERPL-MCNC: 2.06 NG/ML — HIGH (ref 0.02–0.1)
PROT SERPL-MCNC: 6.7 G/DL — SIGNIFICANT CHANGE UP (ref 6–8.3)
RBC # BLD: 2.92 M/UL — LOW (ref 4.2–5.8)
RBC # FLD: 13 % — SIGNIFICANT CHANGE UP (ref 10.3–14.5)
SODIUM SERPL-SCNC: 137 MMOL/L — SIGNIFICANT CHANGE UP (ref 135–145)
SPECIMEN SOURCE: SIGNIFICANT CHANGE UP
WBC # BLD: 13.03 K/UL — HIGH (ref 3.8–10.5)
WBC # FLD AUTO: 13.03 K/UL — HIGH (ref 3.8–10.5)

## 2024-06-30 PROCEDURE — 99233 SBSQ HOSP IP/OBS HIGH 50: CPT

## 2024-06-30 PROCEDURE — 93970 EXTREMITY STUDY: CPT | Mod: 26

## 2024-06-30 PROCEDURE — 99232 SBSQ HOSP IP/OBS MODERATE 35: CPT

## 2024-06-30 RX ORDER — INSULIN LISPRO 100 [IU]/ML
5 INJECTION, SOLUTION SUBCUTANEOUS
Refills: 0 | Status: DISCONTINUED | OUTPATIENT
Start: 2024-06-30 | End: 2024-07-10

## 2024-06-30 RX ADMIN — INSULIN LISPRO 6: 100 INJECTION, SOLUTION SUBCUTANEOUS at 11:46

## 2024-06-30 RX ADMIN — LACOSAMIDE 110 MILLIGRAM(S): 100 TABLET, FILM COATED ORAL at 16:57

## 2024-06-30 RX ADMIN — Medication 1 DROP(S): at 16:59

## 2024-06-30 RX ADMIN — Medication 50 MILLIGRAM(S): at 06:38

## 2024-06-30 RX ADMIN — AMIODARONE HYDROCHLORIDE 400 MILLIGRAM(S): 50 INJECTION, SOLUTION INTRAVENOUS at 06:40

## 2024-06-30 RX ADMIN — TOPIRAMATE 100 MILLIGRAM(S): 50 TABLET, FILM COATED ORAL at 17:01

## 2024-06-30 RX ADMIN — Medication 1 DROP(S): at 06:39

## 2024-06-30 RX ADMIN — Medication 1 PACKET(S): at 17:01

## 2024-06-30 RX ADMIN — HEPARIN SODIUM 5000 UNIT(S): 50 INJECTION, SOLUTION INTRAVENOUS at 17:00

## 2024-06-30 RX ADMIN — LACOSAMIDE 110 MILLIGRAM(S): 100 TABLET, FILM COATED ORAL at 06:39

## 2024-06-30 RX ADMIN — Medication 50 MILLIGRAM(S): at 17:00

## 2024-06-30 RX ADMIN — LEVETIRACETAM 250 MILLIGRAM(S): 100 INJECTION INTRAVENOUS at 17:01

## 2024-06-30 RX ADMIN — ATORVASTATIN CALCIUM 80 MILLIGRAM(S): 20 TABLET, FILM COATED ORAL at 21:26

## 2024-06-30 RX ADMIN — TOPIRAMATE 100 MILLIGRAM(S): 50 TABLET, FILM COATED ORAL at 06:38

## 2024-06-30 RX ADMIN — INSULIN LISPRO 2: 100 INJECTION, SOLUTION SUBCUTANEOUS at 16:58

## 2024-06-30 RX ADMIN — INSULIN LISPRO 5 UNIT(S): 100 INJECTION, SOLUTION SUBCUTANEOUS at 16:35

## 2024-06-30 RX ADMIN — AMIODARONE HYDROCHLORIDE 400 MILLIGRAM(S): 50 INJECTION, SOLUTION INTRAVENOUS at 17:00

## 2024-06-30 RX ADMIN — INSULIN LISPRO 2: 100 INJECTION, SOLUTION SUBCUTANEOUS at 08:35

## 2024-06-30 RX ADMIN — LEVETIRACETAM 250 MILLIGRAM(S): 100 INJECTION INTRAVENOUS at 06:38

## 2024-06-30 RX ADMIN — Medication 1 DROP(S): at 06:41

## 2024-06-30 RX ADMIN — Medication 1 PACKET(S): at 06:41

## 2024-06-30 RX ADMIN — MEROPENEM 100 MILLIGRAM(S): 500 INJECTION, POWDER, FOR SOLUTION INTRAVENOUS at 21:26

## 2024-06-30 RX ADMIN — PANTOPRAZOLE SODIUM 40 MILLIGRAM(S): 40 INJECTION, POWDER, FOR SOLUTION INTRAVENOUS at 17:01

## 2024-06-30 RX ADMIN — Medication 1 PACKET(S): at 21:26

## 2024-06-30 RX ADMIN — PANTOPRAZOLE SODIUM 40 MILLIGRAM(S): 40 INJECTION, POWDER, FOR SOLUTION INTRAVENOUS at 06:38

## 2024-06-30 RX ADMIN — INSULIN GLARGINE 12 UNIT(S): 100 INJECTION, SOLUTION SUBCUTANEOUS at 21:26

## 2024-06-30 RX ADMIN — Medication 5 MILLIGRAM(S): at 17:04

## 2024-06-30 RX ADMIN — MEROPENEM 100 MILLIGRAM(S): 500 INJECTION, POWDER, FOR SOLUTION INTRAVENOUS at 16:57

## 2024-06-30 RX ADMIN — MEROPENEM 100 MILLIGRAM(S): 500 INJECTION, POWDER, FOR SOLUTION INTRAVENOUS at 06:40

## 2024-06-30 RX ADMIN — Medication 1 DROP(S): at 21:27

## 2024-06-30 RX ADMIN — INSULIN LISPRO 2: 100 INJECTION, SOLUTION SUBCUTANEOUS at 21:26

## 2024-06-30 RX ADMIN — HEPARIN SODIUM 5000 UNIT(S): 50 INJECTION, SOLUTION INTRAVENOUS at 06:39

## 2024-06-30 NOTE — PROGRESS NOTE ADULT - ASSESSMENT
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 No AC/AP, coags/plt wnl Now s/p R crani for SDH evacuation. Afib RVR overnight s/p Dilt IV, now in SR.      #Paroxysmal atrial fibrillation  -sp iv dilt with conversion to SR   -Continue with amiodarone per eps   -Continue po metoprolol per eps   -Echo nml lv fxn, no wma, mild MR   -remains off a/c at this time given acute SDH    #Acute subdural hematoma   -s/p R crani for SDH evacuation  -post op mgmt per neuro    #DM  -Mgmt per med        35 minutes spent on total encounter; more than 50% of the visit was spent counseling and/or coordinating care by the attending physician.

## 2024-06-30 NOTE — PROGRESS NOTE ADULT - SUBJECTIVE AND OBJECTIVE BOX
[facility] DIV[facility] of INFECTIOUS DISEASE  Luis Fernando Thomson MD PhD, Luisa Odom MD, Catina Leung MD, Kenton Falcon MD, Maxi Toscano MD  and providing coverage with Fabio Salinas MD  Providing Infectious Disease Consultations at [facility], [facility], [facility]'s    Office# [phone] to schedule follow up appointments  Answering Service for urgent calls or New Consults [phone]  Cell# to text for urgent issues Luis Fernando Thomson [phone]     infectious diseases progress note:    RODRIGUE CARROLL is a 79y y. o. Male patient    Overnight and events of the last 24hrs reviewed    Allergies    No Known Allergies    Intolerances        ANTIBIOTICS/RELEVANT:  antimicrobials  meropenem  IVPB 1000 milliGRAM(s) IV Intermittent every 8 hours    immunologic:    OTHER:  aMIOdarone    Tablet 400 milliGRAM(s) Oral two times a day  atorvastatin 80 milliGRAM(s) Oral at bedtime  bisacodyl 5 milliGRAM(s) Oral daily PRN  dexamethasone/neomycin/polymyxin Suspension 1 Drop(s) Both EYES every 8 hours  dextrose 10% Bolus 125 milliLiter(s) IV Bolus once  dextrose 5%. 1000 milliLiter(s) IV Continuous <Continuous>  dextrose 5%. 1000 milliLiter(s) IV Continuous <Continuous>  dextrose 50% Injectable 25 Gram(s) IV Push once  dextrose 50% Injectable 12.5 Gram(s) IV Push once  dextrose Oral Gel 15 Gram(s) Oral once PRN  glucagon  Injectable 1 milliGRAM(s) IntraMuscular once  heparin   Injectable 5000 Unit(s) SubCutaneous every 12 hours  insulin glargine Injectable (LANTUS) 12 Unit(s) SubCutaneous at bedtime  insulin lispro (ADMELOG) corrective regimen sliding scale   SubCutaneous Before meals and at bedtime  insulin lispro Injectable (ADMELOG) 4 Unit(s) SubCutaneous three times a day before meals  lacosamide IVPB 50 milliGRAM(s) IV Intermittent every 12 hours  levETIRAcetam 250 milliGRAM(s) Oral two times a day  metoprolol tartrate 50 milliGRAM(s) Oral two times a day  ondansetron Injectable 4 milliGRAM(s) IV Push every 6 hours PRN  pantoprazole    Tablet 40 milliGRAM(s) Oral every 12 hours  pilocarpine 2% Solution 1 Drop(s) Right EYE two times a day  potassium phosphate / sodium phosphate Powder (PHOS-NaK) 1 Packet(s) Oral three times a day  topiramate 100 milliGRAM(s) Oral two times a day      Objective:  Vital Signs Last 24 Hrs  T(C): 36.9 (30 Jun 2024 13:11), Max: 38.5 (29 Jun 2024 17:02)  T(F): 98.4 (30 Jun 2024 13:11), Max: 101.3 (29 Jun 2024 17:02)  HR: 60 (30 Jun 2024 13:11) (60 - 146)  BP: 148/63 (30 Jun 2024 13:11) (119/66 - 159/90)  BP(mean): --  RR: 20 (30 Jun 2024 13:11) (18 - 20)  SpO2: 95% (30 Jun 2024 13:11) (94% - 97%)    Parameters below as of 30 Jun 2024 13:11  Patient On (Oxygen Delivery Method): room air        T(C): 36.9 (06-30-24 @ 13:11), Max: 38.5 (06-29-24 @ 17:02)  T(C): 36.9 (06-30-24 @ 13:11), Max: 38.5 (06-29-24 @ 17:02)  T(C): 36.9 (06-30-24 @ 13:11), Max: 38.5 (06-27-24 @ 15:00)    PHYSICAL EXAM:  HEENT: NC atraumatic  Neck: supple  Respiratory: no accessory muscle use, breathing comfortably  Cardiovascular: distant  Gastrointestinal: normal appearing, nondistended  Extremities: no clubbing, no cyanosis,  Neuro: alert reading the paper    LABS:                          9.2    13.03 )-----------( 314      ( 30 Jun 2024 07:20 )             29.6       WBC  13.03 06-30 @ 07:20  13.58 06-29 @ 06:38  14.35 06-28 @ 22:38  11.72 06-27 @ 16:51  10.74 06-27 @ 00:54  7.05 06-25 @ 23:35  13.38 06-24 @ 23:27  11.60 06-24 @ 00:12      06-30    137  |  106  |  14  ----------------------------<  153<H>  4.1   |  20<L>  |  1.11    Ca    8.5      30 Jun 2024 07:25  Phos  2.4     06-29  Mg     2.2     06-29    TPro  6.7  /  Alb  2.8<L>  /  TBili  0.5  /  DBili  x   /  AST  19  /  ALT  34  /  AlkPhos  133<H>  06-30      Creatinine: 1.11 mg/dL (06-30-24 @ 07:25)  Creatinine: 1.11 mg/dL (06-29-24 @ 06:38)  Creatinine: 1.14 mg/dL (06-28-24 @ 22:38)  Creatinine: 1.20 mg/dL (06-28-24 @ 00:34)  Creatinine: 1.40 mg/dL (06-27-24 @ 16:51)  Creatinine: 1.37 mg/dL (06-27-24 @ 00:54)  Creatinine: 1.39 mg/dL (06-26-24 @ 02:19)  Creatinine: 1.59 mg/dL (06-25-24 @ 23:35)  Creatinine: 1.06 mg/dL (06-25-24 @ 08:52)  Creatinine: 0.98 mg/dL (06-24-24 @ 23:27)  Creatinine: 0.96 mg/dL (06-24-24 @ 00:12)        Urinalysis Basic - ( 30 Jun 2024 07:25 )    Color: x / Appearance: x / SG: x / pH: x  Gluc: 153 mg/dL / Ketone: x  / Bili: x / Urobili: x   Blood: x / Protein: x / Nitrite: x   Leuk Esterase: x / RBC: x / WBC x   Sq Epi: x / Non Sq Epi: x / Bacteria: x            INFLAMMATORY MARKERS      MICROBIOLOGY:              RADIOLOGY & ADDITIONAL STUDIES:

## 2024-06-30 NOTE — PROGRESS NOTE ADULT - ASSESSMENT
Mr Karly Anderson is a 79-year-old man with a benign ependymoma, seizure disorder, diabetes and hyperlipidemia He presented with confusion, difficulty ambulating and falls. He was found to have a subdural hematoma. He underwent an evacuation on 6/22/2024. The Electrophysiology service is following the patient due to paroxysmal atrial fibrillation and paroxysmal atrial flutter.     1) AF/AFL, paroxysmal  2) Atrial bigeminy   3) SDH s/p evacuation 6/22/24     Plan:   -Continue with Amiodarone 400 bid x1 week (started 6/27) to maintain SR. Prior was on Amio gtt on 6/26. If patient remains in AF/AFL >48h, then stop Amiodarone. No role for DCCV given patient is paroxysmal and can not tolerate anticoagulation. After 1 week, transition to 200mg qd. While on Amio will need monitoring of LFT's, TFT's and yearly opthalmologic f/u and PFT's. TSH wnl on this admission  -Neuro/NSGY to clarify when full dose A/C can be initiated  -Continue BB  -Briefly introduced the idea of a future ablation / Watchman however at this time not a candidate given patient cannot safely tolerate oral anticoagulation  -Continue Tele monitoring, EP will follow    BERNARDINO Gutierrez

## 2024-06-30 NOTE — PROGRESS NOTE ADULT - ASSESSMENT
79M Hx SOC for tumor (per daughter who is ED attending was benign ependymoma), seizures, DM, HLD presenting with frequent falls, found to have a SDH on CTH, now s/p evacuation on 6/22. Course c/b RUE thrombophlebitis as well as fever, possibly 2/2 aspiration PNA and AFib/ Aflutter.    6/29: Pt on amio and metoprolol. C/w Amio per EP.  Fever ON zosyn broadened to merrem  6/30: Gurgling noted on exam with asp pna. S&S rec MBS and keep diet as is.

## 2024-06-30 NOTE — PROGRESS NOTE ADULT - SUBJECTIVE AND OBJECTIVE BOX
BERNARDINO Gutierrez MD  EP Attending    Patient seen and examined at bedside on [address]    Overnight Events: No events    Review Of Systems: No chest pain, shortness of breath, or palpitations. The patient reports he is not walking.        Current Meds:  aMIOdarone    Tablet 400 milliGRAM(s) Oral two times a day  atorvastatin 80 milliGRAM(s) Oral at bedtime  bisacodyl 5 milliGRAM(s) Oral daily PRN  dexamethasone/neomycin/polymyxin Suspension 1 Drop(s) Both EYES every 8 hours  heparin   Injectable 5000 Unit(s) SubCutaneous every 12 hours  insulin glargine Injectable (LANTUS) 12 Unit(s) SubCutaneous at bedtime  insulin lispro (ADMELOG) corrective regimen sliding scale   SubCutaneous Before meals and at bedtime  insulin lispro Injectable (ADMELOG) 4 Unit(s) SubCutaneous three times a day before meals  lacosamide IVPB 50 milliGRAM(s) IV Intermittent every 12 hours  levETIRAcetam 250 milliGRAM(s) Oral two times a day  meropenem  IVPB 1000 milliGRAM(s) IV Intermittent every 8 hours  metoprolol tartrate 50 milliGRAM(s) Oral two times a day  ondansetron Injectable 4 milliGRAM(s) IV Push every 6 hours PRN  pantoprazole    Tablet 40 milliGRAM(s) Oral every 12 hours  pilocarpine 2% Solution 1 Drop(s) Right EYE two times a day  potassium phosphate / sodium phosphate Powder (PHOS-NaK) 1 Packet(s) Oral three times a day  topiramate 100 milliGRAM(s) Oral two times a day    Vitals:  T(F): 98.4 (06-30), Max: 101.3 (06-29)  HR: 60 (06-30) (60 - 146)  BP: 119/66 (06-30) (119/66 - 159/90)  RR: 18 (06-30)  SpO2: 95% (06-30) on RA      I&O's Summary    29 Jun 2024 07:01  -  30 Jun 2024 07:00  --------------------------------------------------------  IN: 0 mL / OUT: 1500 mL / NET: -1500 mL - not accurate as the patient is eating    Physical Exam:  Appearance: No acute distress; well appearing, staples on R scalp  Eyes: PERRL, EOMI, pink conjunctiva  HEENT: Normal oral mucosa  Cardiovascular: RRR, S1, S2, no murmurs, rubs, or gallops; no edema; no JVD  Respiratory: Poor inspiratory effort, decreased air entry at b/l bases  Gastrointestinal: soft, non-tender, non-distended with normal bowel sounds  Musculoskeletal: No clubbing; no joint deformity   Lymphatic: No lymphadenopathy  Psychiatry: AAOx3, mood & affect appropriate  Skin: No rashes, ecchymoses, or cyanosis                          9.2    13.03 )-----------( 314      ( 30 Jun 2024 07:20 )             29.6     06-30    137  |  106  |  14  ----------------------------<  153<H>  4.1   |  20<L>  |  1.11    Ca    8.5      30 Jun 2024 07:25  Phos  2.4     06-29  Mg     2.2     06-29    TPro  6.7  /  Alb  2.8<L>  /  TBili  0.5  /  DBili  x   /  AST  19  /  ALT  34  /  AlkPhos  133<H>  06-30    CARDIAC MARKERS ( 27 Jun 2024 16:51 )  41 ng/L / x     / x     / x     / x     / x      CARDIAC MARKERS ( 27 Jun 2024 00:54 )  37 ng/L / x     / x     / x     / x     / 3.4 ng/mL  CARDIAC MARKERS ( 26 Jun 2024 02:19 )  42 ng/L / x     / x     / x     / x     / x      CARDIAC MARKERS ( 24 Jun 2024 23:27 )  46 ng/L / x     / x     / x     / x     / x        TTE from 6/27/2024: Endocardium is not well seen. In the setting of atrial fibrillation, there is mild regional hypokinesis seen at the inferior and inderolateral basal to mid segments, EF: 55-60%, RV not well visualized, normal RV size and probably normal RVSF, no pericardial effusion seen, no valvular pathology, no comment on LA (normal LA from 6/24/2024 TTE)    Interpretation of Telemetry: Still with pAF and pAFl, otherwise sinus 60s-70s

## 2024-06-30 NOTE — PROGRESS NOTE ADULT - SUBJECTIVE AND OBJECTIVE BOX
CC: patient in NAD, on tele, HRs up to 140s overnight, on lopressor and amio for afib/aflutter- EP following; no a/c due to SDHs; more alert and interactive today    Vital Signs Last 24 Hrs  T(C): 36.9 (30 Jun 2024 13:11), Max: 37.6 (29 Jun 2024 21:14)  T(F): 98.4 (30 Jun 2024 13:11), Max: 99.6 (29 Jun 2024 21:14)  HR: 60 (30 Jun 2024 13:11) (60 - 84)  BP: 148/63 (30 Jun 2024 13:11) (119/66 - 148/63)  BP(mean): --  RR: 20 (30 Jun 2024 13:11) (18 - 20)  SpO2: 95% (30 Jun 2024 13:11) (94% - 97%)    Parameters below as of 30 Jun 2024 13:11  Patient On (Oxygen Delivery Method): room air    Exam   neuro: Alert, awake, oriented to self, place and year with choices, PERRL, EOMI, gaze midline, L ptosis chronic, face symmetric   RUE 5/5  LUE pronator drift not touching bed  RLE flexors 4/5, extensors 5/5  LLE flexors 4/5, extensors 5/5  General:  calm  HEENT:  MMM  Cards:  RRR  Respiratory:  no respiratory distress  Adomen:  soft, distended   Extremities:  no edema  Skin:  warm/dry  Incision; + staples c/d/i    LABS:                                       9.2    13.03 )-----------( 314      ( 30 Jun 2024 07:20 )             29.6     06-30    137  |  106  |  14  ----------------------------<  153<H>  4.1   |  20<L>  |  1.11    Ca    8.5      30 Jun 2024 07:25  Phos  2.4     06-29  Mg     2.2     06-29    TPro  6.7  /  Alb  2.8<L>  /  TBili  0.5  /  DBili  x   /  AST  19  /  ALT  34  /  AlkPhos  133<H>  06-30    blood cultures testing     MEDICATIONS  (STANDING):  aMIOdarone    Tablet 400 milliGRAM(s) Oral two times a day  atorvastatin 80 milliGRAM(s) Oral at bedtime  dexamethasone/neomycin/polymyxin Suspension 1 Drop(s) Both EYES every 8 hours  dextrose 10% Bolus 125 milliLiter(s) IV Bolus once  dextrose 5%. 1000 milliLiter(s) (100 mL/Hr) IV Continuous <Continuous>  dextrose 5%. 1000 milliLiter(s) (50 mL/Hr) IV Continuous <Continuous>  dextrose 50% Injectable 12.5 Gram(s) IV Push once  dextrose 50% Injectable 25 Gram(s) IV Push once  glucagon  Injectable 1 milliGRAM(s) IntraMuscular once  heparin   Injectable 5000 Unit(s) SubCutaneous every 12 hours  insulin glargine Injectable (LANTUS) 12 Unit(s) SubCutaneous at bedtime  insulin lispro (ADMELOG) corrective regimen sliding scale   SubCutaneous Before meals and at bedtime  insulin lispro Injectable (ADMELOG) 5 Unit(s) SubCutaneous three times a day before meals  lacosamide IVPB 50 milliGRAM(s) IV Intermittent every 12 hours  levETIRAcetam 250 milliGRAM(s) Oral two times a day  meropenem  IVPB 1000 milliGRAM(s) IV Intermittent every 8 hours  metoprolol tartrate 50 milliGRAM(s) Oral two times a day  pantoprazole    Tablet 40 milliGRAM(s) Oral every 12 hours  pilocarpine 2% Solution 1 Drop(s) Right EYE two times a day  potassium phosphate / sodium phosphate Powder (PHOS-NaK) 1 Packet(s) Oral three times a day  topiramate 100 milliGRAM(s) Oral two times a day    MEDICATIONS  (PRN):  bisacodyl 5 milliGRAM(s) Oral daily PRN Constipation  dextrose Oral Gel 15 Gram(s) Oral once PRN Blood Glucose LESS THAN 70 milliGRAM(s)/deciliter  ondansetron Injectable 4 milliGRAM(s) IV Push every 6 hours PRN Nausea and/or Vomiting  IMAGING:   < from: VA Duplex Upper Ext Vein Scan, Bilat (06.28.24 @ 12:18) >  IMPRESSION:  No evidence of deep venous thrombosis in either upper extremity.    There is superficial vein thrombosis of the right basilic vein at the   antecubital fossa and right cephalic vein to the mid upper arm level.    < end of copied text >      < from: Xray Chest 1 View- PORTABLE-Urgent (Xray Chest 1 View- PORTABLE-Urgent .) (06.28.24 @ 01:25) >  IMPRESSION:  Redemonstrated bibasilar opacities with slight increase on the right.      < end of copied text >   CC: patient in NAD, on tele, HRs up to 140s overnight, on lopressor and amio for afib/aflutter- EP following; no a/c due to SDHs; more alert and interactive today    Vital Signs Last 24 Hrs  T(C): 36.9 (30 Jun 2024 13:11), Max: 37.6 (29 Jun 2024 21:14)  T(F): 98.4 (30 Jun 2024 13:11), Max: 99.6 (29 Jun 2024 21:14)  HR: 60 (30 Jun 2024 13:11) (60 - 84)  BP: 148/63 (30 Jun 2024 13:11) (119/66 - 148/63)  BP(mean): --  RR: 20 (30 Jun 2024 13:11) (18 - 20)  SpO2: 95% (30 Jun 2024 13:11) (94% - 97%)    Parameters below as of 30 Jun 2024 13:11  Patient On (Oxygen Delivery Method): room air    Exam   neuro: Alert, awake, oriented to self, place and year, PERRL, EOMI, gaze midline, L ptosis chronic, face symmetric   RUE 5/5  LUE pronator drift not touching bed  RLE flexors 4/5, extensors 5/5  LLE flexors 4/5, extensors 5/5  General:  calm  HEENT:  MMM  Cards:  RRR  Respiratory:  no respiratory distress  Adomen:  soft, distended   Extremities:  no edema  Skin:  warm/dry  Incision; + staples c/d/i    LABS:                                       9.2    13.03 )-----------( 314      ( 30 Jun 2024 07:20 )             29.6     06-30    137  |  106  |  14  ----------------------------<  153<H>  4.1   |  20<L>  |  1.11    Ca    8.5      30 Jun 2024 07:25  Phos  2.4     06-29  Mg     2.2     06-29    TPro  6.7  /  Alb  2.8<L>  /  TBili  0.5  /  DBili  x   /  AST  19  /  ALT  34  /  AlkPhos  133<H>  06-30    blood cultures testing     MEDICATIONS  (STANDING):  aMIOdarone    Tablet 400 milliGRAM(s) Oral two times a day  atorvastatin 80 milliGRAM(s) Oral at bedtime  dexamethasone/neomycin/polymyxin Suspension 1 Drop(s) Both EYES every 8 hours  dextrose 10% Bolus 125 milliLiter(s) IV Bolus once  dextrose 5%. 1000 milliLiter(s) (100 mL/Hr) IV Continuous <Continuous>  dextrose 5%. 1000 milliLiter(s) (50 mL/Hr) IV Continuous <Continuous>  dextrose 50% Injectable 12.5 Gram(s) IV Push once  dextrose 50% Injectable 25 Gram(s) IV Push once  glucagon  Injectable 1 milliGRAM(s) IntraMuscular once  heparin   Injectable 5000 Unit(s) SubCutaneous every 12 hours  insulin glargine Injectable (LANTUS) 12 Unit(s) SubCutaneous at bedtime  insulin lispro (ADMELOG) corrective regimen sliding scale   SubCutaneous Before meals and at bedtime  insulin lispro Injectable (ADMELOG) 5 Unit(s) SubCutaneous three times a day before meals  lacosamide IVPB 50 milliGRAM(s) IV Intermittent every 12 hours  levETIRAcetam 250 milliGRAM(s) Oral two times a day  meropenem  IVPB 1000 milliGRAM(s) IV Intermittent every 8 hours  metoprolol tartrate 50 milliGRAM(s) Oral two times a day  pantoprazole    Tablet 40 milliGRAM(s) Oral every 12 hours  pilocarpine 2% Solution 1 Drop(s) Right EYE two times a day  potassium phosphate / sodium phosphate Powder (PHOS-NaK) 1 Packet(s) Oral three times a day  topiramate 100 milliGRAM(s) Oral two times a day    MEDICATIONS  (PRN):  bisacodyl 5 milliGRAM(s) Oral daily PRN Constipation  dextrose Oral Gel 15 Gram(s) Oral once PRN Blood Glucose LESS THAN 70 milliGRAM(s)/deciliter  ondansetron Injectable 4 milliGRAM(s) IV Push every 6 hours PRN Nausea and/or Vomiting  IMAGING:   < from: VA Duplex Upper Ext Vein Scan, Bilat (06.28.24 @ 12:18) >  IMPRESSION:  No evidence of deep venous thrombosis in either upper extremity.    There is superficial vein thrombosis of the right basilic vein at the   antecubital fossa and right cephalic vein to the mid upper arm level.    < end of copied text >      < from: Xray Chest 1 View- PORTABLE-Urgent (Xray Chest 1 View- PORTABLE-Urgent .) (06.28.24 @ 01:25) >  IMPRESSION:  Redemonstrated bibasilar opacities with slight increase on the right.      < end of copied text >

## 2024-06-30 NOTE — PROGRESS NOTE ADULT - SUBJECTIVE AND OBJECTIVE BOX
CARDIOLOGY FOLLOW UP NOTE - DR. MAZA    Patient Name: RODRIGUE CARROLL    Date of Service: 06-30-24 @ 13:42    no new events      Subjective:    cv: denies chest pain, dyspnea, palpitations, dizziness  pulmonary: denies cough  GI: denies abdominal pain, nausea, vomiting  vascular/legs: no edema   skin: no rash  ROS: otherwise negative   overnight events:      PHYSICAL EXAM:  T(C): 36.9 (06-30-24 @ 13:11), Max: 38.5 (06-29-24 @ 17:02)  HR: 60 (06-30-24 @ 13:11) (60 - 146)  BP: 148/63 (06-30-24 @ 13:11) (119/66 - 159/90)  RR: 20 (06-30-24 @ 13:11) (18 - 20)  SpO2: 95% (06-30-24 @ 13:11) (94% - 97%)  Wt(kg): --  I&O's Summary    29 Jun 2024 07:01  -  30 Jun 2024 07:00  --------------------------------------------------------  IN: 0 mL / OUT: 1500 mL / NET: -1500 mL      Daily     Daily     Appearance: Normal	  Cardiovascular: Normal S1 S2,RRR, No JVD, No murmurs  Respiratory: Lungs clear to auscultation	  Gastrointestinal:  Soft, Non-tender, + BS	  Extremities: Normal range of motion, No clubbing, cyanosis or edema      Home Medications:  Keppra 250 mg oral tablet: 1 tab(s) orally every 12 hours (22 Jun 2024 18:38)  metFORMIN 500 mg oral tablet: 1 tab(s) orally every 12 hours (22 Jun 2024 18:38)  rosuvastatin 40 mg oral capsule: 1 cap(s) orally once a day (22 Jun 2024 18:38)  Topamax 100 mg oral tablet: 1 tab(s) orally every 12 hours (22 Jun 2024 18:37)      MEDICATIONS  (STANDING):  aMIOdarone    Tablet 400 milliGRAM(s) Oral two times a day  atorvastatin 80 milliGRAM(s) Oral at bedtime  dexamethasone/neomycin/polymyxin Suspension 1 Drop(s) Both EYES every 8 hours  dextrose 10% Bolus 125 milliLiter(s) IV Bolus once  dextrose 5%. 1000 milliLiter(s) (100 mL/Hr) IV Continuous <Continuous>  dextrose 5%. 1000 milliLiter(s) (50 mL/Hr) IV Continuous <Continuous>  dextrose 50% Injectable 25 Gram(s) IV Push once  dextrose 50% Injectable 12.5 Gram(s) IV Push once  glucagon  Injectable 1 milliGRAM(s) IntraMuscular once  heparin   Injectable 5000 Unit(s) SubCutaneous every 12 hours  insulin glargine Injectable (LANTUS) 12 Unit(s) SubCutaneous at bedtime  insulin lispro (ADMELOG) corrective regimen sliding scale   SubCutaneous Before meals and at bedtime  insulin lispro Injectable (ADMELOG) 4 Unit(s) SubCutaneous three times a day before meals  lacosamide IVPB 50 milliGRAM(s) IV Intermittent every 12 hours  levETIRAcetam 250 milliGRAM(s) Oral two times a day  meropenem  IVPB 1000 milliGRAM(s) IV Intermittent every 8 hours  metoprolol tartrate 50 milliGRAM(s) Oral two times a day  pantoprazole    Tablet 40 milliGRAM(s) Oral every 12 hours  pilocarpine 2% Solution 1 Drop(s) Right EYE two times a day  potassium phosphate / sodium phosphate Powder (PHOS-NaK) 1 Packet(s) Oral three times a day  topiramate 100 milliGRAM(s) Oral two times a day      TELEMETRY: 	    ECG:  	  RADIOLOGY:   DIAGNOSTIC TESTING:  [ ] Echocardiogram:  [ ] Catheterization:  [ ] Stress Test:    OTHER: 	    LABS:	 	    CARDIAC MARKERS:        Troponin T, High Sensitivity Result: 41 ng/L (06-27 @ 16:51)  Troponin T, High Sensitivity Result: 37 ng/L (06-27 @ 00:54)  Troponin T, High Sensitivity Result: 42 ng/L (06-26 @ 02:19)                                9.2    13.03 )-----------( 314      ( 30 Jun 2024 07:20 )             29.6     06-30    137  |  106  |  14  ----------------------------<  153<H>  4.1   |  20<L>  |  1.11    Ca    8.5      30 Jun 2024 07:25  Phos  2.4     06-29  Mg     2.2     06-29    TPro  6.7  /  Alb  2.8<L>  /  TBili  0.5  /  DBili  x   /  AST  19  /  ALT  34  /  AlkPhos  133<H>  06-30    proBNP:     Lipid Profile:   HgA1c:     Creatinine: 1.11 mg/dL (06-30-24 @ 07:25)  Creatinine: 1.11 mg/dL (06-29-24 @ 06:38)  Creatinine: 1.14 mg/dL (06-28-24 @ 22:38)  Creatinine: 1.20 mg/dL (06-28-24 @ 00:34)  Creatinine: 1.40 mg/dL (06-27-24 @ 16:51)

## 2024-06-30 NOTE — PROGRESS NOTE ADULT - ASSESSMENT
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     Hospital course:  6/22 R crani SDH evacuation   6/24 R MMAE  6/28 tx to floor from NSCU, afib/aflutter- HRs up to 140s, on amio for now, lopressor increased, EP saw  6/29 cards wise stable, EP following cont amio/lopressor 50bid; now on puree diet/thick fluids, spiked, ID changed zosyn to meropenem    PLAN-     NEURO  # R acute on chronic SDH, L acute SDH, R parafalcine acute SDH with mass effect from the R with global atrophy   s/p right crani and MMAE for SDH   #seizures  keppra 250bid   topiramate 100mg bid  vimpat 50mg bid  #Pain: Tylenol and oxycodone  #Activity: [] OOB as tolerated [] Bedrest [x] PT [x] OT [] PMNR    CV   #tachycardia, afib/flutter, 4 to 1 flutter, with pauses, diltiazem stopped per EP, lopressor 50mg BID started; cont amio 400mg BID  cont tele monitoring  #-160mmHg  #TTE 67%  lasix PRN    PULM  RA sat 95%  CTA chest no PE   #pulm infiltrates on CXR, fu sputum cultures, on zosyn this am, ID switched to meropenem after spiking this afternoon  diet downgraded with low threshold for NPO; speech and swallow reordered - MBS tomorrow    Renal  #Strict I+Os   #IVL  #MIKAELA now resolved, contrast induced- will trend  #hyponatremia improved- will trend    GI   #Diet: CCD, now puree with thick liquids due to coughing/gurgling and repeat speech/swallow; MBS tomorrow   #Senna miralax  rectal tube removed  #distended abdomen: kub enlarged bowels     ID  #fever, likley aspiration pneumonia   zosyn changed to meropenem per ID, rpt CXR ordered, f/u all cultures just sent in last 24hrs- NGTD  MRSA swab negative     Endo  Goal euglycemia (-180)  #A1C 6.4   Basal/Bolus insulins, adjust as needed    Heme  #DVT ppx: SQH and SCDs  LED 6/22 no dvt  #UE swelling: no DVT however superficial vein thrombosis, will elevate and monitor  LE Duplex today with prelim bilateral calf DVTs- will cont DVT prophylaxis and consult vascular cardiology in am, given SDH remains off a/c    DISPO: BRENDON upon d/c  wife and son at bedside, questions answered, they verbalized understanding  appreciate EP and hospitalist and cardiology and ID consults  f/u am labs    will discuss with Dr Goodman  [phone]

## 2024-06-30 NOTE — PROGRESS NOTE ADULT - SUBJECTIVE AND OBJECTIVE BOX
[facility] Division of Hospital Medicine  Christine Campos MD  Available via MS Teams      SUBJECTIVE / OVERNIGHT EVENTS: Overnight pt has fever TMax of 101.3. He denies dysuria. Has cough persistent for the past few days but unable to produce sputum    ADDITIONAL REVIEW OF SYSTEMS: ROS reviewed and found to be negative    MEDICATIONS  (STANDING):  aMIOdarone    Tablet 400 milliGRAM(s) Oral two times a day  atorvastatin 80 milliGRAM(s) Oral at bedtime  dexamethasone/neomycin/polymyxin Suspension 1 Drop(s) Both EYES every 8 hours  dextrose 10% Bolus 125 milliLiter(s) IV Bolus once  dextrose 5%. 1000 milliLiter(s) (100 mL/Hr) IV Continuous <Continuous>  dextrose 5%. 1000 milliLiter(s) (50 mL/Hr) IV Continuous <Continuous>  dextrose 50% Injectable 25 Gram(s) IV Push once  dextrose 50% Injectable 12.5 Gram(s) IV Push once  glucagon  Injectable 1 milliGRAM(s) IntraMuscular once  heparin   Injectable 5000 Unit(s) SubCutaneous every 12 hours  insulin glargine Injectable (LANTUS) 12 Unit(s) SubCutaneous at bedtime  insulin lispro (ADMELOG) corrective regimen sliding scale   SubCutaneous Before meals and at bedtime  insulin lispro Injectable (ADMELOG) 4 Unit(s) SubCutaneous three times a day before meals  lacosamide IVPB 50 milliGRAM(s) IV Intermittent every 12 hours  levETIRAcetam 250 milliGRAM(s) Oral two times a day  meropenem  IVPB 1000 milliGRAM(s) IV Intermittent every 8 hours  metoprolol tartrate 50 milliGRAM(s) Oral two times a day  pantoprazole    Tablet 40 milliGRAM(s) Oral every 12 hours  pilocarpine 2% Solution 1 Drop(s) Right EYE two times a day  potassium phosphate / sodium phosphate Powder (PHOS-NaK) 1 Packet(s) Oral three times a day  topiramate 100 milliGRAM(s) Oral two times a day    MEDICATIONS  (PRN):  bisacodyl 5 milliGRAM(s) Oral daily PRN Constipation  dextrose Oral Gel 15 Gram(s) Oral once PRN Blood Glucose LESS THAN 70 milliGRAM(s)/deciliter  ondansetron Injectable 4 milliGRAM(s) IV Push every 6 hours PRN Nausea and/or Vomiting      I&O's Summary    29 Jun 2024 07:01  -  30 Jun 2024 07:00  --------------------------------------------------------  IN: 0 mL / OUT: 1500 mL / NET: -1500 mL        PHYSICAL EXAM:  Vital Signs Last 24 Hrs  T(C): 36.9 (30 Jun 2024 13:11), Max: 38.5 (29 Jun 2024 17:02)  T(F): 98.4 (30 Jun 2024 13:11), Max: 101.3 (29 Jun 2024 17:02)  HR: 60 (30 Jun 2024 13:11) (60 - 146)  BP: 148/63 (30 Jun 2024 13:11) (119/66 - 159/90)  BP(mean): --  RR: 20 (30 Jun 2024 13:11) (18 - 20)  SpO2: 95% (30 Jun 2024 13:11) (94% - 97%)    Parameters below as of 30 Jun 2024 13:11  Patient On (Oxygen Delivery Method): room air      GENERAL: NAD, some gurgling sounds from the throat noted  HEAD:  Atraumatic, Normocephalic  EYES: conjunctiva and sclera clear  NECK: Supple  CHEST/LUNG: Clear to auscultation bilaterally; No wheeze  HEART: Regular rate and rhythm  ABDOMEN: Soft, Nontender  EXTREMITIES:  2+ Peripheral Pulses, No edema  PSYCH: Alert  NEUROLOGY: moves all extremities  SKIN: No noted rashes    LABS:                        9.2    13.03 )-----------( 314      ( 30 Jun 2024 07:20 )             29.6     06-30    137  |  106  |  14  ----------------------------<  153<H>  4.1   |  20<L>  |  1.11    Ca    8.5      30 Jun 2024 07:25  Phos  2.4     06-29  Mg     2.2     06-29    TPro  6.7  /  Alb  2.8<L>  /  TBili  0.5  /  DBili  x   /  AST  19  /  ALT  34  /  AlkPhos  133<H>  06-30          Urinalysis Basic - ( 30 Jun 2024 07:25 )    Color: x / Appearance: x / SG: x / pH: x  Gluc: 153 mg/dL / Ketone: x  / Bili: x / Urobili: x   Blood: x / Protein: x / Nitrite: x   Leuk Esterase: x / RBC: x / WBC x   Sq Epi: x / Non Sq Epi: x / Bacteria: x        Culture - Sputum (collected 28 Jun 2024 15:57)  Source: .Sputum Sputum  Gram Stain (28 Jun 2024 23:46):    Few polymorphonuclear leukocytes per low power field    Moderate Squamous epithelial cells per low power field    Moderate Gram Positive Cocci in Pairs and Chains per oil power field    Rare Gram Negative Coccobacilli per oil power field    Rare Gram Negative Rods per oil power field    Results consistent with oropharyngeal contamination  Final Report (30 Jun 2024 08:15):    Normal Respiratory Marian present    Culture - Blood (collected 28 Jun 2024 00:34)  Source: .Blood Blood  Preliminary Report (30 Jun 2024 03:01):    No growth at 48 Hours    Culture - Blood (collected 28 Jun 2024 00:34)  Source: .Blood Blood  Preliminary Report (30 Jun 2024 03:01):    No growth at 48 Hours          RADIOLOGY & ADDITIONAL TESTS:  New Imaging Personally Reviewed Today:  New Electrocardiogram Personally Reviewed Today:  Other Results Reviewed Today:   Prior or Outpatient Records Reviewed Today with Summary:    COORDINATION OF CARE:  Consultant Communication and Details of Discussion (where applicable):

## 2024-06-30 NOTE — PROGRESS NOTE ADULT - ASSESSMENT
Patient is a 79 year old male with PMH of SOC for benign ependymoma, seizures, DM, HLD, aspirin non compliant, presented to VS for AMS, gait difficulties and falls for a week.     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, L SDH, R parafalcince acute SDH with mass effect  S/p R SDH evacuation 6/22   Aspiration pneumonia   Fevers initially likely due to above improved, now febrile   RUE edema - suspect likely RUE thrombophlebitis; r/o DVT  Leukocytosis     CXR with bibasilar opacities with slight increase on the R  Has cough, no dyspnea or pain, no increased O2 requirements, on zosyn since 6/25 pm  RUE edema with cord like swelling in antecubital area with TTP  R sided incision healing with no swelling or sign of infection  abd benign, nontender  UA negative for pyuria    Recommendations:   Follow up UE and LE duplex   Follow Bcx - in process x2   Zosyn --> escalated to Meropenem 6/29 6/30 alert, afebrile, continue meropenem for now    Thank you for consulting us and involving us in the management of this most interesting and challenging case.  We will follow along in the care of this patient. Please call us at [phone] or text me directly on my cell# at [phone] with any concerns.

## 2024-06-30 NOTE — CHART NOTE - NSCHARTNOTEFT_GEN_A_CORE
RODRIGUE CARROLL is a 78 y/o M Hx SOC for tumor (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 No AC/AP, coags/plt wnl   -6/24: Now s/p R crani for SDH evacuation and s/p s/p MMAE today  -6/25: CTH: Interval removal of subdural drainage catheter and right MMA embolization since 6/24/2024. Unchanged residual subdural hemorrhage and mild midline shift.    Speech & Swallow : no reports in SCM       TODAY, patient seen for re-evaluation of swallow profile as per request by provider 2/2 note of 'gurgling' following po intake on prior diet of easy to chew solids and thin liquids. Provider downgraded to puree and moderately thick liquids in the interim pending re-evaluation by this service. Observed patient bedside, awake/alert and responding to discourse as well as answering simple questions and following simple commands. Offered items from breakfast tray as well as mildly thick liquids and easy to chew solids. Oropharyngeal swallow profile p/w adequate orientation to feeding task, functional however slowed manipulation and mastication of solids and adequate control of liquids. No overt s/sx of aspiration noted. Following interactive discussion w/ nurse/RN Godwin and provider Alexandria DOUGLAS plan to continue on conservative textures with objective testing to be completed to determine swallow profile and guide SLP management.     Appreciated x-ray chest 6/28: IMPRESSION: Re-demonstrated bibasilar opacities with slight increase on the right.    Impression: Pt p/w an oropharyngeal dysphagia as described above. Given imaging from recent chest x-ray and provider report of "gurgling" following po intake plan for objective testing to determine swallow profile and least restrictive textures.       Recommendations:   1-Puree and moderately thick liquids via tsp in the interim pending objective testing   2-Aspiration precautions   3-Monitor tolerance  4-MBS require order  5-SLP tx during course, anticipate needs next level of care pending course    Sophia Mccabe MS CCC-SLP [employer]   extension [phone]     GOAL- Pt to tolerate recommended textures during course w/ no s/sx of aspiration RODRIGUE CARROLL is a 78 y/o M Hx SOC for tumor (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 No AC/AP, coags/plt wnl   -6/24: Now s/p R crani for SDH evacuation and s/p s/p MMAE today  -6/25: CTH: Interval removal of subdural drainage catheter and right MMA embolization since 6/24/2024. Unchanged residual subdural hemorrhage and mild midline shift.    Speech & Swallow : no reports in SCM prior to admission - seen this course with recommendations for thin liquids and easy to chew solids       TODAY, patient seen for re-evaluation of swallow profile as per request by provider 2/2 note of 'gurgling' following po intake on recommended diet of easy to chew solids and thin liquids. Provider downgraded to puree and moderately thick liquids in the interim pending re-evaluation by this service/SLP. Observed patient bedside, awake/alert and responding to discourse as well as answering simple questions and following simple commands. Offered items from breakfast tray as well as mildly thick liquids and easy to chew solids. Oropharyngeal swallow profile p/w adequate orientation to feeding task, functional however slowed manipulation and mastication of solids and adequate control of liquids. No overt s/sx of aspiration noted. Following interactive discussion w/ RN Godwin and provider Alexandria DOUGLAS plan to continue on conservative textures with objective testing to be completed to determine swallow profile and guide SLP management.     Appreciated x-ray chest 6/28: IMPRESSION: Re-demonstrated bibasilar opacities with slight increase on the right.    Impression: Pt p/w an oropharyngeal dysphagia as described above. Given imaging from recent chest x-ray and provider report of "gurgling" following po intake from advanced solids/liquids plan for objective testing to determine swallow profile and least restrictive textures.     Recommendations:   1-Puree and moderately thick liquids via tsp in the interim pending objective testing   2-Aspiration precautions   3-Monitor tolerance  4-MBS require order  5-SLP tx during course, anticipate needs next level of care pending course    Sophia Mccabe MS CCC-SLP [employer]   extension [phone]     GOAL- Pt to tolerate recommended textures during course w/ no s/sx of aspiration

## 2024-07-01 DIAGNOSIS — I82.409 ACUTE EMBOLISM AND THROMBOSIS OF UNSPECIFIED DEEP VEINS OF UNSPECIFIED LOWER EXTREMITY: ICD-10-CM

## 2024-07-01 LAB
ANION GAP SERPL CALC-SCNC: 12 MMOL/L — SIGNIFICANT CHANGE UP (ref 5–17)
BASOPHILS # BLD AUTO: 0.05 K/UL — SIGNIFICANT CHANGE UP (ref 0–0.2)
BASOPHILS NFR BLD AUTO: 0.4 % — SIGNIFICANT CHANGE UP (ref 0–2)
BUN SERPL-MCNC: 16 MG/DL — SIGNIFICANT CHANGE UP (ref 7–23)
CALCIUM SERPL-MCNC: 9.1 MG/DL — SIGNIFICANT CHANGE UP (ref 8.4–10.5)
CHLORIDE SERPL-SCNC: 103 MMOL/L — SIGNIFICANT CHANGE UP (ref 96–108)
CO2 SERPL-SCNC: 21 MMOL/L — LOW (ref 22–31)
CREAT SERPL-MCNC: 1.16 MG/DL — SIGNIFICANT CHANGE UP (ref 0.5–1.3)
CULTURE RESULTS: SIGNIFICANT CHANGE UP
CULTURE RESULTS: SIGNIFICANT CHANGE UP
EGFR: 64 ML/MIN/1.73M2 — SIGNIFICANT CHANGE UP
EOSINOPHIL # BLD AUTO: 0.42 K/UL — SIGNIFICANT CHANGE UP (ref 0–0.5)
EOSINOPHIL NFR BLD AUTO: 3.4 % — SIGNIFICANT CHANGE UP (ref 0–6)
GLUCOSE BLDC GLUCOMTR-MCNC: 128 MG/DL — HIGH (ref 70–99)
GLUCOSE BLDC GLUCOMTR-MCNC: 137 MG/DL — HIGH (ref 70–99)
GLUCOSE BLDC GLUCOMTR-MCNC: 139 MG/DL — HIGH (ref 70–99)
GLUCOSE BLDC GLUCOMTR-MCNC: 179 MG/DL — HIGH (ref 70–99)
GLUCOSE SERPL-MCNC: 128 MG/DL — HIGH (ref 70–99)
HCT VFR BLD CALC: 28 % — LOW (ref 39–50)
HGB BLD-MCNC: 9.2 G/DL — LOW (ref 13–17)
IMM GRANULOCYTES NFR BLD AUTO: 2.6 % — HIGH (ref 0–0.9)
LYMPHOCYTES # BLD AUTO: 1.84 K/UL — SIGNIFICANT CHANGE UP (ref 1–3.3)
LYMPHOCYTES # BLD AUTO: 15.1 % — SIGNIFICANT CHANGE UP (ref 13–44)
MCHC RBC-ENTMCNC: 31.7 PG — SIGNIFICANT CHANGE UP (ref 27–34)
MCHC RBC-ENTMCNC: 32.9 GM/DL — SIGNIFICANT CHANGE UP (ref 32–36)
MCV RBC AUTO: 96.6 FL — SIGNIFICANT CHANGE UP (ref 80–100)
MONOCYTES # BLD AUTO: 1.02 K/UL — HIGH (ref 0–0.9)
MONOCYTES NFR BLD AUTO: 8.4 % — SIGNIFICANT CHANGE UP (ref 2–14)
NEUTROPHILS # BLD AUTO: 8.56 K/UL — HIGH (ref 1.8–7.4)
NEUTROPHILS NFR BLD AUTO: 70.1 % — SIGNIFICANT CHANGE UP (ref 43–77)
NRBC # BLD: 0 /100 WBCS — SIGNIFICANT CHANGE UP (ref 0–0)
PLATELET # BLD AUTO: 349 K/UL — SIGNIFICANT CHANGE UP (ref 150–400)
POTASSIUM SERPL-MCNC: 4.4 MMOL/L — SIGNIFICANT CHANGE UP (ref 3.5–5.3)
POTASSIUM SERPL-SCNC: 4.4 MMOL/L — SIGNIFICANT CHANGE UP (ref 3.5–5.3)
PROCALCITONIN SERPL-MCNC: 1.31 NG/ML — HIGH (ref 0.02–0.1)
RBC # BLD: 2.9 M/UL — LOW (ref 4.2–5.8)
RBC # FLD: 13.2 % — SIGNIFICANT CHANGE UP (ref 10.3–14.5)
SODIUM SERPL-SCNC: 136 MMOL/L — SIGNIFICANT CHANGE UP (ref 135–145)
SPECIMEN SOURCE: SIGNIFICANT CHANGE UP
SPECIMEN SOURCE: SIGNIFICANT CHANGE UP
WBC # BLD: 12.21 K/UL — HIGH (ref 3.8–10.5)
WBC # FLD AUTO: 12.21 K/UL — HIGH (ref 3.8–10.5)

## 2024-07-01 PROCEDURE — 99233 SBSQ HOSP IP/OBS HIGH 50: CPT

## 2024-07-01 PROCEDURE — 99223 1ST HOSP IP/OBS HIGH 75: CPT

## 2024-07-01 PROCEDURE — 74230 X-RAY XM SWLNG FUNCJ C+: CPT | Mod: 26

## 2024-07-01 RX ORDER — BISACODYL 5 MG
10 TABLET, DELAYED RELEASE (ENTERIC COATED) ORAL ONCE
Refills: 0 | Status: COMPLETED | OUTPATIENT
Start: 2024-07-01 | End: 2024-07-01

## 2024-07-01 RX ORDER — BISACODYL 5 MG
5 TABLET, DELAYED RELEASE (ENTERIC COATED) ORAL DAILY
Refills: 0 | Status: DISCONTINUED | OUTPATIENT
Start: 2024-07-01 | End: 2024-07-02

## 2024-07-01 RX ORDER — MEROPENEM 500 MG/1
1000 INJECTION, POWDER, FOR SOLUTION INTRAVENOUS EVERY 8 HOURS
Refills: 0 | Status: COMPLETED | OUTPATIENT
Start: 2024-07-01 | End: 2024-07-03

## 2024-07-01 RX ORDER — ACETAMINOPHEN 325 MG
650 TABLET ORAL EVERY 6 HOURS
Refills: 0 | Status: DISCONTINUED | OUTPATIENT
Start: 2024-07-01 | End: 2024-07-10

## 2024-07-01 RX ORDER — POLYETHYLENE GLYCOL 3350 1 G/G
17 POWDER ORAL
Refills: 0 | Status: DISCONTINUED | OUTPATIENT
Start: 2024-07-01 | End: 2024-07-02

## 2024-07-01 RX ORDER — SENNOSIDES 8.6 MG
2 TABLET ORAL AT BEDTIME
Refills: 0 | Status: DISCONTINUED | OUTPATIENT
Start: 2024-07-01 | End: 2024-07-10

## 2024-07-01 RX ADMIN — MEROPENEM 100 MILLIGRAM(S): 500 INJECTION, POWDER, FOR SOLUTION INTRAVENOUS at 14:26

## 2024-07-01 RX ADMIN — MEROPENEM 100 MILLIGRAM(S): 500 INJECTION, POWDER, FOR SOLUTION INTRAVENOUS at 06:08

## 2024-07-01 RX ADMIN — Medication 50 MILLIGRAM(S): at 17:59

## 2024-07-01 RX ADMIN — Medication 1 PACKET(S): at 21:22

## 2024-07-01 RX ADMIN — LEVETIRACETAM 250 MILLIGRAM(S): 100 INJECTION INTRAVENOUS at 05:12

## 2024-07-01 RX ADMIN — Medication 10 MILLIGRAM(S): at 10:33

## 2024-07-01 RX ADMIN — Medication 50 MILLIGRAM(S): at 05:12

## 2024-07-01 RX ADMIN — Medication 1 PACKET(S): at 14:22

## 2024-07-01 RX ADMIN — POLYETHYLENE GLYCOL 3350 17 GRAM(S): 1 POWDER ORAL at 08:47

## 2024-07-01 RX ADMIN — Medication 1 PACKET(S): at 05:12

## 2024-07-01 RX ADMIN — HEPARIN SODIUM 5000 UNIT(S): 50 INJECTION, SOLUTION INTRAVENOUS at 05:12

## 2024-07-01 RX ADMIN — LACOSAMIDE 110 MILLIGRAM(S): 100 TABLET, FILM COATED ORAL at 17:56

## 2024-07-01 RX ADMIN — INSULIN LISPRO 2: 100 INJECTION, SOLUTION SUBCUTANEOUS at 12:37

## 2024-07-01 RX ADMIN — MEROPENEM 100 MILLIGRAM(S): 500 INJECTION, POWDER, FOR SOLUTION INTRAVENOUS at 21:22

## 2024-07-01 RX ADMIN — Medication 1 DROP(S): at 05:11

## 2024-07-01 RX ADMIN — POLYETHYLENE GLYCOL 3350 17 GRAM(S): 1 POWDER ORAL at 17:59

## 2024-07-01 RX ADMIN — TOPIRAMATE 100 MILLIGRAM(S): 50 TABLET, FILM COATED ORAL at 05:12

## 2024-07-01 RX ADMIN — Medication 1 DROP(S): at 21:23

## 2024-07-01 RX ADMIN — LEVETIRACETAM 250 MILLIGRAM(S): 100 INJECTION INTRAVENOUS at 17:58

## 2024-07-01 RX ADMIN — TOPIRAMATE 100 MILLIGRAM(S): 50 TABLET, FILM COATED ORAL at 17:57

## 2024-07-01 RX ADMIN — INSULIN LISPRO 5 UNIT(S): 100 INJECTION, SOLUTION SUBCUTANEOUS at 08:12

## 2024-07-01 RX ADMIN — Medication 1 DROP(S): at 14:22

## 2024-07-01 RX ADMIN — PANTOPRAZOLE SODIUM 40 MILLIGRAM(S): 40 INJECTION, POWDER, FOR SOLUTION INTRAVENOUS at 17:59

## 2024-07-01 RX ADMIN — Medication 1 DROP(S): at 17:57

## 2024-07-01 RX ADMIN — INSULIN LISPRO 5 UNIT(S): 100 INJECTION, SOLUTION SUBCUTANEOUS at 12:38

## 2024-07-01 RX ADMIN — AMIODARONE HYDROCHLORIDE 400 MILLIGRAM(S): 50 INJECTION, SOLUTION INTRAVENOUS at 05:11

## 2024-07-01 RX ADMIN — PANTOPRAZOLE SODIUM 40 MILLIGRAM(S): 40 INJECTION, POWDER, FOR SOLUTION INTRAVENOUS at 05:12

## 2024-07-01 RX ADMIN — LACOSAMIDE 110 MILLIGRAM(S): 100 TABLET, FILM COATED ORAL at 05:13

## 2024-07-01 RX ADMIN — HEPARIN SODIUM 5000 UNIT(S): 50 INJECTION, SOLUTION INTRAVENOUS at 17:58

## 2024-07-01 RX ADMIN — ATORVASTATIN CALCIUM 80 MILLIGRAM(S): 20 TABLET, FILM COATED ORAL at 21:22

## 2024-07-01 RX ADMIN — AMIODARONE HYDROCHLORIDE 400 MILLIGRAM(S): 50 INJECTION, SOLUTION INTRAVENOUS at 17:58

## 2024-07-01 RX ADMIN — Medication 5 MILLIGRAM(S): at 12:41

## 2024-07-01 RX ADMIN — INSULIN LISPRO 5 UNIT(S): 100 INJECTION, SOLUTION SUBCUTANEOUS at 16:59

## 2024-07-01 RX ADMIN — Medication 2 TABLET(S): at 21:22

## 2024-07-01 RX ADMIN — INSULIN GLARGINE 12 UNIT(S): 100 INJECTION, SOLUTION SUBCUTANEOUS at 21:32

## 2024-07-01 NOTE — CONSULT NOTE ADULT - SUBJECTIVE AND OBJECTIVE BOX
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 S/p R MMA embolization on 6/24. Noted to have Afib / Aflutter while in NSCU. LE venous duplex on 6/30 showed R soleal and peroneal DVT, L soleal DVT. TTE on 6/27 showed normal RV size and function. CTA chest on 6/25 without PE noted. Patient denies C/P or SOB. No LE pain or edema. Vascular Cardiology consulted.     Allergies  No Known Allergies	    MEDICATIONS:  aMIOdarone    Tablet 400 milliGRAM(s) Oral two times a day  heparin   Injectable 5000 Unit(s) SubCutaneous every 12 hours  metoprolol tartrate 50 milliGRAM(s) Oral two times a day  meropenem  IVPB 1000 milliGRAM(s) IV Intermittent every 8 hours  lacosamide IVPB 50 milliGRAM(s) IV Intermittent every 12 hours  levETIRAcetam 250 milliGRAM(s) Oral two times a day  ondansetron Injectable 4 milliGRAM(s) IV Push every 6 hours PRN  topiramate 100 milliGRAM(s) Oral two times a day  bisacodyl 5 milliGRAM(s) Oral daily  pantoprazole    Tablet 40 milliGRAM(s) Oral every 12 hours  polyethylene glycol 3350 17 Gram(s) Oral two times a day  senna 2 Tablet(s) Oral at bedtime  atorvastatin 80 milliGRAM(s) Oral at bedtime  dextrose 50% Injectable 25 Gram(s) IV Push once  dextrose 50% Injectable 12.5 Gram(s) IV Push once  dextrose Oral Gel 15 Gram(s) Oral once PRN  glucagon  Injectable 1 milliGRAM(s) IntraMuscular once  insulin glargine Injectable (LANTUS) 12 Unit(s) SubCutaneous at bedtime  insulin lispro (ADMELOG) corrective regimen sliding scale   SubCutaneous Before meals and at bedtime  insulin lispro Injectable (ADMELOG) 5 Unit(s) SubCutaneous three times a day before meals  deamethasone/neomycin/polymyxin Suspension 1 Drop(s) Both EYES every 8 hours  dextrose 10% Bolus 125 milliLiter(s) IV Bolus once  dextrose 5%. 1000 milliLiter(s) IV Continuous <Continuous>  dextrose 5%. 1000 milliLiter(s) IV Continuous <Continuous>  pilocarpine 2% Solution 1 Drop(s) Right EYE two times a day  potassium phosphate / sodium phosphate Powder (PHOS-NaK) 1 Packet(s) Oral three times a day    PAST MEDICAL & SURGICAL HISTORY:  DM2 (diabetes mellitus, type 2)  HTN (hypertension)  Seizures  Brain tumor  Pulmonary embolism    FAMILY HISTORY: see HPI    SOCIAL HISTORY:  unchanged    REVIEW OF SYSTEMS:  CONSTITUTIONAL: No fever  EYES: No eye pain  ENT:  No throat pain  NECK: No pain   RESPIRATORY: No C/P  CARDIOVASCULAR: No SOB  GASTROINTESTINAL: No abdominal pain  GENITOURINARY: No hematuria  SKIN: No LE wounds  LYMPH Nodes: No enlarged glands noted  ENDOCRINE: No heat or cold intolerance noted  MUSCULOSKELETAL: No LE edema  PSYCHIATRIC: No depression, anxiety  HEME/LYMPH: No bleeding gums  ALLERGY AND IMMUNOLOGIC: No hives    [ x] All others negative	    PHYSICAL EXAM:  T(C): 37.1 (07-01-24 @ 08:00), Max: 37.1 (06-30-24 @ 18:19)  HR: 64 (07-01-24 @ 08:00) (60 - 94)  BP: 132/72 (07-01-24 @ 08:00) (132/72 - 153/75)  RR: 18 (07-01-24 @ 08:00) (17 - 20)  SpO2: 95% (07-01-24 @ 08:00) (94% - 96%)  I&O's Summary    30 Jun 2024 07:01  -  01 Jul 2024 07:00  --------------------------------------------------------  IN: 0 mL / OUT: 1200 mL / NET: -1200 mL    Appearance: NAD 	  Cardiovascular: RRR, S1 and S2  Respiratory: CTA B/L   Psychiatry:  AAO x 3  Gastrointestinal:  Soft, Non-tender, + BS	  Skin: No cyanosis	  Extremities: No LE edema, bilateral calves soft    Vascular Pulse Exam: Palpable pedal pulses bilaterally   Foot Exam: No tissue injury    LABS:	 	    CBC Full  -  ( 01 Jul 2024 06:13 )  WBC Count : 12.21 K/uL  Hemoglobin : 9.2 g/dL  Hematocrit : 28.0 %  Platelet Count - Automated : 349 K/uL  Mean Cell Volume : 96.6 fl  Mean Cell Hemoglobin : 31.7 pg  Mean Cell Hemoglobin Concentration : 32.9 gm/dL  Auto Neutrophil # : 8.56 K/uL  Auto Lymphocyte # : 1.84 K/uL  Auto Monocyte # : 1.02 K/uL  Auto Eosinophil # : 0.42 K/uL  Auto Basophil # : 0.05 K/uL  Auto Neutrophil % : 70.1 %  Auto Lymphocyte % : 15.1 %  Auto Monocyte % : 8.4 %  Auto Eosinophil % : 3.4 %  Auto Basophil % : 0.4 %    07-01    136  |  103  |  16  ----------------------------<  128<H>  4.4   |  21<L>  |  1.16  06-30    137  |  106  |  14  ----------------------------<  153<H>  4.1   |  20<L>  |  1.11    Ca    9.1      01 Jul 2024 06:13  Ca    8.5      30 Jun 2024 07:25    TPro  6.7  /  Alb  2.8<L>  /  TBili  0.5  /  DBili  x   /  AST  19  /  ALT  34  /  AlkPhos  133<H>  06-30      Assessment:  1. Bilateral Below the knee DVT      CTA chest showed no PE      TTE with normal RV function   2. SDH     S/p R craniotomy for evacuation of SDH on 6/22. S/p R MMA embolization on 6/24  3. DM  4. HLD   5. History of Falls    Plan:  1. For asymptomatic below the knee DVT in setting of recent SDH and neurosurgery, recommend to continue surveillance LE venous duplex next on 7/5.  2. Patient currently on Heparin Subcutaneous 5000 units BID, recommend to continue.  3. Appreciate excellent Neurosurgical care.        Thank you  STELLA Mendez, MS, [facility]  Vascular Cardiology Service    Please call with any questions:   DIRECT SERVICE NUMBER: [phone] / Available on TEAMS

## 2024-07-01 NOTE — SWALLOW VFSS/MBS ASSESSMENT ADULT - SLP GENERAL OBSERVATIONS
Pt arrived to radiology secured in BASIM chair, pleasant and cooperative. Able to communicate needs and follow directions for exam.

## 2024-07-01 NOTE — PROGRESS NOTE ADULT - SUBJECTIVE AND OBJECTIVE BOX
CARDIOLOGY FOLLOW UP - Dr. Bass  DATE OF SERVICE: 7/1/24    CC  No cv complaints     REVIEW OF SYSTEMS:  CONSTITUTIONAL: No fever, weight loss, or fatigue  RESPIRATORY: No cough, wheezing, chills or hemoptysis; No Shortness of Breath  CARDIOVASCULAR: No chest pain, palpitations, passing out, dizziness, or leg swelling  GASTROINTESTINAL: No abdominal or epigastric pain. No nausea, vomiting, or hematemesis; No diarrhea or constipation. No melena or hematochezia.  VASCULAR: No edema     PHYSICAL EXAM:  T(C): 37.1 (07-01-24 @ 08:00), Max: 37.1 (06-30-24 @ 18:19)  HR: 64 (07-01-24 @ 08:00) (60 - 94)  BP: 132/72 (07-01-24 @ 08:00) (132/72 - 153/75)  RR: 18 (07-01-24 @ 08:00) (17 - 20)  SpO2: 95% (07-01-24 @ 08:00) (94% - 96%)  Wt(kg): --  I&O's Summary    30 Jun 2024 07:01  -  01 Jul 2024 07:00  --------------------------------------------------------  IN: 0 mL / OUT: 1200 mL / NET: -1200 mL        Appearance: Elderly male 	  Cardiovascular: Normal S1 S2,RRR, No JVD, No murmurs  Respiratory: Lungs clear to auscultation b/l   Gastrointestinal:  Soft, Non-tender, + BS	  Extremities: Normal range of motion, No clubbing, cyanosis or edema      Home Medications:  Keppra 250 mg oral tablet: 1 tab(s) orally every 12 hours (22 Jun 2024 18:38)  metFORMIN 500 mg oral tablet: 1 tab(s) orally every 12 hours (22 Jun 2024 18:38)  rosuvastatin 40 mg oral capsule: 1 cap(s) orally once a day (22 Jun 2024 18:38)  Topamax 100 mg oral tablet: 1 tab(s) orally every 12 hours (22 Jun 2024 18:37)      MEDICATIONS  (STANDING):  aMIOdarone    Tablet 400 milliGRAM(s) Oral two times a day  atorvastatin 80 milliGRAM(s) Oral at bedtime  bisacodyl 5 milliGRAM(s) Oral daily  dexamethasone/neomycin/polymyxin Suspension 1 Drop(s) Both EYES every 8 hours  dextrose 10% Bolus 125 milliLiter(s) IV Bolus once  dextrose 5%. 1000 milliLiter(s) (100 mL/Hr) IV Continuous <Continuous>  dextrose 5%. 1000 milliLiter(s) (50 mL/Hr) IV Continuous <Continuous>  dextrose 50% Injectable 25 Gram(s) IV Push once  dextrose 50% Injectable 12.5 Gram(s) IV Push once  glucagon  Injectable 1 milliGRAM(s) IntraMuscular once  heparin   Injectable 5000 Unit(s) SubCutaneous every 12 hours  insulin glargine Injectable (LANTUS) 12 Unit(s) SubCutaneous at bedtime  insulin lispro (ADMELOG) corrective regimen sliding scale   SubCutaneous Before meals and at bedtime  insulin lispro Injectable (ADMELOG) 5 Unit(s) SubCutaneous three times a day before meals  lacosamide IVPB 50 milliGRAM(s) IV Intermittent every 12 hours  levETIRAcetam 250 milliGRAM(s) Oral two times a day  meropenem  IVPB 1000 milliGRAM(s) IV Intermittent every 8 hours  metoprolol tartrate 50 milliGRAM(s) Oral two times a day  pantoprazole    Tablet 40 milliGRAM(s) Oral every 12 hours  pilocarpine 2% Solution 1 Drop(s) Right EYE two times a day  polyethylene glycol 3350 17 Gram(s) Oral two times a day  potassium phosphate / sodium phosphate Powder (PHOS-NaK) 1 Packet(s) Oral three times a day  senna 2 Tablet(s) Oral at bedtime  topiramate 100 milliGRAM(s) Oral two times a day      TELEMETRY: SR	    ECG:  	  RADIOLOGY:   < from: [facility] Duplex Lower Ext Vein Scan, Bilat (06.30.24 @ 17:05) >  IMPRESSION:  Acute deep venous thrombosis: below the knee.    New bilateral calf veins deep vein thrombosis.    Findings were discussed with Dr. Goodman  6/30/2024 5:10 PM by Dr. Robbi Santos with read back confirmation.    --- End of Report ---    < end of copied text >    DIAGNOSTIC TESTING:  [ ] Echocardiogram:  [ ]  Catheterization:  [ ] Stress Test:    OTHER: 	    LABS:	 	    Troponin T, High Sensitivity Result: 41 ng/L [0 - 51] (06-27 @ 16:51)  Troponin T, High Sensitivity Result: 37 ng/L [0 - 51] (06-27 @ 00:54)  CKMB Units: 3.4 ng/mL [0.0 - 6.7] (06-27 @ 00:54)  Troponin T, High Sensitivity Result: 42 ng/L [0 - 51] (06-26 @ 02:19)  Troponin T, High Sensitivity Result: 46 ng/L [0 - 51] (06-24 @ 23:27)                          9.2    12.21 )-----------( 349      ( 01 Jul 2024 06:13 )             28.0     07-01    136  |  103  |  16  ----------------------------<  128<H>  4.4   |  21<L>  |  1.16    Ca    9.1      01 Jul 2024 06:13    TPro  6.7  /  Alb  2.8<L>  /  TBili  0.5  /  DBili  x   /  AST  19  /  ALT  34  /  AlkPhos  133<H>  06-30

## 2024-07-01 NOTE — PROGRESS NOTE ADULT - SUBJECTIVE AND OBJECTIVE BOX
24H hour events: Pt without acute events overnight, no acute complaints    MEDICATIONS:  aMIOdarone    Tablet 400 milliGRAM(s) Oral two times a day  heparin   Injectable 5000 Unit(s) SubCutaneous every 12 hours  metoprolol tartrate 50 milliGRAM(s) Oral two times a day    meropenem  IVPB 1000 milliGRAM(s) IV Intermittent every 8 hours      acetaminophen     Tablet .. 650 milliGRAM(s) Oral every 6 hours PRN  lacosamide IVPB 50 milliGRAM(s) IV Intermittent every 12 hours  levETIRAcetam 250 milliGRAM(s) Oral two times a day  ondansetron Injectable 4 milliGRAM(s) IV Push every 6 hours PRN  topiramate 100 milliGRAM(s) Oral two times a day    bisacodyl 5 milliGRAM(s) Oral daily  pantoprazole    Tablet 40 milliGRAM(s) Oral every 12 hours  polyethylene glycol 3350 17 Gram(s) Oral two times a day  senna 2 Tablet(s) Oral at bedtime    atorvastatin 80 milliGRAM(s) Oral at bedtime  dextrose 50% Injectable 25 Gram(s) IV Push once  dextrose 50% Injectable 12.5 Gram(s) IV Push once  dextrose Oral Gel 15 Gram(s) Oral once PRN  glucagon  Injectable 1 milliGRAM(s) IntraMuscular once  insulin glargine Injectable (LANTUS) 12 Unit(s) SubCutaneous at bedtime  insulin lispro (ADMELOG) corrective regimen sliding scale   SubCutaneous Before meals and at bedtime  insulin lispro Injectable (ADMELOG) 5 Unit(s) SubCutaneous three times a day before meals    dexamethasone/neomycin/polymyxin Suspension 1 Drop(s) Both EYES every 8 hours  dextrose 10% Bolus 125 milliLiter(s) IV Bolus once  dextrose 5%. 1000 milliLiter(s) IV Continuous <Continuous>  dextrose 5%. 1000 milliLiter(s) IV Continuous <Continuous>  pilocarpine 2% Solution 1 Drop(s) Right EYE two times a day  potassium phosphate / sodium phosphate Powder (PHOS-NaK) 1 Packet(s) Oral three times a day      REVIEW OF SYSTEMS:  See HPI, otherwise ROS negative.    PHYSICAL EXAM:  T(C): 37.1 (07-01-24 @ 12:00), Max: 37.1 (06-30-24 @ 18:19)  HR: 66 (07-01-24 @ 12:00) (64 - 94)  BP: 141/74 (07-01-24 @ 12:00) (132/72 - 153/75)  RR: 18 (07-01-24 @ 12:00) (17 - 18)  SpO2: 96% (07-01-24 @ 12:00) (94% - 96%)  Wt(kg): --  I&O's Summary    30 Jun 2024 07:01  -  01 Jul 2024 07:00  --------------------------------------------------------  IN: 0 mL / OUT: 1200 mL / NET: -1200 mL        Appearance: Alert. NAD	  HEENT:   NC/AT	  Cardiovascular: +S1S2 RRR no m/g/r  Respiratory: CTA B/L	  Psychiatry: Alert, Mood & affect appropriate  Skin: No rashes	      LABS:	 	    CBC Full  -  ( 01 Jul 2024 06:13 )  WBC Count : 12.21 K/uL  Hemoglobin : 9.2 g/dL  Hematocrit : 28.0 %  Platelet Count - Automated : 349 K/uL  Mean Cell Volume : 96.6 fl  Mean Cell Hemoglobin : 31.7 pg  Mean Cell Hemoglobin Concentration : 32.9 gm/dL  Auto Neutrophil # : 8.56 K/uL  Auto Lymphocyte # : 1.84 K/uL  Auto Monocyte # : 1.02 K/uL  Auto Eosinophil # : 0.42 K/uL  Auto Basophil # : 0.05 K/uL  Auto Neutrophil % : 70.1 %  Auto Lymphocyte % : 15.1 %  Auto Monocyte % : 8.4 %  Auto Eosinophil % : 3.4 %  Auto Basophil % : 0.4 %    07-01    136  |  103  |  16  ----------------------------<  128<H>  4.4   |  21<L>  |  1.16  06-30    137  |  106  |  14  ----------------------------<  153<H>  4.1   |  20<L>  |  1.11    Ca    9.1      01 Jul 2024 06:13  Ca    8.5      30 Jun 2024 07:25    TPro  6.7  /  Alb  2.8<L>  /  TBili  0.5  /  DBili  x   /  AST  19  /  ALT  34  /  AlkPhos  133<H>  06-30      TELEMETRY: NSR with APCs, 80s  	      	  ASSESSMENT/PLAN:

## 2024-07-01 NOTE — PROGRESS NOTE ADULT - ASSESSMENT
79-year-old man with a benign ependymoma, seizure disorder, diabetes and hyperlipidemia He presented with confusion, difficulty ambulating and falls. He was found to have a subdural hematoma. He underwent an evacuation on 6/22/2024. The Electrophysiology service is following the patient due to paroxysmal atrial fibrillation and paroxysmal atrial flutter.     1) AF/AFL, paroxysmal  2) Atrial bigeminy   3) SDH s/p evacuation 6/22/24     Plan:   -Continue with Amiodarone 400 bid x1 week (started 6/27) to maintain SR.  As of 7/1 evening, he has received 3.2g.   Prior was on Amio gtt on 6/26. If patient remains in AF/AFL >48h, then stop Amiodarone. No role for DCCV given patient is paroxysmal and can not tolerate anticoagulation.   After 1 week, transition to 200mg qd. While on Amio will need monitoring of LFT's, TFT's and yearly opthalmologic f/u and PFT's. TSH wnl on this admission  -Neuro/NSGY to clarify when full dose A/C can be initiated  -Continue BB  -Briefly introduced the idea of a future ablation / Watchman however at this time not a candidate given patient cannot safely tolerate oral anticoagulation  -Continue Tele monitoring, EP will follow

## 2024-07-01 NOTE — SWALLOW VFSS/MBS ASSESSMENT ADULT - LARYNGEAL PENETRATION DURING THE SWALLOW - SILENT
over the laryngeal surface of the epiglottis and arytenoids one episode to the vocal folds, material is retrieved to the superior 1/3 of the epiglottis and does not descend./Mild over the laryngeal surface of the epiglottis and arytenoids with incomplete retrieval, contrast does not descend./Mild

## 2024-07-01 NOTE — PROGRESS NOTE ADULT - SUBJECTIVE AND OBJECTIVE BOX
[facility] Division of Hospital Medicine  Evy Scott MD  Available via MS Teams  Spectra [phone]    SUBJECTIVE / OVERNIGHT EVENTS: patient seen and examined earlier today. he denied any headache, SOB, pain but has ongoing cough for about a week now.     ADDITIONAL REVIEW OF SYSTEMS:    MEDICATIONS  (STANDING):  aMIOdarone    Tablet 400 milliGRAM(s) Oral two times a day  atorvastatin 80 milliGRAM(s) Oral at bedtime  bisacodyl 5 milliGRAM(s) Oral daily  dexamethasone/neomycin/polymyxin Suspension 1 Drop(s) Both EYES every 8 hours  dextrose 10% Bolus 125 milliLiter(s) IV Bolus once  dextrose 5%. 1000 milliLiter(s) (100 mL/Hr) IV Continuous <Continuous>  dextrose 5%. 1000 milliLiter(s) (50 mL/Hr) IV Continuous <Continuous>  dextrose 50% Injectable 25 Gram(s) IV Push once  dextrose 50% Injectable 12.5 Gram(s) IV Push once  glucagon  Injectable 1 milliGRAM(s) IntraMuscular once  heparin   Injectable 5000 Unit(s) SubCutaneous every 12 hours  insulin glargine Injectable (LANTUS) 12 Unit(s) SubCutaneous at bedtime  insulin lispro (ADMELOG) corrective regimen sliding scale   SubCutaneous Before meals and at bedtime  insulin lispro Injectable (ADMELOG) 5 Unit(s) SubCutaneous three times a day before meals  lacosamide IVPB 50 milliGRAM(s) IV Intermittent every 12 hours  levETIRAcetam 250 milliGRAM(s) Oral two times a day  meropenem  IVPB 1000 milliGRAM(s) IV Intermittent every 8 hours  metoprolol tartrate 50 milliGRAM(s) Oral two times a day  pantoprazole    Tablet 40 milliGRAM(s) Oral every 12 hours  pilocarpine 2% Solution 1 Drop(s) Right EYE two times a day  polyethylene glycol 3350 17 Gram(s) Oral two times a day  potassium phosphate / sodium phosphate Powder (PHOS-NaK) 1 Packet(s) Oral three times a day  senna 2 Tablet(s) Oral at bedtime  topiramate 100 milliGRAM(s) Oral two times a day    MEDICATIONS  (PRN):  acetaminophen     Tablet .. 650 milliGRAM(s) Oral every 6 hours PRN Mild Pain (1 - 3)  dextrose Oral Gel 15 Gram(s) Oral once PRN Blood Glucose LESS THAN 70 milliGRAM(s)/deciliter  ondansetron Injectable 4 milliGRAM(s) IV Push every 6 hours PRN Nausea and/or Vomiting      I&O's Summary    30 Jun 2024 07:01  -  01 Jul 2024 07:00  --------------------------------------------------------  IN: 0 mL / OUT: 1200 mL / NET: -1200 mL        PHYSICAL EXAM:  Vital Signs Last 24 Hrs  T(C): 37.1 (01 Jul 2024 12:00), Max: 37.1 (30 Jun 2024 18:19)  T(F): 98.8 (01 Jul 2024 12:00), Max: 98.8 (30 Jun 2024 18:19)  HR: 66 (01 Jul 2024 12:00) (64 - 94)  BP: 141/74 (01 Jul 2024 12:00) (132/72 - 153/75)  BP(mean): --  RR: 18 (01 Jul 2024 12:00) (17 - 18)  SpO2: 96% (01 Jul 2024 12:00) (94% - 96%)    Parameters below as of 01 Jul 2024 12:00  Patient On (Oxygen Delivery Method): room air      CONSTITUTIONAL: NAD, well-groomed  RESPIRATORY: Normal respiratory effort; lungs are clear to auscultation bilaterally  CARDIOVASCULAR: normal S1 and S2; No lower extremity edema  ABDOMEN: slightly distended but nontender to palpation, normoactive bowel sounds, no rebound/guarding  MUSCULOSKELETAL: no clubbing or cyanosis of digits; no joint swelling or tenderness to palpation  PSYCH: A+O to person, place, and time; affect appropriate  NEUROLOGY: follows commands, moves all extremities      LABS:                        9.2    12.21 )-----------( 349      ( 01 Jul 2024 06:13 )             28.0     07-01    136  |  103  |  16  ----------------------------<  128<H>  4.4   |  21<L>  |  1.16    Ca    9.1      01 Jul 2024 06:13    TPro  6.7  /  Alb  2.8<L>  /  TBili  0.5  /  DBili  x   /  AST  19  /  ALT  34  /  AlkPhos  133<H>  06-30          Urinalysis Basic - ( 01 Jul 2024 06:13 )    Color: x / Appearance: x / SG: x / pH: x  Gluc: 128 mg/dL / Ketone: x  / Bili: x / Urobili: x   Blood: x / Protein: x / Nitrite: x   Leuk Esterase: x / RBC: x / WBC x   Sq Epi: x / Non Sq Epi: x / Bacteria: x        Culture - Sputum (collected 28 Jun 2024 15:57)  Source: .Sputum Sputum  Gram Stain (28 Jun 2024 23:46):    Few polymorphonuclear leukocytes per low power field    Moderate Squamous epithelial cells per low power field    Moderate Gram Positive Cocci in Pairs and Chains per oil power field    Rare Gram Negative Coccobacilli per oil power field    Rare Gram Negative Rods per oil power field    Results consistent with oropharyngeal contamination  Final Report (30 Jun 2024 08:15):    Normal Respiratory Marian present            RADIOLOGY & ADDITIONAL TESTS:  New Results Reviewed Today:     < from: VA Duplex Lower Ext Vein Scan, Bilat (06.30.24 @ 17:05) >  IMPRESSION:  Acute deep venous thrombosis: below the knee.    New bilateral calf veins deep vein thrombosis.    < end of copied text >        COMMUNICATION:  Care Discussed with Consultants/Other Providers and Details of Discussion: neurosurgery PA(Raisa)

## 2024-07-01 NOTE — PROGRESS NOTE ADULT - ASSESSMENT
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 No AC/AP, coags/plt wnl Now s/p R crani for SDH evacuation. Afib RVR overnight s/p Dilt IV, now in SR.      #Paroxysmal atrial fibrillation  -sp iv dilt with conversion to SR   -Continue with amiodarone per eps   -Continue po metoprolol per eps   -Echo nml lv fxn, no wma, mild MR   -remains off a/c at this time given acute SDH    #Acute subdural hematoma   -s/p R crani for SDH evacuation  -post op mgmt per neuro    #DM  -Mgmt per med    #DVT  -Acute b/l LE dvts  -Off a/c at this time given SDH        d/w wife at bedside

## 2024-07-01 NOTE — SWALLOW VFSS/MBS ASSESSMENT ADULT - DIAGNOSTIC IMPRESSIONS
Pt is 80 y/o M admitted for SDH. Now presenting with a mild orpharyngeal dysphagia marked by episodes of uncontrolled loss, and laryngeal penetration with mildly thick and thin liquids (which is a normal variant of swallow). Penetrated material is reduced in severity with cued throat clears and repeat dry swallows. No aspiration observed on exam.   Disorders: reduced tongue to palate contact, delay in trigger of the swallow reflex, reduced laryngeal closure.

## 2024-07-01 NOTE — PROGRESS NOTE ADULT - ASSESSMENT
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     6/22/24 s/p Right craniotomy for subdural hematoma  6/24/24 s/p angio: right MMA embolization     NEURO:   - Continue neuro checks q 4  - 6/25 CTH: s/p removal of SD drain, unchanged residual heme and mild MLS  - Continue Vimpat, Topamax and Keppra for history of seizures   - Pain control w/ Tylenol prn  - PT/OT: BRENDON    PULM:   - On room air, O2Sat>94%  - Incentive spirometry  - 6/25 CTA Chest: no PE, atelectasis seen    CV:  - SBP , within goal  - Cardiology and EP following for afib/aflutter  - Continue Metoprolol for rate control  - Per EP, patient currently on Amiodarone 400mg BID for 1 week (started on 6/27) to continue until 7/4 then transition to Amiodarone 200mg daily   - 6/27 TTE: EF 55-60%, mild regional hypokinesis seen at the inferior and inderolateral basal to mid sigments, normal right vent cavity size and normal right vent systolic fxn, no pericardial effusion  - Continue Lipitor for HLD    ENDO:   - A1c 6.4, continue ISS, Admelog, Lantus and CCD, continue to monitor fingersticks (150s-170s)  - 6/25 Thyroid panel WNL    HEME/ONC:             7/1 CBC: downtrending leukocytosis, post-op anemia stable         DVT ppx: SQH BID, no SCDs, 6/30 Le Dopp: new b/l calf vein DVT. Vascular consulted today, recommended repeat Dopp on 7/5 and continue DVT ppx    RENAL:   - IVL  - 7/1 BMP stable  - Has KPhos supplementation  - Voiding via condom cath    ID:   - Afebrile  - 6/29 Last fever Tmax 101.3 - BCX NGTD, COVID/RSV/Influenza negative, sputum cx: normal respiratory deer  - ID following - per their rec today -> CXR: bibasilar opacities rt>lt, had cough previously, Meropenem until 7/3  - Downtrending procal - currently 1.31 (2.06)    GI:    - Speech and Swallow following, s/p MBS - advanced to easy to chew diet today  - Last BM 6/25, added senna, miralax and dulcolax PO, gave patient suppository, per RN started having small BMs, can give enema if no further BMs  - Monitor LFTs in the setting of Amiodarone    Appreciate Hospitalist team following for co-medical management.     DISCHARGE PLANNING:   BRENDON planning    Plan to be discussed w/ Dr. Goodman  [phone]

## 2024-07-01 NOTE — PROGRESS NOTE ADULT - ASSESSMENT
Patient is a 79 year old male with PMH of SOC for benign ependymoma, seizures, DM, HLD, aspirin non compliant, presented to VS for AMS, gait difficulties and falls for a week.     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, L SDH, R parafalcince acute SDH with mass effect  S/p R SDH evacuation 6/22   Aspiration pneumonia   RUE edema due to SVT, no DVT  Acute b/l LE DVTs  Fevers initially likely due to aspiration pneumonia, then febrile again  may be d/t SVT and DVTs, noted more awake and afebrile after escalating to meropenem  Leukocytosis likely reactive    CXR with bibasilar opacities with slight increase on the R  Has cough, no dyspnea or pain, no increased O2 requirements, on zosyn since 6/25 pm  RUE edema with cord like swelling in antecubital area with TTP  R sided incision healing with no swelling or sign of infection  abd benign, nontender  UA negative for pyuria  Bcx NGTD x2   Scx with normal resp eder   6/29 zosyn (6/25-6/29) escalated to meropenem   now afebrile >24h, WBC stable     Recommendations:   Continue meropenem to complete 5d course 7/3  Monitor temps/WBC  Aspiration precautions   Continue rest of care per primary team     D/w JAMES Falcon M.D.  [facility], Division of Infectious Diseases  [phone]  After 5pm on weekdays and all day on weekends - please call [phone]

## 2024-07-01 NOTE — PROGRESS NOTE ADULT - ASSESSMENT
79M Hx SOC for tumor (per daughter who is ED attending was benign ependymoma), seizures, DM2, HLD presented with frequent falls, found to have SDH tx from OSH now s/p SDH evacuation on 6/22, right MMAE on 6/24. Course c/b MIKAELA resolved, RUE thrombophlebitis, fever possibly due to aspiration PNA and new onset AFib/Aflutter on amio, right basilic vein/cephalic vein thrombus, b/l soleal and right peroneal DVT.

## 2024-07-01 NOTE — SWALLOW VFSS/MBS ASSESSMENT ADULT - NS SWALLOW VFSS REC ASPIR MON
Monitor for s/s aspiration/laryngeal penetration. If noted:  D/C p.o. intake, provide non-oral nutrition/hydration/meds, and contact this service @ [phone]/change of breathing pattern/cough/gurgly voice/fever/pneumonia/throat clearing/upper respiratory infection

## 2024-07-01 NOTE — PROGRESS NOTE ADULT - SUBJECTIVE AND OBJECTIVE BOX
[facility] DIVISION OF INFECTIOUS DISEASES  GODWIN Hutchinson Y. Patel, S. Shah, G. Casimir  [phone]  ([phone] - weekdays after 5pm and weekends)    Name: RODRIGUE CARROLL  Age/Gender: 79y Male  MRN: [id number]    Interval History:  Patient seen and examined this morning.   Feels better, denies pain or any new complaints.   Notes reviewed  No concerning overnight events  Afebrile   Allergies: No Known Allergies      Objective:  Vitals:   T(F): 98.7 (07-01-24 @ 08:00), Max: 98.8 (06-30-24 @ 18:19)  HR: 64 (07-01-24 @ 08:00) (60 - 94)  BP: 132/72 (07-01-24 @ 08:00) (132/72 - 153/75)  RR: 18 (07-01-24 @ 08:00) (17 - 20)  SpO2: 95% (07-01-24 @ 08:00) (94% - 96%)  Physical Examination:  General: no acute distress  HEENT: NC/AT, anicteric, EOMI  Respiratory: decreased breath sounds b/l  Cardiovascular: S1 and S2 present, normal rate   Gastrointestinal: soft, nontender, nondistended  Extremities: no edema, no cyanosis  Skin: no visible rash    Laboratory Studies:  CBC:                       9.2    12.21 )-----------( 349      ( 01 Jul 2024 06:13 )             28.0     WBC Trend:  12.21 07-01-24 @ 06:13  13.03 06-30-24 @ 07:20  13.58 06-29-24 @ 06:38  14.35 06-28-24 @ 22:38  11.72 06-27-24 @ 16:51  10.74 06-27-24 @ 00:54  7.05 06-25-24 @ 23:35  13.38 06-24-24 @ 23:27    CMP: 07-01    136  |  103  |  16  ----------------------------<  128<H>  4.4   |  21<L>  |  1.16    Ca    9.1      01 Jul 2024 06:13    TPro  6.7  /  Alb  2.8<L>  /  TBili  0.5  /  DBili  x   /  AST  19  /  ALT  34  /  AlkPhos  133<H>  06-30    Creatinine: 1.16 mg/dL (07-01-24 @ 06:13)  Creatinine: 1.11 mg/dL (06-30-24 @ 07:25)  Creatinine: 1.11 mg/dL (06-29-24 @ 06:38)  Creatinine: 1.14 mg/dL (06-28-24 @ 22:38)  Creatinine: 1.20 mg/dL (06-28-24 @ 00:34)  Creatinine: 1.40 mg/dL (06-27-24 @ 16:51)  Creatinine: 1.37 mg/dL (06-27-24 @ 00:54)  Creatinine: 1.39 mg/dL (06-26-24 @ 02:19)  Creatinine: 1.59 mg/dL (06-25-24 @ 23:35)  Creatinine: 1.06 mg/dL (06-25-24 @ 08:52)  Creatinine: 0.98 mg/dL (06-24-24 @ 23:27)    LIVER FUNCTIONS - ( 30 Jun 2024 07:25 )  Alb: 2.8 g/dL / Pro: 6.7 g/dL / ALK PHOS: 133 U/L / ALT: 34 U/L / AST: 19 U/L / GGT: x           Microbiology: reviewed   Culture - Sputum (collected 06-28-24 @ 15:57)  Source: .Sputum Sputum  Gram Stain (06-28-24 @ 23:46):    Few polymorphonuclear leukocytes per low power field    Moderate Squamous epithelial cells per low power field    Moderate Gram Positive Cocci in Pairs and Chains per oil power field    Rare Gram Negative Coccobacilli per oil power field    Rare Gram Negative Rods per oil power field    Results consistent with oropharyngeal contamination  Final Report (06-30-24 @ 08:15):    Normal Respiratory Marian present    Culture - Blood (collected 06-28-24 @ 00:34)  Source: .Blood Blood  Preliminary Report (07-01-24 @ 03:01):    No growth at 72 Hours    Culture - Blood (collected 06-28-24 @ 00:34)  Source: .Blood Blood  Preliminary Report (07-01-24 @ 03:01):    No growth at 72 Hours    Culture - Blood (collected 06-25-24 @ 22:17)  Source: .Blood Blood-Peripheral  Final Report (07-01-24 @ 03:01):    No growth at 5 days    Culture - Blood (collected 06-25-24 @ 22:17)  Source: .Blood Blood-Peripheral  Final Report (07-01-24 @ 03:01):    No growth at 5 days    SARS-CoV-2 Result: NotDete (28 Jun 2024 15:09)    Radiology: reviewed   < from: [facility] Duplex Lower Ext Vein Scan, Bilat (06.30.24 @ 17:05) >  IMPRESSION:  Acute deep venous thrombosis: below the knee.    New bilateral calf veins deep vein thrombosis.    Findings were discussed with Dr. Goodman  6/30/2024 5:10 PM by Dr. Robbi Santos with read back confirmation.    < end of copied text >  < from: [facility] Duplex Upper Ext Vein Scan, Bilat (06.28.24 @ 12:18) >  IMPRESSION:  No evidence of deep venous thrombosis in either upper extremity.    There is superficial vein thrombosis of the right basilic vein at the   antecubital fossa and right cephalic vein to the mid upper arm level.    < end of copied text >    Medications:  aMIOdarone    Tablet 400 milliGRAM(s) Oral two times a day  atorvastatin 80 milliGRAM(s) Oral at bedtime  bisacodyl 5 milliGRAM(s) Oral daily  dexamethasone/neomycin/polymyxin Suspension 1 Drop(s) Both EYES every 8 hours  dextrose 10% Bolus 125 milliLiter(s) IV Bolus once  dextrose 5%. 1000 milliLiter(s) IV Continuous <Continuous>  dextrose 5%. 1000 milliLiter(s) IV Continuous <Continuous>  dextrose 50% Injectable 25 Gram(s) IV Push once  dextrose 50% Injectable 12.5 Gram(s) IV Push once  dextrose Oral Gel 15 Gram(s) Oral once PRN  glucagon  Injectable 1 milliGRAM(s) IntraMuscular once  heparin   Injectable 5000 Unit(s) SubCutaneous every 12 hours  insulin glargine Injectable (LANTUS) 12 Unit(s) SubCutaneous at bedtime  insulin lispro (ADMELOG) corrective regimen sliding scale   SubCutaneous Before meals and at bedtime  insulin lispro Injectable (ADMELOG) 5 Unit(s) SubCutaneous three times a day before meals  lacosamide IVPB 50 milliGRAM(s) IV Intermittent every 12 hours  levETIRAcetam 250 milliGRAM(s) Oral two times a day  meropenem  IVPB 1000 milliGRAM(s) IV Intermittent every 8 hours  metoprolol tartrate 50 milliGRAM(s) Oral two times a day  ondansetron Injectable 4 milliGRAM(s) IV Push every 6 hours PRN  pantoprazole    Tablet 40 milliGRAM(s) Oral every 12 hours  pilocarpine 2% Solution 1 Drop(s) Right EYE two times a day  polyethylene glycol 3350 17 Gram(s) Oral two times a day  potassium phosphate / sodium phosphate Powder (PHOS-NaK) 1 Packet(s) Oral three times a day  senna 2 Tablet(s) Oral at bedtime  topiramate 100 milliGRAM(s) Oral two times a day    Current Antimicrobials:  meropenem  IVPB 1000 milliGRAM(s) IV Intermittent every 8 hours    Prior/Completed Antimicrobials:  ceFAZolin   IVPB  piperacillin/tazobactam IVPB.  piperacillin/tazobactam IVPB.-  piperacillin/tazobactam IVPB.-  piperacillin/tazobactam IVPB.-

## 2024-07-01 NOTE — SWALLOW VFSS/MBS ASSESSMENT ADULT - ORAL PHASE
within functional limits trace to the valleculae/Incomplete tongue to palate contact/Uncontrolled bolus / spillover in eunice-pharynx mild to the valleculae and pyriform sinuses/Incomplete tongue to palate contact/Uncontrolled bolus / spillover in eunice-pharynx/Uncontrolled bolus / spillover in hypopharynx

## 2024-07-01 NOTE — PROGRESS NOTE ADULT - SUBJECTIVE AND OBJECTIVE BOX
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 No AC/AP, coags/plt wnl  Exam: AOx3, PERRL, EOMI, no facial, ?/very slight L drift, BEST 5/5, gait testing deferred    (22 Jun 2024 06:07)      OVERNIGHT EVENTS: No acute events overnight, patient seen and evaluated at bedside with his wife in the room. Patient just had his MBS and passed and was advanced to easy to chew diet for which he feels much happier about.     Vital Signs Last 24 Hrs  T(C): 37.1 (01 Jul 2024 08:00), Max: 37.1 (30 Jun 2024 18:19)  T(F): 98.7 (01 Jul 2024 08:00), Max: 98.8 (30 Jun 2024 18:19)  HR: 64 (01 Jul 2024 08:00) (64 - 94)  BP: 132/72 (01 Jul 2024 08:00) (132/72 - 153/75)  BP(mean): --  RR: 18 (01 Jul 2024 08:00) (17 - 18)  SpO2: 95% (01 Jul 2024 08:00) (94% - 96%)    Parameters below as of 01 Jul 2024 08:00  Patient On (Oxygen Delivery Method): room air        PHYSICAL EXAM:    General: No Acute Distress     Neurological: Awake, Ox3 (name, place, date), left eye ptosis, left pupil 2mm react, right pupil 3mm react, following commands, no drift, uppers 5/5, lowers 5/5    Pulmonary: Clear to Auscultation, No Rales, No Rhonchi, No Wheezes     Cardiovascular: S1, S2, Regular Rate and Rhythm     Gastrointestinal: Soft, Nontender, Distended but non-tender to palpation    Incision: right crani surgical incision has staples in place C/D/I    LABS:                        9.2    12.21 )-----------( 349      ( 01 Jul 2024 06:13 )             28.0    07-01    136  |  103  |  16  ----------------------------<  128<H>  4.4   |  21<L>  |  1.16    Ca    9.1      01 Jul 2024 06:13    TPro  6.7  /  Alb  2.8<L>  /  TBili  0.5  /  DBili  x   /  AST  19  /  ALT  34  /  AlkPhos  133<H>  06-30 06-30 @ 07:01  -  07-01 @ 07:00  --------------------------------------------------------  IN: 0 mL / OUT: 1200 mL / NET: -1200 mL      DRAINS: None    MEDICATIONS:  Antibiotics:  meropenem  IVPB 1000 milliGRAM(s) IV Intermittent every 8 hours    Neuro:  lacosamide IVPB 50 milliGRAM(s) IV Intermittent every 12 hours  levETIRAcetam 250 milliGRAM(s) Oral two times a day  ondansetron Injectable 4 milliGRAM(s) IV Push every 6 hours PRN Nausea and/or Vomiting  topiramate 100 milliGRAM(s) Oral two times a day    Cardiac:  aMIOdarone    Tablet 400 milliGRAM(s) Oral two times a day  metoprolol tartrate 50 milliGRAM(s) Oral two times a day    Pulm:    GI/:  bisacodyl 5 milliGRAM(s) Oral daily  pantoprazole    Tablet 40 milliGRAM(s) Oral every 12 hours  polyethylene glycol 3350 17 Gram(s) Oral two times a day  senna 2 Tablet(s) Oral at bedtime    Other:   atorvastatin 80 milliGRAM(s) Oral at bedtime  dexamethasone/neomycin/polymyxin Suspension 1 Drop(s) Both EYES every 8 hours  dextrose 10% Bolus 125 milliLiter(s) IV Bolus once  dextrose 5%. 1000 milliLiter(s) IV Continuous <Continuous>  dextrose 5%. 1000 milliLiter(s) IV Continuous <Continuous>  dextrose 50% Injectable 25 Gram(s) IV Push once  dextrose 50% Injectable 12.5 Gram(s) IV Push once  dextrose Oral Gel 15 Gram(s) Oral once PRN Blood Glucose LESS THAN 70 milliGRAM(s)/deciliter  glucagon  Injectable 1 milliGRAM(s) IntraMuscular once  heparin   Injectable 5000 Unit(s) SubCutaneous every 12 hours  insulin glargine Injectable (LANTUS) 12 Unit(s) SubCutaneous at bedtime  insulin lispro (ADMELOG) corrective regimen sliding scale   SubCutaneous Before meals and at bedtime  insulin lispro Injectable (ADMELOG) 5 Unit(s) SubCutaneous three times a day before meals  pilocarpine 2% Solution 1 Drop(s) Right EYE two times a day  potassium phosphate / sodium phosphate Powder (PHOS-NaK) 1 Packet(s) Oral three times a day    DIET: [] Regular [x easy to chew] CCD [] Renal [] Puree [] Dysphagia [] Tube Feeds:     IMAGING:   < from: CT Head No Cont (06.25.24 @ 09:46) >  IMPRESSION:  Interval removal of subdural drainage catheter and right MMA embolization   since6/24/2024. Unchanged residual subdural hemorrhage and mild midline   shift.      --- End of Report ---    DARRIUS WILL MD; Resident Radiologist  This document has been electronically signed.  XAVIER MUNOZ MD; Attending Radiologist  This document has been electronically signed. Jun 25 2024 12:33PM    < end of copied text >    < from: [facility] Duplex Lower Ext Vein Scan, Bilat (06.30.24 @ 17:05) >  IMPRESSION:  Acute deep venous thrombosis: below the knee.    New bilateral calf veins deep vein thrombosis.    Findings were discussed with Dr. Goodman  6/30/2024 5:10 PM by Dr. Robbi Santos with read back confirmation.    --- End of Report ---      CHELY SANTOS MD; Attending Radiologist  This document has been electronically signed. Jun 30 2024  5:14PM    < end of copied text >

## 2024-07-02 ENCOUNTER — TRANSCRIPTION ENCOUNTER (OUTPATIENT)
Age: 79
End: 2024-07-02

## 2024-07-02 LAB
ALBUMIN SERPL ELPH-MCNC: 3.1 G/DL — LOW (ref 3.3–5)
ALP SERPL-CCNC: 130 U/L — HIGH (ref 40–120)
ALT FLD-CCNC: 44 U/L — SIGNIFICANT CHANGE UP (ref 10–45)
ANION GAP SERPL CALC-SCNC: 12 MMOL/L — SIGNIFICANT CHANGE UP (ref 5–17)
AST SERPL-CCNC: 33 U/L — SIGNIFICANT CHANGE UP (ref 10–40)
BILIRUB SERPL-MCNC: 0.6 MG/DL — SIGNIFICANT CHANGE UP (ref 0.2–1.2)
BUN SERPL-MCNC: 18 MG/DL — SIGNIFICANT CHANGE UP (ref 7–23)
CALCIUM SERPL-MCNC: 9.1 MG/DL — SIGNIFICANT CHANGE UP (ref 8.4–10.5)
CHLORIDE SERPL-SCNC: 102 MMOL/L — SIGNIFICANT CHANGE UP (ref 96–108)
CO2 SERPL-SCNC: 21 MMOL/L — LOW (ref 22–31)
CREAT SERPL-MCNC: 1.29 MG/DL — SIGNIFICANT CHANGE UP (ref 0.5–1.3)
EGFR: 56 ML/MIN/1.73M2 — LOW
GLUCOSE BLDC GLUCOMTR-MCNC: 129 MG/DL — HIGH (ref 70–99)
GLUCOSE BLDC GLUCOMTR-MCNC: 172 MG/DL — HIGH (ref 70–99)
GLUCOSE BLDC GLUCOMTR-MCNC: 181 MG/DL — HIGH (ref 70–99)
GLUCOSE BLDC GLUCOMTR-MCNC: 216 MG/DL — HIGH (ref 70–99)
GLUCOSE SERPL-MCNC: 129 MG/DL — HIGH (ref 70–99)
POTASSIUM SERPL-MCNC: 4.1 MMOL/L — SIGNIFICANT CHANGE UP (ref 3.5–5.3)
POTASSIUM SERPL-SCNC: 4.1 MMOL/L — SIGNIFICANT CHANGE UP (ref 3.5–5.3)
PROT SERPL-MCNC: 7.1 G/DL — SIGNIFICANT CHANGE UP (ref 6–8.3)
SODIUM SERPL-SCNC: 135 MMOL/L — SIGNIFICANT CHANGE UP (ref 135–145)

## 2024-07-02 PROCEDURE — ZZZZZ: CPT

## 2024-07-02 PROCEDURE — 99232 SBSQ HOSP IP/OBS MODERATE 35: CPT

## 2024-07-02 RX ORDER — AMIODARONE HYDROCHLORIDE 50 MG/ML
400 INJECTION, SOLUTION INTRAVENOUS
Refills: 0 | Status: COMPLETED | OUTPATIENT
Start: 2024-07-03 | End: 2024-07-03

## 2024-07-02 RX ORDER — POLYETHYLENE GLYCOL 3350 1 G/G
17 POWDER ORAL DAILY
Refills: 0 | Status: DISCONTINUED | OUTPATIENT
Start: 2024-07-03 | End: 2024-07-05

## 2024-07-02 RX ORDER — AMIODARONE HYDROCHLORIDE 50 MG/ML
INJECTION, SOLUTION INTRAVENOUS
Refills: 0 | Status: DISCONTINUED | OUTPATIENT
Start: 2024-07-03 | End: 2024-07-10

## 2024-07-02 RX ORDER — INSULIN GLARGINE 100 [IU]/ML
10 INJECTION, SOLUTION SUBCUTANEOUS AT BEDTIME
Refills: 0 | Status: DISCONTINUED | OUTPATIENT
Start: 2024-07-02 | End: 2024-07-10

## 2024-07-02 RX ORDER — AMIODARONE HYDROCHLORIDE 50 MG/ML
200 INJECTION, SOLUTION INTRAVENOUS DAILY
Refills: 0 | Status: DISCONTINUED | OUTPATIENT
Start: 2024-07-04 | End: 2024-07-10

## 2024-07-02 RX ADMIN — Medication 1 DROP(S): at 17:12

## 2024-07-02 RX ADMIN — LEVETIRACETAM 250 MILLIGRAM(S): 100 INJECTION INTRAVENOUS at 17:12

## 2024-07-02 RX ADMIN — Medication 1 PACKET(S): at 21:58

## 2024-07-02 RX ADMIN — PANTOPRAZOLE SODIUM 40 MILLIGRAM(S): 40 INJECTION, POWDER, FOR SOLUTION INTRAVENOUS at 05:12

## 2024-07-02 RX ADMIN — Medication 1 DROP(S): at 22:00

## 2024-07-02 RX ADMIN — INSULIN LISPRO 5 UNIT(S): 100 INJECTION, SOLUTION SUBCUTANEOUS at 17:10

## 2024-07-02 RX ADMIN — Medication 50 MILLIGRAM(S): at 05:12

## 2024-07-02 RX ADMIN — HEPARIN SODIUM 5000 UNIT(S): 50 INJECTION, SOLUTION INTRAVENOUS at 17:11

## 2024-07-02 RX ADMIN — MEROPENEM 100 MILLIGRAM(S): 500 INJECTION, POWDER, FOR SOLUTION INTRAVENOUS at 05:13

## 2024-07-02 RX ADMIN — Medication 50 MILLIGRAM(S): at 17:11

## 2024-07-02 RX ADMIN — AMIODARONE HYDROCHLORIDE 400 MILLIGRAM(S): 50 INJECTION, SOLUTION INTRAVENOUS at 05:12

## 2024-07-02 RX ADMIN — INSULIN LISPRO 5 UNIT(S): 100 INJECTION, SOLUTION SUBCUTANEOUS at 08:31

## 2024-07-02 RX ADMIN — LACOSAMIDE 110 MILLIGRAM(S): 100 TABLET, FILM COATED ORAL at 17:10

## 2024-07-02 RX ADMIN — Medication 1 DROP(S): at 13:14

## 2024-07-02 RX ADMIN — TOPIRAMATE 100 MILLIGRAM(S): 50 TABLET, FILM COATED ORAL at 05:12

## 2024-07-02 RX ADMIN — PANTOPRAZOLE SODIUM 40 MILLIGRAM(S): 40 INJECTION, POWDER, FOR SOLUTION INTRAVENOUS at 17:12

## 2024-07-02 RX ADMIN — INSULIN LISPRO 2: 100 INJECTION, SOLUTION SUBCUTANEOUS at 21:59

## 2024-07-02 RX ADMIN — INSULIN LISPRO 2: 100 INJECTION, SOLUTION SUBCUTANEOUS at 17:10

## 2024-07-02 RX ADMIN — Medication 1 PACKET(S): at 05:12

## 2024-07-02 RX ADMIN — Medication 1 DROP(S): at 05:13

## 2024-07-02 RX ADMIN — LACOSAMIDE 110 MILLIGRAM(S): 100 TABLET, FILM COATED ORAL at 05:11

## 2024-07-02 RX ADMIN — INSULIN LISPRO 4: 100 INJECTION, SOLUTION SUBCUTANEOUS at 11:37

## 2024-07-02 RX ADMIN — Medication 1 PACKET(S): at 13:14

## 2024-07-02 RX ADMIN — INSULIN GLARGINE 10 UNIT(S): 100 INJECTION, SOLUTION SUBCUTANEOUS at 21:58

## 2024-07-02 RX ADMIN — MEROPENEM 100 MILLIGRAM(S): 500 INJECTION, POWDER, FOR SOLUTION INTRAVENOUS at 22:00

## 2024-07-02 RX ADMIN — MEROPENEM 100 MILLIGRAM(S): 500 INJECTION, POWDER, FOR SOLUTION INTRAVENOUS at 13:14

## 2024-07-02 RX ADMIN — INSULIN LISPRO 5 UNIT(S): 100 INJECTION, SOLUTION SUBCUTANEOUS at 11:38

## 2024-07-02 RX ADMIN — TOPIRAMATE 100 MILLIGRAM(S): 50 TABLET, FILM COATED ORAL at 17:12

## 2024-07-02 RX ADMIN — AMIODARONE HYDROCHLORIDE 400 MILLIGRAM(S): 50 INJECTION, SOLUTION INTRAVENOUS at 17:11

## 2024-07-02 RX ADMIN — LEVETIRACETAM 250 MILLIGRAM(S): 100 INJECTION INTRAVENOUS at 05:12

## 2024-07-02 RX ADMIN — HEPARIN SODIUM 5000 UNIT(S): 50 INJECTION, SOLUTION INTRAVENOUS at 05:12

## 2024-07-02 RX ADMIN — ATORVASTATIN CALCIUM 80 MILLIGRAM(S): 20 TABLET, FILM COATED ORAL at 21:58

## 2024-07-02 NOTE — DISCHARGE NOTE PROVIDER - REASON FOR ADMISSION
6/22/24 s/p Right craniotomy for subdural hematoma  6/24/24 s/p angio: right MMA embolization  6/22/24 s/p Right craniotomy for subdural hematoma  6/24/24 s/p angio: right MMA embolization

## 2024-07-02 NOTE — PROGRESS NOTE ADULT - SUBJECTIVE AND OBJECTIVE BOX
Vascular Cardiology Progress Note     DIRECT PROVIDER NUMBER: [phone]  / Available on TEAMS    CC: SDH     Interval Events:  Denies C/P or SOB.  No LE pain or edema.     Allergies  No Known Allergies	    MEDICATIONS  (STANDING):  aMIOdarone    Tablet 400 milliGRAM(s) Oral two times a day  atorvastatin 80 milliGRAM(s) Oral at bedtime  dexamethasone/neomycin/polymyxin Suspension 1 Drop(s) Both EYES every 8 hours  dextrose 10% Bolus 125 milliLiter(s) IV Bolus once  dextrose 5%. 1000 milliLiter(s) (100 mL/Hr) IV Continuous <Continuous>  dextrose 5%. 1000 milliLiter(s) (50 mL/Hr) IV Continuous <Continuous>  dextrose 50% Injectable 25 Gram(s) IV Push once  dextrose 50% Injectable 12.5 Gram(s) IV Push once  glucagon  Injectable 1 milliGRAM(s) IntraMuscular once  heparin   Injectable 5000 Unit(s) SubCutaneous every 12 hours  insulin glargine Injectable (LANTUS) 12 Unit(s) SubCutaneous at bedtime  insulin lispro (ADMELOG) corrective regimen sliding scale   SubCutaneous Before meals and at bedtime  insulin lispro Injectable (ADMELOG) 5 Unit(s) SubCutaneous three times a day before meals  lacosamide IVPB 50 milliGRAM(s) IV Intermittent every 12 hours  levETIRAcetam 250 milliGRAM(s) Oral two times a day  meropenem  IVPB 1000 milliGRAM(s) IV Intermittent every 8 hours  metoprolol tartrate 50 milliGRAM(s) Oral two times a day  pantoprazole    Tablet 40 milliGRAM(s) Oral every 12 hours  pilocarpine 2% Solution 1 Drop(s) Right EYE two times a day  polyethylene glycol 3350 17 Gram(s) Oral two times a day  potassium phosphate / sodium phosphate Powder (PHOS-NaK) 1 Packet(s) Oral three times a day  senna 2 Tablet(s) Oral at bedtime  topiramate 100 milliGRAM(s) Oral two times a day    PAST MEDICAL & SURGICAL HISTORY:  DM2 (diabetes mellitus, type 2)  HTN (hypertension)  Seizures  Brain tumor  Pulmonary embolism    FAMILY HISTORY: see HPI    SOCIAL HISTORY:  unchanged    REVIEW OF SYSTEMS:  CONSTITUTIONAL: No fever  EYES: No eye pain  ENT:  No throat pain  NECK: No pain   RESPIRATORY: No C/P  CARDIOVASCULAR: No SOB  GASTROINTESTINAL: No abdominal pain  GENITOURINARY: No hematuria  SKIN: No LE wounds  LYMPH Nodes: No enlarged glands noted  ENDOCRINE: No heat or cold intolerance noted  MUSCULOSKELETAL: No LE edema  PSYCHIATRIC: No depression, anxiety  HEME/LYMPH: No bleeding gums  ALLERGY AND IMMUNOLOGIC: No hives    [ x] All others negative	    PHYSICAL EXAM:  Vital Signs Last 24 Hrs  T(C): 36.4 (02 Jul 2024 08:36), Max: 37.2 (01 Jul 2024 17:56)  T(F): 97.6 (02 Jul 2024 08:36), Max: 99 (01 Jul 2024 17:56)  HR: 60 (02 Jul 2024 08:36) (60 - 90)  BP: 117/68 (02 Jul 2024 08:36) (117/68 - 146/82)  BP(mean): --  RR: 18 (02 Jul 2024 08:36) (18 - 18)  SpO2: 95% (02 Jul 2024 08:36) (95% - 97%)    Parameters below as of 02 Jul 2024 08:36  Patient On (Oxygen Delivery Method): room air        Appearance: NAD 	  Cardiovascular: RRR, S1 and S2  Respiratory: CTA B/L   Psychiatry:  AAO x 3  Gastrointestinal:  Soft, Non-tender, + BS	  Skin: No cyanosis	  Extremities: No LE edema, bilateral calves soft    Vascular Pulse Exam: Palpable pedal pulses bilaterally   Foot Exam: No tissue injury    LABS:	 	                        9.2    12.21 )-----------( 349      ( 01 Jul 2024 06:13 )             28.0     07-02    135  |  102  |  18  ----------------------------<  129<H>  4.1   |  21<L>  |  1.29    Ca    9.1      02 Jul 2024 05:33    TPro  7.1  /  Alb  3.1<L>  /  TBili  0.6  /  DBili  x   /  AST  33  /  ALT  44  /  AlkPhos  130<H>  07-02      Urinalysis Basic - ( 02 Jul 2024 05:33 )    Color: x / Appearance: x / SG: x / pH: x  Gluc: 129 mg/dL / Ketone: x  / Bili: x / Urobili: x   Blood: x / Protein: x / Nitrite: x   Leuk Esterase: x / RBC: x / WBC x   Sq Epi: x / Non Sq Epi: x / Bacteria: x        Assessment:  1. Bilateral Below the knee DVT      CTA chest showed no PE      TTE with normal RV function   2. SDH     S/p R craniotomy for evacuation of SDH on 6/22. S/p R MMA embolization on 6/24  3. DM  4. HLD   5. History of Falls    Plan:  1. For asymptomatic below the knee DVT in setting of recent SDH and neurosurgery, recommend to continue surveillance LE venous duplex next on 7/5.  2. Patient currently on Heparin Subcutaneous 5000 units BID, recommend to continue.  3. Appreciate EP / Cardiology following for history of Afib.   4. Appreciate excellent Neurosurgical care.        Thank you  STELLA Mendez, MS, [facility]  Vascular Cardiology Service    Please call with any questions:   DIRECT SERVICE NUMBER: [phone] / Available on TEAMS

## 2024-07-02 NOTE — DISCHARGE NOTE PROVIDER - CARE PROVIDER_API CALL
Fuad Goodman  Neurosurgery  [address], [address]  [place] [address]  Phone: [phone]  Fax: [phone]  Follow Up Time:     Matt Reese  Neurosurgery  [address], [address]  [place] [address]  Phone: [phone]  Fax: [phone]  Follow Up Time:     Jevon Palacio  Vascular Medicine  [address], [address] [place] [address]  Phone: [phone]  Fax: [phone]  Follow Up Time:     Colby Bass  Cardiovascular Disease  [address], [address]  [place] [address]  Phone: [phone]  Fax: [phone]  Follow Up Time:     Jeb Gutierrez  Cardiac Electrophysiology  [address] [place] [address]  Phone: [phone]  Fax: [phone]  Follow Up Time:

## 2024-07-02 NOTE — PROGRESS NOTE ADULT - SUBJECTIVE AND OBJECTIVE BOX
[facility] Division of Hospital Medicine  Evy Scott MD  Available via MS Teams  Spectra [phone]    SUBJECTIVE / OVERNIGHT EVENTS: patient seen and examined this morning. doing well, denies any pain or SOB, has had multiple BM since yesterday. still has intermittent cough.         MEDICATIONS  (STANDING):  aMIOdarone    Tablet 400 milliGRAM(s) Oral two times a day  atorvastatin 80 milliGRAM(s) Oral at bedtime  dexamethasone/neomycin/polymyxin Suspension 1 Drop(s) Both EYES every 8 hours  dextrose 10% Bolus 125 milliLiter(s) IV Bolus once  dextrose 5%. 1000 milliLiter(s) (100 mL/Hr) IV Continuous <Continuous>  dextrose 5%. 1000 milliLiter(s) (50 mL/Hr) IV Continuous <Continuous>  dextrose 50% Injectable 25 Gram(s) IV Push once  dextrose 50% Injectable 12.5 Gram(s) IV Push once  glucagon  Injectable 1 milliGRAM(s) IntraMuscular once  heparin   Injectable 5000 Unit(s) SubCutaneous every 12 hours  insulin glargine Injectable (LANTUS) 10 Unit(s) SubCutaneous at bedtime  insulin lispro (ADMELOG) corrective regimen sliding scale   SubCutaneous Before meals and at bedtime  insulin lispro Injectable (ADMELOG) 5 Unit(s) SubCutaneous three times a day before meals  lacosamide IVPB 50 milliGRAM(s) IV Intermittent every 12 hours  levETIRAcetam 250 milliGRAM(s) Oral two times a day  meropenem  IVPB 1000 milliGRAM(s) IV Intermittent every 8 hours  metoprolol tartrate 50 milliGRAM(s) Oral two times a day  pantoprazole    Tablet 40 milliGRAM(s) Oral every 12 hours  pilocarpine 2% Solution 1 Drop(s) Right EYE two times a day  potassium phosphate / sodium phosphate Powder (PHOS-NaK) 1 Packet(s) Oral three times a day  senna 2 Tablet(s) Oral at bedtime  topiramate 100 milliGRAM(s) Oral two times a day    MEDICATIONS  (PRN):  acetaminophen     Tablet .. 650 milliGRAM(s) Oral every 6 hours PRN Mild Pain (1 - 3)  dextrose Oral Gel 15 Gram(s) Oral once PRN Blood Glucose LESS THAN 70 milliGRAM(s)/deciliter  ondansetron Injectable 4 milliGRAM(s) IV Push every 6 hours PRN Nausea and/or Vomiting      I&O's Summary    01 Jul 2024 07:01  -  02 Jul 2024 07:00  --------------------------------------------------------  IN: 0 mL / OUT: 900 mL / NET: -900 mL        PHYSICAL EXAM:  Vital Signs Last 24 Hrs  T(C): 36.4 (02 Jul 2024 08:36), Max: 37.2 (01 Jul 2024 17:56)  T(F): 97.6 (02 Jul 2024 08:36), Max: 99 (01 Jul 2024 17:56)  HR: 60 (02 Jul 2024 08:36) (60 - 90)  BP: 117/68 (02 Jul 2024 08:36) (117/68 - 146/82)  BP(mean): --  RR: 18 (02 Jul 2024 08:36) (18 - 18)  SpO2: 95% (02 Jul 2024 08:36) (95% - 97%)    Parameters below as of 02 Jul 2024 08:36  Patient On (Oxygen Delivery Method): room air      CONSTITUTIONAL: NAD, well-groomed  RESPIRATORY: Normal respiratory effort; lungs are clear to auscultation bilaterally  CARDIOVASCULAR: normal S1 and S2; No lower extremity edema  ABDOMEN: nontender to palpation, normoactive bowel sounds, no rebound/guarding  MUSCULOSKELETAL: no clubbing or cyanosis of digits; no joint swelling or tenderness to palpation  PSYCH: A+O to person, place, and time; affect appropriate  NEUROLOGY: follows commands, moves all extremities      LABS:                        9.2    12.21 )-----------( 349      ( 01 Jul 2024 06:13 )             28.0     07-02    135  |  102  |  18  ----------------------------<  129<H>  4.1   |  21<L>  |  1.29    Ca    9.1      02 Jul 2024 05:33    TPro  7.1  /  Alb  3.1<L>  /  TBili  0.6  /  DBili  x   /  AST  33  /  ALT  44  /  AlkPhos  130<H>  07-02          Urinalysis Basic - ( 02 Jul 2024 05:33 )    Color: x / Appearance: x / SG: x / pH: x  Gluc: 129 mg/dL / Ketone: x  / Bili: x / Urobili: x   Blood: x / Protein: x / Nitrite: x   Leuk Esterase: x / RBC: x / WBC x   Sq Epi: x / Non Sq Epi: x / Bacteria: x            COMMUNICATION:  Care Discussed with Consultants/Other Providers and Details of Discussion: neurosurgery PA(Raisa)

## 2024-07-02 NOTE — DISCHARGE NOTE PROVIDER - NSDCCPCAREPLAN_GEN_ALL_CORE_FT
PRINCIPAL DISCHARGE DIAGNOSIS  Diagnosis: Acute subdural hematoma  Assessment and Plan of Treatment: 6/22/24 s/p Right craniotomy for subdural hematoma  6/24/24 s/p angio: right MMA embolization   Please make an appointment and follow up with Dr. Tran (Neurosurgery who did your craniotomy) and Dr. Reese (Neurosurgery who did your MMA embolization) in 1-2 weeks after discharge from the rehab. You have surgical staples in place that can be removed on 7/6//24. Please keep your surgical incision clean and dry. You may shower however please do NOT scrub at incision, pat dry only. You may take Tylenol (Acetaminophen) as needed for pain control. Please continue your Keppra 250mg twice a day and Vimpat 50mg twice a day for your history of seizures. Keppra and Vimpat helps control and prevent seizures.  The most common side effects include diarrhea or constipation, excessive sleepiness, irritability and mood changes.  Rare and sometimes serious side effects include rash. Please do NOT take any NSAIDs (Advil, Aleve, Motrin, Ibuprofen) as these medications can increase your risk of bleeding after surgery.      SECONDARY DISCHARGE DIAGNOSES  Diagnosis: Paroxysmal atrial fibrillation  Assessment and Plan of Treatment: Please continue your Amiodarone and Lopressor as prescribed. Please make an appointment and follow up with Dr. Bass in 1-2 weeks after discharge. Please make an appointment and follow up with Dr. Gutierrez (Electrophysiology) in 1-2 weeks after discharge as you may be candidate for a Watchman procedure or elective ablation or JOHNATHAN occlusion. PLEASE DO NOT START ANTICOAGULATION WITHOUT SPEAKING / APPROVAL FROM DR. TRAN (NEUROSURGERY) AS YOU HAD SUBDURAL HEMATOMA AND HAD SURGERY FOR IT.    Diagnosis: Deep vein thrombosis (DVT)  Assessment and Plan of Treatment: You were found to have bilateral calf vein DVT seen on lower extremity vein dopplers on 6/30/24, please continue your subcutaneous Heparin 5000unit every 12 hours and please have follow up lower extremity dopplers in rehab facility. Please make an appointemnt and follow up with Dr. Palacio in 1-2 weeks after discharge from rehab.    Diagnosis: DM2 (diabetes mellitus, type 2)  Assessment and Plan of Treatment: HgbA1c 6.4, please continue insulin sliding scale, Lantus and Admelog while in rehab facility. Continue low sugar and low carb diet, please make an appointment and follow up with your primary care provider in 1-2 weeks after discharge from rehab.    Diagnosis: Hyperlipidemia  Assessment and Plan of Treatment: Please continue Lipitor as prescribed. Please make an appointment and follow up with your cardiologist / primary care provider in 1-2 weeks after discharge.     PRINCIPAL DISCHARGE DIAGNOSIS  Diagnosis: Acute subdural hematoma  Assessment and Plan of Treatment: 6/22/24 s/p Right craniotomy for subdural hematoma  6/24/24 s/p angio: right MMA embolization   Please make an appointment and follow up with Dr. Tran (Neurosurgery who did your craniotomy) and Dr. Reese (Neurosurgery who did your MMA embolization) in 1-2 weeks after discharge from the rehab. You have surgical staples in place that can be removed on 7/6//24. Please keep your surgical incision clean and dry. You may shower however please do NOT scrub at incision, pat dry only. You may take Tylenol (Acetaminophen) as needed for pain control. Please continue your Keppra 250mg twice a day and Vimpat 50mg twice a day for your history of seizures. Keppra and Vimpat helps control and prevent seizures.  The most common side effects include diarrhea or constipation, excessive sleepiness, irritability and mood changes.  Rare and sometimes serious side effects include rash. Please do NOT take any NSAIDs (Advil, Aleve, Motrin, Ibuprofen) as these medications can increase your risk of bleeding after surgery.      SECONDARY DISCHARGE DIAGNOSES  Diagnosis: Paroxysmal atrial fibrillation  Assessment and Plan of Treatment: Please continue your Amiodarone and Lopressor as prescribed. Please make an appointment and follow up with Dr. Bass in 1-2 weeks after discharge. Please make an appointment and follow up with Dr. Gutierrez (Electrophysiology) in 1-2 weeks after discharge as you may be candidate for a Watchman procedure or elective ablation or JOHNATHAN occlusion. PLEASE DO NOT START ANTICOAGULATION WITHOUT SPEAKING / APPROVAL FROM DR. TRAN (NEUROSURGERY) AS YOU HAD SUBDURAL HEMATOMA AND HAD SURGERY FOR IT.    Diagnosis: Deep vein thrombosis (DVT)  Assessment and Plan of Treatment: You were found to have bilateral calf vein DVT seen on lower extremity vein dopplers on 6/30/24 and a follow up on 7/5 which showed no propagation, please continue your subcutaneous Heparin 5000unit every 12 hours and please have follow up lower extremity dopplers in rehab facility in 5-7 days. Please make an appointement and follow up with Dr. Palacio in 1-2 weeks after discharge from rehab.    Diagnosis: DM2 (diabetes mellitus, type 2)  Assessment and Plan of Treatment: HgbA1c 6.4, please continue insulin sliding scale, Lantus and Admelog while in rehab facility. Continue low sugar and low carb diet, please make an appointment and follow up with your primary care provider in 1-2 weeks after discharge from rehab.    Diagnosis: Hyperlipidemia  Assessment and Plan of Treatment: Please continue Lipitor as prescribed. Please make an appointment and follow up with your cardiologist / primary care provider in 1-2 weeks after discharge.    Diagnosis: Glaucoma  Assessment and Plan of Treatment: Continue Pilocarpine as prescribed and follow up with Dr. Lee ([facility], Ophthalmology in 1-2 weeks after discharge. You were previously on Maxitrol eye drops for blepharitis, you finished it on 7/8, if you are to have a re-occurence of blepharitis you may restart medication per Dr. Lee.     PRINCIPAL DISCHARGE DIAGNOSIS  Diagnosis: Acute subdural hematoma  Assessment and Plan of Treatment: 6/22/24 s/p Right craniotomy for subdural hematoma  6/24/24 s/p angio: right MMA embolization   Please make an appointment and follow up with Dr. Tran (Neurosurgery who did your craniotomy) and Dr. Reese (Neurosurgery who did your MMA embolization) in 1-2 weeks after discharge from the rehab. Your surgical staples were removed on 7/6/24. Please keep your surgical incision clean and dry. You may shower however please do NOT scrub at incision, pat dry only. You may take Tylenol (Acetaminophen) as needed for pain control. Please continue your Keppra 250mg twice a day, Topamax 100mg twice a day and Vimpat 50mg twice a day for your history of seizures. Topamax, Keppra and Vimpat helps control and prevent seizures.  The most common side effects include diarrhea or constipation, excessive sleepiness, irritability and mood changes.  Rare and sometimes serious side effects include rash. Please do NOT take any NSAIDs (Advil, Aleve, Motrin, Ibuprofen) as these medications can increase your risk of bleeding after surgery.      SECONDARY DISCHARGE DIAGNOSES  Diagnosis: Paroxysmal atrial fibrillation  Assessment and Plan of Treatment: Please continue your Amiodarone and Lopressor as prescribed. Please make an appointment and follow up with Dr. Bass in 1-2 weeks after discharge. Please have your liver function test monitored while on Amiodarone while in rehab. Please make an appointment and follow up with Dr. Gutierrez (Electrophysiology) in 1-2 weeks after discharge as you may be candidate for a Watchman procedure or elective ablation or JOHNATHAN occlusion. PLEASE DO NOT START ANTICOAGULATION WITHOUT SPEAKING / APPROVAL FROM DR. TRAN (NEUROSURGERY) AS YOU HAD SUBDURAL HEMATOMA AND HAD SURGERY FOR IT.    Diagnosis: Deep vein thrombosis (DVT)  Assessment and Plan of Treatment: You were found to have bilateral calf vein DVT seen on lower extremity vein dopplers on 6/30/24 and a follow up on 7/5 which showed no propagation, please continue your subcutaneous Heparin 5000unit every 12 hours and please have follow up lower extremity dopplers in rehab facility on 7/12/24. Please make an appointement and follow up with Dr. Palacio in 1-2 weeks after discharge from rehab.    Diagnosis: DM2 (diabetes mellitus, type 2)  Assessment and Plan of Treatment: HgbA1c 6.4, please continue your home Metformin while in rehab facility. Continue low sugar and low carb diet, please make an appointment and follow up with your primary care provider in 1-2 weeks after discharge from rehab.    Diagnosis: Hyperlipidemia  Assessment and Plan of Treatment: Please continue Rosuvastatin as prescribed. Please make an appointment and follow up with your cardiologist / primary care provider in 1-2 weeks after discharge.    Diagnosis: Glaucoma  Assessment and Plan of Treatment: Continue Pilocarpine as prescribed and follow up with Dr. Lee ([facility], Ophthalmology in 1-2 weeks after discharge. You were previously on Maxitrol eye drops for blepharitis, you finished it on 7/8, if you are to have a re-occurence of blepharitis you may restart medication per Dr. Lee.

## 2024-07-02 NOTE — PROGRESS NOTE ADULT - SUBJECTIVE AND OBJECTIVE BOX
24H hour events: Pt without complaint, no acute events overnight, Tele: SR in the 60-70's, one very brief episode of PAF ~1-2 seconds this am around 6:16am      MEDICATIONS:  aMIOdarone    Tablet 400 milliGRAM(s) Oral two times a day  heparin   Injectable 5000 Unit(s) SubCutaneous every 12 hours  metoprolol tartrate 50 milliGRAM(s) Oral two times a day  meropenem  IVPB 1000 milliGRAM(s) IV Intermittent every 8 hours  acetaminophen     Tablet .. 650 milliGRAM(s) Oral every 6 hours PRN  lacosamide IVPB 50 milliGRAM(s) IV Intermittent every 12 hours  levETIRAcetam 250 milliGRAM(s) Oral two times a day  ondansetron Injectable 4 milliGRAM(s) IV Push every 6 hours PRN  topiramate 100 milliGRAM(s) Oral two times a day  pantoprazole    Tablet 40 milliGRAM(s) Oral every 12 hours  senna 2 Tablet(s) Oral at bedtime  atorvastatin 80 milliGRAM(s) Oral at bedtime  dextrose 50% Injectable 12.5 Gram(s) IV Push once  dextrose 50% Injectable 25 Gram(s) IV Push once  dextrose Oral Gel 15 Gram(s) Oral once PRN  glucagon  Injectable 1 milliGRAM(s) IntraMuscular once  insulin glargine Injectable (LANTUS) 10 Unit(s) SubCutaneous at bedtime  insulin lispro (ADMELOG) corrective regimen sliding scale   SubCutaneous Before meals and at bedtime  insulin lispro Injectable (ADMELOG) 5 Unit(s) SubCutaneous three times a day before meals  dexamethasone/neomycin/polymyxin Suspension 1 Drop(s) Both EYES every 8 hours  dextrose 10% Bolus 125 milliLiter(s) IV Bolus once  dextrose 5%. 1000 milliLiter(s) IV Continuous <Continuous>  dextrose 5%. 1000 milliLiter(s) IV Continuous <Continuous>  pilocarpine 2% Solution 1 Drop(s) Right EYE two times a day  potassium phosphate / sodium phosphate Powder (PHOS-NaK) 1 Packet(s) Oral three times a day      REVIEW OF SYSTEMS:  Complete 12 point ROS negative.    PHYSICAL EXAM:  T(C): 36.4 (07-02-24 @ 08:36), Max: 37.2 (07-01-24 @ 17:56)  HR: 60 (07-02-24 @ 08:36) (60 - 90)  BP: 117/68 (07-02-24 @ 08:36) (117/68 - 146/82)  RR: 18 (07-02-24 @ 08:36) (18 - 18)  SpO2: 95% (07-02-24 @ 08:36) (95% - 97%)  Wt(kg): --  I&O's Summary    01 Jul 2024 07:01  -  02 Jul 2024 07:00  --------------------------------------------------------  IN: 0 mL / OUT: 900 mL / NET: -900 mL        Appearance: NAD, OOB sitting up in chair 	  HEENT: PERRL, EOMI	  Cardiovascular: Normal S1 S2, No JVD, No murmurs  Respiratory: Lungs clear to auscultation	  Psychiatry: A & O x 3, Mood & affect appropriate  Gastrointestinal: Soft, Non-tender, + BS	  Skin: + staples on right scalp  Neurologic: Grossly intact  Extremities: No clubbing, cyanosis or edema  Vascular: Peripheral pulses palpable 2+ bilaterally        LABS:	 	    CBC Full  -  ( 01 Jul 2024 06:13 )  WBC Count : 12.21 K/uL  Hemoglobin : 9.2 g/dL  Hematocrit : 28.0 %  Platelet Count - Automated : 349 K/uL  Mean Cell Volume : 96.6 fl  Mean Cell Hemoglobin : 31.7 pg  Mean Cell Hemoglobin Concentration : 32.9 gm/dL  Auto Neutrophil # : 8.56 K/uL  Auto Lymphocyte # : 1.84 K/uL  Auto Monocyte # : 1.02 K/uL  Auto Eosinophil # : 0.42 K/uL  Auto Basophil # : 0.05 K/uL  Auto Neutrophil % : 70.1 %  Auto Lymphocyte % : 15.1 %  Auto Monocyte % : 8.4 %  Auto Eosinophil % : 3.4 %  Auto Basophil % : 0.4 %    07-02    135  |  102  |  18  ----------------------------<  129<H>  4.1   |  21<L>  |  1.29  07-01    136  |  103  |  16  ----------------------------<  128<H>  4.4   |  21<L>  |  1.16    Ca    9.1      02 Jul 2024 05:33  Ca    9.1      01 Jul 2024 06:13    TPro  7.1  /  Alb  3.1<L>  /  TBili  0.6  /  DBili  x   /  AST  33  /  ALT  44  /  AlkPhos  130<H>  07-02    Thyroid Stimulating Hormone, Serum (06.25.24 @ 08:53)    Thyroid Stimulating Hormone, Serum: 2.45 uIU/mL    Free Thyroxine, Serum (06.25.24 @ 08:53)    Free Thyroxine, Serum: 1.0 ng/dL          TELEMETRY: SR in the 60-70's, one very brief episode of PAF ~1-2 seconds this am around 6:16am   	      < from: TTE W or WO Ultrasound Enhancing Agent (06.27.24 @ 18:46) >  CONCLUSIONS:      1. The endocardium is not well seen. In the setting of atrial fibrillation, there is mild regional hypokinesis seen at the inferior and inderolateral basal to mid segments. The over all EF is preserved 55-60%.   2. The right ventricle is not well visualized. Normal right ventricular cavity size and probably normal right ventricular systolic function.   3. No pericardial effusion seen.    ________________________________________________________________________________________  FINDINGS:     Left Ventricle:  The endocardium is not well seen. In the setting of atrial fibrillation, there is mild regional hypokinesis seen at the inferior and inderolateral basal to mid segments. The over all EF is preserved 55-60%.     Right Ventricle:  The right ventricle is not well visualized. The right ventricular cavity is normal in size and right ventricular systolic function is probably normal.     Right Atrium:  The right atrium was not well visualized.     Mitral Valve:  There is mild mitral regurgitation.     Tricuspid Valve:  There is mild tricuspid regurgitation.     Pericardium:  No pericardial effusion seen.  ____________________________________________________________________  QUANTITATIVE DATA:  Left Ventricle Measurements: (Indexed to BSA)     IVSd (2D):  1.3 cm  LVPWd (2D): 1.0 cm  LVIDs (2D): 2.9 cm  Visualized LV EF%: 55 to 60%            Tricuspid Valve Measurements:     TR Vmax:          3.3 m/s  TR Peak Gradient: 43.6 mmHg    < end of copied text >

## 2024-07-02 NOTE — DISCHARGE NOTE PROVIDER - CARE PROVIDERS DIRECT ADDRESSES
[email or web address]

## 2024-07-02 NOTE — PROGRESS NOTE ADULT - ASSESSMENT
Patient is a 79 year old male with PMH of SOC for benign ependymoma, seizures, DM, HLD, aspirin non compliant, presented to VS for AMS, gait difficulties and falls for a week.     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, L SDH, R parafalcince acute SDH with mass effect  S/p R SDH evacuation 6/22   Aspiration pneumonia   RUE edema due to SVT, no DVT  Acute b/l LE DVTs  Fevers initially likely due to aspiration pneumonia, had resolved then febrile again  may be d/t SVT and DVTs, noted more awake and afebrile after escalating to meropenem  Leukocytosis likely reactive    CXR with bibasilar opacities with slight increase on the R  Has cough, no dyspnea or pain, no increased O2 requirements, on zosyn since 6/25 pm  RUE edema with cord like swelling in antecubital area with TTP  R sided incision healing with no swelling or sign of infection  abd benign, nontender  UA negative for pyuria  Bcx NGTD x2   Scx with normal resp eder   6/29 zosyn (6/25-6/29) escalated to meropenem   now afebrile >48h, WBC stable     Recommendations:   Continue meropenem to complete 5d course tomorrow 7/3  Monitor temps/WBC  Aspiration precautions   Continue rest of care per primary team       Kenton Falcon M.D.  [facility], Division of Infectious Diseases  [phone]  After 5pm on weekdays and all day on weekends - please call [phone]

## 2024-07-02 NOTE — PROGRESS NOTE ADULT - ASSESSMENT
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     6/22/24 s/p Right craniotomy for subdural hematoma  6/24/24 s/p angio: right MMA embolization     NEURO:   - Continue neuro checks q 4  - 6/25 CTH: s/p removal of SD drain, unchanged residual heme and mild MLS  - Continue Vimpat, Topamax and Keppra for history of seizures   - Pain control w/ Tylenol prn  - PT/OT: BRENDON    PULM:   - On room air, O2Sat>95%  - Incentive spirometry  - 6/25 CTA Chest: no PE, atelectasis seen    CV:  - SBP , within goal  - Cardiology and EP following for afib/aflutter  - Continue Metoprolol for rate control  - Per EP, patient currently on Amiodarone 400mg BID for 1 week (started on 6/27) to continue until 7/4 then transition to Amiodarone 200mg daily   - 6/27 TTE: EF 55-60%, mild regional hypokinesis seen at the inferior and inderolateral basal to mid sigments, normal right vent cavity size and normal right vent systolic fxn, no pericardial effusion  - Continue Lipitor for HLD    ENDO:   - A1c 6.4, continue ISS, Admelog, Lantus and CCD, continue to monitor fingersticks - hyperglycemic to 216 today, Hospitalist increased Lantus 10units  - 6/25 Thyroid panel WNL    HEME/ONC:             7/1 CBC: downtrending leukocytosis, post-op anemia stable         DVT ppx: SQH BID, no SCDs, 6/30 Le Dopp: new b/l calf vein DVT. Vascular consulted today, recommended repeat Dopp on 7/5 and continue DVT ppx    RENAL:   - IVL  - 7/2 BMP stable  - Has KPhos supplementation  - Voiding via condom cath    ID:   - Afebrile  - 6/29 Last fever Tmax 101.3 - BCX NGTD, COVID/RSV/Influenza negative, sputum cx: normal respiratory eder  - ID following - per their rec today -> CXR: bibasilar opacities rt>lt, had cough previously, Meropenem until 7/3  - Downtrending procal - currently 1.31 (2.06)    GI:    - Speech and Swallow following, s/p MBS - advanced to easy to chew diet today  - Last BM 6/25, added senna, miralax and dulcolax PO, gave patient suppository, per RN started having small BMs, can give enema if no further BMs  - Monitor LFTs in the setting of Amiodarone, 7/2 LFTs stable today    Appreciate Hospitalist team following for co-medical management.     I met and updated patient's wife yesterday 7/1 at bedside, she was currently going over BRENDON options.     DISCHARGE PLANNING:   BRENDON planning    Plan to be discussed w/ Dr. Goodman  [id number]    ASSESSMENT AND PLAN: 79M Hx SOC for tumor (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     6/22/24 s/p Right craniotomy for subdural hematoma  6/24/24 s/p angio: right MMA embolization     NEURO:   - Continue neuro checks q 4  - 6/25 CTH: s/p removal of SD drain, unchanged residual heme and mild MLS  - Continue Vimpat, Topamax and Keppra for history of seizures   - Pain control w/ Tylenol prn  - PT/OT: BRENDON    PULM:   - On room air, O2Sat>95%  - Incentive spirometry  - 6/25 CTA Chest: no PE, atelectasis seen    CV:  - SBP , within goal  - Cardiology and EP following for afib/aflutter  - Continue Metoprolol for rate control  - Per EP, patient currently on Amiodarone 400mg BID for 1 week (started on 6/27) to continue until 7/4 then transition to Amiodarone 200mg daily. Consideration for Watchman procedure / elective ablation or JOHNATHAN occlusion when he can tolerate AC after  - 6/27 TTE: EF 55-60%, mild regional hypokinesis seen at the inferior and inderolateral basal to mid sigments, normal right vent cavity size and normal right vent systolic fxn, no pericardial effusion  - Continue Lipitor for HLD    ENDO:   - A1c 6.4, continue ISS, Admelog, Lantus and CCD, continue to monitor fingersticks - hyperglycemic to 216 today, Hospitalist increased Lantus 10units  - 6/25 Thyroid panel WNL    HEME/ONC:             7/1 CBC: downtrending leukocytosis, post-op anemia stable         DVT ppx: SQH BID, no SCDs, 6/30 Le Dopp: new b/l calf vein DVT. Vascular following, recommended repeat Dopp on 7/5 and continue DVT ppx    RENAL:   - IVL  - 7/2 BMP stable  - Has KPhos supplementation  - Voiding via condom cath    ID:   - Afebrile  - 6/29 Last fever Tmax 101.3 - BCX NGTD, COVID/RSV/Influenza negative, sputum cx: normal respiratory eder  - ID following - per their rec today -> CXR: bibasilar opacities rt>lt, had cough previously, Meropenem until 7/3  - Downtrending procal - currently 1.31 (2.06)    GI:    - Speech and Swallow following, s/p MBS - advanced to easy to chew diet today  - Last BM 6/25, added senna, miralax and dulcolax PO, gave patient suppository, per RN started having small BMs, can give enema if no further BMs  - Monitor LFTs in the setting of Amiodarone, 7/2 LFTs stable today    Appreciate Hospitalist team following for co-medical management.     I met and updated patient's wife yesterday 7/1 at bedside, she was currently going over BRENDON options.     DISCHARGE PLANNING:   BRENDON planning    Plan to be discussed w/ Dr. Goodman  [id number]

## 2024-07-02 NOTE — DISCHARGE NOTE PROVIDER - PROVIDER TOKENS
PROVIDER:[TOKEN:[[id number]]],PROVIDER:[TOKEN:[[id number]]],PROVIDER:[TOKEN:[[id number]]],PROVIDER:[TOKEN:[[id number]]],PROVIDER:[TOKEN:[[id number]]]

## 2024-07-02 NOTE — DISCHARGE NOTE PROVIDER - NSDCMRMEDTOKEN_GEN_ALL_CORE_FT
Keppra 250 mg oral tablet: 1 tab(s) orally every 12 hours  metFORMIN 500 mg oral tablet: 1 tab(s) orally every 12 hours  rosuvastatin 40 mg oral capsule: 1 cap(s) orally once a day  Topamax 100 mg oral tablet: 1 tab(s) orally every 12 hours   acetaminophen 325 mg oral tablet: 2 tab(s) orally every 6 hours As needed Mild Pain (1 - 3)  amiodarone 200 mg oral tablet: 1 tab(s) orally once a day  heparin: 5,000 unit(s) subcutaneous every 12 hours  lacosamide 50 mg oral tablet: 1 tab(s) orally 2 times a day  levETIRAcetam 250 mg oral tablet: 1 tab(s) orally 2 times a day  metFORMIN 500 mg oral tablet: 1 tab(s) orally every 12 hours  metoprolol tartrate 50 mg oral tablet: 1 tab(s) orally 2 times a day  pantoprazole 40 mg oral delayed release tablet: 1 tab(s) orally every 12 hours  pilocarpine 2% ophthalmic solution: 1 drop(s) to each affected eye 2 times a day  polyethylene glycol 3350 oral powder for reconstitution: 17 gram(s) orally once a day  rosuvastatin 40 mg oral capsule: 1 cap(s) orally once a day  senna leaf extract oral tablet: 2 tab(s) orally once a day (at bedtime)  sodium/potassium/phosphorus 160 mg-280 mg-250 mg oral powder for reconstitution: 1 packet(s) orally 3 times a day  topiramate 100 mg oral tablet: 1 tab(s) orally 2 times a day

## 2024-07-02 NOTE — PROGRESS NOTE ADULT - SUBJECTIVE AND OBJECTIVE BOX
CARDIOLOGY FOLLOW UP - Dr. Bass  DATE OF SERVICE: 7/2/24    CC  No cv complaints     REVIEW OF SYSTEMS:  CONSTITUTIONAL: No fever, weight loss, or fatigue  RESPIRATORY: No cough, wheezing, chills or hemoptysis; No Shortness of Breath  CARDIOVASCULAR: No chest pain, palpitations, passing out, dizziness, or leg swelling  GASTROINTESTINAL: No abdominal or epigastric pain. No nausea, vomiting, or hematemesis; No diarrhea or constipation. No melena or hematochezia.  VASCULAR: No edema     PHYSICAL EXAM:  T(C): 36.4 (07-02-24 @ 08:36), Max: 37.2 (07-01-24 @ 17:56)  HR: 60 (07-02-24 @ 08:36) (60 - 90)  BP: 117/68 (07-02-24 @ 08:36) (117/68 - 146/82)  RR: 18 (07-02-24 @ 08:36) (18 - 18)  SpO2: 95% (07-02-24 @ 08:36) (95% - 97%)  Wt(kg): --  I&O's Summary    01 Jul 2024 07:01  -  02 Jul 2024 07:00  --------------------------------------------------------  IN: 0 mL / OUT: 900 mL / NET: -900 mL        Appearance: Elderly male 	  Cardiovascular: Normal S1 S2,RRR, No JVD, No murmurs  Respiratory: Lungs clear to auscultation b/l   Gastrointestinal:  Soft, Non-tender, + BS	  Extremities: Normal range of motion, No clubbing, cyanosis or edema      Home Medications:  Keppra 250 mg oral tablet: 1 tab(s) orally every 12 hours (22 Jun 2024 18:38)  metFORMIN 500 mg oral tablet: 1 tab(s) orally every 12 hours (22 Jun 2024 18:38)  rosuvastatin 40 mg oral capsule: 1 cap(s) orally once a day (22 Jun 2024 18:38)  Topamax 100 mg oral tablet: 1 tab(s) orally every 12 hours (22 Jun 2024 18:37)      MEDICATIONS  (STANDING):  aMIOdarone    Tablet 400 milliGRAM(s) Oral two times a day  atorvastatin 80 milliGRAM(s) Oral at bedtime  dexamethasone/neomycin/polymyxin Suspension 1 Drop(s) Both EYES every 8 hours  dextrose 10% Bolus 125 milliLiter(s) IV Bolus once  dextrose 5%. 1000 milliLiter(s) (100 mL/Hr) IV Continuous <Continuous>  dextrose 5%. 1000 milliLiter(s) (50 mL/Hr) IV Continuous <Continuous>  dextrose 50% Injectable 25 Gram(s) IV Push once  dextrose 50% Injectable 12.5 Gram(s) IV Push once  glucagon  Injectable 1 milliGRAM(s) IntraMuscular once  heparin   Injectable 5000 Unit(s) SubCutaneous every 12 hours  insulin glargine Injectable (LANTUS) 10 Unit(s) SubCutaneous at bedtime  insulin lispro (ADMELOG) corrective regimen sliding scale   SubCutaneous Before meals and at bedtime  insulin lispro Injectable (ADMELOG) 5 Unit(s) SubCutaneous three times a day before meals  lacosamide IVPB 50 milliGRAM(s) IV Intermittent every 12 hours  levETIRAcetam 250 milliGRAM(s) Oral two times a day  meropenem  IVPB 1000 milliGRAM(s) IV Intermittent every 8 hours  metoprolol tartrate 50 milliGRAM(s) Oral two times a day  pantoprazole    Tablet 40 milliGRAM(s) Oral every 12 hours  pilocarpine 2% Solution 1 Drop(s) Right EYE two times a day  potassium phosphate / sodium phosphate Powder (PHOS-NaK) 1 Packet(s) Oral three times a day  senna 2 Tablet(s) Oral at bedtime  topiramate 100 milliGRAM(s) Oral two times a day      TELEMETRY: SR	    ECG:  	  RADIOLOGY:    DIAGNOSTIC TESTING:  [ ] Echocardiogram:  [ ]  Catheterization:  [ ] Stress Test:    OTHER: 	    LABS:	 	    Troponin T, High Sensitivity Result: 41 ng/L [0 - 51] (06-27 @ 16:51)  Troponin T, High Sensitivity Result: 37 ng/L [0 - 51] (06-27 @ 00:54)  CKMB Units: 3.4 ng/mL [0.0 - 6.7] (06-27 @ 00:54)  Troponin T, High Sensitivity Result: 42 ng/L [0 - 51] (06-26 @ 02:19)                          9.2    12.21 )-----------( 349      ( 01 Jul 2024 06:13 )             28.0     07-02    135  |  102  |  18  ----------------------------<  129<H>  4.1   |  21<L>  |  1.29    Ca    9.1      02 Jul 2024 05:33    TPro  7.1  /  Alb  3.1<L>  /  TBili  0.6  /  DBili  x   /  AST  33  /  ALT  44  /  AlkPhos  130<H>  07-02

## 2024-07-02 NOTE — PROGRESS NOTE ADULT - ASSESSMENT
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 No AC/AP, coags/plt wnl Now s/p R crani for SDH evacuation. Afib RVR overnight s/p Dilt IV, now in SR.      #Paroxysmal atrial fibrillation  -sp iv dilt with conversion to SR   -Continue with amiodarone per eps   -Continue po metoprolol per eps   -Echo nml lv fxn, no wma, mild MR   -remains off a/c at this time given acute SDH    #Acute subdural hematoma   -s/p R crani for SDH evacuation  -post op mgmt per neuro    #DM  -Mgmt per med    #DVT  -Acute b/l LE dvts  -Off a/c at this time given SDH  -Cont dvt ppx  -Appreciate vasc cards recs         d/w wife at bedside

## 2024-07-02 NOTE — PROGRESS NOTE ADULT - SUBJECTIVE AND OBJECTIVE BOX
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 No AC/AP, coags/plt wnl  Exam: AOx3, PERRL, EOMI, no facial, ?/very slight L drift, BEST 5/5, gait testing deferred    (22 Jun 2024 06:07)      OVERNIGHT EVENTS: No acute events overnight, patient seen and evaluated at bedside this morning reports having had multiple bowel movements after receiving suppository and oral BM meds yesterday. Otherwise feeling well.     Vital Signs Last 24 Hrs  T(C): 36.4 (02 Jul 2024 08:36), Max: 37.2 (01 Jul 2024 17:56)  T(F): 97.6 (02 Jul 2024 08:36), Max: 99 (01 Jul 2024 17:56)  HR: 60 (02 Jul 2024 08:36) (60 - 90)  BP: 117/68 (02 Jul 2024 08:36) (117/68 - 146/82)  BP(mean): --  RR: 18 (02 Jul 2024 08:36) (18 - 18)  SpO2: 95% (02 Jul 2024 08:36) (95% - 97%)    Parameters below as of 02 Jul 2024 08:36  Patient On (Oxygen Delivery Method): room air        PHYSICAL EXAM:    General: No Acute Distress     Neurological: Awake, Ox3 (name, place, date), improving left eye ptosis, left pupil 2mm react, right pupil 3mm react, following commands, no drift, uppers 5/5, lowers 5/5    Pulmonary: Clear to Auscultation, No Rales, No Rhonchi, No Wheezes     Cardiovascular: S1, S2, Regular Rate and Rhythm     Gastrointestinal: Soft, Nontender, Nondistended     Incision: right crani surgical incision has staples in place C/D/I    LABS:                        9.2    12.21 )-----------( 349      ( 01 Jul 2024 06:13 )             28.0    07-02    135  |  102  |  18  ----------------------------<  129<H>  4.1   |  21<L>  |  1.29    Ca    9.1      02 Jul 2024 05:33    TPro  7.1  /  Alb  3.1<L>  /  TBili  0.6  /  DBili  x   /  AST  33  /  ALT  44  /  AlkPhos  130<H>  07-02 07-01 @ 07:01  -  07-02 @ 07:00  --------------------------------------------------------  IN: 0 mL / OUT: 900 mL / NET: -900 mL      DRAINS: None    MEDICATIONS:  Antibiotics:  meropenem  IVPB 1000 milliGRAM(s) IV Intermittent every 8 hours    Neuro:  acetaminophen     Tablet .. 650 milliGRAM(s) Oral every 6 hours PRN Mild Pain (1 - 3)  lacosamide IVPB 50 milliGRAM(s) IV Intermittent every 12 hours  levETIRAcetam 250 milliGRAM(s) Oral two times a day  ondansetron Injectable 4 milliGRAM(s) IV Push every 6 hours PRN Nausea and/or Vomiting  topiramate 100 milliGRAM(s) Oral two times a day    Cardiac:  aMIOdarone    Tablet 400 milliGRAM(s) Oral two times a day  metoprolol tartrate 50 milliGRAM(s) Oral two times a day    Pulm:    GI/:  pantoprazole    Tablet 40 milliGRAM(s) Oral every 12 hours  senna 2 Tablet(s) Oral at bedtime    Other:   atorvastatin 80 milliGRAM(s) Oral at bedtime  dexamethasone/neomycin/polymyxin Suspension 1 Drop(s) Both EYES every 8 hours  dextrose 10% Bolus 125 milliLiter(s) IV Bolus once  dextrose 5%. 1000 milliLiter(s) IV Continuous <Continuous>  dextrose 5%. 1000 milliLiter(s) IV Continuous <Continuous>  dextrose 50% Injectable 25 Gram(s) IV Push once  dextrose 50% Injectable 12.5 Gram(s) IV Push once  dextrose Oral Gel 15 Gram(s) Oral once PRN Blood Glucose LESS THAN 70 milliGRAM(s)/deciliter  glucagon  Injectable 1 milliGRAM(s) IntraMuscular once  heparin   Injectable 5000 Unit(s) SubCutaneous every 12 hours  insulin glargine Injectable (LANTUS) 10 Unit(s) SubCutaneous at bedtime  insulin lispro (ADMELOG) corrective regimen sliding scale   SubCutaneous Before meals and at bedtime  insulin lispro Injectable (ADMELOG) 5 Unit(s) SubCutaneous three times a day before meals  pilocarpine 2% Solution 1 Drop(s) Right EYE two times a day  potassium phosphate / sodium phosphate Powder (PHOS-NaK) 1 Packet(s) Oral three times a day    DIET: [] Regular [x easy to chew] CCD [] Renal [] Puree [] Dysphagia [] Tube Feeds:     IMAGING:   < from: CT Head No Cont (06.25.24 @ 09:46) >  IMPRESSION:  Interval removal of subdural drainage catheter and right MMA embolization   since6/24/2024. Unchanged residual subdural hemorrhage and mild midline   shift.    --- End of Report ---       DARRIUS WILL MD; Resident Radiologist  This document has been electronically signed.  XAVIER MUNOZ MD; Attending Radiologist  This document has been electronically signed. Jun 25 2024 12:33PM    < end of copied text >

## 2024-07-02 NOTE — DISCHARGE NOTE PROVIDER - NSDCFUADDINST_GEN_ALL_CORE_FT
Please make all necessary appointments and follow up. Please DO NOT take any NSAIDs (Advil, Aleve, Motrin, Ibuprofen) until cleared by your Neurosurgeon. Please DO NOT do any heavy lifting, bending, twisting and straining. You have surgical staples, they will be removed on 7/6/24. You may shower, but NO SOAP / NO SHAMPOO. DO NOT do any scrubbing. Pat dry only. Please come to the emergency room for any of the following: altered mental status, seizures, pain uncontrolled by pain medications, fevers, leaking / bleeding from surgical site, chest pain and shortness of breath. Please make all necessary appointments and follow up. Please DO NOT take any NSAIDs (Advil, Aleve, Motrin, Ibuprofen) until cleared by your Neurosurgeon. Please DO NOT do any heavy lifting, bending, twisting and straining. You may shower, but NO SOAP / NO SHAMPOO. DO NOT do any scrubbing. Pat dry only. Please come to the emergency room for any of the following: altered mental status, seizures, pain uncontrolled by pain medications, fevers, leaking / bleeding from surgical site, chest pain and shortness of breath.

## 2024-07-02 NOTE — DISCHARGE NOTE PROVIDER - HOSPITAL COURSE
9M Hx SOC for tumor (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     6/22/24 s/p Right craniotomy for subdural hematoma  6/24/24 s/p angio: right MMA embolization     NEURO:   - Continue neuro checks q 4  - Surgical staples removed on 7/6/24, incision healing well C/D/I  - 7/5 CTH: right hemispheric subdural hematoma, improving midline shift, stable post-surgical changes  - Continue Vimpat, Topamax and Keppra for history of seizures   - Pain control w/ Tylenol prn  - PT/OT: BRENDON    PULM:   - On room air, O2Sat>94%  - Incentive spirometry  - 6/25 CTA Chest: no PE, atelectasis seen    CV:  - SBP , within goal  - Cardiology and EP following for afib/aflutter  - Continue Metoprolol for rate control  - Per EP - patient finished Amiodarone 400mg BID from 6/27-7/4, currently now on Amiodarone 200mg daily. Consideration for Watchman procedure / elective ablation or JOHNATHAN occlusion when he can tolerate AC after  - 6/27 TTE: EF 55-60%, mild regional hypokinesis seen at the inferior and inderolateral basal to mid sigments, normal right vent cavity size and normal right vent systolic fxn, no pericardial effusion  - Continue Lipitor for HLD    ENDO:   - A1c 6.4, continue ISS, Admelog, Lantus and CCD, continue to monitor fingersticks - between 120-150s  - 6/25 Thyroid panel WNL    HEME/ONC:             7/10 CBC: post-op anemia stable         DVT ppx: SQH BID, no SCDs, 6/30 [place] Dopp: new b/l calf vein DVT. Vascular following, follow up [place] Dopp: persistent right peroneal and soleal DVT and persistent left soleal DVT. Spoke with Vasc 7/8, continue repeat Dopplers in 5-7 days (7/12) and continue SQH BID.    RENAL:   - IVL  - 7/7 BMP stable, had hyponatremia and MIKAELA that has since resolved  - Has KPhos supplementation  - Voiding via condom cath    ID:   - Afebrile  - 6/29 Last fever Tmax 101.3 - BCX NGTD, COVID/RSV/Influenza negative, sputum cx: normal respiratory eder  - ID following -> CXR: bibasilar opacities rt>lt, had cough previously, Meropenem finished 7/3   - Downtrending procal 1.31 (2.06)    GI:    - Speech and Swallow following, s/p MBS 7/1 - advanced to easy to chew diet  - Senna and Miralax for bowel regimen, last BM 7/10  - Monitor LFTs in the setting of Amiodarone, 7/7 LFTs stable    MISC:  - Patient on Maxitrol eye drops, I spoke with his Ophthalmologist [facility] Dr. Lee [phone] on 7/8, for blepharitis, patient does not need this medication any further unless reoccurrence in which case he can restart the medication at rehab. Per wife patient has prescription at home that she can bring to rehab should he need. Patient currently does not have any sign or symptoms of blepharitis.   - Continue Pilocarpine eye drops for glaucoma

## 2024-07-02 NOTE — PROGRESS NOTE ADULT - SUBJECTIVE AND OBJECTIVE BOX
[facility] INFECTIOUS DISEASES  GODWIN Hutchinson Y. Patel, S. Shah, G. Everton  [phone]  ([phone] - weekdays after 5pm and weekends)    Name: RODRIGUE CARROLL  Age/Gender: 79y Male  MRN: [id number]    Interval History:  Patient seen and examined this morning.   Sitting in chair, having breakfast.   States he feels better, no new complaints.   Notes reviewed  No concerning overnight events  Afebrile   Allergies: No Known Allergies      Objective:  Vitals:   T(F): 97.6 (07-02-24 @ 08:36), Max: 99 (07-01-24 @ 17:56)  HR: 60 (07-02-24 @ 08:36) (60 - 90)  BP: 117/68 (07-02-24 @ 08:36) (117/68 - 146/82)  RR: 18 (07-02-24 @ 08:36) (18 - 18)  SpO2: 95% (07-02-24 @ 08:36) (95% - 97%)  Physical Examination:  General: no acute distress, nontoxic   HEENT: NC/AT, anicteric, EOMI  Respiratory: no acc muscle use, breathing comfortably  Cardiovascular: S1 and S2 present  Gastrointestinal: normal appearing, nondistended  Extremities: no edema, no cyanosis  Skin: no visible rash    Laboratory Studies:  CBC:                       9.2    12.21 )-----------( 349      ( 01 Jul 2024 06:13 )             28.0     WBC Trend:  12.21 07-01-24 @ 06:13  13.03 06-30-24 @ 07:20  13.58 06-29-24 @ 06:38  14.35 06-28-24 @ 22:38  11.72 06-27-24 @ 16:51  10.74 06-27-24 @ 00:54  7.05 06-25-24 @ 23:35    CMP: 07-02    135  |  102  |  18  ----------------------------<  129<H>  4.1   |  21<L>  |  1.29    Ca    9.1      02 Jul 2024 05:33    TPro  7.1  /  Alb  3.1<L>  /  TBili  0.6  /  DBili  x   /  AST  33  /  ALT  44  /  AlkPhos  130<H>  07-02    Creatinine: 1.29 mg/dL (07-02-24 @ 05:33)  Creatinine: 1.16 mg/dL (07-01-24 @ 06:13)  Creatinine: 1.11 mg/dL (06-30-24 @ 07:25)  Creatinine: 1.11 mg/dL (06-29-24 @ 06:38)  Creatinine: 1.14 mg/dL (06-28-24 @ 22:38)  Creatinine: 1.20 mg/dL (06-28-24 @ 00:34)  Creatinine: 1.40 mg/dL (06-27-24 @ 16:51)  Creatinine: 1.37 mg/dL (06-27-24 @ 00:54)  Creatinine: 1.39 mg/dL (06-26-24 @ 02:19)  Creatinine: 1.59 mg/dL (06-25-24 @ 23:35)    LIVER FUNCTIONS - ( 02 Jul 2024 05:33 )  Alb: 3.1 g/dL / Pro: 7.1 g/dL / ALK PHOS: 130 U/L / ALT: 44 U/L / AST: 33 U/L / GGT: x           Microbiology: reviewed   Culture - Sputum (collected 06-28-24 @ 15:57)  Source: .Sputum Sputum  Gram Stain (06-28-24 @ 23:46):    Few polymorphonuclear leukocytes per low power field    Moderate Squamous epithelial cells per low power field    Moderate Gram Positive Cocci in Pairs and Chains per oil power field    Rare Gram Negative Coccobacilli per oil power field    Rare Gram Negative Rods per oil power field    Results consistent with oropharyngeal contamination  Final Report (06-30-24 @ 08:15):    Normal Respiratory Marian present    Culture - Blood (collected 06-28-24 @ 00:34)  Source: .Blood Blood  Preliminary Report (07-02-24 @ 03:01):    No growth at 4 days    Culture - Blood (collected 06-28-24 @ 00:34)  Source: .Blood Blood  Preliminary Report (07-02-24 @ 03:01):    No growth at 4 days    Culture - Blood (collected 06-25-24 @ 22:17)  Source: .Blood Blood-Peripheral  Final Report (07-01-24 @ 03:01):    No growth at 5 days    Culture - Blood (collected 06-25-24 @ 22:17)  Source: .Blood Blood-Peripheral  Final Report (07-01-24 @ 03:01):    No growth at 5 days    SARS-CoV-2 Result: NotDete (28 Jun 2024 15:09)    Radiology: reviewed     Medications:  acetaminophen     Tablet .. 650 milliGRAM(s) Oral every 6 hours PRN  aMIOdarone    Tablet 400 milliGRAM(s) Oral two times a day  atorvastatin 80 milliGRAM(s) Oral at bedtime  dexamethasone/neomycin/polymyxin Suspension 1 Drop(s) Both EYES every 8 hours  dextrose 10% Bolus 125 milliLiter(s) IV Bolus once  dextrose 5%. 1000 milliLiter(s) IV Continuous <Continuous>  dextrose 5%. 1000 milliLiter(s) IV Continuous <Continuous>  dextrose 50% Injectable 25 Gram(s) IV Push once  dextrose 50% Injectable 12.5 Gram(s) IV Push once  dextrose Oral Gel 15 Gram(s) Oral once PRN  glucagon  Injectable 1 milliGRAM(s) IntraMuscular once  heparin   Injectable 5000 Unit(s) SubCutaneous every 12 hours  insulin glargine Injectable (LANTUS) 10 Unit(s) SubCutaneous at bedtime  insulin lispro (ADMELOG) corrective regimen sliding scale   SubCutaneous Before meals and at bedtime  insulin lispro Injectable (ADMELOG) 5 Unit(s) SubCutaneous three times a day before meals  lacosamide IVPB 50 milliGRAM(s) IV Intermittent every 12 hours  levETIRAcetam 250 milliGRAM(s) Oral two times a day  meropenem  IVPB 1000 milliGRAM(s) IV Intermittent every 8 hours  metoprolol tartrate 50 milliGRAM(s) Oral two times a day  ondansetron Injectable 4 milliGRAM(s) IV Push every 6 hours PRN  pantoprazole    Tablet 40 milliGRAM(s) Oral every 12 hours  pilocarpine 2% Solution 1 Drop(s) Right EYE two times a day  potassium phosphate / sodium phosphate Powder (PHOS-NaK) 1 Packet(s) Oral three times a day  senna 2 Tablet(s) Oral at bedtime  topiramate 100 milliGRAM(s) Oral two times a day    Current Antimicrobials:  meropenem  IVPB 1000 milliGRAM(s) IV Intermittent every 8 hours    Prior/Completed Antimicrobials:  ceFAZolin   IVPB  piperacillin/tazobactam IVPB.  piperacillin/tazobactam IVPB.-  piperacillin/tazobactam IVPB.-  piperacillin/tazobactam IVPB.-

## 2024-07-02 NOTE — PROGRESS NOTE ADULT - ASSESSMENT
79-year-old man with a benign ependymoma, seizure disorder, diabetes and hyperlipidemia He presented with confusion, difficulty ambulating and falls. He was found to have a subdural hematoma. He underwent an evacuation on 6/22/2024. The Electrophysiology service is following the patient due to paroxysmal atrial fibrillation and paroxysmal atrial flutter.     1) AF/AFL, paroxysmal  2) Atrial bigeminy   3) SDH s/p evacuation 6/22/24   4) Blow the knee DVT    Plan:   -Tele: SR 60-70's, one very brief episode of PAF this am (lasting ~ 1-2 seconds)   -Continue with Amiodarone 400 bid x1 week and then transition to 200mg QD (started 6/27) to maintain SR (received 4.4G PO amio as of 6am today)  -I discussed with patient, pt's spouse and daughter plans for short term amiodarone including risks/side effects. Discussed need for outpatient monitoring of PFT/TFT/LFT/opthalmology monitoring while on amiodarone. TSH and LFTs WNL this admission.  -Recommend full AC once cleared from Neuro standpoint   -Continue BB  -Consideration for elective ablation or JOHNATHAN occlusion when he can tolerate AC.  -No further EP workup as inpatient.     EILEEN Crawley, NP-C  [id number]

## 2024-07-03 LAB
ANION GAP SERPL CALC-SCNC: 12 MMOL/L — SIGNIFICANT CHANGE UP (ref 5–17)
BUN SERPL-MCNC: 14 MG/DL — SIGNIFICANT CHANGE UP (ref 7–23)
CALCIUM SERPL-MCNC: 9.1 MG/DL — SIGNIFICANT CHANGE UP (ref 8.4–10.5)
CHLORIDE SERPL-SCNC: 102 MMOL/L — SIGNIFICANT CHANGE UP (ref 96–108)
CO2 SERPL-SCNC: 21 MMOL/L — LOW (ref 22–31)
CREAT SERPL-MCNC: 1.18 MG/DL — SIGNIFICANT CHANGE UP (ref 0.5–1.3)
CULTURE RESULTS: SIGNIFICANT CHANGE UP
CULTURE RESULTS: SIGNIFICANT CHANGE UP
EGFR: 63 ML/MIN/1.73M2 — SIGNIFICANT CHANGE UP
GLUCOSE BLDC GLUCOMTR-MCNC: 120 MG/DL — HIGH (ref 70–99)
GLUCOSE BLDC GLUCOMTR-MCNC: 129 MG/DL — HIGH (ref 70–99)
GLUCOSE BLDC GLUCOMTR-MCNC: 143 MG/DL — HIGH (ref 70–99)
GLUCOSE BLDC GLUCOMTR-MCNC: 192 MG/DL — HIGH (ref 70–99)
GLUCOSE SERPL-MCNC: 138 MG/DL — HIGH (ref 70–99)
MAGNESIUM SERPL-MCNC: 2.5 MG/DL — SIGNIFICANT CHANGE UP (ref 1.6–2.6)
PHOSPHATE SERPL-MCNC: 3.1 MG/DL — SIGNIFICANT CHANGE UP (ref 2.5–4.5)
POTASSIUM SERPL-MCNC: 4.2 MMOL/L — SIGNIFICANT CHANGE UP (ref 3.5–5.3)
POTASSIUM SERPL-SCNC: 4.2 MMOL/L — SIGNIFICANT CHANGE UP (ref 3.5–5.3)
SODIUM SERPL-SCNC: 135 MMOL/L — SIGNIFICANT CHANGE UP (ref 135–145)
SPECIMEN SOURCE: SIGNIFICANT CHANGE UP
SPECIMEN SOURCE: SIGNIFICANT CHANGE UP

## 2024-07-03 RX ORDER — LACOSAMIDE 100 MG/1
50 TABLET, FILM COATED ORAL
Refills: 0 | Status: DISCONTINUED | OUTPATIENT
Start: 2024-07-03 | End: 2024-07-10

## 2024-07-03 RX ADMIN — Medication 2 TABLET(S): at 21:35

## 2024-07-03 RX ADMIN — ATORVASTATIN CALCIUM 80 MILLIGRAM(S): 20 TABLET, FILM COATED ORAL at 21:35

## 2024-07-03 RX ADMIN — INSULIN LISPRO 2: 100 INJECTION, SOLUTION SUBCUTANEOUS at 17:30

## 2024-07-03 RX ADMIN — HEPARIN SODIUM 5000 UNIT(S): 50 INJECTION, SOLUTION INTRAVENOUS at 17:29

## 2024-07-03 RX ADMIN — PANTOPRAZOLE SODIUM 40 MILLIGRAM(S): 40 INJECTION, POWDER, FOR SOLUTION INTRAVENOUS at 06:10

## 2024-07-03 RX ADMIN — Medication 1 PACKET(S): at 06:10

## 2024-07-03 RX ADMIN — INSULIN GLARGINE 10 UNIT(S): 100 INJECTION, SOLUTION SUBCUTANEOUS at 21:35

## 2024-07-03 RX ADMIN — HEPARIN SODIUM 5000 UNIT(S): 50 INJECTION, SOLUTION INTRAVENOUS at 06:11

## 2024-07-03 RX ADMIN — LEVETIRACETAM 250 MILLIGRAM(S): 100 INJECTION INTRAVENOUS at 17:29

## 2024-07-03 RX ADMIN — Medication 1 DROP(S): at 06:11

## 2024-07-03 RX ADMIN — MEROPENEM 100 MILLIGRAM(S): 500 INJECTION, POWDER, FOR SOLUTION INTRAVENOUS at 06:10

## 2024-07-03 RX ADMIN — AMIODARONE HYDROCHLORIDE 400 MILLIGRAM(S): 50 INJECTION, SOLUTION INTRAVENOUS at 17:34

## 2024-07-03 RX ADMIN — Medication 1 PACKET(S): at 21:35

## 2024-07-03 RX ADMIN — INSULIN LISPRO 5 UNIT(S): 100 INJECTION, SOLUTION SUBCUTANEOUS at 08:28

## 2024-07-03 RX ADMIN — Medication 1 PACKET(S): at 13:38

## 2024-07-03 RX ADMIN — LACOSAMIDE 50 MILLIGRAM(S): 100 TABLET, FILM COATED ORAL at 17:29

## 2024-07-03 RX ADMIN — Medication 1 DROP(S): at 21:35

## 2024-07-03 RX ADMIN — PANTOPRAZOLE SODIUM 40 MILLIGRAM(S): 40 INJECTION, POWDER, FOR SOLUTION INTRAVENOUS at 17:29

## 2024-07-03 RX ADMIN — Medication 50 MILLIGRAM(S): at 06:11

## 2024-07-03 RX ADMIN — INSULIN LISPRO 5 UNIT(S): 100 INJECTION, SOLUTION SUBCUTANEOUS at 13:26

## 2024-07-03 RX ADMIN — LEVETIRACETAM 250 MILLIGRAM(S): 100 INJECTION INTRAVENOUS at 06:10

## 2024-07-03 RX ADMIN — INSULIN LISPRO 5 UNIT(S): 100 INJECTION, SOLUTION SUBCUTANEOUS at 17:33

## 2024-07-03 RX ADMIN — Medication 1 DROP(S): at 13:39

## 2024-07-03 RX ADMIN — TOPIRAMATE 100 MILLIGRAM(S): 50 TABLET, FILM COATED ORAL at 06:16

## 2024-07-03 RX ADMIN — MEROPENEM 100 MILLIGRAM(S): 500 INJECTION, POWDER, FOR SOLUTION INTRAVENOUS at 13:26

## 2024-07-03 RX ADMIN — TOPIRAMATE 100 MILLIGRAM(S): 50 TABLET, FILM COATED ORAL at 17:29

## 2024-07-03 RX ADMIN — Medication 50 MILLIGRAM(S): at 17:29

## 2024-07-03 RX ADMIN — Medication 1 DROP(S): at 17:35

## 2024-07-03 RX ADMIN — Medication 1 DROP(S): at 06:10

## 2024-07-03 RX ADMIN — AMIODARONE HYDROCHLORIDE 400 MILLIGRAM(S): 50 INJECTION, SOLUTION INTRAVENOUS at 06:11

## 2024-07-03 RX ADMIN — LACOSAMIDE 110 MILLIGRAM(S): 100 TABLET, FILM COATED ORAL at 06:10

## 2024-07-03 NOTE — PROGRESS NOTE ADULT - SUBJECTIVE AND OBJECTIVE BOX
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 No AC/AP, coags/plt wnl  Exam: AOx3, PERRL, EOMI, no facial, ?/very slight L drift, BEST 5/5, gait testing deferred    (22 Jun 2024 06:07)      OVERNIGHT EVENTS: No acute events, patient seen and evaluated at bedside this morning, doing well and comfortable.     Vital Signs Last 24 Hrs  T(C): 36.9 (03 Jul 2024 08:00), Max: 37 (02 Jul 2024 16:04)  T(F): 98.5 (03 Jul 2024 08:00), Max: 98.6 (02 Jul 2024 16:04)  HR: 56 (03 Jul 2024 08:00) (56 - 71)  BP: 124/65 (03 Jul 2024 08:00) (116/62 - 131/70)  BP(mean): --  RR: 18 (03 Jul 2024 08:00) (17 - 18)  SpO2: 99% (03 Jul 2024 08:00) (95% - 99%)    Parameters below as of 03 Jul 2024 06:05  Patient On (Oxygen Delivery Method): room air        PHYSICAL EXAM:    General: No Acute Distress     Neurological: Awake, Ox3 (name, place, date), improving left eye ptosis, left pupil 2mm react, right pupil 3mm react, following commands, no drift, uppers 5/5, lowers 5/5    Pulmonary: Clear to Auscultation, No Rales, No Rhonchi, No Wheezes     Cardiovascular: S1, S2, Regular Rate and Rhythm     Gastrointestinal: Soft, Nontender, Nondistended     Incision: right craniotomy surgical staples in place C/D/I    LABS:   07-02    135  |  102  |  18  ----------------------------<  129<H>  4.1   |  21<L>  |  1.29    Ca    9.1      02 Jul 2024 05:33    TPro  7.1  /  Alb  3.1<L>  /  TBili  0.6  /  DBili  x   /  AST  33  /  ALT  44  /  AlkPhos  130<H>  07-02 07-02 @ 07:01  -  07-03 @ 07:00  --------------------------------------------------------  IN: 0 mL / OUT: 1200 mL / NET: -1200 mL      DRAINS: None    MEDICATIONS:  Antibiotics:  meropenem  IVPB 1000 milliGRAM(s) IV Intermittent every 8 hours    Neuro:  acetaminophen     Tablet .. 650 milliGRAM(s) Oral every 6 hours PRN Mild Pain (1 - 3)  lacosamide IVPB 50 milliGRAM(s) IV Intermittent every 12 hours  levETIRAcetam 250 milliGRAM(s) Oral two times a day  ondansetron Injectable 4 milliGRAM(s) IV Push every 6 hours PRN Nausea and/or Vomiting  topiramate 100 milliGRAM(s) Oral two times a day    Cardiac:  aMIOdarone    Tablet   Oral   aMIOdarone    Tablet 400 milliGRAM(s) Oral two times a day  metoprolol tartrate 50 milliGRAM(s) Oral two times a day    Pulm:    GI/:  pantoprazole    Tablet 40 milliGRAM(s) Oral every 12 hours  polyethylene glycol 3350 17 Gram(s) Oral daily  senna 2 Tablet(s) Oral at bedtime    Other:   atorvastatin 80 milliGRAM(s) Oral at bedtime  dexamethasone/neomycin/polymyxin Suspension 1 Drop(s) Both EYES every 8 hours  dextrose 10% Bolus 125 milliLiter(s) IV Bolus once  dextrose 5%. 1000 milliLiter(s) IV Continuous <Continuous>  dextrose 5%. 1000 milliLiter(s) IV Continuous <Continuous>  dextrose 50% Injectable 25 Gram(s) IV Push once  dextrose 50% Injectable 12.5 Gram(s) IV Push once  dextrose Oral Gel 15 Gram(s) Oral once PRN Blood Glucose LESS THAN 70 milliGRAM(s)/deciliter  glucagon  Injectable 1 milliGRAM(s) IntraMuscular once  heparin   Injectable 5000 Unit(s) SubCutaneous every 12 hours  insulin glargine Injectable (LANTUS) 10 Unit(s) SubCutaneous at bedtime  insulin lispro (ADMELOG) corrective regimen sliding scale   SubCutaneous Before meals and at bedtime  insulin lispro Injectable (ADMELOG) 5 Unit(s) SubCutaneous three times a day before meals  pilocarpine 2% Solution 1 Drop(s) Right EYE two times a day  potassium phosphate / sodium phosphate Powder (PHOS-NaK) 1 Packet(s) Oral three times a day    DIET: [] Regular [x easy to chew] CCD [] Renal [] Puree [] Dysphagia [] Tube Feeds:     IMAGING:   < from: CT Head No Cont (06.25.24 @ 09:46) >  IMPRESSION:  Interval removal of subdural drainage catheter and right MMA embolization   since6/24/2024. Unchanged residual subdural hemorrhage and mild midline   shift.        --- End of Report ---       DARRIUS WILL MD; Resident Radiologist  This document has been electronically signed.  XAVIER MUNOZ MD; Attending Radiologist  This document has been electronically signed. Jun 25 2024 12:33PM    < end of copied text >

## 2024-07-03 NOTE — PROGRESS NOTE ADULT - SUBJECTIVE AND OBJECTIVE BOX
[facility]  GODWIN Hutchinson Y. Patel, S. Shah, G. Everton  [phone]  ([phone] - weekdays after 5pm and weekends)    Name: RODRIGUE CARROLL  Age/Gender: 79y Male  MRN: [id number]    Interval History:  Patient seen and examined this morning.   No new complaints noted.  Notes reviewed  No concerning overnight events  Afebrile   Allergies: No Known Allergies      Objective:  Vitals:   T(F): 98.5 (07-03-24 @ 08:00), Max: 98.6 (07-02-24 @ 16:04)  HR: 56 (07-03-24 @ 08:00) (56 - 71)  BP: 124/65 (07-03-24 @ 08:00) (116/62 - 131/70)  RR: 18 (07-03-24 @ 08:00) (17 - 18)  SpO2: 99% (07-03-24 @ 08:00) (95% - 99%)  Physical Examination:  General: no acute distress, nontoxic appearing   HEENT: NC/AT, anicteric, EOMI  Respiratory: no acc muscle use, breathing comfortably  Cardiovascular: S1 and S2 present  Gastrointestinal: normal appearing, nondistended  Extremities: no edema, no cyanosis  Skin: no visible rash    Laboratory Studies:  CBC:   WBC Trend:  12.21 07-01-24 @ 06:13  13.03 06-30-24 @ 07:20  13.58 06-29-24 @ 06:38  14.35 06-28-24 @ 22:38  11.72 06-27-24 @ 16:51  10.74 06-27-24 @ 00:54    CMP: 07-03    135  |  102  |  14  ----------------------------<  138<H>  4.2   |  21<L>  |  1.18    Ca    9.1      03 Jul 2024 10:21  Phos  3.1     07-03  Mg     2.5     07-03    TPro  7.1  /  Alb  3.1<L>  /  TBili  0.6  /  DBili  x   /  AST  33  /  ALT  44  /  AlkPhos  130<H>  07-02    Creatinine: 1.18 mg/dL (07-03-24 @ 10:21)  Creatinine: 1.29 mg/dL (07-02-24 @ 05:33)  Creatinine: 1.16 mg/dL (07-01-24 @ 06:13)  Creatinine: 1.11 mg/dL (06-30-24 @ 07:25)  Creatinine: 1.11 mg/dL (06-29-24 @ 06:38)  Creatinine: 1.14 mg/dL (06-28-24 @ 22:38)  Creatinine: 1.20 mg/dL (06-28-24 @ 00:34)  Creatinine: 1.40 mg/dL (06-27-24 @ 16:51)  Creatinine: 1.37 mg/dL (06-27-24 @ 00:54)    LIVER FUNCTIONS - ( 02 Jul 2024 05:33 )  Alb: 3.1 g/dL / Pro: 7.1 g/dL / ALK PHOS: 130 U/L / ALT: 44 U/L / AST: 33 U/L / GGT: x           Microbiology: reviewed   Culture - Sputum (collected 06-28-24 @ 15:57)  Source: .Sputum Sputum  Gram Stain (06-28-24 @ 23:46):    Few polymorphonuclear leukocytes per low power field    Moderate Squamous epithelial cells per low power field    Moderate Gram Positive Cocci in Pairs and Chains per oil power field    Rare Gram Negative Coccobacilli per oil power field    Rare Gram Negative Rods per oil power field    Results consistent with oropharyngeal contamination  Final Report (06-30-24 @ 08:15):    Normal Respiratory Marian present    Culture - Blood (collected 06-28-24 @ 00:34)  Source: .Blood Blood  Final Report (07-03-24 @ 03:01):    No growth at 5 days    Culture - Blood (collected 06-28-24 @ 00:34)  Source: .Blood Blood  Final Report (07-03-24 @ 03:01):    No growth at 5 days    Culture - Blood (collected 06-25-24 @ 22:17)  Source: .Blood Blood-Peripheral  Final Report (07-01-24 @ 03:01):    No growth at 5 days    Culture - Blood (collected 06-25-24 @ 22:17)  Source: .Blood Blood-Peripheral  Final Report (07-01-24 @ 03:01):    No growth at 5 days    SARS-CoV-2 Result: NotDetec (28 Jun 2024 15:09)    Radiology: reviewed     Medications:  acetaminophen     Tablet .. 650 milliGRAM(s) Oral every 6 hours PRN  aMIOdarone    Tablet   Oral   aMIOdarone    Tablet 400 milliGRAM(s) Oral two times a day  atorvastatin 80 milliGRAM(s) Oral at bedtime  dexamethasone/neomycin/polymyxin Suspension 1 Drop(s) Both EYES every 8 hours  dextrose 10% Bolus 125 milliLiter(s) IV Bolus once  dextrose 5%. 1000 milliLiter(s) IV Continuous <Continuous>  dextrose 5%. 1000 milliLiter(s) IV Continuous <Continuous>  dextrose 50% Injectable 25 Gram(s) IV Push once  dextrose 50% Injectable 12.5 Gram(s) IV Push once  dextrose Oral Gel 15 Gram(s) Oral once PRN  glucagon  Injectable 1 milliGRAM(s) IntraMuscular once  heparin   Injectable 5000 Unit(s) SubCutaneous every 12 hours  insulin glargine Injectable (LANTUS) 10 Unit(s) SubCutaneous at bedtime  insulin lispro (ADMELOG) corrective regimen sliding scale   SubCutaneous Before meals and at bedtime  insulin lispro Injectable (ADMELOG) 5 Unit(s) SubCutaneous three times a day before meals  lacosamide 50 milliGRAM(s) Oral two times a day  levETIRAcetam 250 milliGRAM(s) Oral two times a day  meropenem  IVPB 1000 milliGRAM(s) IV Intermittent every 8 hours  metoprolol tartrate 50 milliGRAM(s) Oral two times a day  ondansetron Injectable 4 milliGRAM(s) IV Push every 6 hours PRN  pantoprazole    Tablet 40 milliGRAM(s) Oral every 12 hours  pilocarpine 2% Solution 1 Drop(s) Right EYE two times a day  polyethylene glycol 3350 17 Gram(s) Oral daily  potassium phosphate / sodium phosphate Powder (PHOS-NaK) 1 Packet(s) Oral three times a day  senna 2 Tablet(s) Oral at bedtime  topiramate 100 milliGRAM(s) Oral two times a day    Current Antimicrobials:  meropenem  IVPB 1000 milliGRAM(s) IV Intermittent every 8 hours    Prior/Completed Antimicrobials:  ceFAZolin   IVPB  piperacillin/tazobactam IVPB.  piperacillin/tazobactam IVPB.-  piperacillin/tazobactam IVPB.-  piperacillin/tazobactam IVPB.-

## 2024-07-03 NOTE — PROGRESS NOTE ADULT - ASSESSMENT
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     6/22/24 s/p Right craniotomy for subdural hematoma  6/24/24 s/p angio: right MMA embolization     NEURO:   - Continue neuro checks q 4  - 6/25 CTH: s/p removal of SD drain, unchanged residual heme and mild MLS  - Continue Vimpat, Topamax and Keppra for history of seizures   - Pain control w/ Tylenol prn  - PT/OT: BRENDON    PULM:   - On room air, O2Sat>95%  - Incentive spirometry  - 6/25 CTA Chest: no PE, atelectasis seen    CV:  - SBP , within goal  - Cardiology and EP following for afib/aflutter  - Continue Metoprolol for rate control  - Per EP, patient currently on Amiodarone 400mg BID for 1 week (started on 6/27) to continue until 7/4 then transition to Amiodarone 200mg daily. Consideration for Watchman procedure / elective ablation or JOHNATHAN occlusion when he can tolerate AC after  - 6/27 TTE: EF 55-60%, mild regional hypokinesis seen at the inferior and inderolateral basal to mid sigments, normal right vent cavity size and normal right vent systolic fxn, no pericardial effusion  - Continue Lipitor for HLD    ENDO:   - A1c 6.4, continue ISS, Admelog, Lantus and CCD, continue to monitor fingersticks - hyperglycemic to 216 today, Hospitalist increased Lantus 10units  - 6/25 Thyroid panel WNL    HEME/ONC:             7/1 CBC: downtrending leukocytosis, post-op anemia stable         DVT ppx: SQH BID, no SCDs, 6/30 Le Dopp: new b/l calf vein DVT. Vascular following, recommended repeat Dopp on 7/5 and continue DVT ppx    RENAL:   - IVL  - 7/2 BMP stable  - Has KPhos supplementation  - Voiding via condom cath    ID:   - Afebrile  - 6/29 Last fever Tmax 101.3 - BCX NGTD, COVID/RSV/Influenza negative, sputum cx: normal respiratory eder  - ID following - per their rec today -> CXR: bibasilar opacities rt>lt, had cough previously, Meropenem until 7/3  - Downtrending procal - currently 1.31 (2.06)    GI:    - Speech and Swallow following, s/p MBS - advanced to easy to chew diet today  - Last BM 6/25, added senna, miralax and dulcolax PO, gave patient suppository, per RN started having small BMs, can give enema if no further BMs  - Monitor LFTs in the setting of Amiodarone, 7/2 LFTs stable today    Appreciate Hospitalist team following for co-medical management.     I met and updated patient's wife yesterday 7/1 at bedside, she was currently going over BRENDON options.     DISCHARGE PLANNING:   BRENDON planning    Plan to be discussed w/ Dr. 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     6/22/24 s/p Right craniotomy for subdural hematoma  6/24/24 s/p angio: right MMA embolization     NEURO:   - Continue neuro checks q 4  - Surgical staples can be removed on POD#14 which is on 7/6/24  - 6/25 CTH: s/p removal of SD drain, unchanged residual heme and mild MLS  - Continue Vimpat, Topamax and Keppra for history of seizures   - Pain control w/ Tylenol prn  - PT/OT: BRENDON    PULM:   - On room air, O2Sat>95%  - Incentive spirometry  - 6/25 CTA Chest: no PE, atelectasis seen    CV:  - SBP , within goal  - Cardiology and EP following for afib/aflutter  - Continue Metoprolol for rate control  - Per EP, patient currently on Amiodarone 400mg BID for 1 week (started on 6/27) to continue until 7/4 then transition to Amiodarone 200mg daily. Taper already written out by EP. Consideration for Watchman procedure / elective ablation or JOHNATHAN occlusion when he can tolerate AC after  - 6/27 TTE: EF 55-60%, mild regional hypokinesis seen at the inferior and inderolateral basal to mid sigments, normal right vent cavity size and normal right vent systolic fxn, no pericardial effusion  - Continue Lipitor for HLD    ENDO:   - A1c 6.4, continue ISS, Admelog, Lantus and CCD, continue to monitor fingersticks - less hyperglycemic today after the increase of Lantus yesterday  - 6/25 Thyroid panel WNL    HEME/ONC:             7/1 CBC: downtrending leukocytosis, post-op anemia stable         DVT ppx: SQH BID, no SCDs, 6/30 Le Dopp: new b/l calf vein DVT. Vascular following, recommended repeat Dopp on 7/5 and continue DVT ppx    RENAL:   - IVL  - 7/2 BMP stable, will f/u BMP today  - Has KPhos supplementation  - Voiding via condom cath    ID:   - Afebrile  - 6/29 Last fever Tmax 101.3 - BCX NGTD, COVID/RSV/Influenza negative, sputum cx: normal respiratory eder  - ID following -> CXR: bibasilar opacities rt>lt, had cough previously, Meropenem until 7/3 this afternoon  - Downtrending procal 1.31 (2.06)    GI:    - Speech and Swallow following, s/p MBS 7/1 - advanced to easy to chew diet  - Senna and Miralax for bowel regimen, last BM 7/2  - Monitor LFTs in the setting of Amiodarone, 7/2 LFTs stable today    Appreciate Hospitalist team following for co-medical management.     DISCHARGE PLANNING:   BRENDON planning    Plan to be discussed w/ Dr. 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     6/22/24 s/p Right craniotomy for subdural hematoma  6/24/24 s/p angio: right MMA embolization     NEURO:   - Continue neuro checks q 4  - Surgical staples can be removed on POD#14 which is on 7/6/24  - 6/25 CTH: s/p removal of SD drain, unchanged residual heme and mild MLS  - Continue Vimpat, Topamax and Keppra for history of seizures   - Pain control w/ Tylenol prn  - PT/OT: BRENDON    PULM:   - On room air, O2Sat>95%  - Incentive spirometry  - 6/25 CTA Chest: no PE, atelectasis seen    CV:  - SBP , within goal  - Cardiology and EP following for afib/aflutter  - Continue Metoprolol for rate control  - Per EP, patient currently on Amiodarone 400mg BID for 1 week (started on 6/27) to continue until 7/4 then transition to Amiodarone 200mg daily. Taper already written out by EP. Consideration for Watchman procedure / elective ablation or JOHNATHAN occlusion when he can tolerate AC after  - 6/27 TTE: EF 55-60%, mild regional hypokinesis seen at the inferior and inderolateral basal to mid sigments, normal right vent cavity size and normal right vent systolic fxn, no pericardial effusion  - Continue Lipitor for HLD    ENDO:   - A1c 6.4, continue ISS, Admelog, Lantus and CCD, continue to monitor fingersticks - less hyperglycemic today after the increase of Lantus yesterday  - 6/25 Thyroid panel WNL    HEME/ONC:             7/1 CBC: downtrending leukocytosis, post-op anemia stable         DVT ppx: SQH BID, no SCDs, 6/30 Le Dopp: new b/l calf vein DVT. Vascular following, recommended repeat Dopp on 7/5 and continue DVT ppx    RENAL:   - IVL  - 7/3 BMP stable  - Has KPhos supplementation  - Voiding via condom cath    ID:   - Afebrile  - 6/29 Last fever Tmax 101.3 - BCX NGTD, COVID/RSV/Influenza negative, sputum cx: normal respiratory eder  - ID following -> CXR: bibasilar opacities rt>lt, had cough previously, Meropenem until 7/3 this afternoon  - Downtrending procal 1.31 (2.06)    GI:    - Speech and Swallow following, s/p MBS 7/1 - advanced to easy to chew diet  - Senna and Miralax for bowel regimen, last BM 7/2  - Monitor LFTs in the setting of Amiodarone, 7/2 LFTs stable today    Appreciate Hospitalist team following for co-medical management.     DISCHARGE PLANNING:   BRENDON planning    Plan to be discussed w/ Dr. Goodman  [id number]

## 2024-07-03 NOTE — PROGRESS NOTE ADULT - ASSESSMENT
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 No AC/AP, coags/plt wnl Now s/p R crani for SDH evacuation. Afib RVR overnight s/p Dilt IV, now in SR.      #Paroxysmal atrial fibrillation  -sp iv dilt with conversion to SR   -Continue with amiodarone per eps   -Continue po metoprolol per eps   -Echo nml lv fxn, no wma, mild MR   -remains off a/c at this time given acute SDH    #Acute subdural hematoma   -s/p R crani for SDH evacuation  -post op mgmt per neuro    #DM  -Mgmt per med    #DVT  -Acute b/l LE dvts  -Off a/c at this time given SDH  -Cont dvt ppx  -Appreciate vasc cards recs

## 2024-07-03 NOTE — PROGRESS NOTE ADULT - ASSESSMENT
Patient is a 79 year old male with PMH of SOC for benign ependymoma, seizures, DM, HLD, aspirin non compliant, presented to VS for AMS, gait difficulties and falls for a week.     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, L SDH, R parafalcince acute SDH with mass effect  S/p R SDH evacuation 6/22   Aspiration pneumonia   RUE edema due to SVT, no DVT  Acute b/l LE DVTs  Fevers initially likely due to aspiration pneumonia, had resolved then febrile again  may be d/t SVT and DVTs, noted more awake and afebrile after escalating to meropenem  Leukocytosis likely reactive    CXR with bibasilar opacities with slight increase on the R  Has cough, no dyspnea or pain, no increased O2 requirements, on zosyn since 6/25 pm  RUE edema with cord like swelling in antecubital area with TTP  R sided incision healing with no swelling or sign of infection  abd benign, nontender  UA negative for pyuria  Bcx NGTD x2   Scx with normal resp eder   6/29 zosyn (6/25-6/29) escalated to meropenem   now afebrile >48h, WBC stable     Recommendations:   Continue meropenem to complete 5d course today 7/3  Monitor temps/WBC  Aspiration precautions   Continue rest of care per primary team   Stable from ID standpoint at this time.     Kenton Falcon M.D.  [facility] Division of Infectious Diseases  [phone]  After 5pm on weekdays and all day on weekends - please call [phone]

## 2024-07-03 NOTE — PROGRESS NOTE ADULT - SUBJECTIVE AND OBJECTIVE BOX
CARDIOLOGY FOLLOW UP - Dr. Bass  DATE OF SERVICE: 7/3/24    CC  No cv complaints     REVIEW OF SYSTEMS:  CONSTITUTIONAL: No fever, weight loss, or fatigue  RESPIRATORY: No cough, wheezing, chills or hemoptysis; No Shortness of Breath  CARDIOVASCULAR: No chest pain, palpitations, passing out, dizziness, or leg swelling  GASTROINTESTINAL: No abdominal or epigastric pain. No nausea, vomiting, or hematemesis; No diarrhea or constipation. No melena or hematochezia.  VASCULAR: No edema     PHYSICAL EXAM:  T(C): 36.9 (07-03-24 @ 08:00), Max: 37 (07-02-24 @ 16:04)  HR: 56 (07-03-24 @ 08:00) (56 - 71)  BP: 124/65 (07-03-24 @ 08:00) (116/62 - 131/70)  RR: 18 (07-03-24 @ 08:00) (17 - 18)  SpO2: 99% (07-03-24 @ 08:00) (95% - 99%)  Wt(kg): --  I&O's Summary    02 Jul 2024 07:01  -  03 Jul 2024 07:00  --------------------------------------------------------  IN: 0 mL / OUT: 1200 mL / NET: -1200 mL        Appearance: Normal	  Cardiovascular: Normal S1 S2,RRR, No JVD, No murmurs  Respiratory: Lungs clear to auscultation b/l   Gastrointestinal:  Soft, Non-tender, + BS	  Extremities: Normal range of motion, No clubbing, cyanosis or edema      Home Medications:  Keppra 250 mg oral tablet: 1 tab(s) orally every 12 hours (22 Jun 2024 18:38)  metFORMIN 500 mg oral tablet: 1 tab(s) orally every 12 hours (22 Jun 2024 18:38)  rosuvastatin 40 mg oral capsule: 1 cap(s) orally once a day (22 Jun 2024 18:38)  Topamax 100 mg oral tablet: 1 tab(s) orally every 12 hours (22 Jun 2024 18:37)      MEDICATIONS  (STANDING):  aMIOdarone    Tablet   Oral   aMIOdarone    Tablet 400 milliGRAM(s) Oral two times a day  atorvastatin 80 milliGRAM(s) Oral at bedtime  dexamethasone/neomycin/polymyxin Suspension 1 Drop(s) Both EYES every 8 hours  dextrose 10% Bolus 125 milliLiter(s) IV Bolus once  dextrose 5%. 1000 milliLiter(s) (100 mL/Hr) IV Continuous <Continuous>  dextrose 5%. 1000 milliLiter(s) (50 mL/Hr) IV Continuous <Continuous>  dextrose 50% Injectable 25 Gram(s) IV Push once  dextrose 50% Injectable 12.5 Gram(s) IV Push once  glucagon  Injectable 1 milliGRAM(s) IntraMuscular once  heparin   Injectable 5000 Unit(s) SubCutaneous every 12 hours  insulin glargine Injectable (LANTUS) 10 Unit(s) SubCutaneous at bedtime  insulin lispro (ADMELOG) corrective regimen sliding scale   SubCutaneous Before meals and at bedtime  insulin lispro Injectable (ADMELOG) 5 Unit(s) SubCutaneous three times a day before meals  lacosamide 50 milliGRAM(s) Oral two times a day  levETIRAcetam 250 milliGRAM(s) Oral two times a day  meropenem  IVPB 1000 milliGRAM(s) IV Intermittent every 8 hours  metoprolol tartrate 50 milliGRAM(s) Oral two times a day  pantoprazole    Tablet 40 milliGRAM(s) Oral every 12 hours  pilocarpine 2% Solution 1 Drop(s) Right EYE two times a day  polyethylene glycol 3350 17 Gram(s) Oral daily  potassium phosphate / sodium phosphate Powder (PHOS-NaK) 1 Packet(s) Oral three times a day  senna 2 Tablet(s) Oral at bedtime  topiramate 100 milliGRAM(s) Oral two times a day      TELEMETRY: SR	    ECG:  	  RADIOLOGY:   DIAGNOSTIC TESTING:  [ ] Echocardiogram:  [ ]  Catheterization:  [ ] Stress Test:    OTHER: 	    LABS:	 	    Troponin T, High Sensitivity Result: 41 ng/L [0 - 51] (06-27 @ 16:51)  Troponin T, High Sensitivity Result: 37 ng/L [0 - 51] (06-27 @ 00:54)  CKMB Units: 3.4 ng/mL [0.0 - 6.7] (06-27 @ 00:54)      07-03    135  |  102  |  14  ----------------------------<  138<H>  4.2   |  21<L>  |  1.18    Ca    9.1      03 Jul 2024 10:21  Phos  3.1     07-03  Mg     2.5     07-03    TPro  7.1  /  Alb  3.1<L>  /  TBili  0.6  /  DBili  x   /  AST  33  /  ALT  44  /  AlkPhos  130<H>  07-02

## 2024-07-04 LAB
GLUCOSE BLDC GLUCOMTR-MCNC: 127 MG/DL — HIGH (ref 70–99)
GLUCOSE BLDC GLUCOMTR-MCNC: 158 MG/DL — HIGH (ref 70–99)
GLUCOSE BLDC GLUCOMTR-MCNC: 188 MG/DL — HIGH (ref 70–99)
GLUCOSE BLDC GLUCOMTR-MCNC: 206 MG/DL — HIGH (ref 70–99)

## 2024-07-04 PROCEDURE — 99232 SBSQ HOSP IP/OBS MODERATE 35: CPT

## 2024-07-04 RX ADMIN — Medication 1 PACKET(S): at 21:39

## 2024-07-04 RX ADMIN — Medication 1 DROP(S): at 17:14

## 2024-07-04 RX ADMIN — ATORVASTATIN CALCIUM 80 MILLIGRAM(S): 20 TABLET, FILM COATED ORAL at 21:40

## 2024-07-04 RX ADMIN — HEPARIN SODIUM 5000 UNIT(S): 50 INJECTION, SOLUTION INTRAVENOUS at 17:14

## 2024-07-04 RX ADMIN — Medication 50 MILLIGRAM(S): at 17:14

## 2024-07-04 RX ADMIN — TOPIRAMATE 100 MILLIGRAM(S): 50 TABLET, FILM COATED ORAL at 05:11

## 2024-07-04 RX ADMIN — Medication 1 DROP(S): at 05:10

## 2024-07-04 RX ADMIN — Medication 1 PACKET(S): at 15:39

## 2024-07-04 RX ADMIN — PANTOPRAZOLE SODIUM 40 MILLIGRAM(S): 40 INJECTION, POWDER, FOR SOLUTION INTRAVENOUS at 17:14

## 2024-07-04 RX ADMIN — PANTOPRAZOLE SODIUM 40 MILLIGRAM(S): 40 INJECTION, POWDER, FOR SOLUTION INTRAVENOUS at 05:11

## 2024-07-04 RX ADMIN — LEVETIRACETAM 250 MILLIGRAM(S): 100 INJECTION INTRAVENOUS at 17:14

## 2024-07-04 RX ADMIN — INSULIN LISPRO 4: 100 INJECTION, SOLUTION SUBCUTANEOUS at 17:05

## 2024-07-04 RX ADMIN — INSULIN LISPRO 2: 100 INJECTION, SOLUTION SUBCUTANEOUS at 21:40

## 2024-07-04 RX ADMIN — INSULIN LISPRO 5 UNIT(S): 100 INJECTION, SOLUTION SUBCUTANEOUS at 11:42

## 2024-07-04 RX ADMIN — LEVETIRACETAM 250 MILLIGRAM(S): 100 INJECTION INTRAVENOUS at 05:11

## 2024-07-04 RX ADMIN — Medication 1 DROP(S): at 15:39

## 2024-07-04 RX ADMIN — INSULIN LISPRO 5 UNIT(S): 100 INJECTION, SOLUTION SUBCUTANEOUS at 17:04

## 2024-07-04 RX ADMIN — LACOSAMIDE 50 MILLIGRAM(S): 100 TABLET, FILM COATED ORAL at 17:14

## 2024-07-04 RX ADMIN — Medication 50 MILLIGRAM(S): at 05:11

## 2024-07-04 RX ADMIN — INSULIN LISPRO 5 UNIT(S): 100 INJECTION, SOLUTION SUBCUTANEOUS at 07:58

## 2024-07-04 RX ADMIN — HEPARIN SODIUM 5000 UNIT(S): 50 INJECTION, SOLUTION INTRAVENOUS at 05:11

## 2024-07-04 RX ADMIN — Medication 1 DROP(S): at 21:39

## 2024-07-04 RX ADMIN — TOPIRAMATE 100 MILLIGRAM(S): 50 TABLET, FILM COATED ORAL at 17:14

## 2024-07-04 RX ADMIN — INSULIN LISPRO 2: 100 INJECTION, SOLUTION SUBCUTANEOUS at 11:30

## 2024-07-04 RX ADMIN — INSULIN GLARGINE 10 UNIT(S): 100 INJECTION, SOLUTION SUBCUTANEOUS at 21:40

## 2024-07-04 RX ADMIN — Medication 1 PACKET(S): at 05:11

## 2024-07-04 RX ADMIN — Medication 2 TABLET(S): at 21:39

## 2024-07-04 RX ADMIN — AMIODARONE HYDROCHLORIDE 200 MILLIGRAM(S): 50 INJECTION, SOLUTION INTRAVENOUS at 05:11

## 2024-07-04 RX ADMIN — LACOSAMIDE 50 MILLIGRAM(S): 100 TABLET, FILM COATED ORAL at 05:11

## 2024-07-04 NOTE — PROGRESS NOTE ADULT - SUBJECTIVE AND OBJECTIVE BOX
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 No AC/AP, coags/plt wnl  Exam: AOx3, PERRL, EOMI, no facial, ?/very slight L drift, BEST 5/5, gait testing deferred  (22 Jun 2024 06:07)    OVERNIGHT EVENTS: No acute events overnight, patient seen and evaluated at bedside this morning, doing well and comfortable and in NAD.     Vital Signs Last 24 Hrs  T(C): 37.4 (04 Jul 2024 10:38), Max: 37.4 (04 Jul 2024 10:38)  T(F): 99.4 (04 Jul 2024 10:38), Max: 99.4 (04 Jul 2024 10:38)  HR: 54 (04 Jul 2024 10:38) (54 - 65)  BP: 109/66 (04 Jul 2024 10:38) (109/66 - 132/70)  BP(mean): --  RR: 18 (04 Jul 2024 10:38) (17 - 18)  SpO2: 94% (04 Jul 2024 10:38) (93% - 99%)    Parameters below as of 04 Jul 2024 10:38  Patient On (Oxygen Delivery Method): room air    PHYSICAL EXAM:    General: No Acute Distress     Neurological: Awake, Ox3 (name, place, date), improving left eye ptosis, left pupil 2mm react, right pupil 3mm react, following commands, no drift, uppers 5/5, lowers 5/5    Pulmonary: Clear to Auscultation, No Rales, No Rhonchi, No Wheezes     Cardiovascular: S1, S2, Regular Rate and Rhythm     Gastrointestinal: Soft, Nontender, Nondistended     Incision: right craniotomy surgical staples in place C/D/I    LABS:    07-03    135  |  102  |  14  ----------------------------<  138<H>  4.2   |  21<L>  |  1.18    Ca    9.1      03 Jul 2024 10:21  Phos  3.1     07-03  Mg     2.5     07-03    MEDICATIONS  (STANDING):  aMIOdarone    Tablet   Oral   aMIOdarone    Tablet 200 milliGRAM(s) Oral daily  atorvastatin 80 milliGRAM(s) Oral at bedtime  dexamethasone/neomycin/polymyxin Suspension 1 Drop(s) Both EYES every 8 hours  dextrose 10% Bolus 125 milliLiter(s) IV Bolus once  dextrose 5%. 1000 milliLiter(s) (100 mL/Hr) IV Continuous <Continuous>  dextrose 5%. 1000 milliLiter(s) (50 mL/Hr) IV Continuous <Continuous>  dextrose 50% Injectable 25 Gram(s) IV Push once  dextrose 50% Injectable 12.5 Gram(s) IV Push once  glucagon  Injectable 1 milliGRAM(s) IntraMuscular once  heparin   Injectable 5000 Unit(s) SubCutaneous every 12 hours  insulin glargine Injectable (LANTUS) 10 Unit(s) SubCutaneous at bedtime  insulin lispro (ADMELOG) corrective regimen sliding scale   SubCutaneous Before meals and at bedtime  insulin lispro Injectable (ADMELOG) 5 Unit(s) SubCutaneous three times a day before meals  lacosamide 50 milliGRAM(s) Oral two times a day  levETIRAcetam 250 milliGRAM(s) Oral two times a day  metoprolol tartrate 50 milliGRAM(s) Oral two times a day  pantoprazole    Tablet 40 milliGRAM(s) Oral every 12 hours  pilocarpine 2% Solution 1 Drop(s) Right EYE two times a day  polyethylene glycol 3350 17 Gram(s) Oral daily  potassium phosphate / sodium phosphate Powder (PHOS-NaK) 1 Packet(s) Oral three times a day  senna 2 Tablet(s) Oral at bedtime  topiramate 100 milliGRAM(s) Oral two times a day    MEDICATIONS  (PRN):  acetaminophen     Tablet .. 650 milliGRAM(s) Oral every 6 hours PRN Mild Pain (1 - 3)  dextrose Oral Gel 15 Gram(s) Oral once PRN Blood Glucose LESS THAN 70 milliGRAM(s)/deciliter  ondansetron Injectable 4 milliGRAM(s) IV Push every 6 hours PRN Nausea and/or Vomiting    DIET: [] Regular [x easy to chew] CCD [] Renal [] Puree [] Dysphagia [] Tube Feeds:     IMAGING:   < from: CT Head No Cont (06.25.24 @ 09:46) >  IMPRESSION:  Interval removal of subdural drainage catheter and right MMA embolization   since6/24/2024. Unchanged residual subdural hemorrhage and mild midline   shift.        --- End of Report ---       DARRIUS WILL MD; Resident Radiologist  This document has been electronically signed.  XAVIER MUNOZ MD; Attending Radiologist  This document has been electronically signed. Jun 25 2024 12:33PM    < end of copied text >

## 2024-07-04 NOTE — PROGRESS NOTE ADULT - ASSESSMENT
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     6/22/24 s/p Right craniotomy for subdural hematoma  6/24/24 s/p angio: right MMA embolization     NEURO:   - Continue neuro checks q 4  - Surgical staples can be removed on POD#14 which is on 7/6/24  - 6/25 CTH: s/p removal of SD drain, unchanged residual heme and mild MLS  - Continue Vimpat, Topamax and Keppra for history of seizures   - Pain control w/ Tylenol prn  - PT/OT: BRENDON    PULM:   - On room air, O2Sat>95%  - Incentive spirometry  - 6/25 CTA Chest: no PE, atelectasis seen    CV:  - SBP , within goal  - Cardiology and EP following for afib/aflutter  - Continue Metoprolol for rate control  - Per EP, patient currently on Amiodarone 400mg BID for 1 week (started on 6/27) to continue until 7/4 then transition to Amiodarone 200mg daily. Taper already written out by EP. Consideration for Watchman procedure / elective ablation or JOHNATHAN occlusion when he can tolerate AC after  - 6/27 TTE: EF 55-60%, mild regional hypokinesis seen at the inferior and inderolateral basal to mid segments, normal right vent cavity size and normal right vent systolic fxn, no pericardial effusion  - Continue Lipitor for HLD    ENDO:   - A1c 6.4, continue ISS, Admelog, Lantus and CCD, continue to monitor fingersticks - less hyperglycemic today after the increase of Lantus yesterday  - 6/25 Thyroid panel WNL    HEME/ONC:             7/1 CBC: downtrending leukocytosis, post-op anemia stable         DVT ppx: SQH BID, no SCDs, 6/30 Le Dopp: new b/l calf vein DVT. Vascular following, recommended repeat Dopp on 7/5 and continue DVT ppx    RENAL:   - IVL  - 7/3 BMP stable  - Has KPhos supplementation  - Voiding via condom cath    ID:   - Afebrile  - 6/29 Last fever Tmax 101.3 - BCX NGTD, COVID/RSV/Influenza negative, sputum cx: normal respiratory eder  - ID following -> CXR: bibasilar opacities rt>lt, had cough previously, Meropenem until 7/3 this afternoon  - Downtrending procal 1.31 (2.06)    GI:    - Speech and Swallow following, s/p MBS 7/1 - advanced to easy to chew diet  - Senna and Miralax for bowel regimen, last BM 7/2  - Monitor LFTs in the setting of Amiodarone, 7/2 LFTs stable    Appreciate Hospitalist team following for co-medical management.     DISCHARGE PLANNING:   BRENDON upon discharge    Plan to be discussed w/ Dr. Goodman  [id number]    ASSESSMENT AND PLAN: 79M Hx SOC for tumor (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     6/22/24 s/p Right craniotomy for subdural hematoma  6/24/24 s/p angio: right MMA embolization     NEURO:   - Continue neuro checks q 4  - Surgical staples can be removed on POD#14 which is on 7/6/24  - 6/25 CTH: s/p removal of SD drain, unchanged residual heme and mild MLS  - Continue Vimpat, Topamax and Keppra for history of seizures   - Pain control w/ Tylenol prn  - PT/OT: BRENDON    PULM:   - On room air, O2Sat>95%  - Incentive spirometry  - 6/25 CTA Chest: no PE, atelectasis seen    CV:  - SBP , within goal  - Cardiology and EP following for afib/aflutter  - Continue Metoprolol for rate control  - Per EP, patient currently on Amiodarone 400mg BID for 1 week (started on 6/27) to continue until 7/4 then transition to Amiodarone 200mg daily. Taper already written out by EP. Consideration for Watchman procedure / elective ablation or JOHNATHAN occlusion when he can tolerate AC after  - 6/27 TTE: EF 55-60%, mild regional hypokinesis seen at the inferior and inferolateral basal to mid segments, normal right vent cavity size and normal right vent systolic fxn, no pericardial effusion  - Continue Lipitor for HLD    ENDO:   - A1c 6.4, continue ISS, Admelog, Lantus and CCD, continue to monitor fingersticks - improved glycemic control  - 6/25 Thyroid panel WNL    HEME/ONC:             7/1 CBC: downtrending leukocytosis, post-op anemia stable         DVT ppx: SQH BID, no SCDs, 6/30 Le Dopp: new b/l calf vein DVT. Vascular following, recommended repeat Dopp on 7/5 and continue DVT ppx    RENAL:   - IVL  - 7/3 BMP stable  - Has KPhos supplementation  - Voiding via condom cath    ID:   - Afebrile  - 6/29 Last fever Tmax 101.3 - BCX NGTD, COVID/RSV/Influenza negative, sputum cx: normal respiratory eder  - ID following -> CXR: bibasilar opacities rt>lt, had cough previously, Meropenem until 7/3  - Downtrending procal 1.31 (2.06)    GI:    - Speech and Swallow following, s/p MBS 7/1 - advanced to easy to chew diet  - Senna and Miralax for bowel regimen, last BM 7/2  - Monitor LFTs in the setting of Amiodarone, 7/2 LFTs stable    Appreciate Hospitalist team following for co-medical management.     DISCHARGE PLANNING:   BRENDON upon discharge    Plan to be discussed w/ Dr. Goodman  [id number]

## 2024-07-04 NOTE — PROGRESS NOTE ADULT - SUBJECTIVE AND OBJECTIVE BOX
Viri Manley MD  Division of Hospital Medicine  Please contact via MS Teams (prefer message first)  Office: [phone]    Patient is a 79y old  Male who presents with a chief complaint of SDH (04 Jul 2024 09:11)      SUBJECTIVE / OVERNIGHT EVENTS:  no acute events overnight, vss, afebrile  pt feels better this morning, worked with PT well    ROS:  14 point ROS negative in detail except stated as above    MEDICATIONS  (STANDING):  aMIOdarone    Tablet   Oral   aMIOdarone    Tablet 200 milliGRAM(s) Oral daily  atorvastatin 80 milliGRAM(s) Oral at bedtime  dexamethasone/neomycin/polymyxin Suspension 1 Drop(s) Both EYES every 8 hours  dextrose 10% Bolus 125 milliLiter(s) IV Bolus once  dextrose 5%. 1000 milliLiter(s) (100 mL/Hr) IV Continuous <Continuous>  dextrose 5%. 1000 milliLiter(s) (50 mL/Hr) IV Continuous <Continuous>  dextrose 50% Injectable 25 Gram(s) IV Push once  dextrose 50% Injectable 12.5 Gram(s) IV Push once  glucagon  Injectable 1 milliGRAM(s) IntraMuscular once  heparin   Injectable 5000 Unit(s) SubCutaneous every 12 hours  insulin glargine Injectable (LANTUS) 10 Unit(s) SubCutaneous at bedtime  insulin lispro (ADMELOG) corrective regimen sliding scale   SubCutaneous Before meals and at bedtime  insulin lispro Injectable (ADMELOG) 5 Unit(s) SubCutaneous three times a day before meals  lacosamide 50 milliGRAM(s) Oral two times a day  levETIRAcetam 250 milliGRAM(s) Oral two times a day  metoprolol tartrate 50 milliGRAM(s) Oral two times a day  pantoprazole    Tablet 40 milliGRAM(s) Oral every 12 hours  pilocarpine 2% Solution 1 Drop(s) Right EYE two times a day  polyethylene glycol 3350 17 Gram(s) Oral daily  potassium phosphate / sodium phosphate Powder (PHOS-NaK) 1 Packet(s) Oral three times a day  senna 2 Tablet(s) Oral at bedtime  topiramate 100 milliGRAM(s) Oral two times a day    MEDICATIONS  (PRN):  acetaminophen     Tablet .. 650 milliGRAM(s) Oral every 6 hours PRN Mild Pain (1 - 3)  dextrose Oral Gel 15 Gram(s) Oral once PRN Blood Glucose LESS THAN 70 milliGRAM(s)/deciliter  ondansetron Injectable 4 milliGRAM(s) IV Push every 6 hours PRN Nausea and/or Vomiting      CAPILLARY BLOOD GLUCOSE      POCT Blood Glucose.: 127 mg/dL (04 Jul 2024 07:41)  POCT Blood Glucose.: 143 mg/dL (03 Jul 2024 21:25)  POCT Blood Glucose.: 192 mg/dL (03 Jul 2024 16:17)  POCT Blood Glucose.: 120 mg/dL (03 Jul 2024 11:34)    I&O's Summary    03 Jul 2024 07:01  -  04 Jul 2024 07:00  --------------------------------------------------------  IN: 0 mL / OUT: 250 mL / NET: -250 mL        PHYSICAL EXAM:  Vital Signs Last 24 Hrs  T(C): 37.4 (04 Jul 2024 10:38), Max: 37.4 (04 Jul 2024 10:38)  T(F): 99.4 (04 Jul 2024 10:38), Max: 99.4 (04 Jul 2024 10:38)  HR: 54 (04 Jul 2024 10:38) (54 - 65)  BP: 109/66 (04 Jul 2024 10:38) (109/66 - 132/70)  BP(mean): --  RR: 18 (04 Jul 2024 10:38) (17 - 18)  SpO2: 94% (04 Jul 2024 10:38) (93% - 99%)    Parameters below as of 04 Jul 2024 10:38  Patient On (Oxygen Delivery Method): room air      GENERAL: NAD, well-developed  HEAD:  s/p R crani, staples cdi  EYES: EOMI, PERRLA, conjunctiva and sclera clear  NECK: Supple, No JVD  CHEST/LUNG: Clear to auscultation bilaterally; No wheeze  HEART: Regular rate and rhythm; No murmurs, rubs, or gallops  ABDOMEN: Soft, Nontender, Nondistended; Bowel sounds present  EXTREMITIES:  2+ Peripheral Pulses, No clubbing, cyanosis, or edema  NEUROLOGY: AAOx3; non-focal  SKIN: No rashes or lesions    LABS:    07-03    135  |  102  |  14  ----------------------------<  138<H>  4.2   |  21<L>  |  1.18    Ca    9.1      03 Jul 2024 10:21  Phos  3.1     07-03  Mg     2.5     07-03            Urinalysis Basic - ( 03 Jul 2024 10:21 )    Color: x / Appearance: x / SG: x / pH: x  Gluc: 138 mg/dL / Ketone: x  / Bili: x / Urobili: x   Blood: x / Protein: x / Nitrite: x   Leuk Esterase: x / RBC: x / WBC x   Sq Epi: x / Non Sq Epi: x / Bacteria: x        RADIOLOGY & ADDITIONAL TESTS:    Imaging Personally Reviewed:    Consultant(s) Notes Reviewed:      Care Discussed with Consultants/Other Providers:

## 2024-07-04 NOTE — PROGRESS NOTE ADULT - SUBJECTIVE AND OBJECTIVE BOX
CARDIOLOGY FOLLOW UP - Dr. Bass (for [facility])  Date of Service: 7/4/24  CC: no events    Review of Systems:  Constitutional: No fever, weight loss, or fatigue  Respiratory: No cough, wheezing, or hemoptysis, no shortness of breath  Cardiovascular: No chest pain, palpitations, passing out, dizziness, or leg swelling  Gastrointestinal: No abd or epigastric pain. No nausea, vomiting, or hematemesis; no diarrhea or consiptaiton, no melena or hematochezia  Vascular: No edema     TELEMETRY:    PHYSICAL EXAM:  T(C): 36.3 (07-04-24 @ 04:30), Max: 36.9 (07-03-24 @ 08:00)  HR: 65 (07-04-24 @ 04:30) (56 - 65)  BP: 118/68 (07-04-24 @ 04:30) (118/68 - 132/70)  RR: 18 (07-04-24 @ 04:30) (17 - 18)  SpO2: 93% (07-04-24 @ 04:30) (93% - 99%)  Wt(kg): --  I&O's Summary    03 Jul 2024 07:01  -  04 Jul 2024 07:00  --------------------------------------------------------  IN: 0 mL / OUT: 250 mL / NET: -250 mL        Appearance: Normal	  Cardiovascular: Normal S1 S2,RRR, No JVD, No murmurs  Respiratory: Lungs clear to auscultation	  Gastrointestinal:  Soft, Non-tender, + BS	  Extremities: Normal range of motion, No clubbing, cyanosis or edema  Vascular: Peripheral pulses palpable 2+ bilaterally       Home Medications:  Keppra 250 mg oral tablet: 1 tab(s) orally every 12 hours (22 Jun 2024 18:38)  metFORMIN 500 mg oral tablet: 1 tab(s) orally every 12 hours (22 Jun 2024 18:38)  rosuvastatin 40 mg oral capsule: 1 cap(s) orally once a day (22 Jun 2024 18:38)  Topamax 100 mg oral tablet: 1 tab(s) orally every 12 hours (22 Jun 2024 18:37)        MEDICATIONS  (STANDING):  aMIOdarone    Tablet   Oral   aMIOdarone    Tablet 200 milliGRAM(s) Oral daily  atorvastatin 80 milliGRAM(s) Oral at bedtime  dexamethasone/neomycin/polymyxin Suspension 1 Drop(s) Both EYES every 8 hours  dextrose 10% Bolus 125 milliLiter(s) IV Bolus once  dextrose 5%. 1000 milliLiter(s) (100 mL/Hr) IV Continuous <Continuous>  dextrose 5%. 1000 milliLiter(s) (50 mL/Hr) IV Continuous <Continuous>  dextrose 50% Injectable 25 Gram(s) IV Push once  dextrose 50% Injectable 12.5 Gram(s) IV Push once  glucagon  Injectable 1 milliGRAM(s) IntraMuscular once  heparin   Injectable 5000 Unit(s) SubCutaneous every 12 hours  insulin glargine Injectable (LANTUS) 10 Unit(s) SubCutaneous at bedtime  insulin lispro (ADMELOG) corrective regimen sliding scale   SubCutaneous Before meals and at bedtime  insulin lispro Injectable (ADMELOG) 5 Unit(s) SubCutaneous three times a day before meals  lacosamide 50 milliGRAM(s) Oral two times a day  levETIRAcetam 250 milliGRAM(s) Oral two times a day  metoprolol tartrate 50 milliGRAM(s) Oral two times a day  pantoprazole    Tablet 40 milliGRAM(s) Oral every 12 hours  pilocarpine 2% Solution 1 Drop(s) Right EYE two times a day  polyethylene glycol 3350 17 Gram(s) Oral daily  potassium phosphate / sodium phosphate Powder (PHOS-NaK) 1 Packet(s) Oral three times a day  senna 2 Tablet(s) Oral at bedtime  topiramate 100 milliGRAM(s) Oral two times a day        EKG:  RADIOLOGY:  DIAGNOSTIC TESTING:  [ ] Echocardiogram:  [ ] Catherterization:  [ ] Stress Test:  OTHER:     LABS:	 	      07-03    135  |  102  |  14  ----------------------------<  138<H>  4.2   |  21<L>  |  1.18    Ca    9.1      03 Jul 2024 10:21  Phos  3.1     07-03  Mg     2.5     07-03            CARDIAC MARKERS:

## 2024-07-04 NOTE — PROGRESS NOTE ADULT - ASSESSMENT
Patient is a 79 year old male with PMH of SOC for benign ependymoma, seizures, DM, HLD, aspirin non compliant, presented to VS for AMS, gait difficulties and falls for a week.     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, L SDH, R parafalcince acute SDH with mass effect  S/p R SDH evacuation 6/22   Aspiration pneumonia   RUE edema due to SVT, no DVT  Acute b/l LE DVTs  Fevers initially likely due to aspiration pneumonia, had resolved then febrile again  may be d/t SVT and DVTs, noted more awake and afebrile after escalating to meropenem  Leukocytosis likely reactive    CXR with bibasilar opacities with slight increase on the R  Has cough, no dyspnea or pain, no increased O2 requirements, on zosyn since 6/25 pm  RUE edema with cord like swelling in antecubital area with TTP  R sided incision healing with no swelling or sign of infection  abd benign, nontender  UA negative for pyuria  Bcx NGTD x2   Scx with normal resp eder   6/29 zosyn (6/25-6/29) escalated to meropenem (6/29-7/3)  now afebrile >48h, WBC stable, nontoxic     Recommendations:   Continue off antibiotics   Aspiration precautions   Continue rest of care per primary team     Stable from ID standpoint at this time.   Discharge planning per primary team.   ID will sign off at this time but remains available for any further questions/concerns.     Kenton Falcon M.D.  [facility], Division of Infectious Diseases  [phone]  After 5pm on weekdays and all day on weekends - please call [phone]

## 2024-07-04 NOTE — PROGRESS NOTE ADULT - SUBJECTIVE AND OBJECTIVE BOX
[facility] DIVISION OF INFECTIOUS DISEASES  GODWIN Hutchinson Y. Patel, S. Shah, G. Everton  [phone]  ([phone] - weekdays after 5pm and weekends)    Name: RODRIGUE CARROLL  Age/Gender: 79y Male  MRN: [id number]    Interval History:  Patient seen and examined this morning.   Feels well, denies fever, pain or any new complaints.   Notes reviewed. No concerning overnight events  Afebrile   Allergies: No Known Allergies      Objective:  Vitals:   T(F): 97.3 (07-04-24 @ 04:30), Max: 98.4 (07-03-24 @ 21:33)  HR: 65 (07-04-24 @ 04:30) (62 - 65)  BP: 118/68 (07-04-24 @ 04:30) (118/68 - 132/70)  RR: 18 (07-04-24 @ 04:30) (17 - 18)  SpO2: 93% (07-04-24 @ 04:30) (93% - 99%)  Physical Examination:  General: no acute distress, RA, nontoxic   HEENT: NC/AT, anicteric, EOMI  Respiratory: no acc muscle use, breathing comfortably  Cardiovascular: S1 and S2 present  Gastrointestinal: normal appearing, nondistended  Extremities: no edema, no cyanosis  Skin: no visible rash    Laboratory Studies:  CBC:   WBC Trend:  12.21 07-01-24 @ 06:13  13.03 06-30-24 @ 07:20  13.58 06-29-24 @ 06:38  14.35 06-28-24 @ 22:38  11.72 06-27-24 @ 16:51    CMP: 07-03    135  |  102  |  14  ----------------------------<  138<H>  4.2   |  21<L>  |  1.18    Ca    9.1      03 Jul 2024 10:21  Phos  3.1     07-03  Mg     2.5     07-03      Creatinine: 1.18 mg/dL (07-03-24 @ 10:21)  Creatinine: 1.29 mg/dL (07-02-24 @ 05:33)  Creatinine: 1.16 mg/dL (07-01-24 @ 06:13)  Creatinine: 1.11 mg/dL (06-30-24 @ 07:25)  Creatinine: 1.11 mg/dL (06-29-24 @ 06:38)  Creatinine: 1.14 mg/dL (06-28-24 @ 22:38)  Creatinine: 1.20 mg/dL (06-28-24 @ 00:34)  Creatinine: 1.40 mg/dL (06-27-24 @ 16:51)    Microbiology: reviewed   Culture - Sputum (collected 06-28-24 @ 15:57)  Source: .Sputum Sputum  Gram Stain (06-28-24 @ 23:46):    Few polymorphonuclear leukocytes per low power field    Moderate Squamous epithelial cells per low power field    Moderate Gram Positive Cocci in Pairs and Chains per oil power field    Rare Gram Negative Coccobacilli per oil power field    Rare Gram Negative Rods per oil power field    Results consistent with oropharyngeal contamination  Final Report (06-30-24 @ 08:15):    Normal Respiratory Marian present    Culture - Blood (collected 06-28-24 @ 00:34)  Source: .Blood Blood  Final Report (07-03-24 @ 03:01):    No growth at 5 days    Culture - Blood (collected 06-28-24 @ 00:34)  Source: .Blood Blood  Final Report (07-03-24 @ 03:01):    No growth at 5 days    Culture - Blood (collected 06-25-24 @ 22:17)  Source: .Blood Blood-Peripheral  Final Report (07-01-24 @ 03:01):    No growth at 5 days    Culture - Blood (collected 06-25-24 @ 22:17)  Source: .Blood Blood-Peripheral  Final Report (07-01-24 @ 03:01):    No growth at 5 days    SARS-CoV-2 Result: NotDete (28 Jun 2024 15:09)    Radiology: reviewed     Medications:  acetaminophen     Tablet .. 650 milliGRAM(s) Oral every 6 hours PRN  aMIOdarone    Tablet   Oral   aMIOdarone    Tablet 200 milliGRAM(s) Oral daily  atorvastatin 80 milliGRAM(s) Oral at bedtime  dexamethasone/neomycin/polymyxin Suspension 1 Drop(s) Both EYES every 8 hours  dextrose 10% Bolus 125 milliLiter(s) IV Bolus once  dextrose 5%. 1000 milliLiter(s) IV Continuous <Continuous>  dextrose 5%. 1000 milliLiter(s) IV Continuous <Continuous>  dextrose 50% Injectable 25 Gram(s) IV Push once  dextrose 50% Injectable 12.5 Gram(s) IV Push once  dextrose Oral Gel 15 Gram(s) Oral once PRN  glucagon  Injectable 1 milliGRAM(s) IntraMuscular once  heparin   Injectable 5000 Unit(s) SubCutaneous every 12 hours  insulin glargine Injectable (LANTUS) 10 Unit(s) SubCutaneous at bedtime  insulin lispro (ADMELOG) corrective regimen sliding scale   SubCutaneous Before meals and at bedtime  insulin lispro Injectable (ADMELOG) 5 Unit(s) SubCutaneous three times a day before meals  lacosamide 50 milliGRAM(s) Oral two times a day  levETIRAcetam 250 milliGRAM(s) Oral two times a day  metoprolol tartrate 50 milliGRAM(s) Oral two times a day  ondansetron Injectable 4 milliGRAM(s) IV Push every 6 hours PRN  pantoprazole    Tablet 40 milliGRAM(s) Oral every 12 hours  pilocarpine 2% Solution 1 Drop(s) Right EYE two times a day  polyethylene glycol 3350 17 Gram(s) Oral daily  potassium phosphate / sodium phosphate Powder (PHOS-NaK) 1 Packet(s) Oral three times a day  senna 2 Tablet(s) Oral at bedtime  topiramate 100 milliGRAM(s) Oral two times a day    Prior/Completed Antimicrobials:  ceFAZolin   IVPB  meropenem  IVPB  piperacillin/tazobactam IVPB.  piperacillin/tazobactam IVPB.-  piperacillin/tazobactam IVPB.-  piperacillin/tazobactam IVPB.-

## 2024-07-05 DIAGNOSIS — N17.9 ACUTE KIDNEY FAILURE, UNSPECIFIED: ICD-10-CM

## 2024-07-05 LAB
ANION GAP SERPL CALC-SCNC: 10 MMOL/L — SIGNIFICANT CHANGE UP (ref 5–17)
BASOPHILS # BLD AUTO: 0.04 K/UL — SIGNIFICANT CHANGE UP (ref 0–0.2)
BASOPHILS NFR BLD AUTO: 0.4 % — SIGNIFICANT CHANGE UP (ref 0–2)
BUN SERPL-MCNC: 18 MG/DL — SIGNIFICANT CHANGE UP (ref 7–23)
CALCIUM SERPL-MCNC: 9.1 MG/DL — SIGNIFICANT CHANGE UP (ref 8.4–10.5)
CHLORIDE SERPL-SCNC: 101 MMOL/L — SIGNIFICANT CHANGE UP (ref 96–108)
CO2 SERPL-SCNC: 22 MMOL/L — SIGNIFICANT CHANGE UP (ref 22–31)
CREAT SERPL-MCNC: 1.35 MG/DL — HIGH (ref 0.5–1.3)
EGFR: 53 ML/MIN/1.73M2 — LOW
EOSINOPHIL # BLD AUTO: 0.23 K/UL — SIGNIFICANT CHANGE UP (ref 0–0.5)
EOSINOPHIL NFR BLD AUTO: 2.2 % — SIGNIFICANT CHANGE UP (ref 0–6)
GLUCOSE BLDC GLUCOMTR-MCNC: 129 MG/DL — HIGH (ref 70–99)
GLUCOSE BLDC GLUCOMTR-MCNC: 184 MG/DL — HIGH (ref 70–99)
GLUCOSE BLDC GLUCOMTR-MCNC: 200 MG/DL — HIGH (ref 70–99)
GLUCOSE BLDC GLUCOMTR-MCNC: 77 MG/DL — SIGNIFICANT CHANGE UP (ref 70–99)
GLUCOSE SERPL-MCNC: 139 MG/DL — HIGH (ref 70–99)
HCT VFR BLD CALC: 29.8 % — LOW (ref 39–50)
HGB BLD-MCNC: 9.5 G/DL — LOW (ref 13–17)
IMM GRANULOCYTES NFR BLD AUTO: 1.8 % — HIGH (ref 0–0.9)
LYMPHOCYTES # BLD AUTO: 1.78 K/UL — SIGNIFICANT CHANGE UP (ref 1–3.3)
LYMPHOCYTES # BLD AUTO: 17.2 % — SIGNIFICANT CHANGE UP (ref 13–44)
MCHC RBC-ENTMCNC: 31.8 PG — SIGNIFICANT CHANGE UP (ref 27–34)
MCHC RBC-ENTMCNC: 31.9 GM/DL — LOW (ref 32–36)
MCV RBC AUTO: 99.7 FL — SIGNIFICANT CHANGE UP (ref 80–100)
MONOCYTES # BLD AUTO: 0.79 K/UL — SIGNIFICANT CHANGE UP (ref 0–0.9)
MONOCYTES NFR BLD AUTO: 7.6 % — SIGNIFICANT CHANGE UP (ref 2–14)
NEUTROPHILS # BLD AUTO: 7.34 K/UL — SIGNIFICANT CHANGE UP (ref 1.8–7.4)
NEUTROPHILS NFR BLD AUTO: 70.8 % — SIGNIFICANT CHANGE UP (ref 43–77)
NRBC # BLD: 0 /100 WBCS — SIGNIFICANT CHANGE UP (ref 0–0)
PLATELET # BLD AUTO: 415 K/UL — HIGH (ref 150–400)
POTASSIUM SERPL-MCNC: 4.3 MMOL/L — SIGNIFICANT CHANGE UP (ref 3.5–5.3)
POTASSIUM SERPL-SCNC: 4.3 MMOL/L — SIGNIFICANT CHANGE UP (ref 3.5–5.3)
RBC # BLD: 2.99 M/UL — LOW (ref 4.2–5.8)
RBC # FLD: 14 % — SIGNIFICANT CHANGE UP (ref 10.3–14.5)
SODIUM SERPL-SCNC: 133 MMOL/L — LOW (ref 135–145)
WBC # BLD: 10.37 K/UL — SIGNIFICANT CHANGE UP (ref 3.8–10.5)
WBC # FLD AUTO: 10.37 K/UL — SIGNIFICANT CHANGE UP (ref 3.8–10.5)

## 2024-07-05 PROCEDURE — 99232 SBSQ HOSP IP/OBS MODERATE 35: CPT

## 2024-07-05 PROCEDURE — 70450 CT HEAD/BRAIN W/O DYE: CPT | Mod: 26

## 2024-07-05 PROCEDURE — 93970 EXTREMITY STUDY: CPT | Mod: 26

## 2024-07-05 RX ORDER — SODIUM CHLORIDE 0.9 % (FLUSH) 0.9 %
1000 SYRINGE (ML) INJECTION
Refills: 0 | Status: DISCONTINUED | OUTPATIENT
Start: 2024-07-05 | End: 2024-07-06

## 2024-07-05 RX ORDER — POLYETHYLENE GLYCOL 3350 1 G/G
17 POWDER ORAL EVERY 12 HOURS
Refills: 0 | Status: DISCONTINUED | OUTPATIENT
Start: 2024-07-05 | End: 2024-07-05

## 2024-07-05 RX ORDER — POLYETHYLENE GLYCOL 3350 1 G/G
17 POWDER ORAL DAILY
Refills: 0 | Status: DISCONTINUED | OUTPATIENT
Start: 2024-07-06 | End: 2024-07-10

## 2024-07-05 RX ADMIN — INSULIN LISPRO 5 UNIT(S): 100 INJECTION, SOLUTION SUBCUTANEOUS at 11:47

## 2024-07-05 RX ADMIN — LEVETIRACETAM 250 MILLIGRAM(S): 100 INJECTION INTRAVENOUS at 05:00

## 2024-07-05 RX ADMIN — LEVETIRACETAM 250 MILLIGRAM(S): 100 INJECTION INTRAVENOUS at 17:06

## 2024-07-05 RX ADMIN — Medication 1 PACKET(S): at 05:01

## 2024-07-05 RX ADMIN — Medication 1 DROP(S): at 05:01

## 2024-07-05 RX ADMIN — INSULIN LISPRO 5 UNIT(S): 100 INJECTION, SOLUTION SUBCUTANEOUS at 08:18

## 2024-07-05 RX ADMIN — Medication 1 DROP(S): at 13:11

## 2024-07-05 RX ADMIN — INSULIN LISPRO 2: 100 INJECTION, SOLUTION SUBCUTANEOUS at 21:23

## 2024-07-05 RX ADMIN — LACOSAMIDE 50 MILLIGRAM(S): 100 TABLET, FILM COATED ORAL at 05:00

## 2024-07-05 RX ADMIN — Medication 1 DROP(S): at 17:14

## 2024-07-05 RX ADMIN — AMIODARONE HYDROCHLORIDE 200 MILLIGRAM(S): 50 INJECTION, SOLUTION INTRAVENOUS at 05:01

## 2024-07-05 RX ADMIN — Medication 1 DROP(S): at 05:02

## 2024-07-05 RX ADMIN — Medication 1 PACKET(S): at 21:27

## 2024-07-05 RX ADMIN — Medication 75 MILLILITER(S): at 13:20

## 2024-07-05 RX ADMIN — PANTOPRAZOLE SODIUM 40 MILLIGRAM(S): 40 INJECTION, POWDER, FOR SOLUTION INTRAVENOUS at 17:06

## 2024-07-05 RX ADMIN — Medication 1 PACKET(S): at 13:11

## 2024-07-05 RX ADMIN — TOPIRAMATE 100 MILLIGRAM(S): 50 TABLET, FILM COATED ORAL at 05:01

## 2024-07-05 RX ADMIN — INSULIN GLARGINE 10 UNIT(S): 100 INJECTION, SOLUTION SUBCUTANEOUS at 21:22

## 2024-07-05 RX ADMIN — TOPIRAMATE 100 MILLIGRAM(S): 50 TABLET, FILM COATED ORAL at 17:06

## 2024-07-05 RX ADMIN — INSULIN LISPRO 2: 100 INJECTION, SOLUTION SUBCUTANEOUS at 11:46

## 2024-07-05 RX ADMIN — LACOSAMIDE 50 MILLIGRAM(S): 100 TABLET, FILM COATED ORAL at 17:12

## 2024-07-05 RX ADMIN — Medication 1 DROP(S): at 21:23

## 2024-07-05 RX ADMIN — PANTOPRAZOLE SODIUM 40 MILLIGRAM(S): 40 INJECTION, POWDER, FOR SOLUTION INTRAVENOUS at 05:01

## 2024-07-05 RX ADMIN — ATORVASTATIN CALCIUM 80 MILLIGRAM(S): 20 TABLET, FILM COATED ORAL at 21:24

## 2024-07-05 RX ADMIN — Medication 50 MILLIGRAM(S): at 17:08

## 2024-07-05 RX ADMIN — HEPARIN SODIUM 5000 UNIT(S): 50 INJECTION, SOLUTION INTRAVENOUS at 05:01

## 2024-07-05 RX ADMIN — HEPARIN SODIUM 5000 UNIT(S): 50 INJECTION, SOLUTION INTRAVENOUS at 17:06

## 2024-07-05 NOTE — PROGRESS NOTE ADULT - ASSESSMENT
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 No AC/AP, coags/plt wnl Now s/p R crani for SDH evacuation. Afib RVR overnight s/p Dilt IV, now in SR.      #Paroxysmal atrial fibrillation  -sp iv dilt with conversion to SR   -Continue with amiodarone per eps   -Continue po metoprolol per eps - can consider decreasing to 25mg if remains fito on tele   -Echo nml lv fxn, no wma, mild MR   -remains off a/c at this time given acute SDH    #Acute subdural hematoma   -s/p R crani for SDH evacuation  -post op mgmt per neuro    #DM  -Mgmt per med    #DVT  -Acute b/l LE dvts  -Off a/c at this time given SDH  -Cont dvt ppx  -Appreciate vasc cards recs   -F/u repeat LE duplex

## 2024-07-05 NOTE — PROGRESS NOTE ADULT - SUBJECTIVE AND OBJECTIVE BOX
[facility] Division of Hospital Medicine  Evy Scott MD  Available via MS Teams  Spectra [phone]    SUBJECTIVE / OVERNIGHT EVENTS: patient seen and examined this morning. denies any headache, SOB. multiple BM yesterday.       MEDICATIONS  (STANDING):  aMIOdarone    Tablet   Oral   aMIOdarone    Tablet 200 milliGRAM(s) Oral daily  atorvastatin 80 milliGRAM(s) Oral at bedtime  dexamethasone/neomycin/polymyxin Suspension 1 Drop(s) Both EYES every 8 hours  dextrose 10% Bolus 125 milliLiter(s) IV Bolus once  dextrose 5%. 1000 milliLiter(s) (100 mL/Hr) IV Continuous <Continuous>  dextrose 5%. 1000 milliLiter(s) (50 mL/Hr) IV Continuous <Continuous>  dextrose 50% Injectable 25 Gram(s) IV Push once  dextrose 50% Injectable 12.5 Gram(s) IV Push once  glucagon  Injectable 1 milliGRAM(s) IntraMuscular once  heparin   Injectable 5000 Unit(s) SubCutaneous every 12 hours  insulin glargine Injectable (LANTUS) 10 Unit(s) SubCutaneous at bedtime  insulin lispro (ADMELOG) corrective regimen sliding scale   SubCutaneous Before meals and at bedtime  insulin lispro Injectable (ADMELOG) 5 Unit(s) SubCutaneous three times a day before meals  lacosamide 50 milliGRAM(s) Oral two times a day  levETIRAcetam 250 milliGRAM(s) Oral two times a day  metoprolol tartrate 50 milliGRAM(s) Oral two times a day  pantoprazole    Tablet 40 milliGRAM(s) Oral every 12 hours  pilocarpine 2% Solution 1 Drop(s) Right EYE two times a day  potassium phosphate / sodium phosphate Powder (PHOS-NaK) 1 Packet(s) Oral three times a day  senna 2 Tablet(s) Oral at bedtime  sodium chloride 0.9%. 1000 milliLiter(s) (75 mL/Hr) IV Continuous <Continuous>  topiramate 100 milliGRAM(s) Oral two times a day    MEDICATIONS  (PRN):  acetaminophen     Tablet .. 650 milliGRAM(s) Oral every 6 hours PRN Mild Pain (1 - 3)  dextrose Oral Gel 15 Gram(s) Oral once PRN Blood Glucose LESS THAN 70 milliGRAM(s)/deciliter  ondansetron Injectable 4 milliGRAM(s) IV Push every 6 hours PRN Nausea and/or Vomiting      I&O's Summary    04 Jul 2024 07:01  -  05 Jul 2024 07:00  --------------------------------------------------------  IN: 0 mL / OUT: 450 mL / NET: -450 mL    05 Jul 2024 07:01  -  05 Jul 2024 12:19  --------------------------------------------------------  IN: 120 mL / OUT: 100 mL / NET: 20 mL        PHYSICAL EXAM:  Vital Signs Last 24 Hrs  T(C): 36.8 (05 Jul 2024 08:00), Max: 37.2 (04 Jul 2024 13:51)  T(F): 98.2 (05 Jul 2024 08:00), Max: 99 (04 Jul 2024 13:51)  HR: 60 (05 Jul 2024 08:00) (54 - 60)  BP: 125/70 (05 Jul 2024 08:00) (115/63 - 127/57)  BP(mean): --  RR: 18 (05 Jul 2024 08:00) (18 - 18)  SpO2: 98% (05 Jul 2024 08:00) (97% - 98%)    Parameters below as of 05 Jul 2024 05:00  Patient On (Oxygen Delivery Method): room air      CONSTITUTIONAL: NAD, well-groomed  RESPIRATORY: Normal respiratory effort; lungs are clear to auscultation bilaterally  CARDIOVASCULAR: normal S1 and S2; No lower extremity edema  ABDOMEN: nontender to palpation, normoactive bowel sounds, no rebound/guarding  MUSCULOSKELETAL: no clubbing or cyanosis of digits; no joint swelling or tenderness to palpation  PSYCH: A+O to person, place, and time; affect appropriate  NEUROLOGY: follows commands, moves all extremities      LABS:                        9.5    10.37 )-----------( 415      ( 05 Jul 2024 06:13 )             29.8     07-05    133<L>  |  101  |  18  ----------------------------<  139<H>  4.3   |  22  |  1.35<H>    Ca    9.1      05 Jul 2024 06:13            Urinalysis Basic - ( 05 Jul 2024 06:13 )    Color: x / Appearance: x / SG: x / pH: x  Gluc: 139 mg/dL / Ketone: x  / Bili: x / Urobili: x   Blood: x / Protein: x / Nitrite: x   Leuk Esterase: x / RBC: x / WBC x   Sq Epi: x / Non Sq Epi: x / Bacteria: x                COMMUNICATION:  Care Discussed with Consultants/Other Providers and Details of Discussion: neurosurgery PA(Jany)

## 2024-07-05 NOTE — PROGRESS NOTE ADULT - SUBJECTIVE AND OBJECTIVE BOX
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 No AC/AP, coags/plt wnl  6/22 patient underwent right crani for SDH  6/24 R MMAE, SD drain removed in IR, post angio afib 2:1 flutter 150-160hr, given diliatzem IV and PO  6/27 given lasix 20mg for fluid overload?, afib w/ RVR, febrile, pancultured, given IV dilit, amio loaded      Admission Scores  GCS:15    NIHSS:     ICH:    24 hour Events:   no overnight events, patient seen and examined at bedside, resting comfortably     Allergies    No Known Allergies    Intolerances      REVIEW OF SYSTEMS: [ ] Unable to Assess due to neurologic exam   [X] All ROS addressed below are non-contributory, except:  Neuro: [ ] Headache [ ] Back pain [ ] Numbness [ ] Weakness [ ] Ataxia [ ] Dizziness [ ] Aphasia [ ] Dysarthria [ ] Visual disturbance  Resp: [ ] Shortness of breath/dyspnea, [ ] Orthopnea [ ] Cough  CV: [ ] Chest pain [ ] Palpitation [ ] Lightheadedness [ ] Syncope  Renal: [ ] Thirst [ ] Edema  GI: [ ] Nausea [ ] Emesis [ ] Abdominal pain [ ] Constipation [ ] Diarrhea  Hem: [ ] Hematemesis [ ] bright red blood per rectum  ID: [ ] Fever [ ] Chills [ ] Dysuria  ENT: [ ] Rhinorrhea      DEVICES:   [ ] Restraints [ ] ET tube [ ] central line [ ] arterial line [ ] obregon [ ] NGT/OGT [ ] EVD [ ] LD [ ] RADHA/HMV [ ] Trach [ ] PEG [ ] Chest Tube     VITALS:   Vital Signs Last 24 Hrs  T(C): 36.8 (05 Jul 2024 05:00), Max: 37.4 (04 Jul 2024 10:38)  T(F): 98.2 (05 Jul 2024 05:00), Max: 99.4 (04 Jul 2024 10:38)  HR: 56 (05 Jul 2024 05:00) (54 - 60)  BP: 123/66 (05 Jul 2024 05:00) (109/66 - 127/57)  BP(mean): --  RR: 18 (05 Jul 2024 05:00) (18 - 18)  SpO2: 97% (05 Jul 2024 05:00) (94% - 98%)    Parameters below as of 05 Jul 2024 05:00  Patient On (Oxygen Delivery Method): room air      CAPILLARY BLOOD GLUCOSE      POCT Blood Glucose.: 129 mg/dL (05 Jul 2024 07:33)  POCT Blood Glucose.: 188 mg/dL (04 Jul 2024 21:26)  POCT Blood Glucose.: 206 mg/dL (04 Jul 2024 17:03)  POCT Blood Glucose.: 158 mg/dL (04 Jul 2024 11:47)    I&O's Summary    04 Jul 2024 07:01  -  05 Jul 2024 07:00  --------------------------------------------------------  IN: 0 mL / OUT: 450 mL / NET: -450 mL      Respiratory:  RA       LABS:                        9.5    10.37 )-----------( 415      ( 05 Jul 2024 06:13 )             29.8     07-05    133<L>  |  101  |  18  ----------------------------<  139<H>  4.3   |  22  |  1.35<H>         MEDICATION LEVELS:     IVF FLUIDS/MEDICATIONS:   MEDICATIONS  (STANDING):  aMIOdarone    Tablet   Oral   aMIOdarone    Tablet 200 milliGRAM(s) Oral daily  atorvastatin 80 milliGRAM(s) Oral at bedtime  dexamethasone/neomycin/polymyxin Suspension 1 Drop(s) Both EYES every 8 hours  dextrose 10% Bolus 125 milliLiter(s) IV Bolus once  dextrose 5%. 1000 milliLiter(s) (100 mL/Hr) IV Continuous <Continuous>  dextrose 5%. 1000 milliLiter(s) (50 mL/Hr) IV Continuous <Continuous>  dextrose 50% Injectable 25 Gram(s) IV Push once  dextrose 50% Injectable 12.5 Gram(s) IV Push once  glucagon  Injectable 1 milliGRAM(s) IntraMuscular once  heparin   Injectable 5000 Unit(s) SubCutaneous every 12 hours  insulin glargine Injectable (LANTUS) 10 Unit(s) SubCutaneous at bedtime  insulin lispro (ADMELOG) corrective regimen sliding scale   SubCutaneous Before meals and at bedtime  insulin lispro Injectable (ADMELOG) 5 Unit(s) SubCutaneous three times a day before meals  lacosamide 50 milliGRAM(s) Oral two times a day  levETIRAcetam 250 milliGRAM(s) Oral two times a day  metoprolol tartrate 50 milliGRAM(s) Oral two times a day  pantoprazole    Tablet 40 milliGRAM(s) Oral every 12 hours  pilocarpine 2% Solution 1 Drop(s) Right EYE two times a day  polyethylene glycol 3350 17 Gram(s) Oral daily  potassium phosphate / sodium phosphate Powder (PHOS-NaK) 1 Packet(s) Oral three times a day  senna 2 Tablet(s) Oral at bedtime  topiramate 100 milliGRAM(s) Oral two times a day    MEDICATIONS  (PRN):  acetaminophen     Tablet .. 650 milliGRAM(s) Oral every 6 hours PRN Mild Pain (1 - 3)  dextrose Oral Gel 15 Gram(s) Oral once PRN Blood Glucose LESS THAN 70 milliGRAM(s)/deciliter  ondansetron Injectable 4 milliGRAM(s) IV Push every 6 hours PRN Nausea and/or Vomiting        IMAGING:  < from: CT Head No Cont (06.25.24 @ 09:46) >  IMPRESSION:  Interval removal of subdural drainage catheter and right MMA embolization   since6/24/2024. Unchanged residual subdural hemorrhage and mild midline   shift.      < end of copied text >      EXAMINATION:  PHYSICAL EXAM:    Constitutional: No Acute Distress     Neurological: Awake, alert oriented to person, place and time, Following Commands, PERRL, EOMI, No Gaze Preference, Face Symmetrical, Speech Fluent, No dysmetria, No ataxia, No nystagmus     Motor exam:          Upper extremity                         Delt     Bicep     Tricep    HG                                                 R         5/5        5/5        5/5       5/5                                               L          5/5        5/5        5/5       5/5          Lower extremity                        HF         KF        KE       DF         PF                                                  R        5/5        5/5        5/5       5/5         5/5                                               L         5/5        5/5       5/5       5/5          5/5                                                 Sensation: [X] intact to light touch  [ ] decreased:     Pulmonary: Clear to Auscultation, No rales, No rhonchi, No wheezes     Cardiovascular: S1, S2, Regular rate and rhythm     Gastrointestinal: Soft, Non-tender, Non-distended     Extremities: No calf tenderness     Incision: c/d/i   HOSPITAL COURSE:79M Hx SOC for tumor (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 No AC/AP, coags/plt wnl  6/22 patient underwent right crani for SDH  6/24 R MMAE, SD drain removed in IR, post angio afib 2:1 flutter 150-160hr, given diliatzem IV and PO  6/27 given lasix 20mg for fluid overload?, afib w/ RVR, febrile, pancultured, given IV dilit, amio loaded, cardiology following      24 hour Events:   no overnight events, patient seen and examined at bedside, resting comfortably     Allergies    No Known Allergies    Intolerances      REVIEW OF SYSTEMS: [ ] Unable to Assess due to neurologic exam   [X] All ROS addressed below are non-contributory, except:  Neuro: [ ] Headache [ ] Back pain [ ] Numbness [ ] Weakness [ ] Ataxia [ ] Dizziness [ ] Aphasia [ ] Dysarthria [ ] Visual disturbance  Resp: [ ] Shortness of breath/dyspnea, [ ] Orthopnea [ ] Cough  CV: [ ] Chest pain [ ] Palpitation [ ] Lightheadedness [ ] Syncope  Renal: [ ] Thirst [ ] Edema  GI: [ ] Nausea [ ] Emesis [ ] Abdominal pain [ ] Constipation [ ] Diarrhea  Hem: [ ] Hematemesis [ ] bright red blood per rectum  ID: [ ] Fever [ ] Chills [ ] Dysuria  ENT: [ ] Rhinorrhea      DEVICES:   [ ] Restraints [ ] ET tube [ ] central line [ ] arterial line [ ] obregon [ ] NGT/OGT [ ] EVD [ ] LD [ ] RADHA/HMV [ ] Trach [ ] PEG [ ] Chest Tube     VITALS:   Vital Signs Last 24 Hrs  T(C): 36.8 (05 Jul 2024 05:00), Max: 37.4 (04 Jul 2024 10:38)  T(F): 98.2 (05 Jul 2024 05:00), Max: 99.4 (04 Jul 2024 10:38)  HR: 56 (05 Jul 2024 05:00) (54 - 60)  BP: 123/66 (05 Jul 2024 05:00) (109/66 - 127/57)  BP(mean): --  RR: 18 (05 Jul 2024 05:00) (18 - 18)  SpO2: 97% (05 Jul 2024 05:00) (94% - 98%)    Parameters below as of 05 Jul 2024 05:00  Patient On (Oxygen Delivery Method): room air      CAPILLARY BLOOD GLUCOSE      POCT Blood Glucose.: 129 mg/dL (05 Jul 2024 07:33)  POCT Blood Glucose.: 188 mg/dL (04 Jul 2024 21:26)  POCT Blood Glucose.: 206 mg/dL (04 Jul 2024 17:03)  POCT Blood Glucose.: 158 mg/dL (04 Jul 2024 11:47)    I&O's Summary    04 Jul 2024 07:01  -  05 Jul 2024 07:00  --------------------------------------------------------  IN: 0 mL / OUT: 450 mL / NET: -450 mL      Respiratory:  RA       LABS:                        9.5    10.37 )-----------( 415      ( 05 Jul 2024 06:13 )             29.8     07-05    133<L>  |  101  |  18  ----------------------------<  139<H>  4.3   |  22  |  1.35<H>         MEDICATION LEVELS:     IVF FLUIDS/MEDICATIONS:   MEDICATIONS  (STANDING):  aMIOdarone    Tablet   Oral   aMIOdarone    Tablet 200 milliGRAM(s) Oral daily  atorvastatin 80 milliGRAM(s) Oral at bedtime  dexamethasone/neomycin/polymyxin Suspension 1 Drop(s) Both EYES every 8 hours  dextrose 10% Bolus 125 milliLiter(s) IV Bolus once  dextrose 5%. 1000 milliLiter(s) (100 mL/Hr) IV Continuous <Continuous>  dextrose 5%. 1000 milliLiter(s) (50 mL/Hr) IV Continuous <Continuous>  dextrose 50% Injectable 25 Gram(s) IV Push once  dextrose 50% Injectable 12.5 Gram(s) IV Push once  glucagon  Injectable 1 milliGRAM(s) IntraMuscular once  heparin   Injectable 5000 Unit(s) SubCutaneous every 12 hours  insulin glargine Injectable (LANTUS) 10 Unit(s) SubCutaneous at bedtime  insulin lispro (ADMELOG) corrective regimen sliding scale   SubCutaneous Before meals and at bedtime  insulin lispro Injectable (ADMELOG) 5 Unit(s) SubCutaneous three times a day before meals  lacosamide 50 milliGRAM(s) Oral two times a day  levETIRAcetam 250 milliGRAM(s) Oral two times a day  metoprolol tartrate 50 milliGRAM(s) Oral two times a day  pantoprazole    Tablet 40 milliGRAM(s) Oral every 12 hours  pilocarpine 2% Solution 1 Drop(s) Right EYE two times a day  polyethylene glycol 3350 17 Gram(s) Oral daily  potassium phosphate / sodium phosphate Powder (PHOS-NaK) 1 Packet(s) Oral three times a day  senna 2 Tablet(s) Oral at bedtime  topiramate 100 milliGRAM(s) Oral two times a day    MEDICATIONS  (PRN):  acetaminophen     Tablet .. 650 milliGRAM(s) Oral every 6 hours PRN Mild Pain (1 - 3)  dextrose Oral Gel 15 Gram(s) Oral once PRN Blood Glucose LESS THAN 70 milliGRAM(s)/deciliter  ondansetron Injectable 4 milliGRAM(s) IV Push every 6 hours PRN Nausea and/or Vomiting        IMAGING:  < from: CT Head No Cont (06.25.24 @ 09:46) >  IMPRESSION:  Interval removal of subdural drainage catheter and right MMA embolization   since6/24/2024. Unchanged residual subdural hemorrhage and mild midline   shift.      < end of copied text >      EXAMINATION:  PHYSICAL EXAM:    Constitutional: No Acute Distress     Neurological: AOx3, EOMI, PERRL left eye ptosis, FC, BEST 5/5                                                 Sensation: [X] intact to light touch  [ ] decreased:     Pulmonary: Clear to Auscultation, No rales, No rhonchi, No wheezes     Cardiovascular: S1, S2, Regular rate and rhythm     Gastrointestinal: Soft, Non-tender, Non-distended     Extremities: No calf tenderness     Incision: c/d/i

## 2024-07-05 NOTE — PROGRESS NOTE ADULT - SUBJECTIVE AND OBJECTIVE BOX
Vascular Cardiology Progress Note     DIRECT PROVIDER NUMBER: [phone]  / Available on TEAMS    CC: SDH     Interval Events:  Denies C/P or SOB.  No LE pain or edema.     Allergies  No Known Allergies	   MEDICATIONS  (STANDING):  aMIOdarone    Tablet   Oral   aMIOdarone    Tablet 200 milliGRAM(s) Oral daily  atorvastatin 80 milliGRAM(s) Oral at bedtime  dexamethasone/neomycin/polymyxin Suspension 1 Drop(s) Both EYES every 8 hours  dextrose 10% Bolus 125 milliLiter(s) IV Bolus once  dextrose 5%. 1000 milliLiter(s) (100 mL/Hr) IV Continuous <Continuous>  dextrose 5%. 1000 milliLiter(s) (50 mL/Hr) IV Continuous <Continuous>  dextrose 50% Injectable 25 Gram(s) IV Push once  dextrose 50% Injectable 12.5 Gram(s) IV Push once  glucagon  Injectable 1 milliGRAM(s) IntraMuscular once  heparin   Injectable 5000 Unit(s) SubCutaneous every 12 hours  insulin glargine Injectable (LANTUS) 10 Unit(s) SubCutaneous at bedtime  insulin lispro (ADMELOG) corrective regimen sliding scale   SubCutaneous Before meals and at bedtime  insulin lispro Injectable (ADMELOG) 5 Unit(s) SubCutaneous three times a day before meals  lacosamide 50 milliGRAM(s) Oral two times a day  levETIRAcetam 250 milliGRAM(s) Oral two times a day  metoprolol tartrate 50 milliGRAM(s) Oral two times a day  pantoprazole    Tablet 40 milliGRAM(s) Oral every 12 hours  pilocarpine 2% Solution 1 Drop(s) Right EYE two times a day  polyethylene glycol 3350 17 Gram(s) Oral every 12 hours  potassium phosphate / sodium phosphate Powder (PHOS-NaK) 1 Packet(s) Oral three times a day  senna 2 Tablet(s) Oral at bedtime  topiramate 100 milliGRAM(s) Oral two times a day      PAST MEDICAL & SURGICAL HISTORY:  DM2 (diabetes mellitus, type 2)  HTN (hypertension)  Seizures  Brain tumor  Pulmonary embolism    FAMILY HISTORY: see HPI    SOCIAL HISTORY:  unchanged    REVIEW OF SYSTEMS:  CONSTITUTIONAL: No fever  EYES: No eye pain  ENT:  No throat pain  NECK: No pain   RESPIRATORY: No C/P  CARDIOVASCULAR: No SOB  GASTROINTESTINAL: No abdominal pain  GENITOURINARY: No hematuria  SKIN: No LE wounds  LYMPH Nodes: No enlarged glands noted  ENDOCRINE: No heat or cold intolerance noted  MUSCULOSKELETAL: No LE edema  PSYCHIATRIC: No depression, anxiety  HEME/LYMPH: No bleeding gums  ALLERGY AND IMMUNOLOGIC: No hives    [ x] All others negative	   ICU Vital Signs Last 24 Hrs  T(C): 36.8 (05 Jul 2024 08:00), Max: 37.4 (04 Jul 2024 10:38)  T(F): 98.2 (05 Jul 2024 08:00), Max: 99.4 (04 Jul 2024 10:38)  HR: 60 (05 Jul 2024 08:00) (54 - 60)  BP: 125/70 (05 Jul 2024 08:00) (109/66 - 127/57)  BP(mean): --  ABP: --  ABP(mean): --  RR: 18 (05 Jul 2024 08:00) (18 - 18)  SpO2: 98% (05 Jul 2024 08:00) (94% - 98%)    O2 Parameters below as of 05 Jul 2024 05:00  Patient On (Oxygen Delivery Method): room air                Appearance: NAD 	  Cardiovascular: RRR, S1 and S2  Respiratory: CTA B/L   Psychiatry:  AAO x 3  Gastrointestinal:  Soft, Non-tender, + BS	  Skin: No cyanosis	  Extremities: No LE edema, bilateral calves soft    Vascular Pulse Exam: Palpable pedal pulses bilaterally   Foot Exam: No tissue injury            Assessment:  1. Bilateral Below the knee DVT      CTA chest showed no PE      TTE with normal RV function   2. SDH     S/p R craniotomy for evacuation of SDH on 6/22. S/p R MMA embolization on 6/24  3. DM  4. HLD   5. History of Falls    Plan:  1. For asymptomatic below the knee DVT in setting of recent SDH and neurosurgery, recommend to continue surveillance LE venous duplex next on 7/5.  2. Patient currently on Heparin Subcutaneous 5000 units BID, recommend to continue.  3. Appreciate EP / Cardiology following for history of Afib.   4. Appreciate excellent Neurosurgical care.          Pia

## 2024-07-05 NOTE — PROGRESS NOTE ADULT - ASSESSMENT
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     6/22/24 s/p Right craniotomy for subdural hematoma  6/24/24 s/p angio: right MMA embolization     NEURO:   - Continue neuro checks q 4  - Surgical staples can be removed on POD#14 which is on 7/6/24  - 6/25 CTH: s/p removal of SD drain, unchanged residual heme and mild MLS  - Rpt CTH ordered to fu heme   - Continue Vimpat, Topamax and Keppra for history of seizures   - Pain control w/ Tylenol prn  - PT/OT: BRENDON    PULM:   - On room air, O2Sat>95%  - Incentive spirometry  - 6/25 CTA Chest: no PE, atelectasis seen    CV:  - SBP , within goal  - Cardiology and EP following for afib/aflutter  - Continue Metoprolol for rate control  - Per EP, patient currently on Amiodarone 400mg BID for 1 week (started on 6/27) to continue until 7/4 then transition to Amiodarone 200mg daily. Taper already written out by EP. Consideration for Watchman procedure / elective ablation or JOHNATHAN occlusion when he can tolerate AC after  - 6/27 TTE: EF 55-60%, mild regional hypokinesis seen at the inferior and inferolateral basal to mid segments, normal right vent cavity size and normal right vent systolic fxn, no pericardial effusion  - Continue Lipitor for HLD    ENDO:   - A1c 6.4, continue ISS, Admelog, Lantus and CCD, continue to monitor fingersticks - improved glycemic control  - 6/25 Thyroid panel WNL    HEME/ONC:               7/1 CBC: downtrending leukocytosis, post-op anemia stable  DVT ppx: SQH BID, no SCDs, 6/30 Le Dopp: new b/l calf vein DVT. Vascular following, recommended repeat Dopp on 7/5 and continue DVT ppx    RENAL:   - IVL  - 7/5 Na 133, continue to monitor  - 7/3 BMP stable  - Has KPhos supplementation  - Voiding via condom cath    ID:   - Afebrile  - 6/29 Last fever Tmax 101.3 - BCX NGTD, COVID/RSV/Influenza negative, sputum cx: normal respiratory eder  - ID following -> CXR: bibasilar opacities rt>lt, had cough previously, Meropenem until 7/3  - Downtrending procal 1.31 (2.06)    GI:    - Speech and Swallow following, s/p MBS 7/1 - advanced to easy to chew diet  - Senna and Miralax for bowel regimen, last BM 7/4  - Monitor LFTs in the setting of Amiodarone, 7/2 LFTs stable    Appreciate Hospitalist team following for co-medical management.     DISCHARGE PLANNING:   [facility]ON upon discharge    Plan to be discussed w/ Dr. 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     6/22/24 s/p Right craniotomy for subdural hematoma  6/24/24 s/p angio: right MMA embolization     NEURO:   - Continue neuro checks q 4  - Surgical staples can be removed on POD#14 which is on 7/6/24  - 6/25 CTH: s/p removal of SD drain, unchanged residual heme and mild MLS  - Rpt CTH ordered to fu heme   - Continue Vimpat, Topamax and Keppra for history of seizures   - Pain control w/ Tylenol prn  - PT/OT: BRENDON, pending wife's decision     PULM:   - On room air, O2Sat>95%  - Incentive spirometry  - 6/25 CTA Chest: no PE, atelectasis seen    CV:  - SBP , within goal  - Cardiology and EP following for afib/aflutter  - Continue Metoprolol for rate control  - Per EP, patient currently on Amiodarone 400mg BID for 1 week (started on 6/27) to continue until 7/4 then transition to Amiodarone 200mg daily. Taper already written out by EP. Consideration for Watchman procedure / elective ablation or JOHNATHAN occlusion when he can tolerate AC after  - 6/27 TTE: EF 55-60%, mild regional hypokinesis seen at the inferior and inferolateral basal to mid segments, normal right vent cavity size and normal right vent systolic fxn, no pericardial effusion  - Continue Lipitor for HLD    ENDO:   - A1c 6.4, continue ISS, Admelog, Lantus and CCD, continue to monitor fingersticks - improved glycemic control  - 6/25 Thyroid panel WNL    HEME/ONC:               7/1 CBC: downtrending leukocytosis, post-op anemia stable  DVT ppx: SQH BID, no SCDs, 6/30 Le Dopp: new b/l calf vein DVT. Vascular following, recommended repeat Dopp on 7/5 and continue DVT ppx    RENAL:   - NS @ 75 for 16 hrs  - 7/5 Na 133, w/ elevated creatinine. fu in am   - 7/3 BMP stable  - Has KPhos supplementation  - Voiding via condom cath    ID:   - Afebrile  - 6/29 Last fever Tmax 101.3 - BCX NGTD, COVID/RSV/Influenza negative, sputum cx: normal respiratory eder  - ID following -> CXR: bibasilar opacities rt>lt, had cough previously, Meropenem until 7/3  - Downtrending procal 1.31 (2.06)    GI:    - Speech and Swallow following, s/p MBS 7/1 - advanced to easy to chew diet  - Senna and Miralax for bowel regimen, last BM 7/4  - Monitor LFTs in the setting of Amiodarone, 7/2 LFTs stable    Appreciate Hospitalist team following for co-medical management.     DISCHARGE PLANNING:   [facility] upon discharge    Plan to be discussed w/ Dr. Goodman  [id number]

## 2024-07-05 NOTE — PROGRESS NOTE ADULT - SUBJECTIVE AND OBJECTIVE BOX
CARDIOLOGY FOLLOW UP - Dr. Bass  DATE OF SERVICE: 7/5/24    CC  No cv complaints     REVIEW OF SYSTEMS:  CONSTITUTIONAL: No fever, weight loss, or fatigue  RESPIRATORY: No cough, wheezing, chills or hemoptysis; No Shortness of Breath  CARDIOVASCULAR: No chest pain, palpitations, passing out, dizziness, or leg swelling  GASTROINTESTINAL: No abdominal or epigastric pain. No nausea, vomiting, or hematemesis; No diarrhea or constipation. No melena or hematochezia.  VASCULAR: No edema     PHYSICAL EXAM:  T(C): 36.8 (07-05-24 @ 08:00), Max: 37.2 (07-04-24 @ 13:51)  HR: 60 (07-05-24 @ 08:00) (54 - 60)  BP: 125/70 (07-05-24 @ 08:00) (115/63 - 127/57)  RR: 18 (07-05-24 @ 08:00) (18 - 18)  SpO2: 98% (07-05-24 @ 08:00) (97% - 98%)  Wt(kg): --  I&O's Summary    04 Jul 2024 07:01  -  05 Jul 2024 07:00  --------------------------------------------------------  IN: 0 mL / OUT: 450 mL / NET: -450 mL    05 Jul 2024 07:01  -  05 Jul 2024 11:28  --------------------------------------------------------  IN: 120 mL / OUT: 100 mL / NET: 20 mL        Appearance: Normal	  Cardiovascular: Normal S1 S2,RRR, No JVD, No murmurs  Respiratory: Lungs clear to auscultation	  Gastrointestinal:  Soft, Non-tender, + BS	  Extremities: Normal range of motion, No clubbing, cyanosis or edema      Home Medications:  Keppra 250 mg oral tablet: 1 tab(s) orally every 12 hours (22 Jun 2024 18:38)  metFORMIN 500 mg oral tablet: 1 tab(s) orally every 12 hours (22 Jun 2024 18:38)  rosuvastatin 40 mg oral capsule: 1 cap(s) orally once a day (22 Jun 2024 18:38)  Topamax 100 mg oral tablet: 1 tab(s) orally every 12 hours (22 Jun 2024 18:37)      MEDICATIONS  (STANDING):  aMIOdarone    Tablet 200 milliGRAM(s) Oral daily  aMIOdarone    Tablet   Oral   atorvastatin 80 milliGRAM(s) Oral at bedtime  dexamethasone/neomycin/polymyxin Suspension 1 Drop(s) Both EYES every 8 hours  dextrose 10% Bolus 125 milliLiter(s) IV Bolus once  dextrose 5%. 1000 milliLiter(s) (100 mL/Hr) IV Continuous <Continuous>  dextrose 5%. 1000 milliLiter(s) (50 mL/Hr) IV Continuous <Continuous>  dextrose 50% Injectable 25 Gram(s) IV Push once  dextrose 50% Injectable 12.5 Gram(s) IV Push once  glucagon  Injectable 1 milliGRAM(s) IntraMuscular once  heparin   Injectable 5000 Unit(s) SubCutaneous every 12 hours  insulin glargine Injectable (LANTUS) 10 Unit(s) SubCutaneous at bedtime  insulin lispro (ADMELOG) corrective regimen sliding scale   SubCutaneous Before meals and at bedtime  insulin lispro Injectable (ADMELOG) 5 Unit(s) SubCutaneous three times a day before meals  lacosamide 50 milliGRAM(s) Oral two times a day  levETIRAcetam 250 milliGRAM(s) Oral two times a day  metoprolol tartrate 50 milliGRAM(s) Oral two times a day  pantoprazole    Tablet 40 milliGRAM(s) Oral every 12 hours  pilocarpine 2% Solution 1 Drop(s) Right EYE two times a day  potassium phosphate / sodium phosphate Powder (PHOS-NaK) 1 Packet(s) Oral three times a day  senna 2 Tablet(s) Oral at bedtime  sodium chloride 0.9%. 1000 milliLiter(s) (75 mL/Hr) IV Continuous <Continuous>  topiramate 100 milliGRAM(s) Oral two times a day      TELEMETRY: Sinus bradycardia     ECG:  	  RADIOLOGY:   DIAGNOSTIC TESTING:  [ ] Echocardiogram:  [ ]  Catheterization:  [ ] Stress Test:    OTHER: 	    LABS:	 	                            9.5    10.37 )-----------( 415      ( 05 Jul 2024 06:13 )             29.8     07-05    133<L>  |  101  |  18  ----------------------------<  139<H>  4.3   |  22  |  1.35<H>    Ca    9.1      05 Jul 2024 06:13

## 2024-07-05 NOTE — PROGRESS NOTE ADULT - ASSESSMENT
79M Hx SOC for tumor (per daughter who is ED attending was benign ependymoma), seizures, DM2, HLD presented with frequent falls, found to have SDH tx from OSH now s/p SDH evacuation on 6/22, right MMAE on 6/24. Course c/b MIKAELA, RUE thrombophlebitis, fever possibly due to aspiration PNA and new onset AFib/Aflutter on amio, right basilic vein/cephalic vein thrombus, b/l soleal and right peroneal DVT.

## 2024-07-06 LAB
ALBUMIN SERPL ELPH-MCNC: 3.8 G/DL — SIGNIFICANT CHANGE UP (ref 3.3–5)
ALP SERPL-CCNC: 117 U/L — SIGNIFICANT CHANGE UP (ref 40–120)
ALT FLD-CCNC: 26 U/L — SIGNIFICANT CHANGE UP (ref 10–45)
ANION GAP SERPL CALC-SCNC: 11 MMOL/L — SIGNIFICANT CHANGE UP (ref 5–17)
AST SERPL-CCNC: 13 U/L — SIGNIFICANT CHANGE UP (ref 10–40)
BILIRUB SERPL-MCNC: 0.5 MG/DL — SIGNIFICANT CHANGE UP (ref 0.2–1.2)
BUN SERPL-MCNC: 16 MG/DL — SIGNIFICANT CHANGE UP (ref 7–23)
CALCIUM SERPL-MCNC: 9.4 MG/DL — SIGNIFICANT CHANGE UP (ref 8.4–10.5)
CHLORIDE SERPL-SCNC: 104 MMOL/L — SIGNIFICANT CHANGE UP (ref 96–108)
CO2 SERPL-SCNC: 19 MMOL/L — LOW (ref 22–31)
CREAT SERPL-MCNC: 1.27 MG/DL — SIGNIFICANT CHANGE UP (ref 0.5–1.3)
EGFR: 57 ML/MIN/1.73M2 — LOW
GLUCOSE BLDC GLUCOMTR-MCNC: 120 MG/DL — HIGH (ref 70–99)
GLUCOSE BLDC GLUCOMTR-MCNC: 136 MG/DL — HIGH (ref 70–99)
GLUCOSE BLDC GLUCOMTR-MCNC: 142 MG/DL — HIGH (ref 70–99)
GLUCOSE BLDC GLUCOMTR-MCNC: 181 MG/DL — HIGH (ref 70–99)
GLUCOSE SERPL-MCNC: 141 MG/DL — HIGH (ref 70–99)
MAGNESIUM SERPL-MCNC: 2.5 MG/DL — SIGNIFICANT CHANGE UP (ref 1.6–2.6)
PHOSPHATE SERPL-MCNC: 3.5 MG/DL — SIGNIFICANT CHANGE UP (ref 2.5–4.5)
POTASSIUM SERPL-MCNC: 4.4 MMOL/L — SIGNIFICANT CHANGE UP (ref 3.5–5.3)
POTASSIUM SERPL-SCNC: 4.4 MMOL/L — SIGNIFICANT CHANGE UP (ref 3.5–5.3)
PROT SERPL-MCNC: 7.7 G/DL — SIGNIFICANT CHANGE UP (ref 6–8.3)
SODIUM SERPL-SCNC: 134 MMOL/L — LOW (ref 135–145)

## 2024-07-06 PROCEDURE — 99232 SBSQ HOSP IP/OBS MODERATE 35: CPT

## 2024-07-06 RX ORDER — SODIUM CHLORIDE 0.9 % (FLUSH) 0.9 %
1000 SYRINGE (ML) INJECTION ONCE
Refills: 0 | Status: COMPLETED | OUTPATIENT
Start: 2024-07-06 | End: 2024-07-06

## 2024-07-06 RX ORDER — SODIUM CHLORIDE 0.9 % (FLUSH) 0.9 %
1000 SYRINGE (ML) INJECTION
Refills: 0 | Status: DISCONTINUED | OUTPATIENT
Start: 2024-07-06 | End: 2024-07-10

## 2024-07-06 RX ADMIN — Medication 1 DROP(S): at 05:25

## 2024-07-06 RX ADMIN — Medication 1 DROP(S): at 17:19

## 2024-07-06 RX ADMIN — Medication 1000 MILLILITER(S): at 08:45

## 2024-07-06 RX ADMIN — TOPIRAMATE 100 MILLIGRAM(S): 50 TABLET, FILM COATED ORAL at 05:24

## 2024-07-06 RX ADMIN — HEPARIN SODIUM 5000 UNIT(S): 50 INJECTION, SOLUTION INTRAVENOUS at 17:17

## 2024-07-06 RX ADMIN — Medication 1 DROP(S): at 21:09

## 2024-07-06 RX ADMIN — Medication 1 PACKET(S): at 21:09

## 2024-07-06 RX ADMIN — Medication 50 MILLIGRAM(S): at 05:25

## 2024-07-06 RX ADMIN — LEVETIRACETAM 250 MILLIGRAM(S): 100 INJECTION INTRAVENOUS at 17:17

## 2024-07-06 RX ADMIN — TOPIRAMATE 100 MILLIGRAM(S): 50 TABLET, FILM COATED ORAL at 17:18

## 2024-07-06 RX ADMIN — LACOSAMIDE 50 MILLIGRAM(S): 100 TABLET, FILM COATED ORAL at 05:25

## 2024-07-06 RX ADMIN — Medication 50 MILLIGRAM(S): at 17:18

## 2024-07-06 RX ADMIN — INSULIN GLARGINE 10 UNIT(S): 100 INJECTION, SOLUTION SUBCUTANEOUS at 21:21

## 2024-07-06 RX ADMIN — PANTOPRAZOLE SODIUM 40 MILLIGRAM(S): 40 INJECTION, POWDER, FOR SOLUTION INTRAVENOUS at 17:18

## 2024-07-06 RX ADMIN — INSULIN LISPRO 2: 100 INJECTION, SOLUTION SUBCUTANEOUS at 21:21

## 2024-07-06 RX ADMIN — Medication 1 PACKET(S): at 05:24

## 2024-07-06 RX ADMIN — Medication 1 DROP(S): at 13:19

## 2024-07-06 RX ADMIN — PANTOPRAZOLE SODIUM 40 MILLIGRAM(S): 40 INJECTION, POWDER, FOR SOLUTION INTRAVENOUS at 05:25

## 2024-07-06 RX ADMIN — INSULIN LISPRO 5 UNIT(S): 100 INJECTION, SOLUTION SUBCUTANEOUS at 16:44

## 2024-07-06 RX ADMIN — LACOSAMIDE 50 MILLIGRAM(S): 100 TABLET, FILM COATED ORAL at 17:17

## 2024-07-06 RX ADMIN — POLYETHYLENE GLYCOL 3350 17 GRAM(S): 1 POWDER ORAL at 11:21

## 2024-07-06 RX ADMIN — INSULIN LISPRO 5 UNIT(S): 100 INJECTION, SOLUTION SUBCUTANEOUS at 11:20

## 2024-07-06 RX ADMIN — HEPARIN SODIUM 5000 UNIT(S): 50 INJECTION, SOLUTION INTRAVENOUS at 05:24

## 2024-07-06 RX ADMIN — ATORVASTATIN CALCIUM 80 MILLIGRAM(S): 20 TABLET, FILM COATED ORAL at 21:09

## 2024-07-06 RX ADMIN — Medication 1 APPLICATION(S): at 21:11

## 2024-07-06 RX ADMIN — Medication 75 MILLILITER(S): at 13:18

## 2024-07-06 RX ADMIN — AMIODARONE HYDROCHLORIDE 200 MILLIGRAM(S): 50 INJECTION, SOLUTION INTRAVENOUS at 05:24

## 2024-07-06 RX ADMIN — Medication 1 PACKET(S): at 13:21

## 2024-07-06 RX ADMIN — LEVETIRACETAM 250 MILLIGRAM(S): 100 INJECTION INTRAVENOUS at 05:25

## 2024-07-06 RX ADMIN — INSULIN LISPRO 5 UNIT(S): 100 INJECTION, SOLUTION SUBCUTANEOUS at 07:47

## 2024-07-06 NOTE — PROGRESS NOTE ADULT - PROBLEM SELECTOR PLAN 5
- 6/28 UE Duplex: There is superficial vein thrombosis of the right basilic vein at the antecubital fossa and right cephalic vein to the mid upper arm level.  - 6/30 LE Duplex: Left soleal DVT, Right soleal + peroneal DVT  - per wife, no prior hx of PE  - vascular cardiology input appreciated, plan to check serial duplex on 7/5  - continue HSQ
- 6/28 UE Duplex: There is superficial vein thrombosis of the right basilic vein at the antecubital fossa and right cephalic vein to the mid upper arm level.  - 6/30 LE Duplex: Left soleal DVT, Right soleal + peroneal DVT  - per wife, no prior hx of PE  - vascular cardiology input appreciated, plan to check serial duplex (next on 7/5)  - continue HSQ
- 6/28 UE Duplex: There is superficial vein thrombosis of the right basilic vein at the antecubital fossa and right cephalic vein to the mid upper arm level.  - 6/30 LE Duplex: Left soleal DVT, Right soleal + peroneal DVT  - per wife, no prior hx of PE  - vascular cardiology input appreciated, plan to check serial duplex (next on 7/5)  - continue HSQ
- 6/28 UE Duplex: There is superficial vein thrombosis of the right basilic vein at the antecubital fossa and right cephalic vein to the mid upper arm level.  - 6/30 LE Duplex: Left soleal DVT, Right soleal + peroneal DVT  - repeat LE doppler on 7/5: persistent DVTs  - per wife, no prior hx of PE  - vascular cardiology input appreciated: continue HSQ for now  - continue HSQ
- 6/28 UE Duplex: There is superficial vein thrombosis of the right basilic vein at the antecubital fossa and right cephalic vein to the mid upper arm level.  - 6/30 LE Duplex: Left soleal DVT, Right soleal + peroneal DVT  - per wife, no prior hx of PE  - vascular cardiology input appreciated, plan to check serial duplex (next on 7/5)  - continue HSQ
Speech and swallow eval recommending to continue with current diet and evaluate with MBS.     MBS ordered, pending likely 7/1, d/w with speech    CXR as above.

## 2024-07-06 NOTE — PROGRESS NOTE ADULT - PROBLEM SELECTOR PLAN 2
Now s/p R crani for SDH evacuation  Orders per NSCU   aspiration precautions.
- A1c 6.4   - FS well controlled  - continue lantus 10 U qhs, continue premeal 5u TID  - monitor FS glucose and adjust insulin accordingly
- A1c 6.4   - Glargine 12 U qhs  - Lispro 4U premeal, now that pt is eating will increase to 5 U tid - adjust with trend  - iss    HLD  - C/w Atorvastatin 80 mg qhs
- A1c 6.4   - continue lantus 12 U qhs, premeal 5u TID  - monitor FS glucose and adjust insulin accordingly
- A1c 6.4   - FS well controlled  - continue lantus 10 U qhs, continue premeal 5u TID  - monitor FS glucose and adjust insulin accordingly
- A1c 6.4   - FS well controlled  - continue lantus 10 U qhs, continue premeal 5u TID  - monitor FS glucose and adjust insulin accordingly
Now s/p R crani for SDH evacuation  Orders per NSCU   aspiration precautions.
Now s/p R crani for SDH evacuation  Orders per NSCU   aspiration precautions.
- A1c 6.4   - decrease lantus 10 U qhs, continue premeal 5u TID  - monitor FS glucose and adjust insulin accordingly

## 2024-07-06 NOTE — PROGRESS NOTE ADULT - SUBJECTIVE AND OBJECTIVE BOX
SUBJECTIVE: Patient seen and examined at bedside. Patient is sitting comfortably in the bed, no complaint of HA, neck pain, or vision changes.     12 hour events: neon    Vital Signs Last 24 Hrs  T(C): 36.7 (07-06-24 @ 09:18), Max: 36.9 (07-05-24 @ 17:08)  T(F): 98 (07-06-24 @ 09:18), Max: 98.5 (07-05-24 @ 17:08)  HR: 54 (07-06-24 @ 09:18) (54 - 75)  BP: 109/68 (07-06-24 @ 09:18) (109/68 - 152/74)  BP(mean): --  RR: 18 (07-06-24 @ 09:18) (18 - 18)  SpO2: 98% (07-06-24 @ 09:18) (95% - 98%)    PHYSICAL EXAM:    Constitutional: No Acute Distress     Neurological: AOx3, Left pupil 3 mm, R pupil 2mm, left ptosis, FC, Woodard 5/5    Incision: c/d/i    Pulmonary: Clear to Auscultation, No rales, No rhonchi, No wheezes     Cardiovascular: S1, S2, Regular rate and rhythm     Gastrointestinal: Soft, Non-tender, Non-distended, +bowel sounds x 4    Extremities: No calf tenderness bilaterally, no cyanosis, clubbing or edema      LABS:                          9.5    10.37 )-----------( 415      ( 05 Jul 2024 06:13 )             29.8    07-06    134<L>  |  104  |  16  ----------------------------<  141<H>  4.4   |  19<L>  |  1.27    Ca    9.4      06 Jul 2024 06:02  Phos  3.5     07-06  Mg     2.5     07-06    TPro  7.7  /  Alb  3.8  /  TBili  0.5  /  DBili  x   /  AST  13  /  ALT  26  /  AlkPhos  117  07-06      07-05 @ 07:01  -  07-06 @ 07:00  --------------------------------------------------------  IN: 120 mL / OUT: 2100 mL / NET: -1980 mL        IMAGING:   < from: CT Head No Cont (07.05.24 @ 17:29) >  IMPRESSION:    1.  Right hemispheric subdural hematoma, with improving midline shift.  2.  No interval rebleeding.  3.  Stable postsurgical changes in the left posterior fossa.    < end of copied text >    MEDICATIONS  (STANDING):  aMIOdarone    Tablet   Oral   aMIOdarone    Tablet 200 milliGRAM(s) Oral daily  atorvastatin 80 milliGRAM(s) Oral at bedtime  dexamethasone/neomycin/polymyxin Suspension 1 Drop(s) Both EYES every 8 hours  dextrose 10% Bolus 125 milliLiter(s) IV Bolus once  dextrose 5%. 1000 milliLiter(s) (100 mL/Hr) IV Continuous <Continuous>  dextrose 5%. 1000 milliLiter(s) (50 mL/Hr) IV Continuous <Continuous>  dextrose 50% Injectable 25 Gram(s) IV Push once  dextrose 50% Injectable 12.5 Gram(s) IV Push once  glucagon  Injectable 1 milliGRAM(s) IntraMuscular once  heparin   Injectable 5000 Unit(s) SubCutaneous every 12 hours  insulin glargine Injectable (LANTUS) 10 Unit(s) SubCutaneous at bedtime  insulin lispro (ADMELOG) corrective regimen sliding scale   SubCutaneous Before meals and at bedtime  insulin lispro Injectable (ADMELOG) 5 Unit(s) SubCutaneous three times a day before meals  lacosamide 50 milliGRAM(s) Oral two times a day  levETIRAcetam 250 milliGRAM(s) Oral two times a day  metoprolol tartrate 50 milliGRAM(s) Oral two times a day  pantoprazole    Tablet 40 milliGRAM(s) Oral every 12 hours  pilocarpine 2% Solution 1 Drop(s) Right EYE two times a day  polyethylene glycol 3350 17 Gram(s) Oral daily  potassium phosphate / sodium phosphate Powder (PHOS-NaK) 1 Packet(s) Oral three times a day  senna 2 Tablet(s) Oral at bedtime  topiramate 100 milliGRAM(s) Oral two times a day    MEDICATIONS  (PRN):  acetaminophen     Tablet .. 650 milliGRAM(s) Oral every 6 hours PRN Mild Pain (1 - 3)  dextrose Oral Gel 15 Gram(s) Oral once PRN Blood Glucose LESS THAN 70 milliGRAM(s)/deciliter  ondansetron Injectable 4 milliGRAM(s) IV Push every 6 hours PRN Nausea and/or Vomiting      DIET: Easy to chew

## 2024-07-06 NOTE — PROGRESS NOTE ADULT - PROBLEM SELECTOR PLAN 1
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
- R acute on chronic SDH, L acute SDH, R parafalcine acute SDH with brain compression  - S/p evacuation of SDH on 6/22 and R MMAE on 6/24  - s/p staple removal on 7/6  - c/w keppra 250bid, topiramate 100mg bid, vimpat 50mg bid.  - PT/OT: pending [facility]
Cont with amiodarone to completion then start 400 PO BID  Obviously unable to be on AC for now.
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  - PT/OT
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  - PT/OT

## 2024-07-06 NOTE — PROGRESS NOTE ADULT - ASSESSMENT
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     6/22/24 s/p Right craniotomy for subdural hematoma  6/24/24 s/p angio: right MMA embolization     NEURO:   - Continue neuro checks q 4  - Surgical staples removed today 7/6  - 7/5 CTH: right SDH, improving MLS  - Continue Vimpat, Topamax and Keppra for history of seizures   - Pain control w/ Tylenol prn  - PT/OT: BRENDON, pending acceptance    PULM:   - On room air, O2Sat>95%  - Incentive spirometry  - 6/25 CTA Chest: no PE, atelectasis seen    CV:  - SBP , within goal  - Cardiology and EP following for afib/aflutter  - Continue Metoprolol for rate control  - Per EP, patient currently on Amiodarone 400mg BID for 1 week (started on 6/27) to continue until 7/4 then transition to Amiodarone 200mg daily. Taper already written out by EP. Consideration for Watchman procedure / elective ablation or JOHNATHAN occlusion when he can tolerate AC after  - 6/27 TTE: EF 55-60%, mild regional hypokinesis seen at the inferior and inferolateral basal to mid segments, normal right vent cavity size and normal right vent systolic fxn, no pericardial effusion  - Continue Lipitor for HLD    ENDO:   - A1c 6.4, continue ISS, Admelog, Lantus and CCD, continue to monitor fingersticks - improved glycemic control  - 6/25 Thyroid panel WNL    HEME/ONC:               7/1 CBC: downtrending leukocytosis, post-op anemia stable  DVT ppx: SQH BID, no SCDs, 6/30 Le Dopp: new b/l calf vein DVT. Vascular following, recommended repeat Dopp on 7/5 and continue DVT ppx    RENAL:   - NS @ 75 for 12hr   - 7/6 Na 134, w/ improved creatinine. fu in am   - 7/3 BMP stable  - Has KPhos supplementation  - Voiding via condom cath    ID:   - Afebrile  - 6/29 Last fever Tmax 101.3 - BCX NGTD, COVID/RSV/Influenza negative, sputum cx: normal respiratory eder  - ID following -> CXR: bibasilar opacities rt>lt, had cough previously, Meropenem until 7/3  - Downtrending procal 1.31 (2.06)    GI:    - Speech and Swallow following, s/p MBS 7/1 - advanced to easy to chew diet  - Senna and Miralax for bowel regimen, last BM 7/4  - Monitor LFTs in the setting of Amiodarone, 7/6 LFTs stable    Appreciate Hospitalist team following for co-medical management.     DISCHARGE PLANNING:   BRENDON upon discharge    Plan to be discussed w/ Dr. Goodman  [id number]

## 2024-07-06 NOTE — PROGRESS NOTE ADULT - PROBLEM SELECTOR PLAN 8
DVT ppx: Heparin SQ  having multiple BM now- changed miralax to daily  on condom catheter    Dispo- [facility]    Will follow periodically. Call with questions.
DVT ppx: Heparin SQ  having multiple BM now- changed miralax to daily  on condom catheter    Dispo- BR[facility]ON    Will follow periodically. Call with questions.

## 2024-07-06 NOTE — PROGRESS NOTE ADULT - PROBLEM SELECTOR PLAN 4
Will need to obtain prior outpt EKGs from his cardiologist   Asymptomatic at present time.
Will need to obtain prior outpt EKGs from his cardiologist   Asymptomatic at present time.
- CXR with bibasilar opacities with slight increase on the R  - BCx 6/28 NGTD  - Was on Zosyn but noted to be febrile 6/29 and abx broadened to IV meropenem (completed 5 day course on 7/3)  - ID following
Will need to obtain prior outpt EKGs from his cardiologist   Asymptomatic at present time.
- CXR with bibasilar opacities with slight increase on the R  - BCx 6/28 NGTD  - Was on Zosyn but noted to be febrile 6/29 and abx broadened to IV meropenem (to complete 5 day course on 7/3)  - ID following
- CXR with bibasilar opacities with slight increase on the R    - BC 6/28 NGTD    - 6/28 UE Duplex: No evidence of deep venous thrombosis in either upper extremity. There is superficial vein thrombosis of the right basilic vein at the antecubital fossa and right cephalic vein to the mid upper arm level.  - 6/22 LE Doppler: No evidence of deep venous thrombosis in either lower extremity.    - Was on Zosyn, Fever .3F abx broadened to Merrem
- CXR with bibasilar opacities with slight increase on the R  - BCx 6/28 NGTD  - Was on Zosyn but noted to be febrile 6/29 and abx broadened to IV meropenem (to complete 5 day course on 7/3)  - ID following
- CXR with bibasilar opacities with slight increase on the R  - BCx 6/28 NGTD  - Was on Zosyn but noted to be febrile 6/29 and abx broadened to IV meropenem (to complete 5 day course on 7/3)  - ID following  - now off of abx, has been afebrile
- CXR with bibasilar opacities with slight increase on the R  - BCx 6/28 NGTD  - Was on Zosyn but noted to be febrile 6/29 and abx broadened to IV meropenem (completed 5 day course on 7/3)  - ID following

## 2024-07-06 NOTE — PROGRESS NOTE ADULT - SUBJECTIVE AND OBJECTIVE BOX
Viri Manley MD  Division of Hospital Medicine  Please contact via MS Teams (prefer message first)  Office: [phone]    Patient is a 79y old  Male who presents with a chief complaint of SDH (06 Jul 2024 07:22)      SUBJECTIVE / OVERNIGHT EVENTS:  no acute events overnight, vss, afebrile  pt overall feels well  s/p staple removal this morning    ROS:  14 point ROS negative in detail except stated as above    MEDICATIONS  (STANDING):  aMIOdarone    Tablet   Oral   aMIOdarone    Tablet 200 milliGRAM(s) Oral daily  atorvastatin 80 milliGRAM(s) Oral at bedtime  dexamethasone/neomycin/polymyxin Suspension 1 Drop(s) Both EYES every 8 hours  dextrose 10% Bolus 125 milliLiter(s) IV Bolus once  dextrose 5%. 1000 milliLiter(s) (100 mL/Hr) IV Continuous <Continuous>  dextrose 5%. 1000 milliLiter(s) (50 mL/Hr) IV Continuous <Continuous>  dextrose 50% Injectable 25 Gram(s) IV Push once  dextrose 50% Injectable 12.5 Gram(s) IV Push once  glucagon  Injectable 1 milliGRAM(s) IntraMuscular once  heparin   Injectable 5000 Unit(s) SubCutaneous every 12 hours  insulin glargine Injectable (LANTUS) 10 Unit(s) SubCutaneous at bedtime  insulin lispro (ADMELOG) corrective regimen sliding scale   SubCutaneous Before meals and at bedtime  insulin lispro Injectable (ADMELOG) 5 Unit(s) SubCutaneous three times a day before meals  lacosamide 50 milliGRAM(s) Oral two times a day  levETIRAcetam 250 milliGRAM(s) Oral two times a day  metoprolol tartrate 50 milliGRAM(s) Oral two times a day  pantoprazole    Tablet 40 milliGRAM(s) Oral every 12 hours  pilocarpine 2% Solution 1 Drop(s) Right EYE two times a day  polyethylene glycol 3350 17 Gram(s) Oral daily  potassium phosphate / sodium phosphate Powder (PHOS-NaK) 1 Packet(s) Oral three times a day  senna 2 Tablet(s) Oral at bedtime  topiramate 100 milliGRAM(s) Oral two times a day    MEDICATIONS  (PRN):  acetaminophen     Tablet .. 650 milliGRAM(s) Oral every 6 hours PRN Mild Pain (1 - 3)  dextrose Oral Gel 15 Gram(s) Oral once PRN Blood Glucose LESS THAN 70 milliGRAM(s)/deciliter  ondansetron Injectable 4 milliGRAM(s) IV Push every 6 hours PRN Nausea and/or Vomiting      CAPILLARY BLOOD GLUCOSE      POCT Blood Glucose.: 136 mg/dL (06 Jul 2024 07:31)  POCT Blood Glucose.: 200 mg/dL (05 Jul 2024 20:58)  POCT Blood Glucose.: 77 mg/dL (05 Jul 2024 17:06)  POCT Blood Glucose.: 184 mg/dL (05 Jul 2024 11:29)    I&O's Summary    05 Jul 2024 07:01  -  06 Jul 2024 07:00  --------------------------------------------------------  IN: 120 mL / OUT: 2100 mL / NET: -1980 mL        PHYSICAL EXAM:  Vital Signs Last 24 Hrs  T(C): 36.7 (06 Jul 2024 09:18), Max: 36.9 (05 Jul 2024 17:08)  T(F): 98 (06 Jul 2024 09:18), Max: 98.5 (05 Jul 2024 17:08)  HR: 54 (06 Jul 2024 09:18) (54 - 75)  BP: 109/68 (06 Jul 2024 09:18) (109/68 - 152/74)  BP(mean): --  RR: 18 (06 Jul 2024 09:18) (18 - 18)  SpO2: 98% (06 Jul 2024 09:18) (95% - 98%)    Parameters below as of 06 Jul 2024 09:18  Patient On (Oxygen Delivery Method): room air      GENERAL: NAD, well-developed  HEAD:  Atraumatic, Normocephalic  EYES: EOMI, PERRLA, conjunctiva and sclera clear  NECK: Supple, No JVD  CHEST/LUNG: Clear to auscultation bilaterally; No wheeze  HEART: Regular rate and rhythm; No murmurs, rubs, or gallops  ABDOMEN: Soft, Nontender, Nondistended; Bowel sounds present  EXTREMITIES:  2+ Peripheral Pulses, No clubbing, cyanosis, or edema  NEUROLOGY: AAOx3; non-focal  SKIN: No rashes or lesions    LABS:                        9.5    10.37 )-----------( 415      ( 05 Jul 2024 06:13 )             29.8     07-06    134<L>  |  104  |  16  ----------------------------<  141<H>  4.4   |  19<L>  |  1.27    Ca    9.4      06 Jul 2024 06:02  Phos  3.5     07-06  Mg     2.5     07-06    TPro  7.7  /  Alb  3.8  /  TBili  0.5  /  DBili  x   /  AST  13  /  ALT  26  /  AlkPhos  117  07-06          Urinalysis Basic - ( 06 Jul 2024 06:02 )    Color: x / Appearance: x / SG: x / pH: x  Gluc: 141 mg/dL / Ketone: x  / Bili: x / Urobili: x   Blood: x / Protein: x / Nitrite: x   Leuk Esterase: x / RBC: x / WBC x   Sq Epi: x / Non Sq Epi: x / Bacteria: x        RADIOLOGY & ADDITIONAL TESTS:    Imaging Personally Reviewed:  < from: VA Duplex Lower Ext Vein Scan, Bilat (07.05.24 @ 16:23) >  Persistent DVT noted in the right peroneal and soleal veins.    Persistent DVT noted in the left soleal vein.    < end of copied text >      Consultant(s) Notes Reviewed:  vascular cards    Care Discussed with Consultants/Other Providers:

## 2024-07-06 NOTE — PROGRESS NOTE ADULT - PROBLEM SELECTOR PROBLEM 7
Prophylactic measure
Prophylactic measure
MIKAELA (acute kidney injury)
Prophylactic measure
MIKAELA (acute kidney injury)

## 2024-07-06 NOTE — PROGRESS NOTE ADULT - ASSESSMENT
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 No AC/AP, coags/plt wnl Now s/p R crani for SDH evacuation. Afib RVR overnight s/p Dilt IV, now in SR.      #Paroxysmal atrial fibrillation  -sp iv dilt with conversion to SR   -Continue with amiodarone per eps   -Continue po metoprolol per eps - can consider decreasing to 25mg if remains fito on tele   -Echo nml lv fxn, no wma, mild MR   -remains off a/c at this time given acute SDH    #Acute subdural hematoma   -s/p R crani for SDH evacuation  -post op mgmt per neuro    #DM  -Mgmt per med    #DVT  -Acute b/l LE dvts  -Off a/c at this time given SDH  -Cont dvt ppx  -Appreciate vasc cards recs   -F/u repeat LE duplex     35 minutes spent on total encounter; more than 50% of the visit was spent counseling and/or coordinating care by the attending physician.

## 2024-07-06 NOTE — PROGRESS NOTE ADULT - PROBLEM SELECTOR PLAN 3
- s/p amio gtt on 6/26  - Amio transitioned to 200 mg qd on 7/4 (monitor TFT and liver enzymes periodically while on amio)  - Continue metoprolol tartrate 50mg bid  - appears to be in sinus rhythm now, monitor on tele  - consider AC when deemed stable from neurosurgery standpoint  - TTE on 6/27 noted with: "in the setting of atrial fibrillation, there is mild regional hypokinesis seen at the inferior and inderolateral basal to mid segments. The over all EF is preserved 55-60%."  - cards/EP following
RISS.
- s/p amio gtt on 6/26    - Continue with Amio 400mg bid (6/27-, transition to Amio 200 mg qd on 7/4, Metoprolol tartrate 50mg bid. Monitor on Tele.    - Pt cannot safely tolerate AC for now given recent NSGY procedure/ ADH, NSGY to clarify when can be initiated     - EP following d/w Dr. Gutierrez, [facility]B d/c'd.
RISS.
- s/p amio gtt on 6/26  - Continue with Amio 400mg bid (6/27-) and transition to Amio 200 mg qd on 7/4 (monitor TFT and liver enzymes periodically while on amio)  - Continue metoprolol tartrate 50mg bid  - appears to be in sinus rhythm now, monitor on Tele  - consider AC when deemed stable from neurosurgery standpoint  - TTE on 6/27 noted with: "in the setting of atrial fibrillation, there is mild regional hypokinesis seen at the inferior and inderolateral basal to mid segments. The over all EF is preserved 55-60%."  - cards/EP following
RISS.
- s/p amio gtt on 6/26  - Continue with Amio 400mg bid (6/27-) and transition to Amio 200 mg qd on 7/4 (monitor TFT and liver enzymes periodically while on amio)  - Continue metoprolol tartrate 50mg bid  - appears to be in sinus rhythm now, monitor on tele  - consider AC when deemed stable from neurosurgery standpoint  - TTE on 6/27 noted with: "in the setting of atrial fibrillation, there is mild regional hypokinesis seen at the inferior and inderolateral basal to mid segments. The over all EF is preserved 55-60%."  - cards/EP following
- s/p amio gtt on 6/26  - Amio transitioned to 200 mg qd on 7/4 (monitor TFT and liver enzymes periodically while on amio)  - Continue metoprolol tartrate 50mg bid  - appears to be in sinus rhythm now, monitor on tele  - consider AC when deemed stable from neurosurgery standpoint  - TTE on 6/27 noted with: "in the setting of atrial fibrillation, there is mild regional hypokinesis seen at the inferior and inderolateral basal to mid segments. The over all EF is preserved 55-60%."  - cards/EP following
- s/p amio gtt on 6/26  - Continue with Amio 400mg bid (6/27-) and transition to Amio 200 mg qd on 7/4 (monitor TFT and liver enzymes periodically while on amio)  - Continue metoprolol tartrate 50mg bid  - appears to be in sinus rhythm now, monitor on tele  - consider AC when deemed stable from neurosurgery standpoint  - TTE on 6/27 noted with: "in the setting of atrial fibrillation, there is mild regional hypokinesis seen at the inferior and inderolateral basal to mid segments. The over all EF is preserved 55-60%."  - cards/EP following

## 2024-07-06 NOTE — PROGRESS NOTE ADULT - SUBJECTIVE AND OBJECTIVE BOX
CARDIOLOGY FOLLOW UP - Dr. Bass([facility])  Date of Service: 7/6/24  CC: no events    Review of Systems:  Constitutional: No fever, weight loss, or fatigue  Respiratory: No cough, wheezing, or hemoptysis, no shortness of breath  Cardiovascular: No chest pain, palpitations, passing out, dizziness, or leg swelling  Gastrointestinal: No abd or epigastric pain. No nausea, vomiting, or hematemesis; no diarrhea or consiptaiton, no melena or hematochezia  Vascular: No edema     TELEMETRY:    PHYSICAL EXAM:  T(C): 36.8 (07-06-24 @ 04:39), Max: 36.9 (07-05-24 @ 17:08)  HR: 66 (07-06-24 @ 04:39) (59 - 75)  BP: 136/73 (07-06-24 @ 04:39) (121/71 - 152/74)  RR: 18 (07-06-24 @ 04:39) (18 - 18)  SpO2: 96% (07-06-24 @ 04:39) (95% - 98%)  Wt(kg): --  I&O's Summary    05 Jul 2024 07:01  -  06 Jul 2024 07:00  --------------------------------------------------------  IN: 120 mL / OUT: 2100 mL / NET: -1980 mL        Appearance: Normal	  Cardiovascular: Normal S1 S2,RRR, No JVD, No murmurs  Respiratory: Lungs clear to auscultation	  Gastrointestinal:  Soft, Non-tender, + BS	  Extremities: Normal range of motion, No clubbing, cyanosis or edema  Vascular: Peripheral pulses palpable 2+ bilaterally       Home Medications:  Keppra 250 mg oral tablet: 1 tab(s) orally every 12 hours (22 Jun 2024 18:38)  metFORMIN 500 mg oral tablet: 1 tab(s) orally every 12 hours (22 Jun 2024 18:38)  rosuvastatin 40 mg oral capsule: 1 cap(s) orally once a day (22 Jun 2024 18:38)  Topamax 100 mg oral tablet: 1 tab(s) orally every 12 hours (22 Jun 2024 18:37)        MEDICATIONS  (STANDING):  aMIOdarone    Tablet   Oral   aMIOdarone    Tablet 200 milliGRAM(s) Oral daily  atorvastatin 80 milliGRAM(s) Oral at bedtime  dexamethasone/neomycin/polymyxin Suspension 1 Drop(s) Both EYES every 8 hours  dextrose 10% Bolus 125 milliLiter(s) IV Bolus once  dextrose 5%. 1000 milliLiter(s) (100 mL/Hr) IV Continuous <Continuous>  dextrose 5%. 1000 milliLiter(s) (50 mL/Hr) IV Continuous <Continuous>  dextrose 50% Injectable 25 Gram(s) IV Push once  dextrose 50% Injectable 12.5 Gram(s) IV Push once  glucagon  Injectable 1 milliGRAM(s) IntraMuscular once  heparin   Injectable 5000 Unit(s) SubCutaneous every 12 hours  insulin glargine Injectable (LANTUS) 10 Unit(s) SubCutaneous at bedtime  insulin lispro (ADMELOG) corrective regimen sliding scale   SubCutaneous Before meals and at bedtime  insulin lispro Injectable (ADMELOG) 5 Unit(s) SubCutaneous three times a day before meals  lacosamide 50 milliGRAM(s) Oral two times a day  levETIRAcetam 250 milliGRAM(s) Oral two times a day  metoprolol tartrate 50 milliGRAM(s) Oral two times a day  pantoprazole    Tablet 40 milliGRAM(s) Oral every 12 hours  pilocarpine 2% Solution 1 Drop(s) Right EYE two times a day  polyethylene glycol 3350 17 Gram(s) Oral daily  potassium phosphate / sodium phosphate Powder (PHOS-NaK) 1 Packet(s) Oral three times a day  senna 2 Tablet(s) Oral at bedtime  sodium chloride 0.9%. 1000 milliLiter(s) (75 mL/Hr) IV Continuous <Continuous>  topiramate 100 milliGRAM(s) Oral two times a day        EKG:  RADIOLOGY:  DIAGNOSTIC TESTING:  [ ] Echocardiogram:  [ ] Catherterization:  [ ] Stress Test:  OTHER:     LABS:	 	                          9.5    10.37 )-----------( 415      ( 05 Jul 2024 06:13 )             29.8     07-06    134<L>  |  104  |  16  ----------------------------<  141<H>  4.4   |  19<L>  |  1.27    Ca    9.4      06 Jul 2024 06:02  Phos  3.5     07-06  Mg     2.5     07-06    TPro  7.7  /  Alb  3.8  /  TBili  0.5  /  DBili  x   /  AST  13  /  ALT  26  /  AlkPhos  117  07-06          CARDIAC MARKERS:

## 2024-07-06 NOTE — PROGRESS NOTE ADULT - PROBLEM SELECTOR PROBLEM 5
Dysphagia
Deep vein thrombosis (DVT)

## 2024-07-06 NOTE — PROGRESS NOTE ADULT - PROBLEM SELECTOR PLAN 7
DVT ppx: Heparin SQ  escalate bowel regimen- last 6/25?  on condom catheter    D/w patient/wife at bedside and daughter who is an ER physician on the phone.    Dispo- [facility]
DVT ppx: Heparin SQ  having multiple BM now- changed miralax to daily  on condom catheter    Dispo- [facility]    Will follow periodically. Call with questions.
DVT ppx: Heparin SQ  having multiple BM now- changed miralax to daily  on condom catheter    Dispo- [facility]    Will follow periodically. Call with questions.
- MIKAELA initially resolved but serum cr trended up again today along with mild hyponatremia  - trial of gentle IVF  - monitor urine output
- MIKAELA initially resolved but serum cr trended up again today along with mild hyponatremia  - trial of gentle IVF  - monitor urine output

## 2024-07-06 NOTE — PROGRESS NOTE ADULT - PROBLEM SELECTOR PLAN 6
- s/p MBS, diet advanced
DVT ppx: Heparin SQ  Diet: Pureed, MBS pending  D/w pt, wife and son at bedside. Pt's daughter is an ER physician.
- s/p MBS, diet advanced

## 2024-07-07 LAB
ALBUMIN SERPL ELPH-MCNC: 3.3 G/DL — SIGNIFICANT CHANGE UP (ref 3.3–5)
ALP SERPL-CCNC: 110 U/L — SIGNIFICANT CHANGE UP (ref 40–120)
ALT FLD-CCNC: 20 U/L — SIGNIFICANT CHANGE UP (ref 10–45)
ANION GAP SERPL CALC-SCNC: 11 MMOL/L — SIGNIFICANT CHANGE UP (ref 5–17)
AST SERPL-CCNC: 12 U/L — SIGNIFICANT CHANGE UP (ref 10–40)
BILIRUB SERPL-MCNC: 0.4 MG/DL — SIGNIFICANT CHANGE UP (ref 0.2–1.2)
BUN SERPL-MCNC: 16 MG/DL — SIGNIFICANT CHANGE UP (ref 7–23)
CALCIUM SERPL-MCNC: 9 MG/DL — SIGNIFICANT CHANGE UP (ref 8.4–10.5)
CHLORIDE SERPL-SCNC: 103 MMOL/L — SIGNIFICANT CHANGE UP (ref 96–108)
CO2 SERPL-SCNC: 21 MMOL/L — LOW (ref 22–31)
CREAT SERPL-MCNC: 1.2 MG/DL — SIGNIFICANT CHANGE UP (ref 0.5–1.3)
EGFR: 62 ML/MIN/1.73M2 — SIGNIFICANT CHANGE UP
GLUCOSE BLDC GLUCOMTR-MCNC: 107 MG/DL — HIGH (ref 70–99)
GLUCOSE BLDC GLUCOMTR-MCNC: 152 MG/DL — HIGH (ref 70–99)
GLUCOSE BLDC GLUCOMTR-MCNC: 158 MG/DL — HIGH (ref 70–99)
GLUCOSE BLDC GLUCOMTR-MCNC: 177 MG/DL — HIGH (ref 70–99)
GLUCOSE SERPL-MCNC: 159 MG/DL — HIGH (ref 70–99)
POTASSIUM SERPL-MCNC: 3.9 MMOL/L — SIGNIFICANT CHANGE UP (ref 3.5–5.3)
POTASSIUM SERPL-SCNC: 3.9 MMOL/L — SIGNIFICANT CHANGE UP (ref 3.5–5.3)
PROT SERPL-MCNC: 7.2 G/DL — SIGNIFICANT CHANGE UP (ref 6–8.3)
SODIUM SERPL-SCNC: 135 MMOL/L — SIGNIFICANT CHANGE UP (ref 135–145)

## 2024-07-07 RX ADMIN — Medication 1 PACKET(S): at 12:16

## 2024-07-07 RX ADMIN — Medication 1 DROP(S): at 05:34

## 2024-07-07 RX ADMIN — INSULIN LISPRO 5 UNIT(S): 100 INJECTION, SOLUTION SUBCUTANEOUS at 08:14

## 2024-07-07 RX ADMIN — TOPIRAMATE 100 MILLIGRAM(S): 50 TABLET, FILM COATED ORAL at 17:00

## 2024-07-07 RX ADMIN — PANTOPRAZOLE SODIUM 40 MILLIGRAM(S): 40 INJECTION, POWDER, FOR SOLUTION INTRAVENOUS at 17:00

## 2024-07-07 RX ADMIN — INSULIN LISPRO 5 UNIT(S): 100 INJECTION, SOLUTION SUBCUTANEOUS at 17:01

## 2024-07-07 RX ADMIN — AMIODARONE HYDROCHLORIDE 200 MILLIGRAM(S): 50 INJECTION, SOLUTION INTRAVENOUS at 08:14

## 2024-07-07 RX ADMIN — PANTOPRAZOLE SODIUM 40 MILLIGRAM(S): 40 INJECTION, POWDER, FOR SOLUTION INTRAVENOUS at 05:35

## 2024-07-07 RX ADMIN — Medication 1 PACKET(S): at 05:35

## 2024-07-07 RX ADMIN — Medication 50 MILLIGRAM(S): at 17:00

## 2024-07-07 RX ADMIN — INSULIN LISPRO 2: 100 INJECTION, SOLUTION SUBCUTANEOUS at 22:18

## 2024-07-07 RX ADMIN — Medication 1 DROP(S): at 17:01

## 2024-07-07 RX ADMIN — INSULIN LISPRO 2: 100 INJECTION, SOLUTION SUBCUTANEOUS at 17:00

## 2024-07-07 RX ADMIN — HEPARIN SODIUM 5000 UNIT(S): 50 INJECTION, SOLUTION INTRAVENOUS at 05:34

## 2024-07-07 RX ADMIN — Medication 1 DROP(S): at 22:17

## 2024-07-07 RX ADMIN — ATORVASTATIN CALCIUM 80 MILLIGRAM(S): 20 TABLET, FILM COATED ORAL at 22:16

## 2024-07-07 RX ADMIN — Medication 1 DROP(S): at 12:16

## 2024-07-07 RX ADMIN — LACOSAMIDE 50 MILLIGRAM(S): 100 TABLET, FILM COATED ORAL at 17:00

## 2024-07-07 RX ADMIN — TOPIRAMATE 100 MILLIGRAM(S): 50 TABLET, FILM COATED ORAL at 05:36

## 2024-07-07 RX ADMIN — Medication 1 DROP(S): at 05:36

## 2024-07-07 RX ADMIN — INSULIN GLARGINE 10 UNIT(S): 100 INJECTION, SOLUTION SUBCUTANEOUS at 22:17

## 2024-07-07 RX ADMIN — LEVETIRACETAM 250 MILLIGRAM(S): 100 INJECTION INTRAVENOUS at 05:35

## 2024-07-07 RX ADMIN — HEPARIN SODIUM 5000 UNIT(S): 50 INJECTION, SOLUTION INTRAVENOUS at 17:01

## 2024-07-07 RX ADMIN — LEVETIRACETAM 250 MILLIGRAM(S): 100 INJECTION INTRAVENOUS at 17:00

## 2024-07-07 RX ADMIN — INSULIN LISPRO 5 UNIT(S): 100 INJECTION, SOLUTION SUBCUTANEOUS at 12:16

## 2024-07-07 RX ADMIN — Medication 1 PACKET(S): at 22:16

## 2024-07-07 RX ADMIN — INSULIN LISPRO 2: 100 INJECTION, SOLUTION SUBCUTANEOUS at 08:13

## 2024-07-07 RX ADMIN — Medication 1 APPLICATION(S): at 22:18

## 2024-07-07 RX ADMIN — LACOSAMIDE 50 MILLIGRAM(S): 100 TABLET, FILM COATED ORAL at 05:35

## 2024-07-07 NOTE — PROGRESS NOTE ADULT - SUBJECTIVE AND OBJECTIVE BOX
SUBJECTIVE:   Patient was seen and evaluated at bedside. Patient is resting in bed and is in no new acute distress.   OVERNIGHT EVENTS:   none   Vital Signs Last 24 Hrs  T(C): 36.5 (07 Jul 2024 07:32), Max: 36.6 (06 Jul 2024 15:46)  T(F): 97.7 (07 Jul 2024 07:32), Max: 97.8 (06 Jul 2024 15:46)  HR: 79 (07 Jul 2024 07:32) (55 - 79)  BP: 122/73 (07 Jul 2024 07:32) (114/65 - 153/80)  BP(mean): --  RR: 18 (07 Jul 2024 07:32) (18 - 19)  SpO2: 94% (07 Jul 2024 07:32) (94% - 98%)    Parameters below as of 07 Jul 2024 05:00  Patient On (Oxygen Delivery Method): room air        PHYSICAL EXAM:    General: No Acute Distress     Neurological: Awake, alert oriented to person, place and time, Following Commands, PERRL, EOMI, Face Symmetrical, Speech Fluent, Moving all extremities, Muscle Strength normal in all four extremities, No Drift, Sensation to Light Touch Intact    Pulmonary: Clear to Auscultation, No Rales, No Rhonchi, No Wheezes     Cardiovascular: S1, S2, Regular Rate and Rhythm     Gastrointestinal: Soft, Nontender, Nondistended     Incision:   clean and dry   LABS:   07-07    135  |  103  |  16  ----------------------------<  159<H>  3.9   |  21<L>  |  1.20    Ca    9.0      07 Jul 2024 09:31  Phos  3.5     07-06  Mg     2.5     07-06    TPro  7.2  /  Alb  3.3  /  TBili  0.4  /  DBili  x   /  AST  12  /  ALT  20  /  AlkPhos  110  07-07 07-06 @ 07:01  -  07-07 @ 07:00  --------------------------------------------------------  IN: 845 mL / OUT: 2550 mL / NET: -1705 mL      DRAINS:     MEDICATIONS:  Antibiotics:    Neuro:  acetaminophen     Tablet .. 650 milliGRAM(s) Oral every 6 hours PRN Mild Pain (1 - 3)  lacosamide 50 milliGRAM(s) Oral two times a day  levETIRAcetam 250 milliGRAM(s) Oral two times a day  ondansetron Injectable 4 milliGRAM(s) IV Push every 6 hours PRN Nausea and/or Vomiting  topiramate 100 milliGRAM(s) Oral two times a day    Cardiac:  aMIOdarone    Tablet   Oral   aMIOdarone    Tablet 200 milliGRAM(s) Oral daily  metoprolol tartrate 50 milliGRAM(s) Oral two times a day    Pulm:    GI/:  pantoprazole    Tablet 40 milliGRAM(s) Oral every 12 hours  polyethylene glycol 3350 17 Gram(s) Oral daily  senna 2 Tablet(s) Oral at bedtime    Other:   atorvastatin 80 milliGRAM(s) Oral at bedtime  chlorhexidine 4% Liquid 1 Application(s) Topical <User Schedule>  dexamethasone/neomycin/polymyxin Suspension 1 Drop(s) Both EYES every 8 hours  dextrose 10% Bolus 125 milliLiter(s) IV Bolus once  dextrose 5%. 1000 milliLiter(s) IV Continuous <Continuous>  dextrose 5%. 1000 milliLiter(s) IV Continuous <Continuous>  dextrose 50% Injectable 25 Gram(s) IV Push once  dextrose 50% Injectable 12.5 Gram(s) IV Push once  dextrose Oral Gel 15 Gram(s) Oral once PRN Blood Glucose LESS THAN 70 milliGRAM(s)/deciliter  glucagon  Injectable 1 milliGRAM(s) IntraMuscular once  heparin   Injectable 5000 Unit(s) SubCutaneous every 12 hours  insulin glargine Injectable (LANTUS) 10 Unit(s) SubCutaneous at bedtime  insulin lispro (ADMELOG) corrective regimen sliding scale   SubCutaneous Before meals and at bedtime  insulin lispro Injectable (ADMELOG) 5 Unit(s) SubCutaneous three times a day before meals  pilocarpine 2% Solution 1 Drop(s) Right EYE two times a day  potassium phosphate / sodium phosphate Powder (PHOS-NaK) 1 Packet(s) Oral three times a day  sodium chloride 0.9%. 1000 milliLiter(s) IV Continuous <Continuous>    DIET: [] Regular [] CCD [] Renal [] Puree [] Dysphagia [] Tube Feeds:   easy to chew diet   IMAGING:   ACC: [id number] EXAM:  CT BRAIN   ORDERED BY: KIM MONROE     PROCEDURE DATE:  07/05/2024          INTERPRETATION:  EXAMINATION: CT HEAD    CLINICAL INDICATION: sdh  TECHNIQUE: CT images of the head were obtained without contrast. Coronal   andsagittal reconstructions were performed. Dose reduction techniques   were utilized including but not limited to automated exposure control   (AEC), iterative reconstruction technique, and/or mA and/or kV dose   adjustments based on patient size.  COMPARISON: Head CT 6/25/2024.    FINDINGS:    Again noted is a right hemispheric subdural hematoma with blood products   in multiple stages of evolution, measuring 15 mm in thickness. Accounting   for differences in technique, this appears stable, without interval   rebleeding. There is a right frontoparietal vertex craniotomy flap in   anatomic alignment, with improving subdural gas on a postoperative basis.   There is right to left midline shift measuring 4 mm and trace subfalcine   herniation, both improved.    A more chronic left suboccipital craniotomy flap is noted, with a   resection cavity in the left cerebellum. This is unchanged.    Mild generalized cerebral volume loss. Mild nonspecific low attenuation   in the periventricular and subcortical white matter.    No CT evidence of acute territorial infarction, although MRI with DWI   would be more sensitive.    The visualized sinuses and mastoids are clear.    Bilateral lens implants. Limited views of the orbits and visualized soft   tissues of the neck, face, scalp, skull base, and calvarium are otherwise   unremarkable.    IMPRESSION:    1.  Right hemispheric subdural hematoma, with improving midline shift.  2.  No interval rebleeding.  3.  Stable postsurgical changes in the left posterior fossa.        Patient Name: RODRIGUE CARROLL  MRN: [id number], Accession: [id number]  DOS: 07/05/24 05:29 PM    --- End of Report ---            JALEEL HERNANDEZ MD; Attending Radiologist  This document has been electronically signed. Jul 5 2024  7:43PM

## 2024-07-07 NOTE — PROGRESS NOTE ADULT - ASSESSMENT
HPI:  ependymoma), seizures, DM, HLD was told to start ASA by cardiologist but likely not taking presented VS for confusion/difficulty ambulating and falls since last week xfer for CTH w/ 1.9cm R mixed density convexity SDH, and smaller L convexity and interhemispheric aSDHs.     6/22/24 s/p Right craniotomy for subdural hematoma  6/24/24 s/p angio: right MMA embolization     NEURO:   - Continue neuro checks q 4  - Surgical staples removed today 7/6  - 7/5 CTH: right SDH, improving MLS  - Continue Vimpat, Topamax and Keppra for history of seizures   - Pain control w/ Tylenol prn  - PT/OT: [facility], pending acceptance    PULM:   - On room air, O2Sat>95%  - Incentive spirometry  - 6/25 CTA Chest: no PE, atelectasis seen    CV:  - SBP , within goal  - Cardiology and EP following for afib/aflutter  - Continue Metoprolol for rate control  - Per EP, patient currently on Amiodarone 400mg BID for 1 week (started on 6/27) to continue until 7/4 then transition to Amiodarone 200mg daily. Taper already written out by EP. Consideration for Watchman procedure / elective ablation or JOHNATHAN occlusion when he can tolerate AC after  - 6/27 TTE: EF 55-60%, mild regional hypokinesis seen at the inferior and inferolateral basal to mid segments, normal right vent cavity size and normal right vent systolic fxn, no pericardial effusion  - Continue Lipitor for HLD    ENDO:   - A1c 6.4, continue ISS, Admelog, Lantus and CCD, continue to monitor fingersticks - improved glycemic control  - 6/25 Thyroid panel WNL    HEME/ONC:               7/1 CBC: downtrending leukocytosis, post-op anemia stable  DVT ppx: SQH BID, no SCDs, 6/30 Le Dopp: new b/l calf vein DVT. Vascular following,  repeat doppler on 7/5 shows b/l below knee dvt without propagation.     RENAL:   -ivl   - 7/7 na 132 , creatinine 1.2   - 7/3 BMP stable  - Voiding via condom cath    ID:   - Afebrile  - 6/29 Last fever Tmax 101.3 - BCX NGTD, COVID/RSV/Influenza negative, sputum cx: normal respiratory eder  - ID following -> CXR: bibasilar opacities rt>lt, had cough previously, Meropenem until 7/3  - Downtrending procal 1.31 (2.06)    GI:    - Speech and Swallow following, s/p MBS 7/1 - advanced to easy to chew diet  - Senna and Miralax for bowel regimen, last BM 7/7  - Monitor LFTs in the setting of Amiodarone, 7/7 LFTs stable    Appreciate Hospitalist team following for co-medical management.     DISCHARGE PLANNING:   [facility] upon discharge    Plan to be discussed w/ Dr. Goodman  [id number]

## 2024-07-07 NOTE — PROGRESS NOTE ADULT - SUBJECTIVE AND OBJECTIVE BOX
CARDIOLOGY FOLLOW UP - Dr. Bass([facility])  Date of Service: 7/7/2024  CC: no events    Review of Systems:  Constitutional: No fever, weight loss, or fatigue  Respiratory: No cough, wheezing, or hemoptysis, no shortness of breath  Cardiovascular: No chest pain, palpitations, passing out, dizziness, or leg swelling  Gastrointestinal: No abd or epigastric pain. No nausea, vomiting, or hematemesis; no diarrhea or consiptaiton, no melena or hematochezia  Vascular: No edema     TELEMETRY:    PHYSICAL EXAM:  T(C): 36.3 (07-07-24 @ 05:00), Max: 36.7 (07-06-24 @ 09:18)  HR: 55 (07-07-24 @ 05:00) (54 - 68)  BP: 114/65 (07-07-24 @ 05:00) (109/68 - 153/80)  RR: 18 (07-07-24 @ 05:00) (18 - 19)  SpO2: 96% (07-07-24 @ 05:00) (95% - 98%)  Wt(kg): --  I&O's Summary    06 Jul 2024 07:01  -  07 Jul 2024 07:00  --------------------------------------------------------  IN: 845 mL / OUT: 2550 mL / NET: -1705 mL        Appearance: Normal	  Cardiovascular: Normal S1 S2,RRR, No JVD, No murmurs  Respiratory: Lungs clear to auscultation	  Gastrointestinal:  Soft, Non-tender, + BS	  Extremities: Normal range of motion, No clubbing, cyanosis or edema  Vascular: Peripheral pulses palpable 2+ bilaterally       Home Medications:  Keppra 250 mg oral tablet: 1 tab(s) orally every 12 hours (22 Jun 2024 18:38)  metFORMIN 500 mg oral tablet: 1 tab(s) orally every 12 hours (22 Jun 2024 18:38)  rosuvastatin 40 mg oral capsule: 1 cap(s) orally once a day (22 Jun 2024 18:38)  Topamax 100 mg oral tablet: 1 tab(s) orally every 12 hours (22 Jun 2024 18:37)        MEDICATIONS  (STANDING):  aMIOdarone    Tablet   Oral   aMIOdarone    Tablet 200 milliGRAM(s) Oral daily  atorvastatin 80 milliGRAM(s) Oral at bedtime  chlorhexidine 4% Liquid 1 Application(s) Topical <User Schedule>  dexamethasone/neomycin/polymyxin Suspension 1 Drop(s) Both EYES every 8 hours  dextrose 10% Bolus 125 milliLiter(s) IV Bolus once  dextrose 5%. 1000 milliLiter(s) (100 mL/Hr) IV Continuous <Continuous>  dextrose 5%. 1000 milliLiter(s) (50 mL/Hr) IV Continuous <Continuous>  dextrose 50% Injectable 25 Gram(s) IV Push once  dextrose 50% Injectable 12.5 Gram(s) IV Push once  glucagon  Injectable 1 milliGRAM(s) IntraMuscular once  heparin   Injectable 5000 Unit(s) SubCutaneous every 12 hours  insulin glargine Injectable (LANTUS) 10 Unit(s) SubCutaneous at bedtime  insulin lispro (ADMELOG) corrective regimen sliding scale   SubCutaneous Before meals and at bedtime  insulin lispro Injectable (ADMELOG) 5 Unit(s) SubCutaneous three times a day before meals  lacosamide 50 milliGRAM(s) Oral two times a day  levETIRAcetam 250 milliGRAM(s) Oral two times a day  metoprolol tartrate 50 milliGRAM(s) Oral two times a day  pantoprazole    Tablet 40 milliGRAM(s) Oral every 12 hours  pilocarpine 2% Solution 1 Drop(s) Right EYE two times a day  polyethylene glycol 3350 17 Gram(s) Oral daily  potassium phosphate / sodium phosphate Powder (PHOS-NaK) 1 Packet(s) Oral three times a day  senna 2 Tablet(s) Oral at bedtime  sodium chloride 0.9%. 1000 milliLiter(s) (75 mL/Hr) IV Continuous <Continuous>  topiramate 100 milliGRAM(s) Oral two times a day        EKG:  RADIOLOGY:  DIAGNOSTIC TESTING:  [ ] Echocardiogram:  [ ] Catherterization:  [ ] Stress Test:  OTHER:     LABS:	 	      07-06    134<L>  |  104  |  16  ----------------------------<  141<H>  4.4   |  19<L>  |  1.27    Ca    9.4      06 Jul 2024 06:02  Phos  3.5     07-06  Mg     2.5     07-06    TPro  7.7  /  Alb  3.8  /  TBili  0.5  /  DBili  x   /  AST  13  /  ALT  26  /  AlkPhos  117  07-06          CARDIAC MARKERS:

## 2024-07-08 LAB
GLUCOSE BLDC GLUCOMTR-MCNC: 143 MG/DL — HIGH (ref 70–99)
GLUCOSE BLDC GLUCOMTR-MCNC: 153 MG/DL — HIGH (ref 70–99)
GLUCOSE BLDC GLUCOMTR-MCNC: 154 MG/DL — HIGH (ref 70–99)
GLUCOSE BLDC GLUCOMTR-MCNC: 184 MG/DL — HIGH (ref 70–99)

## 2024-07-08 PROCEDURE — 99232 SBSQ HOSP IP/OBS MODERATE 35: CPT

## 2024-07-08 RX ADMIN — INSULIN LISPRO 2: 100 INJECTION, SOLUTION SUBCUTANEOUS at 16:35

## 2024-07-08 RX ADMIN — LEVETIRACETAM 250 MILLIGRAM(S): 100 INJECTION INTRAVENOUS at 17:55

## 2024-07-08 RX ADMIN — Medication 50 MILLIGRAM(S): at 17:56

## 2024-07-08 RX ADMIN — INSULIN LISPRO 5 UNIT(S): 100 INJECTION, SOLUTION SUBCUTANEOUS at 11:49

## 2024-07-08 RX ADMIN — Medication 1 APPLICATION(S): at 21:42

## 2024-07-08 RX ADMIN — LEVETIRACETAM 250 MILLIGRAM(S): 100 INJECTION INTRAVENOUS at 05:30

## 2024-07-08 RX ADMIN — Medication 2 TABLET(S): at 21:22

## 2024-07-08 RX ADMIN — HEPARIN SODIUM 5000 UNIT(S): 50 INJECTION, SOLUTION INTRAVENOUS at 17:55

## 2024-07-08 RX ADMIN — TOPIRAMATE 100 MILLIGRAM(S): 50 TABLET, FILM COATED ORAL at 05:31

## 2024-07-08 RX ADMIN — HEPARIN SODIUM 5000 UNIT(S): 50 INJECTION, SOLUTION INTRAVENOUS at 05:32

## 2024-07-08 RX ADMIN — Medication 1 DROP(S): at 17:55

## 2024-07-08 RX ADMIN — Medication 1 PACKET(S): at 21:22

## 2024-07-08 RX ADMIN — Medication 1 DROP(S): at 05:31

## 2024-07-08 RX ADMIN — ATORVASTATIN CALCIUM 80 MILLIGRAM(S): 20 TABLET, FILM COATED ORAL at 21:22

## 2024-07-08 RX ADMIN — INSULIN GLARGINE 10 UNIT(S): 100 INJECTION, SOLUTION SUBCUTANEOUS at 21:22

## 2024-07-08 RX ADMIN — INSULIN LISPRO 5 UNIT(S): 100 INJECTION, SOLUTION SUBCUTANEOUS at 16:35

## 2024-07-08 RX ADMIN — INSULIN LISPRO 2: 100 INJECTION, SOLUTION SUBCUTANEOUS at 07:51

## 2024-07-08 RX ADMIN — PANTOPRAZOLE SODIUM 40 MILLIGRAM(S): 40 INJECTION, POWDER, FOR SOLUTION INTRAVENOUS at 05:30

## 2024-07-08 RX ADMIN — Medication 1 PACKET(S): at 13:38

## 2024-07-08 RX ADMIN — INSULIN LISPRO 2: 100 INJECTION, SOLUTION SUBCUTANEOUS at 21:23

## 2024-07-08 RX ADMIN — LACOSAMIDE 50 MILLIGRAM(S): 100 TABLET, FILM COATED ORAL at 17:54

## 2024-07-08 RX ADMIN — LACOSAMIDE 50 MILLIGRAM(S): 100 TABLET, FILM COATED ORAL at 05:30

## 2024-07-08 RX ADMIN — AMIODARONE HYDROCHLORIDE 200 MILLIGRAM(S): 50 INJECTION, SOLUTION INTRAVENOUS at 07:50

## 2024-07-08 RX ADMIN — TOPIRAMATE 100 MILLIGRAM(S): 50 TABLET, FILM COATED ORAL at 17:55

## 2024-07-08 RX ADMIN — INSULIN LISPRO 5 UNIT(S): 100 INJECTION, SOLUTION SUBCUTANEOUS at 07:51

## 2024-07-08 RX ADMIN — PANTOPRAZOLE SODIUM 40 MILLIGRAM(S): 40 INJECTION, POWDER, FOR SOLUTION INTRAVENOUS at 17:55

## 2024-07-08 RX ADMIN — Medication 1 PACKET(S): at 05:31

## 2024-07-08 NOTE — PROGRESS NOTE ADULT - ASSESSMENT
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     6/22/24 s/p Right craniotomy for subdural hematoma  6/24/24 s/p angio: right MMA embolization     NEURO:   - Continue neuro checks q 4  - Surgical staples removed on 7/6/24, incision healing well C/D/I  - 7/5 CTH: right hemispheric subdural hematoma, improving midline shift, stable post-surgical changes  - Continue Vimpat, Topamax and Keppra for history of seizures   - Pain control w/ Tylenol prn  - PT/OT: BRENDON    PULM:   - On room air, O2Sat>94%  - Incentive spirometry  - 6/25 CTA Chest: no PE, atelectasis seen    CV:  - SBP , within goal  - Cardiology and EP following for afib/aflutter  - Continue Metoprolol for rate control  - Per EP - patient finished Amiodarone 400mg BID from 6/27-7/4, currently now on Amiodarone 200mg daily. Consideration for Watchman procedure / elective ablation or JOHNATHAN occlusion when he can tolerate AC after  - 6/27 TTE: EF 55-60%, mild regional hypokinesis seen at the inferior and inderolateral basal to mid sigments, normal right vent cavity size and normal right vent systolic fxn, no pericardial effusion  - Continue Lipitor for HLD    ENDO:   - A1c 6.4, continue ISS, Admelog, Lantus and CCD, continue to monitor fingersticks - between 100-170s  - 6/25 Thyroid panel WNL    HEME/ONC:             7/5 CBC: post-op anemia stable         DVT ppx: SQH BID, no SCDs, 6/30 Le Dopp: new b/l calf vein DVT. Vascular following, follow up Le Dopp: persistent right peroneal and soleal DVT and persistent left soleal DVT. Continue SQH BID    RENAL:   - IVL  - 7/7 BMP stable, had hyponatremia and MIKAELA that has since resolved  - Has KPhos supplementation  - Voiding via condom cath    ID:   - Afebrile  - 6/29 Last fever Tmax 101.3 - BCX NGTD, COVID/RSV/Influenza negative, sputum cx: normal respiratory eder  - ID following -> CXR: bibasilar opacities rt>lt, had cough previously, Meropenem until 7/3   - Downtrending procal 1.31 (2.06)    GI:    - Speech and Swallow following, s/p MBS 7/1 - advanced to easy to chew diet  - Senna and Miralax for bowel regimen, last BM 7/7  - Monitor LFTs in the setting of Amiodarone, 7/7 LFTs stable    Appreciate Hospitalist team following for co-medical management.     DISCHARGE PLANNING:   BRENDON planning    Plan to be discussed w/ Dr. 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     6/22/24 s/p Right craniotomy for subdural hematoma  6/24/24 s/p angio: right MMA embolization     NEURO:   - Continue neuro checks q 4  - Surgical staples removed on 7/6/24, incision healing well C/D/I  - 7/5 CTH: right hemispheric subdural hematoma, improving midline shift, stable post-surgical changes  - Continue Vimpat, Topamax and Keppra for history of seizures   - Pain control w/ Tylenol prn  - PT/OT: BRENDON    PULM:   - On room air, O2Sat>94%  - Incentive spirometry  - 6/25 CTA Chest: no PE, atelectasis seen    CV:  - SBP , within goal  - Cardiology and EP following for afib/aflutter  - Continue Metoprolol for rate control  - Per EP - patient finished Amiodarone 400mg BID from 6/27-7/4, currently now on Amiodarone 200mg daily. Consideration for Watchman procedure / elective ablation or JOHNATHAN occlusion when he can tolerate AC after  - 6/27 TTE: EF 55-60%, mild regional hypokinesis seen at the inferior and inderolateral basal to mid sigments, normal right vent cavity size and normal right vent systolic fxn, no pericardial effusion  - Continue Lipitor for HLD    ENDO:   - A1c 6.4, continue ISS, Admelog, Lantus and CCD, continue to monitor fingersticks - between 100-170s  - 6/25 Thyroid panel WNL    HEME/ONC:             7/5 CBC: post-op anemia stable         DVT ppx: SQH BID, no SCDs, 6/30 Le Dopp: new b/l calf vein DVT. Vascular following, follow up [place] Dopp: persistent right peroneal and soleal DVT and persistent left soleal DVT. Spoke with Vasc today, continue repeat Dopplers in 5-7 days and continue SQH BID.    RENAL:   - IVL  - 7/7 BMP stable, had hyponatremia and MIKAELA that has since resolved  - Has KPhos supplementation  - Voiding via condom cath    ID:   - Afebrile  - 6/29 Last fever Tmax 101.3 - BCX NGTD, COVID/RSV/Influenza negative, sputum cx: normal respiratory eder  - ID following -> CXR: bibasilar opacities rt>lt, had cough previously, Meropenem until 7/3   - Downtrending procal 1.31 (2.06)    GI:    - Speech and Swallow following, s/p MBS 7/1 - advanced to easy to chew diet  - Senna and Miralax for bowel regimen, last BM 7/7  - Monitor LFTs in the setting of Amiodarone, 7/7 LFTs stable    Appreciate Hospitalist team following for co-medical management.     DISCHARGE PLANNING:   BRENDON planning    Plan to be discussed w/ Dr. 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     6/22/24 s/p Right craniotomy for subdural hematoma  6/24/24 s/p angio: right MMA embolization     NEURO:   - Continue neuro checks q 4  - Surgical staples removed on 7/6/24, incision healing well C/D/I  - 7/5 CTH: right hemispheric subdural hematoma, improving midline shift, stable post-surgical changes  - Continue Vimpat, Topamax and Keppra for history of seizures   - Pain control w/ Tylenol prn  - PT/OT: BRENDON    PULM:   - On room air, O2Sat>94%  - Incentive spirometry  - 6/25 CTA Chest: no PE, atelectasis seen    CV:  - SBP , within goal  - Cardiology and EP following for afib/aflutter  - Continue Metoprolol for rate control  - Per EP - patient finished Amiodarone 400mg BID from 6/27-7/4, currently now on Amiodarone 200mg daily. Consideration for Watchman procedure / elective ablation or JOHNATHAN occlusion when he can tolerate AC after  - 6/27 TTE: EF 55-60%, mild regional hypokinesis seen at the inferior and inderolateral basal to mid sigments, normal right vent cavity size and normal right vent systolic fxn, no pericardial effusion  - Continue Lipitor for HLD    ENDO:   - A1c 6.4, continue ISS, Admelog, Lantus and CCD, continue to monitor fingersticks - between 100-170s  - 6/25 Thyroid panel WNL    HEME/ONC:             7/5 CBC: post-op anemia stable         DVT ppx: SQH BID, no SCDs, 6/30 [place] Dopp: new b/l calf vein DVT. Vascular following, follow up [place] Dopp: persistent right peroneal and soleal DVT and persistent left soleal DVT. Spoke with Vasc today, continue repeat Dopplers in 5-7 days and continue SQH BID.    RENAL:   - IVL  - 7/7 BMP stable, had hyponatremia and MIKAELA that has since resolved  - Has KPhos supplementation  - Voiding via condom cath    ID:   - Afebrile  - 6/29 Last fever Tmax 101.3 - BCX NGTD, COVID/RSV/Influenza negative, sputum cx: normal respiratory eder  - ID following -> CXR: bibasilar opacities rt>lt, had cough previously, Meropenem until 7/3   - Downtrending procal 1.31 (2.06)    GI:    - Speech and Swallow following, s/p MBS 7/1 - advanced to easy to chew diet  - Senna and Miralax for bowel regimen, last BM 7/7  - Monitor LFTs in the setting of Amiodarone, 7/7 LFTs stable    MISC:  - Continue Maxitrol eye drops - patient follows with Dr. Luis Fernando [facility], per wife patient has recurrent eye infection and was placed on it, wife confirmed that they have the medication at home and can bring to rehab upon discharge.   - Continue Pilocarpine eye drops    Appreciate Hospitalist team following for co-medical management.     DISCHARGE PLANNING:   BRENDON planning    Plan to be discussed w/ Dr. 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     6/22/24 s/p Right craniotomy for subdural hematoma  6/24/24 s/p angio: right MMA embolization     NEURO:   - Continue neuro checks q 4  - Surgical staples removed on 7/6/24, incision healing well C/D/I  - 7/5 CTH: right hemispheric subdural hematoma, improving midline shift, stable post-surgical changes  - Continue Vimpat, Topamax and Keppra for history of seizures   - Pain control w/ Tylenol prn  - PT/OT: BRENDON    PULM:   - On room air, O2Sat>94%  - Incentive spirometry  - 6/25 CTA Chest: no PE, atelectasis seen    CV:  - SBP , within goal  - Cardiology and EP following for afib/aflutter  - Continue Metoprolol for rate control  - Per EP - patient finished Amiodarone 400mg BID from 6/27-7/4, currently now on Amiodarone 200mg daily. Consideration for Watchman procedure / elective ablation or JOHNATHAN occlusion when he can tolerate AC after  - 6/27 TTE: EF 55-60%, mild regional hypokinesis seen at the inferior and inderolateral basal to mid sigments, normal right vent cavity size and normal right vent systolic fxn, no pericardial effusion  - Continue Lipitor for HLD    ENDO:   - A1c 6.4, continue ISS, Admelog, Lantus and CCD, continue to monitor fingersticks - between 100-170s  - 6/25 Thyroid panel WNL    HEME/ONC:             7/5 CBC: post-op anemia stable         DVT ppx: SQH BID, no SCDs, 6/30 [place] Dopp: new b/l calf vein DVT. Vascular following, follow up [place] Dopp: persistent right peroneal and soleal DVT and persistent left soleal DVT. Spoke with Vasc today, continue repeat Dopplers in 5-7 days and continue SQH BID.    RENAL:   - IVL  - 7/7 BMP stable, had hyponatremia and MIKAELA that has since resolved  - Has KPhos supplementation  - Voiding via condom cath    ID:   - Afebrile  - 6/29 Last fever Tmax 101.3 - BCX NGTD, COVID/RSV/Influenza negative, sputum cx: normal respiratory eder  - ID following -> CXR: bibasilar opacities rt>lt, had cough previously, Meropenem until 7/3   - Downtrending procal 1.31 (2.06)    GI:    - Speech and Swallow following, s/p MBS 7/1 - advanced to easy to chew diet  - Senna and Miralax for bowel regimen, last BM 7/7  - Monitor LFTs in the setting of Amiodarone, 7/7 LFTs stable    MISC:  - Patient on Maxtirol eye drops, I spoke with his Ophthalmologist [facility] Dr. Lee [phone], for blepharitis, patient does not need this medication any further unless reoccurrence in which case he can restart the medication at rehab. Per wife patient has prescription at home that she can bring to rehab should he need. Patient currently does not have any sign or symptoms of blepharitis.   - Continue Pilocarpine eye drops for glaucoma    Appreciate Hospitalist team following for co-medical management.     DISCHARGE PLANNING:   BRENDON planning    Plan to be discussed w/ Dr. Goodman  [phone]

## 2024-07-08 NOTE — PROGRESS NOTE ADULT - ASSESSMENT
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 No AC/AP, coags/plt wnl Now s/p R crani for SDH evacuation. Afib RVR overnight s/p Dilt IV, now in SR.      #Paroxysmal atrial fibrillation  -sp iv dilt with conversion to SR   -Continue with amiodarone per eps   -Continue po metoprolol per eps - can consider decreasing to 25mg if remains fito on tele   -Echo nml lv fxn, no wma, mild MR   -remains off a/c at this time given acute SDH    #Acute subdural hematoma   -s/p R crani for SDH evacuation  -post op mgmt per neuro    #DM  -Mgmt per med    #DVT  -Acute b/l LE dvts  -Off a/c at this time given SDH  -Cont dvt ppx  -Appreciate vasc cards recs   -Repeat LE duplex noted - persistent dvts b/l

## 2024-07-08 NOTE — PROGRESS NOTE ADULT - SUBJECTIVE AND OBJECTIVE BOX
CARDIOLOGY FOLLOW UP - Dr. Bass  DATE OF SERVICE: 7/8/24    CC  No cv complaints     REVIEW OF SYSTEMS:  CONSTITUTIONAL: No fever, weight loss, or fatigue  RESPIRATORY: No cough, wheezing, chills or hemoptysis; No Shortness of Breath  CARDIOVASCULAR: No chest pain, palpitations, passing out, dizziness, or leg swelling  GASTROINTESTINAL: No abdominal or epigastric pain. No nausea, vomiting, or hematemesis; No diarrhea or constipation. No melena or hematochezia.  VASCULAR: No edema     PHYSICAL EXAM:  T(C): 36.8 (07-08-24 @ 09:32), Max: 37.1 (07-07-24 @ 13:23)  HR: 69 (07-08-24 @ 09:32) (55 - 69)  BP: 138/69 (07-08-24 @ 09:32) (124/69 - 140/67)  RR: 18 (07-08-24 @ 09:32) (18 - 18)  SpO2: 97% (07-08-24 @ 09:32) (94% - 97%)  Wt(kg): --  I&O's Summary    07 Jul 2024 07:01  -  08 Jul 2024 07:00  --------------------------------------------------------  IN: 360 mL / OUT: 3650 mL / NET: -3290 mL        Appearance: Elderly male 	  Cardiovascular: Normal S1 S2,RRR, No JVD, No murmurs  Respiratory: Lungs clear to auscultation b/l  Gastrointestinal:  Soft, Non-tender, + BS	  Extremities: Normal range of motion, No clubbing, cyanosis or edema      Home Medications:  Keppra 250 mg oral tablet: 1 tab(s) orally every 12 hours (22 Jun 2024 18:38)  metFORMIN 500 mg oral tablet: 1 tab(s) orally every 12 hours (22 Jun 2024 18:38)  rosuvastatin 40 mg oral capsule: 1 cap(s) orally once a day (22 Jun 2024 18:38)  Topamax 100 mg oral tablet: 1 tab(s) orally every 12 hours (22 Jun 2024 18:37)      MEDICATIONS  (STANDING):  aMIOdarone    Tablet   Oral   aMIOdarone    Tablet 200 milliGRAM(s) Oral daily  atorvastatin 80 milliGRAM(s) Oral at bedtime  chlorhexidine 4% Liquid 1 Application(s) Topical <User Schedule>  dexamethasone/neomycin/polymyxin Suspension 1 Drop(s) Both EYES every 8 hours  dextrose 10% Bolus 125 milliLiter(s) IV Bolus once  dextrose 5%. 1000 milliLiter(s) (100 mL/Hr) IV Continuous <Continuous>  dextrose 5%. 1000 milliLiter(s) (50 mL/Hr) IV Continuous <Continuous>  dextrose 50% Injectable 25 Gram(s) IV Push once  dextrose 50% Injectable 12.5 Gram(s) IV Push once  glucagon  Injectable 1 milliGRAM(s) IntraMuscular once  heparin   Injectable 5000 Unit(s) SubCutaneous every 12 hours  insulin glargine Injectable (LANTUS) 10 Unit(s) SubCutaneous at bedtime  insulin lispro (ADMELOG) corrective regimen sliding scale   SubCutaneous Before meals and at bedtime  insulin lispro Injectable (ADMELOG) 5 Unit(s) SubCutaneous three times a day before meals  lacosamide 50 milliGRAM(s) Oral two times a day  levETIRAcetam 250 milliGRAM(s) Oral two times a day  metoprolol tartrate 50 milliGRAM(s) Oral two times a day  pantoprazole    Tablet 40 milliGRAM(s) Oral every 12 hours  pilocarpine 2% Solution 1 Drop(s) Right EYE two times a day  polyethylene glycol 3350 17 Gram(s) Oral daily  potassium phosphate / sodium phosphate Powder (PHOS-NaK) 1 Packet(s) Oral three times a day  senna 2 Tablet(s) Oral at bedtime  sodium chloride 0.9%. 1000 milliLiter(s) (75 mL/Hr) IV Continuous <Continuous>  topiramate 100 milliGRAM(s) Oral two times a day      TELEMETRY: SR	    ECG:  	  RADIOLOGY:   DIAGNOSTIC TESTING:  [ ] Echocardiogram:  [ ]  Catheterization:  [ ] Stress Test:    OTHER: 	    LABS:	 	        07-07    135  |  103  |  16  ----------------------------<  159<H>  3.9   |  21<L>  |  1.20    Ca    9.0      07 Jul 2024 09:31    TPro  7.2  /  Alb  3.3  /  TBili  0.4  /  DBili  x   /  AST  12  /  ALT  20  /  AlkPhos  110  07-07

## 2024-07-08 NOTE — PROGRESS NOTE ADULT - SUBJECTIVE AND OBJECTIVE BOX
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 No AC/AP, coags/plt wnl  Exam: AOx3, PERRL, EOMI, no facial, ?/very slight L drift, BEST 5/5, gait testing deferred    (22 Jun 2024 06:07)      OVERNIGHT EVENTS: No acute events overnight, patient seen and evaluated at bedside, doing well, no complaints.     Vital Signs Last 24 Hrs  T(C): 36.9 (08 Jul 2024 04:30), Max: 37.1 (07 Jul 2024 13:23)  T(F): 98.4 (08 Jul 2024 04:30), Max: 98.8 (07 Jul 2024 13:23)  HR: 57 (08 Jul 2024 04:30) (55 - 66)  BP: 130/68 (08 Jul 2024 04:30) (124/69 - 140/67)  BP(mean): --  RR: 18 (08 Jul 2024 04:30) (18 - 18)  SpO2: 95% (08 Jul 2024 04:30) (94% - 97%)    Parameters below as of 08 Jul 2024 04:30  Patient On (Oxygen Delivery Method): room air        PHYSICAL EXAM:    General: No Acute Distress     Neurological: Awake, Ox3 (name, place, date), improving left eye ptosis, left pupil 2mm react, right pupil 3mm react, following commands, no drift, uppers 5/5, lowers 5/5    Pulmonary: Clear to Auscultation, No Rales, No Rhonchi, No Wheezes     Cardiovascular: S1, S2, Regular Rate and Rhythm     Gastrointestinal: Soft, Nontender, Nondistended     Incision: right craniotomy incision C/D/I    LABS:   07-07    135  |  103  |  16  ----------------------------<  159<H>  3.9   |  21<L>  |  1.20    Ca    9.0      07 Jul 2024 09:31    TPro  7.2  /  Alb  3.3  /  TBili  0.4  /  DBili  x   /  AST  12  /  ALT  20  /  AlkPhos  110  07-07 07-07 @ 07:01  -  07-08 @ 07:00  --------------------------------------------------------  IN: 360 mL / OUT: 3650 mL / NET: -3290 mL      DRAINS:     MEDICATIONS:  Antibiotics:    Neuro:  acetaminophen     Tablet .. 650 milliGRAM(s) Oral every 6 hours PRN Mild Pain (1 - 3)  lacosamide 50 milliGRAM(s) Oral two times a day  levETIRAcetam 250 milliGRAM(s) Oral two times a day  ondansetron Injectable 4 milliGRAM(s) IV Push every 6 hours PRN Nausea and/or Vomiting  topiramate 100 milliGRAM(s) Oral two times a day    Cardiac:  aMIOdarone    Tablet   Oral   aMIOdarone    Tablet 200 milliGRAM(s) Oral daily  metoprolol tartrate 50 milliGRAM(s) Oral two times a day    Pulm:    GI/:  pantoprazole    Tablet 40 milliGRAM(s) Oral every 12 hours  polyethylene glycol 3350 17 Gram(s) Oral daily  senna 2 Tablet(s) Oral at bedtime    Other:   atorvastatin 80 milliGRAM(s) Oral at bedtime  chlorhexidine 4% Liquid 1 Application(s) Topical <User Schedule>  dexamethasone/neomycin/polymyxin Suspension 1 Drop(s) Both EYES every 8 hours  dextrose 10% Bolus 125 milliLiter(s) IV Bolus once  dextrose 5%. 1000 milliLiter(s) IV Continuous <Continuous>  dextrose 5%. 1000 milliLiter(s) IV Continuous <Continuous>  dextrose 50% Injectable 25 Gram(s) IV Push once  dextrose 50% Injectable 12.5 Gram(s) IV Push once  dextrose Oral Gel 15 Gram(s) Oral once PRN Blood Glucose LESS THAN 70 milliGRAM(s)/deciliter  glucagon  Injectable 1 milliGRAM(s) IntraMuscular once  heparin   Injectable 5000 Unit(s) SubCutaneous every 12 hours  insulin glargine Injectable (LANTUS) 10 Unit(s) SubCutaneous at bedtime  insulin lispro (ADMELOG) corrective regimen sliding scale   SubCutaneous Before meals and at bedtime  insulin lispro Injectable (ADMELOG) 5 Unit(s) SubCutaneous three times a day before meals  pilocarpine 2% Solution 1 Drop(s) Right EYE two times a day  potassium phosphate / sodium phosphate Powder (PHOS-NaK) 1 Packet(s) Oral three times a day  sodium chloride 0.9%. 1000 milliLiter(s) IV Continuous <Continuous>    DIET: [] Regular [x easy to chew] CCD [] Renal [] Puree [] Dysphagia [] Tube Feeds:     IMAGING:   < from: CT Head No Cont (07.05.24 @ 17:29) >  IMPRESSION:    1.  Right hemispheric subdural hematoma, with improving midline shift.  2.  No interval rebleeding.  3.  Stable postsurgical changes in the left posterior fossa.        Patient Name: KARLY ANDERSON  MRN: [id number], Accession: [id number]  DOS: 07/05/24 05:29 PM    --- End of Report ---    JALEEL HERNANDEZ MD; Attending Radiologist  This document has been electronically signed. Jul 5 2024  7:43PM    < end of copied text >   decreased ability to use arms for pushing/pulling/decreased ability to use legs for bridging/pushing

## 2024-07-08 NOTE — PROGRESS NOTE ADULT - SUBJECTIVE AND OBJECTIVE BOX
Vascular Cardiology Progress Note     DIRECT PROVIDER NUMBER: [phone]  / Available on TEAMS    CC: SDH     Interval Events:  Denies C/P or SOB.  No LE pain or edema.   Last LE venous duplex 7/5: Persistent DVT noted in the right peroneal and soleal veins. Persistent DVT noted in the left soleal vein.    Allergies  No Known Allergies	    MEDICATIONS  (STANDING):  aMIOdarone    Tablet   Oral   aMIOdarone    Tablet 200 milliGRAM(s) Oral daily  atorvastatin 80 milliGRAM(s) Oral at bedtime  chlorhexidine 4% Liquid 1 Application(s) Topical <User Schedule>  dexamethasone/neomycin/polymyxin Suspension 1 Drop(s) Both EYES every 8 hours  dextrose 10% Bolus 125 milliLiter(s) IV Bolus once  dextrose 5%. 1000 milliLiter(s) (100 mL/Hr) IV Continuous <Continuous>  dextrose 5%. 1000 milliLiter(s) (50 mL/Hr) IV Continuous <Continuous>  dextrose 50% Injectable 25 Gram(s) IV Push once  dextrose 50% Injectable 12.5 Gram(s) IV Push once  glucagon  Injectable 1 milliGRAM(s) IntraMuscular once  heparin   Injectable 5000 Unit(s) SubCutaneous every 12 hours  insulin glargine Injectable (LANTUS) 10 Unit(s) SubCutaneous at bedtime  insulin lispro (ADMELOG) corrective regimen sliding scale   SubCutaneous Before meals and at bedtime  insulin lispro Injectable (ADMELOG) 5 Unit(s) SubCutaneous three times a day before meals  lacosamide 50 milliGRAM(s) Oral two times a day  levETIRAcetam 250 milliGRAM(s) Oral two times a day  metoprolol tartrate 50 milliGRAM(s) Oral two times a day  pantoprazole    Tablet 40 milliGRAM(s) Oral every 12 hours  pilocarpine 2% Solution 1 Drop(s) Right EYE two times a day  polyethylene glycol 3350 17 Gram(s) Oral daily  potassium phosphate / sodium phosphate Powder (PHOS-NaK) 1 Packet(s) Oral three times a day  senna 2 Tablet(s) Oral at bedtime  sodium chloride 0.9%. 1000 milliLiter(s) (75 mL/Hr) IV Continuous <Continuous>  topiramate 100 milliGRAM(s) Oral two times a day      PAST MEDICAL & SURGICAL HISTORY:  DM2 (diabetes mellitus, type 2)  HTN (hypertension)  Seizures  Brain tumor  Pulmonary embolism    FAMILY HISTORY: see HPI    SOCIAL HISTORY:  unchanged    REVIEW OF SYSTEMS:  CONSTITUTIONAL: No fever  EYES: No eye pain  ENT:  No throat pain  NECK: No pain   RESPIRATORY: No C/P  CARDIOVASCULAR: No SOB  GASTROINTESTINAL: No abdominal pain  GENITOURINARY: No hematuria  SKIN: No LE wounds  LYMPH Nodes: No enlarged glands noted  ENDOCRINE: No heat or cold intolerance noted  MUSCULOSKELETAL: No LE edema  PSYCHIATRIC: No depression, anxiety  HEME/LYMPH: No bleeding gums  ALLERGY AND IMMUNOLOGIC: No hives    [ x] All others negative	    PHYSICAL EXAM:  Vital Signs Last 24 Hrs  T(C): 36.8 (08 Jul 2024 09:32), Max: 37.1 (07 Jul 2024 13:23)  T(F): 98.3 (08 Jul 2024 09:32), Max: 98.8 (07 Jul 2024 13:23)  HR: 69 (08 Jul 2024 09:32) (55 - 69)  BP: 138/69 (08 Jul 2024 09:32) (124/69 - 140/67)  BP(mean): --  RR: 18 (08 Jul 2024 09:32) (18 - 18)  SpO2: 97% (08 Jul 2024 09:32) (94% - 97%)    Parameters below as of 08 Jul 2024 09:32  Patient On (Oxygen Delivery Method): room air      Appearance: NAD 	  Cardiovascular: RRR, S1 and S2  Respiratory: CTA B/L   Psychiatry:  AAO x 3  Gastrointestinal:  Soft, Non-tender, + BS	  Skin: No cyanosis	  Extremities: No LE edema, bilateral calves soft    Vascular Pulse Exam: Palpable pedal pulses bilaterally       LABS:	     07-07    135  |  103  |  16  ----------------------------<  159<H>  3.9   |  21<L>  |  1.20    Ca    9.0      07 Jul 2024 09:31    TPro  7.2  /  Alb  3.3  /  TBili  0.4  /  DBili  x   /  AST  12  /  ALT  20  /  AlkPhos  110  07-07      Urinalysis Basic - ( 07 Jul 2024 09:31 )    Color: x / Appearance: x / SG: x / pH: x  Gluc: 159 mg/dL / Ketone: x  / Bili: x / Urobili: x   Blood: x / Protein: x / Nitrite: x   Leuk Esterase: x / RBC: x / WBC x   Sq Epi: x / Non Sq Epi: x / Bacteria: x              Assessment:  1. Bilateral Below the knee DVT      CTA chest showed no PE      TTE with normal RV function   2. SDH     S/p R craniotomy for evacuation of SDH on 6/22. S/p R MMA embolization on 6/24  3. DM  4. HLD   5. History of Falls    Plan:  1. Recent LE venous duplex on 7/5, stable and without propagation.  2. For asymptomatic below the knee DVT in setting of recent SDH and neurosurgery, recommend to continue surveillance paired with DVT prophylaxis.   3. Recommend to continue Heparin Subcutaneous 5000 units BID, while admitted and on discharge.  4. Appreciate EP / Cardiology following for history of Afib.   5. Our office will arrange outpatient Vascular Cardiology follow-up and continue LE venous duplex surveillance as an outpatient.  6. Next surveillance LE venous duplex on 7/12.    7. Appreciate excellent Neurosurgical care.          Thank you  STELLA Mendez, MS, [facility]  Vascular Cardiology Service    Please call with any questions:   DIRECT SERVICE NUMBER: [phone] / Available on TEAMS

## 2024-07-09 LAB
GLUCOSE BLDC GLUCOMTR-MCNC: 103 MG/DL — HIGH (ref 70–99)
GLUCOSE BLDC GLUCOMTR-MCNC: 120 MG/DL — HIGH (ref 70–99)
GLUCOSE BLDC GLUCOMTR-MCNC: 142 MG/DL — HIGH (ref 70–99)
GLUCOSE BLDC GLUCOMTR-MCNC: 171 MG/DL — HIGH (ref 70–99)

## 2024-07-09 PROCEDURE — 99232 SBSQ HOSP IP/OBS MODERATE 35: CPT

## 2024-07-09 RX ADMIN — INSULIN GLARGINE 10 UNIT(S): 100 INJECTION, SOLUTION SUBCUTANEOUS at 22:01

## 2024-07-09 RX ADMIN — Medication 1 PACKET(S): at 05:59

## 2024-07-09 RX ADMIN — PANTOPRAZOLE SODIUM 40 MILLIGRAM(S): 40 INJECTION, POWDER, FOR SOLUTION INTRAVENOUS at 18:06

## 2024-07-09 RX ADMIN — Medication 50 MILLIGRAM(S): at 18:06

## 2024-07-09 RX ADMIN — Medication 1 DROP(S): at 05:59

## 2024-07-09 RX ADMIN — LEVETIRACETAM 250 MILLIGRAM(S): 100 INJECTION INTRAVENOUS at 05:59

## 2024-07-09 RX ADMIN — TOPIRAMATE 100 MILLIGRAM(S): 50 TABLET, FILM COATED ORAL at 18:07

## 2024-07-09 RX ADMIN — LACOSAMIDE 50 MILLIGRAM(S): 100 TABLET, FILM COATED ORAL at 05:59

## 2024-07-09 RX ADMIN — LACOSAMIDE 50 MILLIGRAM(S): 100 TABLET, FILM COATED ORAL at 18:07

## 2024-07-09 RX ADMIN — Medication 1 DROP(S): at 18:07

## 2024-07-09 RX ADMIN — INSULIN LISPRO 5 UNIT(S): 100 INJECTION, SOLUTION SUBCUTANEOUS at 12:15

## 2024-07-09 RX ADMIN — INSULIN LISPRO 5 UNIT(S): 100 INJECTION, SOLUTION SUBCUTANEOUS at 08:01

## 2024-07-09 RX ADMIN — Medication 50 MILLIGRAM(S): at 05:58

## 2024-07-09 RX ADMIN — Medication 1 PACKET(S): at 22:02

## 2024-07-09 RX ADMIN — Medication 1 PACKET(S): at 13:20

## 2024-07-09 RX ADMIN — INSULIN LISPRO 5 UNIT(S): 100 INJECTION, SOLUTION SUBCUTANEOUS at 16:45

## 2024-07-09 RX ADMIN — AMIODARONE HYDROCHLORIDE 200 MILLIGRAM(S): 50 INJECTION, SOLUTION INTRAVENOUS at 05:59

## 2024-07-09 RX ADMIN — TOPIRAMATE 100 MILLIGRAM(S): 50 TABLET, FILM COATED ORAL at 05:58

## 2024-07-09 RX ADMIN — Medication 1 APPLICATION(S): at 21:24

## 2024-07-09 RX ADMIN — PANTOPRAZOLE SODIUM 40 MILLIGRAM(S): 40 INJECTION, POWDER, FOR SOLUTION INTRAVENOUS at 05:59

## 2024-07-09 RX ADMIN — HEPARIN SODIUM 5000 UNIT(S): 50 INJECTION, SOLUTION INTRAVENOUS at 05:59

## 2024-07-09 RX ADMIN — LEVETIRACETAM 250 MILLIGRAM(S): 100 INJECTION INTRAVENOUS at 18:06

## 2024-07-09 RX ADMIN — HEPARIN SODIUM 5000 UNIT(S): 50 INJECTION, SOLUTION INTRAVENOUS at 18:07

## 2024-07-09 RX ADMIN — INSULIN LISPRO 2: 100 INJECTION, SOLUTION SUBCUTANEOUS at 16:45

## 2024-07-09 RX ADMIN — ATORVASTATIN CALCIUM 80 MILLIGRAM(S): 20 TABLET, FILM COATED ORAL at 22:02

## 2024-07-09 NOTE — PROGRESS NOTE ADULT - SUBJECTIVE AND OBJECTIVE BOX
CARDIOLOGY FOLLOW UP - Dr. Bass  DATE OF SERVICE: 7/9/24    CC  No cv complaints     REVIEW OF SYSTEMS:  CONSTITUTIONAL: No fever, weight loss, or fatigue  RESPIRATORY: No cough, wheezing, chills or hemoptysis; No Shortness of Breath  CARDIOVASCULAR: No chest pain, palpitations, passing out, dizziness, or leg swelling  GASTROINTESTINAL: No abdominal or epigastric pain. No nausea, vomiting, or hematemesis; No diarrhea or constipation. No melena or hematochezia.  VASCULAR: No edema     PHYSICAL EXAM:  T(C): 37.1 (07-09-24 @ 09:11), Max: 37.2 (07-08-24 @ 12:59)  HR: 55 (07-09-24 @ 09:11) (55 - 68)  BP: 108/59 (07-09-24 @ 09:11) (108/59 - 130/75)  RR: 18 (07-09-24 @ 09:11) (18 - 18)  SpO2: 99% (07-09-24 @ 09:11) (96% - 99%)  Wt(kg): --  I&O's Summary    08 Jul 2024 07:01  -  09 Jul 2024 07:00  --------------------------------------------------------  IN: 600 mL / OUT: 1800 mL / NET: -1200 mL        Appearance: Normal	  Cardiovascular: Normal S1 S2,RRR, No JVD, No murmurs  Respiratory: Lungs clear to auscultation	  Gastrointestinal:  Soft, Non-tender, + BS	  Extremities: Normal range of motion, No clubbing, cyanosis or edema      Home Medications:  Keppra 250 mg oral tablet: 1 tab(s) orally every 12 hours (22 Jun 2024 18:38)  metFORMIN 500 mg oral tablet: 1 tab(s) orally every 12 hours (22 Jun 2024 18:38)  rosuvastatin 40 mg oral capsule: 1 cap(s) orally once a day (22 Jun 2024 18:38)  Topamax 100 mg oral tablet: 1 tab(s) orally every 12 hours (22 Jun 2024 18:37)      MEDICATIONS  (STANDING):  aMIOdarone    Tablet   Oral   aMIOdarone    Tablet 200 milliGRAM(s) Oral daily  atorvastatin 80 milliGRAM(s) Oral at bedtime  chlorhexidine 4% Liquid 1 Application(s) Topical <User Schedule>  dextrose 10% Bolus 125 milliLiter(s) IV Bolus once  dextrose 5%. 1000 milliLiter(s) (100 mL/Hr) IV Continuous <Continuous>  dextrose 5%. 1000 milliLiter(s) (50 mL/Hr) IV Continuous <Continuous>  dextrose 50% Injectable 25 Gram(s) IV Push once  dextrose 50% Injectable 12.5 Gram(s) IV Push once  glucagon  Injectable 1 milliGRAM(s) IntraMuscular once  heparin   Injectable 5000 Unit(s) SubCutaneous every 12 hours  insulin glargine Injectable (LANTUS) 10 Unit(s) SubCutaneous at bedtime  insulin lispro (ADMELOG) corrective regimen sliding scale   SubCutaneous Before meals and at bedtime  insulin lispro Injectable (ADMELOG) 5 Unit(s) SubCutaneous three times a day before meals  lacosamide 50 milliGRAM(s) Oral two times a day  levETIRAcetam 250 milliGRAM(s) Oral two times a day  metoprolol tartrate 50 milliGRAM(s) Oral two times a day  pantoprazole    Tablet 40 milliGRAM(s) Oral every 12 hours  pilocarpine 2% Solution 1 Drop(s) Right EYE two times a day  polyethylene glycol 3350 17 Gram(s) Oral daily  potassium phosphate / sodium phosphate Powder (PHOS-NaK) 1 Packet(s) Oral three times a day  senna 2 Tablet(s) Oral at bedtime  sodium chloride 0.9%. 1000 milliLiter(s) (75 mL/Hr) IV Continuous <Continuous>  topiramate 100 milliGRAM(s) Oral two times a day      TELEMETRY: Sinus bradycardia 	    ECG:  	  RADIOLOGY:   DIAGNOSTIC TESTING:  [ ] Echocardiogram:  [ ]  Catheterization:  [ ] Stress Test:    OTHER: 	    LABS:

## 2024-07-09 NOTE — PROGRESS NOTE ADULT - SUBJECTIVE AND OBJECTIVE BOX
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 No AC/AP, coags/plt wnl  Exam: AOx3, PERRL, EOMI, no facial, ?/very slight L drift, BEST 5/5, gait testing deferred    (22 Jun 2024 06:07)      OVERNIGHT EVENTS: No acute events overnight, patient seen and evaluated this morning doing well. Awaiting BRENDON.     Vital Signs Last 24 Hrs  T(C): 37.1 (09 Jul 2024 09:11), Max: 37.2 (08 Jul 2024 12:59)  T(F): 98.8 (09 Jul 2024 09:11), Max: 98.9 (08 Jul 2024 12:59)  HR: 55 (09 Jul 2024 09:11) (55 - 68)  BP: 108/59 (09 Jul 2024 09:11) (108/59 - 130/75)  BP(mean): --  RR: 18 (09 Jul 2024 09:11) (18 - 18)  SpO2: 99% (09 Jul 2024 09:11) (96% - 99%)    Parameters below as of 09 Jul 2024 09:11  Patient On (Oxygen Delivery Method): room air        PHYSICAL EXAM:    General: No Acute Distress     Neurological: Awake, Ox3 (name, place, date), improving - nearly resolved left eye ptosis, left pupil 2mm react, right pupil 3mm react, following commands, no drift, uppers 5/5, lowers 5/5    Pulmonary: Clear to Auscultation, No Rales, No Rhonchi, No Wheezes     Cardiovascular: S1, S2, Regular Rate and Rhythm     Gastrointestinal: Soft, Nontender, Nondistended     Incision: right craniotomy incision C/D/I    LABS:             07-08 @ 07:01  -  07-09 @ 07:00  --------------------------------------------------------  IN: 600 mL / OUT: 1800 mL / NET: -1200 mL      DRAINS: None    MEDICATIONS:  Antibiotics:    Neuro:  acetaminophen     Tablet .. 650 milliGRAM(s) Oral every 6 hours PRN Mild Pain (1 - 3)  lacosamide 50 milliGRAM(s) Oral two times a day  levETIRAcetam 250 milliGRAM(s) Oral two times a day  ondansetron Injectable 4 milliGRAM(s) IV Push every 6 hours PRN Nausea and/or Vomiting  topiramate 100 milliGRAM(s) Oral two times a day    Cardiac:  aMIOdarone    Tablet   Oral   aMIOdarone    Tablet 200 milliGRAM(s) Oral daily  metoprolol tartrate 50 milliGRAM(s) Oral two times a day    Pulm:    GI/:  pantoprazole    Tablet 40 milliGRAM(s) Oral every 12 hours  polyethylene glycol 3350 17 Gram(s) Oral daily  senna 2 Tablet(s) Oral at bedtime    Other:   atorvastatin 80 milliGRAM(s) Oral at bedtime  chlorhexidine 4% Liquid 1 Application(s) Topical <User Schedule>  dextrose 10% Bolus 125 milliLiter(s) IV Bolus once  dextrose 5%. 1000 milliLiter(s) IV Continuous <Continuous>  dextrose 5%. 1000 milliLiter(s) IV Continuous <Continuous>  dextrose 50% Injectable 25 Gram(s) IV Push once  dextrose 50% Injectable 12.5 Gram(s) IV Push once  dextrose Oral Gel 15 Gram(s) Oral once PRN Blood Glucose LESS THAN 70 milliGRAM(s)/deciliter  glucagon  Injectable 1 milliGRAM(s) IntraMuscular once  heparin   Injectable 5000 Unit(s) SubCutaneous every 12 hours  insulin glargine Injectable (LANTUS) 10 Unit(s) SubCutaneous at bedtime  insulin lispro (ADMELOG) corrective regimen sliding scale   SubCutaneous Before meals and at bedtime  insulin lispro Injectable (ADMELOG) 5 Unit(s) SubCutaneous three times a day before meals  pilocarpine 2% Solution 1 Drop(s) Right EYE two times a day  potassium phosphate / sodium phosphate Powder (PHOS-NaK) 1 Packet(s) Oral three times a day  sodium chloride 0.9%. 1000 milliLiter(s) IV Continuous <Continuous>    DIET: [] Regular [x easy to chew] CCD [] Renal [] Puree [] Dysphagia [] Tube Feeds:     IMAGING:   < from: CT Head No Cont (07.05.24 @ 17:29) >  IMPRESSION:    1.  Right hemispheric subdural hematoma, with improving midline shift.  2.  No interval rebleeding.  3.  Stable postsurgical changes in the left posterior fossa.        Patient Name: KARLY ANDERSON  MRN: [id number], Accession: [id number]  DOS: 07/05/24 05:29 PM    --- End of Report ---    JALEEL HERNANDEZ MD; Attending Radiologist  This document has been electronically signed. Jul 5 2024  7:43PM    < end of copied text >    < from: VA Duplex Lower Ext Vein Scan, Bilat (07.05.24 @ 16:23) >  IMPRESSION:  Persistent DVT noted in the right peroneal and soleal veins.    Persistent DVT noted in the left soleal vein.      --- End of Report ---    HARRIS MALONEY MD; Attending Radiologist  This document has been electronically signed. Jul 5 2024  5:12PM    < end of copied text >

## 2024-07-09 NOTE — PROGRESS NOTE ADULT - ASSESSMENT
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 No AC/AP, coags/plt wnl Now s/p R crani for SDH evacuation. Afib RVR overnight s/p Dilt IV, now in SR.      #Paroxysmal atrial fibrillation  -sp iv dilt with conversion to SR   -Continue with amiodarone per eps   -Continue po metoprolol per eps - can consider decreasing to 25mg if remains fito on tele   -Echo nml lv fxn, no wma, mild MR   -remains off a/c at this time given acute SDH    #Acute subdural hematoma   -s/p R crani for SDH evacuation  -post op mgmt per neuro    #DM  -Mgmt per med    #DVT  -Acute b/l LE dvts  -Off a/c at this time given SDH  -Cont dvt ppx  -Appreciate vasc cards recs   -Repeat LE duplex noted - persistent dvts b/l without propagation  -Plan for additional le duplex 7/12 as per vasc cards

## 2024-07-09 NOTE — PROGRESS NOTE ADULT - ASSESSMENT
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     6/22/24 s/p Right craniotomy for subdural hematoma  6/24/24 s/p angio: right MMA embolization     NEURO:   - Continue neuro checks q 4  - Surgical staples removed on 7/6/24, incision healing well C/D/I  - 7/5 CTH: right hemispheric subdural hematoma, improving midline shift, stable post-surgical changes  - Continue Vimpat, Topamax and Keppra for history of seizures   - Pain control w/ Tylenol prn  - PT/OT: BRENDON    PULM:   - On room air, O2Sat>96%  - Incentive spirometry  - 6/25 CTA Chest: no PE, atelectasis seen    CV:  - SBP , within goal  - Cardiology and EP following for afib/aflutter  - Continue Metoprolol for rate control  - Per EP - patient finished Amiodarone 400mg BID from 6/27-7/4, currently now on Amiodarone 200mg daily. Consideration for Watchman procedure / elective ablation or JOHNATHAN occlusion when he can tolerate AC after  - 6/27 TTE: EF 55-60%, mild regional hypokinesis seen at the inferior and inderolateral basal to mid sigments, normal right vent cavity size and normal right vent systolic fxn, no pericardial effusion  - Continue Lipitor for HLD    ENDO:   - A1c 6.4, continue ISS, Admelog, Lantus and CCD, continue to monitor fingersticks - between 120-150s  - 6/25 Thyroid panel WNL    HEME/ONC:             7/5 CBC: post-op anemia stable         DVT ppx: SQH BID, no SCDs, 6/30 [place] Dopp: new b/l calf vein DVT. Vascular following, follow up [place] Do[place]: persistent right peroneal and soleal DVT and persistent left soleal DVT. Spoke with Vasc 7/8, continue repeat Dopplers in 5-7 days (7/12) and continue SQH BID.    RENAL:   - IVL  - 7/7 BMP stable, had hyponatremia and MIKAELA that has since resolved  - Has KPhos supplementation  - Voiding via condom cath    ID:   - Afebrile  - 6/29 Last fever Tmax 101.3 - BCX NGTD, COVID/RSV/Influenza negative, sputum cx: normal respiratory eder  - ID following -> CXR: bibasilar opacities rt>lt, had cough previously, Meropenem finished 7/3   - Downtrending procal 1.31 (2.06)    GI:    - Speech and Swallow following, s/p MBS 7/1 - advanced to easy to chew diet  - Senna and Miralax for bowel regimen, last BM 7/8  - Monitor LFTs in the setting of Amiodarone, 7/7 LFTs stable    MISC:  - Patient on Maxtirol eye drops, I spoke with his Ophthalmologist [facility] Dr. Lee [phone] on 7/8, for blepharitis, patient does not need this medication any further unless reoccurrence in which case he can restart the medication at rehab. Per wife patient has prescription at home that she can bring to rehab should he need. Patient currently does not have any sign or symptoms of blepharitis.   - Continue Pilocarpine eye drops for glaucoma    Appreciate Hospitalist team following for co-medical management.     DISCHARGE PLANNING:   BRENDON planning, awaiting authorization    Plan to be discussed w/ Dr. Goodman  [phone]

## 2024-07-09 NOTE — PROGRESS NOTE ADULT - NS ATTEND AMEND GEN_ALL_CORE FT
Cont DVT ppx and surveillance as planned
Cont DVT ppx  surveillance duplex as planned    Pia
Continue current plan, ie amio with consideration for elective ablation or JOHNATHAN occlusion when he can tolerate AC
Continue amio
Patient seen at bedside. Has been maintaining sinus (with atrial bigeminy) since 9:30am today, c/w Amio, if the patient has a recurrence of AF/AFl that is continuous for 48 hours will need to stop Amio. EP will follow along. Discussed recommendations with Hospitalist.    BERNARDINO Gutierrez
Pt seen on 7/3/24.  Pt alert, awake in NAD, BEST well with no drift.  Right wound c/d/i.  Pending follow up CT.  PT with new-onset afib on amiodarone po, being followed by EP.  Anticoagulation is contraindicated at this time due to residual mixed-density SDH.  Continue to monitor and cardiology care is appreciated.
Pt seen on 7/5/24.  Pt is alert, awake in NAD.  BEST with no drift. wound c/d/i.  Wife at bedside and spoke also with daughter (on phone).  CT shows improved midline shift as subdural air has dissipated.  The acute blood from surgery in right subdural space is beginning to resolve and size of residual collection is stable.  Pt is clinically better and on amiodarone for AF.  He also has stable bilat below knee DVT.  Full anticoagulation and antiplatelets are contraindicated at this time.  Will continue to follow with serial scans in future.  Pt for BRENDON placement, family hoping for [facility].

## 2024-07-09 NOTE — PROGRESS NOTE ADULT - SUBJECTIVE AND OBJECTIVE BOX
Vascular Cardiology Progress Note     DIRECT PROVIDER NUMBER: [phone]  / Available on TEAMS    CC: SDH     Interval Events:  Denies C/P or SOB.  No LE pain or edema.     Allergies  No Known Allergies	    MEDICATIONS  (STANDING):  aMIOdarone    Tablet   Oral   aMIOdarone    Tablet 200 milliGRAM(s) Oral daily  atorvastatin 80 milliGRAM(s) Oral at bedtime  chlorhexidine 4% Liquid 1 Application(s) Topical <User Schedule>  glucagon  Injectable 1 milliGRAM(s) IntraMuscular once  heparin   Injectable 5000 Unit(s) SubCutaneous every 12 hours  insulin glargine Injectable (LANTUS) 10 Unit(s) SubCutaneous at bedtime  insulin lispro (ADMELOG) corrective regimen sliding scale   SubCutaneous Before meals and at bedtime  insulin lispro Injectable (ADMELOG) 5 Unit(s) SubCutaneous three times a day before meals  lacosamide 50 milliGRAM(s) Oral two times a day  levETIRAcetam 250 milliGRAM(s) Oral two times a day  metoprolol tartrate 50 milliGRAM(s) Oral two times a day  pantoprazole    Tablet 40 milliGRAM(s) Oral every 12 hours  pilocarpine 2% Solution 1 Drop(s) Right EYE two times a day  polyethylene glycol 3350 17 Gram(s) Oral daily  potassium phosphate / sodium phosphate Powder (PHOS-NaK) 1 Packet(s) Oral three times a day  senna 2 Tablet(s) Oral at bedtime  sodium chloride 0.9%. 1000 milliLiter(s) (75 mL/Hr) IV Continuous <Continuous>  topiramate 100 milliGRAM(s) Oral two times a day      PAST MEDICAL & SURGICAL HISTORY:  DM2 (diabetes mellitus, type 2)  HTN (hypertension)  Seizures  Brain tumor  Pulmonary embolism    FAMILY HISTORY: see HPI    SOCIAL HISTORY:  unchanged    REVIEW OF SYSTEMS:  CONSTITUTIONAL: No fever  EYES: No eye pain  ENT:  No throat pain  NECK: No pain   RESPIRATORY: No C/P  CARDIOVASCULAR: No SOB  GASTROINTESTINAL: No abdominal pain  GENITOURINARY: No hematuria  SKIN: No LE wounds  LYMPH Nodes: No enlarged glands noted  ENDOCRINE: No heat or cold intolerance noted  MUSCULOSKELETAL: No LE edema  PSYCHIATRIC: No depression, anxiety  HEME/LYMPH: No bleeding gums  ALLERGY AND IMMUNOLOGIC: No hives    [ x] All others negative	    PHYSICAL EXAM:  Vital Signs Last 24 Hrs  T(C): 37.1 (09 Jul 2024 09:11), Max: 37.2 (08 Jul 2024 12:59)  T(F): 98.8 (09 Jul 2024 09:11), Max: 98.9 (08 Jul 2024 12:59)  HR: 55 (09 Jul 2024 09:11) (55 - 68)  BP: 108/59 (09 Jul 2024 09:11) (108/59 - 130/75)  RR: 18 (09 Jul 2024 09:11) (18 - 18)  SpO2: 99% (09 Jul 2024 09:11) (96% - 99%)    Parameters below as of 09 Jul 2024 09:11  Patient On (Oxygen Delivery Method): room air    Appearance: NAD 	  Cardiovascular: RRR, S1 and S2  Respiratory: CTA B/L   Psychiatry:  AAO x 3  Gastrointestinal:  Soft, Non-tender, + BS	  Skin: No cyanosis	  Extremities: No LE edema, bilateral calves soft    Vascular Pulse Exam: Palpable pedal pulses bilaterally       LABS: see sunrise              Assessment:  1. Bilateral Below the knee DVT      CTA chest showed no PE      TTE with normal RV function   2. SDH     S/p R craniotomy for evacuation of SDH on 6/22. S/p R MMA embolization on 6/24  3. DM  4. HLD   5. History of Falls    Plan:  1. Recent LE venous duplex on 7/5, stable and without propagation.  2. For asymptomatic below the knee DVT in setting of recent SDH and neurosurgery, recommend to continue surveillance paired with DVT prophylaxis.   3. Recommend to continue Heparin Subcutaneous 5000 units BID, while admitted and on discharge.  4. Appreciate EP / Cardiology following for history of Afib.   5. Our office will arrange outpatient Vascular Cardiology follow-up and continue LE venous duplex surveillance as an outpatient.  6. Next surveillance LE venous duplex on 7/12.    7. Appreciate excellent Neurosurgical care.          Thank you  STELLA Mendez, MS, [facility]  Vascular Cardiology Service    Please call with any questions:   DIRECT SERVICE NUMBER: [phone] / Available on TEAMS

## 2024-07-10 ENCOUNTER — TRANSCRIPTION ENCOUNTER (OUTPATIENT)
Age: 79
End: 2024-07-10

## 2024-07-10 ENCOUNTER — NON-APPOINTMENT (OUTPATIENT)
Age: 79
End: 2024-07-10

## 2024-07-10 VITALS
SYSTOLIC BLOOD PRESSURE: 127 MMHG | RESPIRATION RATE: 18 BRPM | TEMPERATURE: 98 F | DIASTOLIC BLOOD PRESSURE: 73 MMHG | OXYGEN SATURATION: 95 % | HEART RATE: 70 BPM

## 2024-07-10 LAB
GLUCOSE BLDC GLUCOMTR-MCNC: 108 MG/DL — HIGH (ref 70–99)
GLUCOSE BLDC GLUCOMTR-MCNC: 113 MG/DL — HIGH (ref 70–99)
GLUCOSE BLDC GLUCOMTR-MCNC: 154 MG/DL — HIGH (ref 70–99)
HCT VFR BLD CALC: 30.8 % — LOW (ref 39–50)
HGB BLD-MCNC: 9.7 G/DL — LOW (ref 13–17)
MCHC RBC-ENTMCNC: 31.5 GM/DL — LOW (ref 32–36)
MCHC RBC-ENTMCNC: 31.6 PG — SIGNIFICANT CHANGE UP (ref 27–34)
MCV RBC AUTO: 100.3 FL — HIGH (ref 80–100)
NRBC # BLD: 0 /100 WBCS — SIGNIFICANT CHANGE UP (ref 0–0)
PLATELET # BLD AUTO: 374 K/UL — SIGNIFICANT CHANGE UP (ref 150–400)
RBC # BLD: 3.07 M/UL — LOW (ref 4.2–5.8)
RBC # FLD: 14.3 % — SIGNIFICANT CHANGE UP (ref 10.3–14.5)
WBC # BLD: 7.45 K/UL — SIGNIFICANT CHANGE UP (ref 3.8–10.5)
WBC # FLD AUTO: 7.45 K/UL — SIGNIFICANT CHANGE UP (ref 3.8–10.5)

## 2024-07-10 PROCEDURE — 92611 MOTION FLUOROSCOPY/SWALLOW: CPT

## 2024-07-10 PROCEDURE — 93005 ELECTROCARDIOGRAM TRACING: CPT

## 2024-07-10 PROCEDURE — 83880 ASSAY OF NATRIURETIC PEPTIDE: CPT

## 2024-07-10 PROCEDURE — 84132 ASSAY OF SERUM POTASSIUM: CPT

## 2024-07-10 PROCEDURE — 74230 X-RAY XM SWLNG FUNCJ C+: CPT

## 2024-07-10 PROCEDURE — 97110 THERAPEUTIC EXERCISES: CPT

## 2024-07-10 PROCEDURE — 88304 TISSUE EXAM BY PATHOLOGIST: CPT

## 2024-07-10 PROCEDURE — 87070 CULTURE OTHR SPECIMN AEROBIC: CPT

## 2024-07-10 PROCEDURE — 75898 FOLLOW-UP ANGIOGRAPHY: CPT

## 2024-07-10 PROCEDURE — 87040 BLOOD CULTURE FOR BACTERIA: CPT

## 2024-07-10 PROCEDURE — 82962 GLUCOSE BLOOD TEST: CPT

## 2024-07-10 PROCEDURE — 71045 X-RAY EXAM CHEST 1 VIEW: CPT

## 2024-07-10 PROCEDURE — 92610 EVALUATE SWALLOWING FUNCTION: CPT

## 2024-07-10 PROCEDURE — 71275 CT ANGIOGRAPHY CHEST: CPT | Mod: MC

## 2024-07-10 PROCEDURE — 85576 BLOOD PLATELET AGGREGATION: CPT

## 2024-07-10 PROCEDURE — 86850 RBC ANTIBODY SCREEN: CPT

## 2024-07-10 PROCEDURE — 84100 ASSAY OF PHOSPHORUS: CPT

## 2024-07-10 PROCEDURE — 83036 HEMOGLOBIN GLYCOSYLATED A1C: CPT

## 2024-07-10 PROCEDURE — 83930 ASSAY OF BLOOD OSMOLALITY: CPT

## 2024-07-10 PROCEDURE — C9254: CPT

## 2024-07-10 PROCEDURE — C9399: CPT

## 2024-07-10 PROCEDURE — 81001 URINALYSIS AUTO W/SCOPE: CPT

## 2024-07-10 PROCEDURE — 36224 PLACE CATH CAROTD ART: CPT

## 2024-07-10 PROCEDURE — 85610 PROTHROMBIN TIME: CPT

## 2024-07-10 PROCEDURE — 80076 HEPATIC FUNCTION PANEL: CPT

## 2024-07-10 PROCEDURE — 80048 BASIC METABOLIC PNL TOTAL CA: CPT

## 2024-07-10 PROCEDURE — 83735 ASSAY OF MAGNESIUM: CPT

## 2024-07-10 PROCEDURE — 97116 GAIT TRAINING THERAPY: CPT

## 2024-07-10 PROCEDURE — 92526 ORAL FUNCTION THERAPY: CPT

## 2024-07-10 PROCEDURE — 86901 BLOOD TYPING SEROLOGIC RH(D): CPT

## 2024-07-10 PROCEDURE — 97161 PT EVAL LOW COMPLEX 20 MIN: CPT

## 2024-07-10 PROCEDURE — 61624 TCAT PERM OCCLS/EMBOLJ CNS: CPT

## 2024-07-10 PROCEDURE — 84484 ASSAY OF TROPONIN QUANT: CPT

## 2024-07-10 PROCEDURE — C1769: CPT

## 2024-07-10 PROCEDURE — 85027 COMPLETE CBC AUTOMATED: CPT

## 2024-07-10 PROCEDURE — 86900 BLOOD TYPING SEROLOGIC ABO: CPT

## 2024-07-10 PROCEDURE — C1894: CPT

## 2024-07-10 PROCEDURE — 87637 SARSCOV2&INF A&B&RSV AMP PRB: CPT

## 2024-07-10 PROCEDURE — 85730 THROMBOPLASTIN TIME PARTIAL: CPT

## 2024-07-10 PROCEDURE — 85014 HEMATOCRIT: CPT

## 2024-07-10 PROCEDURE — 84443 ASSAY THYROID STIM HORMONE: CPT

## 2024-07-10 PROCEDURE — 82947 ASSAY GLUCOSE BLOOD QUANT: CPT

## 2024-07-10 PROCEDURE — 83935 ASSAY OF URINE OSMOLALITY: CPT

## 2024-07-10 PROCEDURE — 87640 STAPH A DNA AMP PROBE: CPT

## 2024-07-10 PROCEDURE — 85018 HEMOGLOBIN: CPT

## 2024-07-10 PROCEDURE — 97535 SELF CARE MNGMENT TRAINING: CPT

## 2024-07-10 PROCEDURE — C1760: CPT

## 2024-07-10 PROCEDURE — 76376 3D RENDER W/INTRP POSTPROCES: CPT

## 2024-07-10 PROCEDURE — 85025 COMPLETE CBC W/AUTO DIFF WBC: CPT

## 2024-07-10 PROCEDURE — 36415 COLL VENOUS BLD VENIPUNCTURE: CPT

## 2024-07-10 PROCEDURE — C1713: CPT

## 2024-07-10 PROCEDURE — 82553 CREATINE MB FRACTION: CPT

## 2024-07-10 PROCEDURE — 74018 RADEX ABDOMEN 1 VIEW: CPT

## 2024-07-10 PROCEDURE — 99291 CRITICAL CARE FIRST HOUR: CPT | Mod: 25

## 2024-07-10 PROCEDURE — C1887: CPT

## 2024-07-10 PROCEDURE — 82330 ASSAY OF CALCIUM: CPT

## 2024-07-10 PROCEDURE — 80053 COMPREHEN METABOLIC PANEL: CPT

## 2024-07-10 PROCEDURE — C1889: CPT

## 2024-07-10 PROCEDURE — 82435 ASSAY OF BLOOD CHLORIDE: CPT

## 2024-07-10 PROCEDURE — 97166 OT EVAL MOD COMPLEX 45 MIN: CPT

## 2024-07-10 PROCEDURE — 80061 LIPID PANEL: CPT

## 2024-07-10 PROCEDURE — 36227 PLACE CATH XTRNL CAROTID: CPT

## 2024-07-10 PROCEDURE — 87641 MR-STAPH DNA AMP PROBE: CPT

## 2024-07-10 PROCEDURE — 93970 EXTREMITY STUDY: CPT

## 2024-07-10 PROCEDURE — 75894 X-RAYS TRANSCATH THERAPY: CPT

## 2024-07-10 PROCEDURE — 84436 ASSAY OF TOTAL THYROXINE: CPT

## 2024-07-10 PROCEDURE — 97530 THERAPEUTIC ACTIVITIES: CPT

## 2024-07-10 PROCEDURE — 83605 ASSAY OF LACTIC ACID: CPT

## 2024-07-10 PROCEDURE — 93306 TTE W/DOPPLER COMPLETE: CPT

## 2024-07-10 PROCEDURE — 84145 PROCALCITONIN (PCT): CPT

## 2024-07-10 PROCEDURE — 84480 ASSAY TRIIODOTHYRONINE (T3): CPT

## 2024-07-10 PROCEDURE — 82803 BLOOD GASES ANY COMBINATION: CPT

## 2024-07-10 PROCEDURE — 99222 1ST HOSP IP/OBS MODERATE 55: CPT

## 2024-07-10 PROCEDURE — 84300 ASSAY OF URINE SODIUM: CPT

## 2024-07-10 PROCEDURE — 70450 CT HEAD/BRAIN W/O DYE: CPT | Mod: MC

## 2024-07-10 PROCEDURE — 84439 ASSAY OF FREE THYROXINE: CPT

## 2024-07-10 PROCEDURE — 84295 ASSAY OF SERUM SODIUM: CPT

## 2024-07-10 RX ORDER — ACETAMINOPHEN 325 MG
2 TABLET ORAL
Qty: 0 | Refills: 0 | DISCHARGE
Start: 2024-07-10

## 2024-07-10 RX ORDER — PILOCARPINE HCL 4 %
1 DROPS OPHTHALMIC (EYE)
Qty: 0 | Refills: 0 | DISCHARGE
Start: 2024-07-10

## 2024-07-10 RX ORDER — TOPIRAMATE 50 MG/1
1 TABLET, FILM COATED ORAL
Qty: 0 | Refills: 0 | DISCHARGE
Start: 2024-07-10

## 2024-07-10 RX ORDER — HEPARIN SODIUM 50 [USP'U]/ML
5000 INJECTION, SOLUTION INTRAVENOUS
Qty: 0 | Refills: 0 | DISCHARGE
Start: 2024-07-10

## 2024-07-10 RX ORDER — POLYETHYLENE GLYCOL 3350 1 G/G
17 POWDER ORAL
Qty: 0 | Refills: 0 | DISCHARGE
Start: 2024-07-10

## 2024-07-10 RX ORDER — SENNOSIDES 8.6 MG
2 TABLET ORAL
Qty: 0 | Refills: 0 | DISCHARGE
Start: 2024-07-10

## 2024-07-10 RX ORDER — PANTOPRAZOLE SODIUM 40 MG/10ML
1 INJECTION, POWDER, FOR SOLUTION INTRAVENOUS
Qty: 0 | Refills: 0 | DISCHARGE
Start: 2024-07-10

## 2024-07-10 RX ORDER — LEVETIRACETAM 100 MG/ML
1 INJECTION INTRAVENOUS
Qty: 0 | Refills: 0 | DISCHARGE
Start: 2024-07-10

## 2024-07-10 RX ORDER — METOPROLOL TARTRATE 50 MG
1 TABLET ORAL
Qty: 0 | Refills: 0 | DISCHARGE
Start: 2024-07-10

## 2024-07-10 RX ORDER — LACOSAMIDE 100 MG/1
1 TABLET, FILM COATED ORAL
Qty: 0 | Refills: 0 | DISCHARGE
Start: 2024-07-10

## 2024-07-10 RX ORDER — SOD PHOS DI, MONO/K PHOS MONO 250 MG
1 TABLET ORAL
Qty: 0 | Refills: 0 | DISCHARGE
Start: 2024-07-10

## 2024-07-10 RX ORDER — AMIODARONE HYDROCHLORIDE 50 MG/ML
1 INJECTION, SOLUTION INTRAVENOUS
Qty: 0 | Refills: 0 | DISCHARGE
Start: 2024-07-10

## 2024-07-10 RX ADMIN — Medication 50 MILLIGRAM(S): at 17:16

## 2024-07-10 RX ADMIN — Medication 1 DROP(S): at 05:26

## 2024-07-10 RX ADMIN — Medication 50 MILLIGRAM(S): at 05:25

## 2024-07-10 RX ADMIN — Medication 1 PACKET(S): at 05:24

## 2024-07-10 RX ADMIN — INSULIN LISPRO 5 UNIT(S): 100 INJECTION, SOLUTION SUBCUTANEOUS at 11:48

## 2024-07-10 RX ADMIN — INSULIN LISPRO 2: 100 INJECTION, SOLUTION SUBCUTANEOUS at 16:32

## 2024-07-10 RX ADMIN — TOPIRAMATE 100 MILLIGRAM(S): 50 TABLET, FILM COATED ORAL at 17:16

## 2024-07-10 RX ADMIN — LEVETIRACETAM 250 MILLIGRAM(S): 100 INJECTION INTRAVENOUS at 05:25

## 2024-07-10 RX ADMIN — INSULIN LISPRO 5 UNIT(S): 100 INJECTION, SOLUTION SUBCUTANEOUS at 08:15

## 2024-07-10 RX ADMIN — PANTOPRAZOLE SODIUM 40 MILLIGRAM(S): 40 INJECTION, POWDER, FOR SOLUTION INTRAVENOUS at 17:16

## 2024-07-10 RX ADMIN — LACOSAMIDE 50 MILLIGRAM(S): 100 TABLET, FILM COATED ORAL at 17:21

## 2024-07-10 RX ADMIN — PANTOPRAZOLE SODIUM 40 MILLIGRAM(S): 40 INJECTION, POWDER, FOR SOLUTION INTRAVENOUS at 05:24

## 2024-07-10 RX ADMIN — LACOSAMIDE 50 MILLIGRAM(S): 100 TABLET, FILM COATED ORAL at 05:25

## 2024-07-10 RX ADMIN — HEPARIN SODIUM 5000 UNIT(S): 50 INJECTION, SOLUTION INTRAVENOUS at 05:24

## 2024-07-10 RX ADMIN — Medication 1 DROP(S): at 17:17

## 2024-07-10 RX ADMIN — LEVETIRACETAM 250 MILLIGRAM(S): 100 INJECTION INTRAVENOUS at 17:16

## 2024-07-10 RX ADMIN — AMIODARONE HYDROCHLORIDE 200 MILLIGRAM(S): 50 INJECTION, SOLUTION INTRAVENOUS at 05:25

## 2024-07-10 RX ADMIN — HEPARIN SODIUM 5000 UNIT(S): 50 INJECTION, SOLUTION INTRAVENOUS at 17:16

## 2024-07-10 RX ADMIN — TOPIRAMATE 100 MILLIGRAM(S): 50 TABLET, FILM COATED ORAL at 05:25

## 2024-07-10 RX ADMIN — INSULIN LISPRO 5 UNIT(S): 100 INJECTION, SOLUTION SUBCUTANEOUS at 16:33

## 2024-07-10 NOTE — PROGRESS NOTE ADULT - PROBLEM SELECTOR PROBLEM 1
Paroxysmal atrial fibrillation
Acute subdural hematoma

## 2024-07-10 NOTE — PROGRESS NOTE ADULT - ASSESSMENT
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     6/22/24 s/p Right craniotomy for subdural hematoma  6/24/24 s/p angio: right MMA embolization     NEURO:   - Continue neuro checks q 4  - Surgical staples removed on 7/6/24, incision healing well C/D/I  - 7/5 CTH: right hemispheric subdural hematoma, improving midline shift, stable post-surgical changes  - Continue Vimpat, Topamax and Keppra for history of seizures   - Pain control w/ Tylenol prn  - PT/OT: BRENDON    PULM:   - On room air, O2Sat>94%  - Incentive spirometry  - 6/25 CTA Chest: no PE, atelectasis seen    CV:  - SBP , within goal  - Cardiology and EP following for afib/aflutter  - Continue Metoprolol for rate control  - Per EP - patient finished Amiodarone 400mg BID from 6/27-7/4, currently now on Amiodarone 200mg daily. Consideration for Watchman procedure / elective ablation or JOHNATHAN occlusion when he can tolerate AC after  - 6/27 TTE: EF 55-60%, mild regional hypokinesis seen at the inferior and inderolateral basal to mid sigments, normal right vent cavity size and normal right vent systolic fxn, no pericardial effusion  - Continue Lipitor for HLD    ENDO:   - A1c 6.4, continue ISS, Admelog, Lantus and CCD, continue to monitor fingersticks - between 120-150s  - 6/25 Thyroid panel WNL    HEME/ONC:             7/10 CBC: post-op anemia stable         DVT ppx: SQH BID, no SCDs, 6/30 Le Dopp: new b/l calf vein DVT. Vascular following, follow up Le Dopp: persistent right peroneal and soleal DVT and persistent left soleal DVT. Spoke with Vasc 7/8, continue repeat Dopplers in 5-7 days (7/12) and continue SQH BID.    RENAL:   - IVL  - 7/7 BMP stable, had hyponatremia and MIKAELA that has since resolved  - Has KPhos supplementation  - Voiding via condom cath    ID:   - Afebrile  - 6/29 Last fever Tmax 101.3 - BCX NGTD, COVID/RSV/Influenza negative, sputum cx: normal respiratory eder  - ID following -> CXR: bibasilar opacities rt>lt, had cough previously, Meropenem finished 7/3   - Downtrending procal 1.31 (2.06)    GI:    - Speech and Swallow following, s/p MBS 7/1 - advanced to easy to chew diet  - Senna and Miralax for bowel regimen, last BM 7/10  - Monitor LFTs in the setting of Amiodarone, 7/7 LFTs stable    MISC:  - Patient on Maxitrol eye drops, I spoke with his Ophthalmologist [facility] Dr. Lee [phone] on 7/8, for blepharitis, patient does not need this medication any further unless reoccurrence in which case he can restart the medication at rehab. Per wife patient has prescription at home that she can bring to rehab should he need. Patient currently does not have any sign or symptoms of blepharitis.   - Continue Pilocarpine eye drops for glaucoma    Appreciate Hospitalist team following for co-medical management.     DISCHARGE PLANNING:   BRENDON planning,  was denied, PMR consulted for assistance and peer to peer.    Plan to be discussed w/ Dr. Goodman  [id number]

## 2024-07-10 NOTE — CONSULT NOTE ADULT - SUBJECTIVE AND OBJECTIVE BOX
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 No AC/AP, coags/plt wnl  Exam: AOx3, PERRL, EOMI, no facial, ?/very slight L drift, BEST 5/5, gait testing deferred    (22 Jun 2024 06:07)    Interval History:  Patient s/p Craniotomy on 6/22, R MMA embolization on 6/24.  Course has been complicated by Afib/RVR and managed w IV Dilt. Also noted to have DVT- plan for f/u dopplers. Also w labile DM- medications adjusted.    REVIEW OF SYSTEMS: No chest pain, shortness of breath, nausea, vomiting or diarhea; other ROS neg     PAST MEDICAL & SURGICAL HISTORY  DM2 (diabetes mellitus, type 2)  HTN (hypertension)  Seizures  Brain tumor  Pulmonary embolism    FUNCTIONAL HISTORY:   Lives w wife in home w 3 OSITO  PTA Independent    CURRENT FUNCTIONAL STATUS:  Min A transfers and gait.    FAMILY HISTORY   N/C    MEDICATIONS   acetaminophen     Tablet .. 650 milliGRAM(s) Oral every 6 hours PRN  aMIOdarone    Tablet   Oral   aMIOdarone    Tablet 200 milliGRAM(s) Oral daily  atorvastatin 80 milliGRAM(s) Oral at bedtime  chlorhexidine 4% Liquid 1 Application(s) Topical <User Schedule>  dextrose 10% Bolus 125 milliLiter(s) IV Bolus once  dextrose 5%. 1000 milliLiter(s) IV Continuous <Continuous>  dextrose 5%. 1000 milliLiter(s) IV Continuous <Continuous>  dextrose 50% Injectable 25 Gram(s) IV Push once  dextrose 50% Injectable 12.5 Gram(s) IV Push once  dextrose Oral Gel 15 Gram(s) Oral once PRN  glucagon  Injectable 1 milliGRAM(s) IntraMuscular once  heparin   Injectable 5000 Unit(s) SubCutaneous every 12 hours  insulin glargine Injectable (LANTUS) 10 Unit(s) SubCutaneous at bedtime  insulin lispro (ADMELOG) corrective regimen sliding scale   SubCutaneous Before meals and at bedtime  insulin lispro Injectable (ADMELOG) 5 Unit(s) SubCutaneous three times a day before meals  lacosamide 50 milliGRAM(s) Oral two times a day  levETIRAcetam 250 milliGRAM(s) Oral two times a day  metoprolol tartrate 50 milliGRAM(s) Oral two times a day  ondansetron Injectable 4 milliGRAM(s) IV Push every 6 hours PRN  pantoprazole    Tablet 40 milliGRAM(s) Oral every 12 hours  pilocarpine 2% Solution 1 Drop(s) Right EYE two times a day  polyethylene glycol 3350 17 Gram(s) Oral daily  potassium phosphate / sodium phosphate Powder (PHOS-NaK) 1 Packet(s) Oral three times a day  senna 2 Tablet(s) Oral at bedtime  sodium chloride 0.9%. 1000 milliLiter(s) IV Continuous <Continuous>  topiramate 100 milliGRAM(s) Oral two times a day    ALLERGIES  No Known Allergies    VITALS  T(C): 36.9 (07-10-24 @ 05:25), Max: 36.9 (07-09-24 @ 13:16)  HR: 60 (07-10-24 @ 05:25) (60 - 64)  BP: 148/82 (07-10-24 @ 05:25) (119/59 - 148/82)  RR: 18 (07-10-24 @ 05:25) (18 - 18)  SpO2: 96% (07-10-24 @ 05:25) (94% - 99%)  Wt(kg): --    PHYSICAL EXAM  Constitutional - NAD, Comfortable  HEENT - NCAT, EOMI  Neck - Supple  Chest - No distress, no use of accessory muscles for respiration  Cardiovascular -Well perfused  Abdomen - BS+, Soft, NTND  Extremities - No C/C/E, No calf tenderness   Neurologic Exam -                    Cognitive - Awake, Alert, AAO to self, place, date, year, situation     Communication - Fluent, No dysarthria, no aphasia     Cranial Nerves - CN 2-12 intact     Motor - No focal deficits      Sensory - Intact to LT     Reflexes - DTR Intact, No primitive reflexive  Psychiatric - Mood stable, Affect WNL    RECENT LABS/IMAGING  CBC Full  -  ( 10 Jul 2024 06:17 )  WBC Count : 7.45 K/uL  RBC Count : 3.07 M/uL  Hemoglobin : 9.7 g/dL  Hematocrit : 30.8 %  Platelet Count - Automated : 374 K/uL  Mean Cell Volume : 100.3 fl  Mean Cell Hemoglobin : 31.6 pg  Mean Cell Hemoglobin Concentration : 31.5 gm/dL  Auto Neutrophil # : x  Auto Lymphocyte # : x  Auto Monocyte # : x  Auto Eosinophil # : x  Auto Basophil # : x  Auto Neutrophil % : x  Auto Lymphocyte % : x  Auto Monocyte % : x  Auto Eosinophil % : x  Auto Basophil % : x    CAPILLARY BLOOD GLUCOSE  POCT Blood Glucose.: 113 mg/dL (10 Jul 2024 07:35)  POCT Blood Glucose.: 142 mg/dL (09 Jul 2024 21:06)  POCT Blood Glucose.: 171 mg/dL (09 Jul 2024 16:13)  POCT Blood Glucose.: 103 mg/dL (09 Jul 2024 11:39)      Impression:  78 yo with functional deficits secondary to diagnosis of SDH    Plan:  PT- ROM, Bed Mob, Transfers, Amb w AD and bracing as needed  OT- ADLs, bracing  SLP- Dysphagia eval and treat  Prec- Falls, Cardiac, Seizure  DVT - Heparin; f/u doppler  Skin- Turn q2 h  Dispo-  Patient is a 79y old  Male who presents with a chief complaint of SDH (09 Jul 2024 12:51)    Admission HPI:  Karly Anderson   79M Hx SOC for tumor (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 No AC/AP, coags/plt wnl  Exam: AOx3, PERRL, EOMI, no facial, ?/very slight L drift, BEST 5/5, gait testing deferred    (22 Jun 2024 06:07)    Interval History:  Patient s/p Craniotomy on 6/22, R MMA embolization on 6/24.  Course has been complicated by Afib/RVR and managed w IV Dilt. Also noted to have DVT- plan for f/u dopplers. Also w labile DM- medications adjusted.    REVIEW OF SYSTEMS: + poor balance, No chest pain, shortness of breath, nausea, vomiting or diarhea; other ROS neg     PAST MEDICAL & SURGICAL HISTORY  DM2 (diabetes mellitus, type 2)  HTN (hypertension)  Seizures  Brain tumor  Pulmonary embolism    FUNCTIONAL HISTORY:   Lives w wife in home w 3 OSITO  PTA Independent    CURRENT FUNCTIONAL STATUS:  Min A transfers and gait.    FAMILY HISTORY   N/C    MEDICATIONS   acetaminophen     Tablet .. 650 milliGRAM(s) Oral every 6 hours PRN  aMIOdarone    Tablet   Oral   aMIOdarone    Tablet 200 milliGRAM(s) Oral daily  atorvastatin 80 milliGRAM(s) Oral at bedtime  chlorhexidine 4% Liquid 1 Application(s) Topical <User Schedule>  dextrose 10% Bolus 125 milliLiter(s) IV Bolus once  dextrose 5%. 1000 milliLiter(s) IV Continuous <Continuous>  dextrose 5%. 1000 milliLiter(s) IV Continuous <Continuous>  dextrose 50% Injectable 25 Gram(s) IV Push once  dextrose 50% Injectable 12.5 Gram(s) IV Push once  dextrose Oral Gel 15 Gram(s) Oral once PRN  glucagon  Injectable 1 milliGRAM(s) IntraMuscular once  heparin   Injectable 5000 Unit(s) SubCutaneous every 12 hours  insulin glargine Injectable (LANTUS) 10 Unit(s) SubCutaneous at bedtime  insulin lispro (ADMELOG) corrective regimen sliding scale   SubCutaneous Before meals and at bedtime  insulin lispro Injectable (ADMELOG) 5 Unit(s) SubCutaneous three times a day before meals  lacosamide 50 milliGRAM(s) Oral two times a day  levETIRAcetam 250 milliGRAM(s) Oral two times a day  metoprolol tartrate 50 milliGRAM(s) Oral two times a day  ondansetron Injectable 4 milliGRAM(s) IV Push every 6 hours PRN  pantoprazole    Tablet 40 milliGRAM(s) Oral every 12 hours  pilocarpine 2% Solution 1 Drop(s) Right EYE two times a day  polyethylene glycol 3350 17 Gram(s) Oral daily  potassium phosphate / sodium phosphate Powder (PHOS-NaK) 1 Packet(s) Oral three times a day  senna 2 Tablet(s) Oral at bedtime  sodium chloride 0.9%. 1000 milliLiter(s) IV Continuous <Continuous>  topiramate 100 milliGRAM(s) Oral two times a day    ALLERGIES  No Known Allergies    VITALS  T(C): 36.9 (07-10-24 @ 05:25), Max: 36.9 (07-09-24 @ 13:16)  HR: 60 (07-10-24 @ 05:25) (60 - 64)  BP: 148/82 (07-10-24 @ 05:25) (119/59 - 148/82)  RR: 18 (07-10-24 @ 05:25) (18 - 18)  SpO2: 96% (07-10-24 @ 05:25) (94% - 99%)  Wt(kg): --    PHYSICAL EXAM  Constitutional - NAD, Comfortable  HEENT - NCAT, EOMI  Neck - Supple  Chest - No distress, no use of accessory muscles for respiration  Cardiovascular -Well perfused  Abdomen - BS+, Soft, NTND  Extremities - No C/C/E, No calf tenderness   Neurologic Exam -                 AAO x 3  Slightly delayed processing  Motor 4+/5 bl UE and LEs     Psychiatric - Mood stable, Affect WNL    RECENT LABS/IMAGING  CBC Full  -  ( 10 Jul 2024 06:17 )  WBC Count : 7.45 K/uL  RBC Count : 3.07 M/uL  Hemoglobin : 9.7 g/dL  Hematocrit : 30.8 %  Platelet Count - Automated : 374 K/uL  Mean Cell Volume : 100.3 fl  Mean Cell Hemoglobin : 31.6 pg  Mean Cell Hemoglobin Concentration : 31.5 gm/dL  Auto Neutrophil # : x  Auto Lymphocyte # : x  Auto Monocyte # : x  Auto Eosinophil # : x  Auto Basophil # : x  Auto Neutrophil % : x  Auto Lymphocyte % : x  Auto Monocyte % : x  Auto Eosinophil % : x  Auto Basophil % : x    CAPILLARY BLOOD GLUCOSE  POCT Blood Glucose.: 113 mg/dL (10 Jul 2024 07:35)  POCT Blood Glucose.: 142 mg/dL (09 Jul 2024 21:06)  POCT Blood Glucose.: 171 mg/dL (09 Jul 2024 16:13)  POCT Blood Glucose.: 103 mg/dL (09 Jul 2024 11:39)      Impression:  78 yo with functional deficits secondary to diagnosis of SDH    Plan:  PT- ROM, Bed Mob, Transfers, Amb w AD and bracing as needed  OT- ADLs, bracing  SLP- Dysphagia eval and treat  Prec- Falls, Cardiac, Seizure  DVT - Heparin; f/u doppler  Skin- Turn q2 h  Dispo- Acute Rehab- patient requires active and ongoing therapeutic interventions of multiple disciplines and can tolerate 3 hours of therapies x 2-4wks depending on progress at rehabilitation facility. Can actively participate and benefit from  an intensive rehabilitation program. Requires supervision by a rehabilitation physician and a coordinated interdisciplinary approach to providing rehabilitation.   Called and left a message for Peer to Peer Review.

## 2024-07-10 NOTE — CONSULT NOTE ADULT - PROVIDER SPECIALTY LIST ADULT
Infectious Disease
Electrophysiology
Rehab Medicine
Vascular Cardiology
Cardiology
Internal Medicine

## 2024-07-10 NOTE — PROGRESS NOTE ADULT - PROBLEM SELECTOR PROBLEM 2
Acute subdural hematoma
DM2 (diabetes mellitus, type 2)
Acute subdural hematoma
DM2 (diabetes mellitus, type 2)
Acute subdural hematoma
Acute subdural hematoma
DM2 (diabetes mellitus, type 2)
Acute subdural hematoma
Acute subdural hematoma
DM2 (diabetes mellitus, type 2)

## 2024-07-10 NOTE — PROGRESS NOTE ADULT - SUBJECTIVE AND OBJECTIVE BOX
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 No AC/AP, coags/plt wnl  Exam: AOx3, PERRL, EOMI, no facial, ?/very slight L drift, BEST 5/5, gait testing deferred    (22 Jun 2024 06:07)      OVERNIGHT EVENTS: No acute events overnight, patient seen and evaluated at bedside doing well, awaiting BRENDON.     Vital Signs Last 24 Hrs  T(C): 36.4 (10 Jul 2024 08:33), Max: 36.9 (09 Jul 2024 13:16)  T(F): 97.6 (10 Jul 2024 08:33), Max: 98.5 (09 Jul 2024 13:16)  HR: 60 (10 Jul 2024 08:33) (60 - 64)  BP: 115/68 (10 Jul 2024 08:33) (115/68 - 148/82)  BP(mean): --  RR: 18 (10 Jul 2024 08:33) (18 - 18)  SpO2: 95% (10 Jul 2024 08:33) (94% - 99%)    Parameters below as of 10 Jul 2024 08:33  Patient On (Oxygen Delivery Method): room air        PHYSICAL EXAM:    General: No Acute Distress     Neurological: Awake, Ox3 (name, place, date), improving - nearly resolved left eye ptosis, left pupil 2mm react, right pupil 3mm react, following commands, no drift, uppers 5/5, lowers 5/5    Pulmonary: Clear to Auscultation, No Rales, No Rhonchi, No Wheezes     Cardiovascular: S1, S2, Regular Rate and Rhythm     Gastrointestinal: Soft, Nontender, Nondistended     Incision: right craniotomy incision C/D/I    LABS:                        9.7    7.45  )-----------( 374      ( 10 Jul 2024 06:17 )             30.8              07-09 @ 07:01  -  07-10 @ 07:00  --------------------------------------------------------  IN: 960 mL / OUT: 1201 mL / NET: -241 mL      DRAINS: None    MEDICATIONS:  Antibiotics:    Neuro:  acetaminophen     Tablet .. 650 milliGRAM(s) Oral every 6 hours PRN Mild Pain (1 - 3)  lacosamide 50 milliGRAM(s) Oral two times a day  levETIRAcetam 250 milliGRAM(s) Oral two times a day  ondansetron Injectable 4 milliGRAM(s) IV Push every 6 hours PRN Nausea and/or Vomiting  topiramate 100 milliGRAM(s) Oral two times a day    Cardiac:  aMIOdarone    Tablet   Oral   aMIOdarone    Tablet 200 milliGRAM(s) Oral daily  metoprolol tartrate 50 milliGRAM(s) Oral two times a day    Pulm:    GI/:  pantoprazole    Tablet 40 milliGRAM(s) Oral every 12 hours  polyethylene glycol 3350 17 Gram(s) Oral daily  senna 2 Tablet(s) Oral at bedtime    Other:   atorvastatin 80 milliGRAM(s) Oral at bedtime  chlorhexidine 4% Liquid 1 Application(s) Topical <User Schedule>  dextrose 10% Bolus 125 milliLiter(s) IV Bolus once  dextrose 5%. 1000 milliLiter(s) IV Continuous <Continuous>  dextrose 5%. 1000 milliLiter(s) IV Continuous <Continuous>  dextrose 50% Injectable 25 Gram(s) IV Push once  dextrose 50% Injectable 12.5 Gram(s) IV Push once  dextrose Oral Gel 15 Gram(s) Oral once PRN Blood Glucose LESS THAN 70 milliGRAM(s)/deciliter  glucagon  Injectable 1 milliGRAM(s) IntraMuscular once  heparin   Injectable 5000 Unit(s) SubCutaneous every 12 hours  insulin glargine Injectable (LANTUS) 10 Unit(s) SubCutaneous at bedtime  insulin lispro (ADMELOG) corrective regimen sliding scale   SubCutaneous Before meals and at bedtime  insulin lispro Injectable (ADMELOG) 5 Unit(s) SubCutaneous three times a day before meals  pilocarpine 2% Solution 1 Drop(s) Right EYE two times a day  potassium phosphate / sodium phosphate Powder (PHOS-NaK) 1 Packet(s) Oral three times a day  sodium chloride 0.9%. 1000 milliLiter(s) IV Continuous <Continuous>    DIET: [] Regular [x easy to chew] CCD [] Renal [] Puree [] Dysphagia [] Tube Feeds:     IMAGING:   < from: CT Head No Cont (07.05.24 @ 17:29) >  IMPRESSION:    1.  Right hemispheric subdural hematoma, with improving midline shift.  2.  No interval rebleeding.  3.  Stable postsurgical changes in the left posterior fossa.      Patient Name: KARLY ANDERSON  MRN: [id number], Accession: [id number]  DOS: 07/05/24 05:29 PM    --- End of Report ---    JALEEL HERNANDEZ MD; Attending Radiologist  This document has been electronically signed. Jul 5 2024  7:43PM    < end of copied text >

## 2024-07-10 NOTE — DISCHARGE NOTE NURSING/CASE MANAGEMENT/SOCIAL WORK - PATIENT PORTAL LINK FT
You can access the FollowMyHealth Patient Portal offered by [facility] by registering at the following website: [email or web address]. By joining [employer]’s FollowMyHealth portal, you will also be able to view your health information using other applications (apps) compatible with our system.

## 2024-07-10 NOTE — PROGRESS NOTE ADULT - TIME BILLING
Agree with above ACP note.  cv stable  cont current tx  dcp
agree with above ACP note.  events noted  rhythm stable  cont tx per eps   dvts noted  vasc cards f/u  remains off a/c
agree with above ACP note.  events noted  rhythm stable  cont tx per eps   dvts noted  vasc cards f/u  remains off a/c
Agree with above ACP note.  cv stable  cont current tx  dcp
Agree with above ACP note.  cv stable  cont current tx  dcp
agree with above ACP note.  cv stable  cont current tx  f/u repeat duplex
agree with above ACP note.  events noted  rhythm stable  cont tx per eps   dvts noted  vasc cards f/u  remains off a/c
agree with above ACP note.  PATIENT SEEN IN COVERAGE FOR DR FUENTES  events noted  in sr/atrial big  cont amio, po dilt   consider adding metoprolol as needed  cont care per nsicu
reviewing medical record, evaluating the patient, discussing plan of care with patient/neurosurgery, changing insulin, documenting in EMR.
agree with above ACP note.  PATIENT SEEN IN COVERAGE FOR DR FUENTES  events noted  sp iv dilt with conversion to SR   Continue with amiodarone 400 PO BID  Continue po dilt and metoprolol as ordered  Tele with episode of SVT vs Aflutter - now atrial bigem. Also with frequent, brief pauses   Recommend EP eval   Echo nml lv fxn, no wma, mild MR   Unable to give a/c at this time given acute SDH
reviewing medical record, evaluating the patient, discussing plan of care with patient/family/neurosurgery, documenting in EMR.
reviewing medical record, evaluating the patient, discussing plan of care with patient/neurosurgery, documenting in EMR.

## 2024-07-10 NOTE — PROGRESS NOTE ADULT - THIS PATIENT HAS THE FOLLOWING CONDITION(S)/DIAGNOSES ON THIS ADMISSION:
None
SDH
post-op anemia, stable/Acute Blood Loss Anemia
post-op anemia, stable/Acute Blood Loss Anemia
None
SDH
SDH
post-op anemia, stable/Acute Blood Loss Anemia
None
SDH
post-op anemia, stable/Acute Blood Loss Anemia
None
SDH
post-op anemia, stable/Acute Blood Loss Anemia
None

## 2024-07-10 NOTE — PROGRESS NOTE ADULT - PROBLEM SELECTOR PROBLEM 4
Abnormal EKG
Pneumonia, aspiration
Abnormal EKG
Pneumonia, aspiration
Abnormal EKG
Pneumonia, aspiration

## 2024-07-10 NOTE — PROGRESS NOTE ADULT - REASON FOR ADMISSION
SDH

## 2024-07-10 NOTE — PROGRESS NOTE ADULT - PROBLEM SELECTOR PROBLEM 3
DM2 (diabetes mellitus, type 2)
Paroxysmal atrial fibrillation
DM2 (diabetes mellitus, type 2)
Paroxysmal atrial fibrillation
DM2 (diabetes mellitus, type 2)
Paroxysmal atrial fibrillation
DM2 (diabetes mellitus, type 2)
Paroxysmal atrial fibrillation
DM2 (diabetes mellitus, type 2)
Paroxysmal atrial fibrillation
DM2 (diabetes mellitus, type 2)
DM2 (diabetes mellitus, type 2)
Paroxysmal atrial fibrillation

## 2024-07-10 NOTE — CONSULT NOTE ADULT - CONSULT REQUESTED DATE/TIME
01-Jul-2024
10-Jul-2024 09:22
28-Jun-2024 10:51
25-Jun-2024 07:10
28-Jun-2024 16:36
29-Jun-2024 14:15

## 2024-07-10 NOTE — PROGRESS NOTE ADULT - SUBJECTIVE AND OBJECTIVE BOX
CARDIOLOGY FOLLOW UP - Dr. Bass  DATE OF SERVICE: 7/10/24    CC  No cv complaints     REVIEW OF SYSTEMS:  CONSTITUTIONAL: No fever, weight loss, or fatigue  RESPIRATORY: No cough, wheezing, chills or hemoptysis; No Shortness of Breath  CARDIOVASCULAR: No chest pain, palpitations, passing out, dizziness, or leg swelling  GASTROINTESTINAL: No abdominal or epigastric pain. No nausea, vomiting, or hematemesis; No diarrhea or constipation. No melena or hematochezia.  VASCULAR: No edema     PHYSICAL EXAM:  T(C): 36.4 (07-10-24 @ 08:33), Max: 36.9 (07-09-24 @ 13:16)  HR: 60 (07-10-24 @ 08:33) (60 - 64)  BP: 115/68 (07-10-24 @ 08:33) (115/68 - 148/82)  RR: 18 (07-10-24 @ 08:33) (18 - 18)  SpO2: 95% (07-10-24 @ 08:33) (94% - 99%)  Wt(kg): --  I&O's Summary    09 Jul 2024 07:01  -  10 Jul 2024 07:00  --------------------------------------------------------  IN: 960 mL / OUT: 1201 mL / NET: -241 mL        Appearance: Elderly male 	  Cardiovascular: Normal S1 S2,RRR, No JVD, No murmurs  Respiratory: Lungs clear to auscultation b/l 	  Gastrointestinal:  Soft, Non-tender, + BS	  Extremities: Normal range of motion, No clubbing, cyanosis or edema      Home Medications:  Keppra 250 mg oral tablet: 1 tab(s) orally every 12 hours (22 Jun 2024 18:38)  metFORMIN 500 mg oral tablet: 1 tab(s) orally every 12 hours (22 Jun 2024 18:38)  rosuvastatin 40 mg oral capsule: 1 cap(s) orally once a day (22 Jun 2024 18:38)  Topamax 100 mg oral tablet: 1 tab(s) orally every 12 hours (22 Jun 2024 18:37)      MEDICATIONS  (STANDING):  aMIOdarone    Tablet   Oral   aMIOdarone    Tablet 200 milliGRAM(s) Oral daily  atorvastatin 80 milliGRAM(s) Oral at bedtime  chlorhexidine 4% Liquid 1 Application(s) Topical <User Schedule>  dextrose 10% Bolus 125 milliLiter(s) IV Bolus once  dextrose 5%. 1000 milliLiter(s) (100 mL/Hr) IV Continuous <Continuous>  dextrose 5%. 1000 milliLiter(s) (50 mL/Hr) IV Continuous <Continuous>  dextrose 50% Injectable 25 Gram(s) IV Push once  dextrose 50% Injectable 12.5 Gram(s) IV Push once  glucagon  Injectable 1 milliGRAM(s) IntraMuscular once  heparin   Injectable 5000 Unit(s) SubCutaneous every 12 hours  insulin glargine Injectable (LANTUS) 10 Unit(s) SubCutaneous at bedtime  insulin lispro (ADMELOG) corrective regimen sliding scale   SubCutaneous Before meals and at bedtime  insulin lispro Injectable (ADMELOG) 5 Unit(s) SubCutaneous three times a day before meals  lacosamide 50 milliGRAM(s) Oral two times a day  levETIRAcetam 250 milliGRAM(s) Oral two times a day  metoprolol tartrate 50 milliGRAM(s) Oral two times a day  pantoprazole    Tablet 40 milliGRAM(s) Oral every 12 hours  pilocarpine 2% Solution 1 Drop(s) Right EYE two times a day  polyethylene glycol 3350 17 Gram(s) Oral daily  potassium phosphate / sodium phosphate Powder (PHOS-NaK) 1 Packet(s) Oral three times a day  senna 2 Tablet(s) Oral at bedtime  sodium chloride 0.9%. 1000 milliLiter(s) (75 mL/Hr) IV Continuous <Continuous>  topiramate 100 milliGRAM(s) Oral two times a day      TELEMETRY: Sinus bradycardia 	    ECG:  	  RADIOLOGY:   DIAGNOSTIC TESTING:  [ ] Echocardiogram:  [ ]  Catheterization:  [ ] Stress Test:    OTHER: 	    LABS:	 	                            9.7    7.45  )-----------( 374      ( 10 Jul 2024 06:17 )             30.8

## 2024-07-10 NOTE — PROGRESS NOTE ADULT - PROVIDER SPECIALTY LIST ADULT
Cardiology
Infectious Disease
NSICU
NSICU
Neurosurgery
Vascular Cardiology
Electrophysiology
Infectious Disease
Infectious Disease
NSICU
Neurosurgery
Cardiology
Electrophysiology
Electrophysiology
Infectious Disease
NSICU
Neurosurgery
Vascular Cardiology
Cardiology
Electrophysiology
Infectious Disease
Infectious Disease
NSICU
NSICU
Neurosurgery
Cardiology
NSICU
Neurosurgery
Cardiology
Internal Medicine
Cardiology
Internal Medicine
Cardiology
Internal Medicine

## 2024-07-11 ENCOUNTER — RESULT REVIEW (OUTPATIENT)
Age: 79
End: 2024-07-11

## 2024-07-11 PROBLEM — Z00.00 ENCOUNTER FOR PREVENTIVE HEALTH EXAMINATION: Status: ACTIVE | Noted: 2024-07-11

## 2024-07-12 PROBLEM — E78.5 HYPERLIPIDEMIA, UNSPECIFIED: Chronic | Status: ACTIVE | Noted: 2024-06-21

## 2024-07-12 PROBLEM — R56.9 UNSPECIFIED CONVULSIONS: Chronic | Status: ACTIVE | Noted: 2024-06-21

## 2024-07-12 PROBLEM — E11.9 TYPE 2 DIABETES MELLITUS WITHOUT COMPLICATIONS: Chronic | Status: ACTIVE | Noted: 2024-06-21

## 2024-07-12 RX ORDER — ROSUVASTATIN CALCIUM 20 MG/1
1 TABLET ORAL
Refills: 0 | DISCHARGE

## 2024-07-12 RX ORDER — LEVETIRACETAM 100 MG/ML
1 INJECTION INTRAVENOUS
Refills: 0 | DISCHARGE

## 2024-07-12 RX ORDER — METFORMIN HYDROCHLORIDE 850 MG/1
1 TABLET, FILM COATED ORAL
Refills: 0 | DISCHARGE

## 2024-07-12 RX ORDER — TOPIRAMATE 50 MG/1
1 TABLET, FILM COATED ORAL
Refills: 0 | DISCHARGE

## 2024-07-18 ENCOUNTER — RESULT REVIEW (OUTPATIENT)
Age: 79
End: 2024-07-18

## 2024-07-26 ENCOUNTER — RESULT REVIEW (OUTPATIENT)
Age: 79
End: 2024-07-26

## 2024-07-26 DIAGNOSIS — Z86.718 PERSONAL HISTORY OF OTHER VENOUS THROMBOSIS AND EMBOLISM: ICD-10-CM

## 2024-07-30 PROBLEM — I26.99 OTHER PULMONARY EMBOLISM WITHOUT ACUTE COR PULMONALE: Chronic | Status: ACTIVE | Noted: 2024-06-22

## 2024-07-30 PROBLEM — D49.6 NEOPLASM OF UNSPECIFIED BEHAVIOR OF BRAIN: Chronic | Status: ACTIVE | Noted: 2024-06-22

## 2024-07-30 PROBLEM — R56.9 UNSPECIFIED CONVULSIONS: Chronic | Status: ACTIVE | Noted: 2024-06-22

## 2024-07-30 PROBLEM — E11.9 TYPE 2 DIABETES MELLITUS WITHOUT COMPLICATIONS: Chronic | Status: ACTIVE | Noted: 2024-06-22

## 2024-07-30 PROBLEM — I10 ESSENTIAL (PRIMARY) HYPERTENSION: Chronic | Status: ACTIVE | Noted: 2024-06-22

## 2024-08-02 DIAGNOSIS — Z00.00 ENCOUNTER FOR GENERAL ADULT MEDICAL EXAMINATION W/OUT ABNORMAL FINDINGS: ICD-10-CM

## 2024-08-05 ENCOUNTER — OUTPATIENT (OUTPATIENT)
Dept: OUTPATIENT SERVICES | Facility: HOSPITAL | Age: 79
LOS: 1 days | End: 2024-08-05
Payer: MEDICARE

## 2024-08-05 ENCOUNTER — APPOINTMENT (OUTPATIENT)
Dept: ULTRASOUND IMAGING | Facility: HOSPITAL | Age: 79
End: 2024-08-05

## 2024-08-05 DIAGNOSIS — Z86.718 PERSONAL HISTORY OF OTHER VENOUS THROMBOSIS AND EMBOLISM: ICD-10-CM

## 2024-08-05 DIAGNOSIS — Z00.00 ENCOUNTER FOR GENERAL ADULT MEDICAL EXAMINATION WITHOUT ABNORMAL FINDINGS: ICD-10-CM

## 2024-08-05 PROCEDURE — 93970 EXTREMITY STUDY: CPT | Mod: 26

## 2024-08-05 PROCEDURE — 93970 EXTREMITY STUDY: CPT

## 2024-08-06 LAB
ALBUMIN SERPL ELPH-MCNC: 4.1 G/DL
ALP BLD-CCNC: 82 U/L
ALT SERPL-CCNC: 27 U/L
ANION GAP SERPL CALC-SCNC: 14 MMOL/L
AST SERPL-CCNC: 22 U/L
BILIRUB SERPL-MCNC: 0.3 MG/DL
BUN SERPL-MCNC: 19 MG/DL
CALCIUM SERPL-MCNC: 9.2 MG/DL
CHLORIDE SERPL-SCNC: 103 MMOL/L
CO2 SERPL-SCNC: 21 MMOL/L
CREAT SERPL-MCNC: 1.53 MG/DL
EGFR: 46 ML/MIN/1.73M2
GLUCOSE SERPL-MCNC: 80 MG/DL
HCT VFR BLD CALC: 34.9 %
HGB BLD-MCNC: 10.7 G/DL
MCHC RBC-ENTMCNC: 30.7 GM/DL
MCHC RBC-ENTMCNC: 31.6 PG
MCV RBC AUTO: 102.9 FL
PLATELET # BLD AUTO: 285 K/UL
POTASSIUM SERPL-SCNC: 4.1 MMOL/L
PROT SERPL-MCNC: 7.1 G/DL
RBC # BLD: 3.39 M/UL
RBC # FLD: 15.9 %
SODIUM SERPL-SCNC: 138 MMOL/L
WBC # FLD AUTO: 5.4 K/UL

## 2024-08-08 ENCOUNTER — APPOINTMENT (OUTPATIENT)
Dept: NEUROSURGERY | Facility: CLINIC | Age: 79
End: 2024-08-08

## 2024-08-08 ENCOUNTER — NON-APPOINTMENT (OUTPATIENT)
Age: 79
End: 2024-08-08

## 2024-08-08 PROBLEM — Z98.890 S/P CRANIOTOMY: Status: ACTIVE | Noted: 2024-08-08

## 2024-08-08 PROBLEM — Z86.39 HISTORY OF HYPERLIPIDEMIA: Status: RESOLVED | Noted: 2024-08-08 | Resolved: 2024-08-08

## 2024-08-08 PROBLEM — Z86.69 HISTORY OF GLAUCOMA: Status: RESOLVED | Noted: 2024-08-08 | Resolved: 2024-08-08

## 2024-08-08 PROBLEM — Z86.39 HISTORY OF DIABETES MELLITUS: Status: RESOLVED | Noted: 2024-08-08 | Resolved: 2024-08-08

## 2024-08-08 PROBLEM — Z78.9 CURRENT NON-SMOKER: Status: ACTIVE | Noted: 2024-08-08

## 2024-08-08 PROBLEM — Z82.49 FAMILY HISTORY OF HYPERTENSION: Status: ACTIVE | Noted: 2024-08-08

## 2024-08-08 PROBLEM — Z87.898 HISTORY OF SEIZURE: Status: RESOLVED | Noted: 2024-08-08 | Resolved: 2024-08-08

## 2024-08-08 PROBLEM — S06.5XAA SDH (SUBDURAL HEMATOMA): Status: ACTIVE | Noted: 2024-08-08

## 2024-08-08 PROCEDURE — 99214 OFFICE O/P EST MOD 30 MIN: CPT

## 2024-08-14 NOTE — HISTORY OF PRESENT ILLNESS
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  6/22/24 s/p Right craniotomy for subdural hematoma  6/24/24 s/p angio: right MMA embolization  NEURO:  - Continue neuro checks q 4  - Surgical staples removed on 7/6/24, incision healing well C/D/I  - 7/5 CTH: right hemispheric subdural hematoma, improving midline shift, stable  post-surgical changes  - Continue Vimpat, Topamax and Keppra for history of seizures  - Pain control w/ Tylenol prn  - PT/OT: BRENDON  PULM:  - On room air, O2Sat>94%  - Incentive spirometry  - 6/25 CTA Chest: no PE, atelectasis seen  CV:  - SBP , within goal  - Cardiology and EP following for afib/aflutter  - Continue Metoprolol for rate control  - Per EP - patient finished Amiodarone 400mg BID from 6/27-7/4, currently now  on Amiodarone 200mg daily. Consideration for Watchman procedure / elective  ablation or JOHNATHAN occlusion when he can tolerate AC after  - 6/27 TTE: EF 55-60%, mild regional hypokinesis seen at the inferior and  inderolateral basal to mid sigments, normal right vent cavity size and normal  right vent systolic fxn, no pericardial effusion  - Continue Lipitor for HLD  ENDO:  - A1c 6.4, continue ISS, Admelog, Lantus and CCD, continue to monitor  fingersticks - between 120-150s  - 6/25 Thyroid panel WNL  HEME/ONC:     7/10 CBC: post-op anemia stable     DVT ppx: SQH BID, no SCDs, 6/30 Le Dopp: new b/l calf vein DVT. Vascular  following, follow up Le Dopp: persistent right peroneal and soleal DVT and  persistent left soleal DVT. Spoke with Vasc 7/8, continue repeat Dopplers in  5-7 days (7/12) and continue SQH BID.  RENAL:  - IVL  - 7/7 BMP stable, had hyponatremia and MIKAELA that has since resolved  - Has KPhos supplementation  - Voiding via condom cath ID:  - Afebrile  - 6/29 Last fever Tmax 101.3 - BCX NGTD, COVID/RSV/Influenza negative, sputum  cx: normal respiratory eder  - ID following -> CXR: bibasilar opacities rt>lt, had cough previously,  Meropenem finished 7/3  - Down trending procal 1.31 (2.06)  GI:  - Speech and Swallow following, s/p MBS 7/1 - advanced to easy to chew diet  - Senna and Miralax for bowel regimen, last BM 7/10  - Monitor LFTs in the setting of Amiodarone, 7/7 LFTs stable  MISC:  - Patient on Maxitrol eye drops, I spoke with his Ophthalmologist [facility] Dr. Lee [phone] on 7/8, for blepharitis, patient does not need this  medication any further unless reoccurrence in which case he can restart the  medication at rehab. Per wife patient has prescription at home that she can  bring to rehab should he need. Patient currently does not have any sign or  symptoms of blepharitis.  - Continue Pilocarpine eye drops for glaucoma  OUT PT Follow up Today Mr. Anderson present ambulatory with walker, believes that he improved overall. Denies headache, speech impairment, vision problems or seizures. Pt orientedx4 with good strength in arms and legs. Pt was discharged from [facility] rehab a week ago. They have a f/u with Dr. Palacio coming up. There was a LE Doppler done recently for the upcoming appointment. Pt on SQ heparin at home. No home therapy yet , pt doing some exercises at home.

## 2024-08-14 NOTE — HISTORY OF PRESENT ILLNESS
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  6/22/24 s/p Right craniotomy for subdural hematoma  6/24/24 s/p angio: right MMA embolization  NEURO:  - Continue neuro checks q 4  - Surgical staples removed on 7/6/24, incision healing well C/D/I  - 7/5 CTH: right hemispheric subdural hematoma, improving midline shift, stable  post-surgical changes  - Continue Vimpat, Topamax and Keppra for history of seizures  - Pain control w/ Tylenol prn  - PT/OT: BRENDON  PULM:  - On room air, O2Sat>94%  - Incentive spirometry  - 6/25 CTA Chest: no PE, atelectasis seen  CV:  - SBP , within goal  - Cardiology and EP following for afib/aflutter  - Continue Metoprolol for rate control  - Per EP - patient finished Amiodarone 400mg BID from 6/27-7/4, currently now  on Amiodarone 200mg daily. Consideration for Watchman procedure / elective  ablation or JOHNATHAN occlusion when he can tolerate AC after  - 6/27 TTE: EF 55-60%, mild regional hypokinesis seen at the inferior and  inderolateral basal to mid sigments, normal right vent cavity size and normal  right vent systolic fxn, no pericardial effusion  - Continue Lipitor for HLD  ENDO:  - A1c 6.4, continue ISS, Admelog, Lantus and CCD, continue to monitor  fingersticks - between 120-150s  - 6/25 Thyroid panel WNL  HEME/ONC:     7/10 CBC: post-op anemia stable     DVT ppx: SQH BID, no SCDs, 6/30 Le Dopp: new b/l calf vein DVT. Vascular  following, follow up Le Dopp: persistent right peroneal and soleal DVT and  persistent left soleal DVT. Spoke with Vasc 7/8, continue repeat Dopplers in  5-7 days (7/12) and continue SQH BID.  RENAL:  - IVL  - 7/7 BMP stable, had hyponatremia and MIKAELA that has since resolved  - Has KPhos supplementation  - Voiding via condom cath ID:  - Afebrile  - 6/29 Last fever Tmax 101.3 - BCX NGTD, COVID/RSV/Influenza negative, sputum  cx: normal respiratory eder  - ID following -> CXR: bibasilar opacities rt>lt, had cough previously,  Meropenem finished 7/3  - Down trending procal 1.31 (2.06)  GI:  - Speech and Swallow following, s/p MBS 7/1 - advanced to easy to chew diet  - Senna and Miralax for bowel regimen, last BM 7/10  - Monitor LFTs in the setting of Amiodarone, 7/7 LFTs stable  MISC:  - Patient on Maxitrol eye drops, I spoke with his Ophthalmologist [facility] Dr. Lee [phone] on 7/8, for blepharitis, patient does not need this  medication any further unless reoccurrence in which case he can restart the  medication at rehab. Per wife patient has prescription at home that she can  bring to rehab should he need. Patient currently does not have any sign or  symptoms of blepharitis.  - Continue Pilocarpine eye drops for glaucoma  OUT PT Follow up Today Mr. Anderson present ambulatory with walker, believes that he improved overall. Denies headache, speech impairment, vision problems or seizures. Pt orientedx4 with good strength in arms and legs. Pt was discharged from [facility] rehab a week ago. They have a f/u with Dr. Palacio coming up. There was a LE Doppler done recently for the upcoming appointment. Pt on SQ heparin at home. No home therapy yet , pt doing some exercises at home.

## 2024-08-14 NOTE — HISTORY OF PRESENT ILLNESS
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  6/22/24 s/p Right craniotomy for subdural hematoma  6/24/24 s/p angio: right MMA embolization  NEURO:  - Continue neuro checks q 4  - Surgical staples removed on 7/6/24, incision healing well C/D/I  - 7/5 CTH: right hemispheric subdural hematoma, improving midline shift, stable  post-surgical changes  - Continue Vimpat, Topamax and Keppra for history of seizures  - Pain control w/ Tylenol prn  - PT/OT: BRENDON  PULM:  - On room air, O2Sat>94%  - Incentive spirometry  - 6/25 CTA Chest: no PE, atelectasis seen  CV:  - SBP , within goal  - Cardiology and EP following for afib/aflutter  - Continue Metoprolol for rate control  - Per EP - patient finished Amiodarone 400mg BID from 6/27-7/4, currently now  on Amiodarone 200mg daily. Consideration for Watchman procedure / elective  ablation or JOHNATHAN occlusion when he can tolerate AC after  - 6/27 TTE: EF 55-60%, mild regional hypokinesis seen at the inferior and  inderolateral basal to mid sigments, normal right vent cavity size and normal  right vent systolic fxn, no pericardial effusion  - Continue Lipitor for HLD  ENDO:  - A1c 6.4, continue ISS, Admelog, Lantus and CCD, continue to monitor  fingersticks - between 120-150s  - 6/25 Thyroid panel WNL  HEME/ONC:     7/10 CBC: post-op anemia stable     DVT ppx: SQH BID, no SCDs, 6/30 Le Dopp: new b/l calf vein DVT. Vascular  following, follow up Le Dopp: persistent right peroneal and soleal DVT and  persistent left soleal DVT. Spoke with Vasc 7/8, continue repeat Dopplers in  5-7 days (7/12) and continue SQH BID.  RENAL:  - IVL  - 7/7 BMP stable, had hyponatremia and MIKAELA that has since resolved  - Has KPhos supplementation  - Voiding via condom cath ID:  - Afebrile  - 6/29 Last fever Tmax 101.3 - BCX NGTD, COVID/RSV/Influenza negative, sputum  cx: normal respiratory eder  - ID following -> CXR: bibasilar opacities rt>lt, had cough previously,  Meropenem finished 7/3  - Down trending procal 1.31 (2.06)  GI:  - Speech and Swallow following, s/p MBS 7/1 - advanced to easy to chew diet  - Senna and Miralax for bowel regimen, last BM 7/10  - Monitor LFTs in the setting of Amiodarone, 7/7 LFTs stable  MISC:  - Patient on Maxitrol eye drops, I spoke with his Ophthalmologist [facility] Dr. Lee [phone] on 7/8, for blepharitis, patient does not need this  medication any further unless reoccurrence in which case he can restart the  medication at rehab. Per wife patient has prescription at home that she can  bring to rehab should he need. Patient currently does not have any sign or  symptoms of blepharitis.  - Continue Pilocarpine eye drops for glaucoma  OUT PT Follow up Today Mr. Anderson present ambulatory with walker, believes that he improved overall. Denies headache, speech impairment, vision problems or seizures. Pt orientedx4 with good strength in arms and legs. Pt was [facility]. They have a f/u with Dr. Palacio coming up. There was a LE Doppler done recently for the upcoming appointment. Pt on SQ heparin at home. No home therapy yet , pt doing some exercises at home.

## 2024-08-14 NOTE — ASSESSMENT
[FreeTextEntry1] : IMPRESSION:  79M s/p fall with CTH w/ 1.9cm R mixed density convexity SDH, and smaller L convexity and interhemispheric SDHs. he is s/p craniotomy on 6/22/24 and Right MMA on 6/24/24 for SDH. He recovered in [place] and now recovering at home. Pt is walking with walker and has not started therapy yet after discharge.  Plan:  CT Head w/o contrast   PT for gait/balance and for strengthening 2-3 times/week x 8 weeks.  F/U on 9/4/24 with Dr. Goodman after the CT head

## 2024-08-14 NOTE — PHYSICAL EXAM
[Oriented To Time, Place, And Person] : oriented to person, place, and time [Affect] : the affect was normal [Memory Recent] : recent memory was not impaired [Person] : oriented to person [Place] : oriented to place [Time] : oriented to time [Short Term Intact] : short term memory intact [Cranial Nerves Optic (II)] : visual acuity intact bilaterally,  pupils equal round and reactive to light [Cranial Nerves Oculomotor (III)] : extraocular motion intact [Cranial Nerves Trigeminal (V)] : facial sensation intact symmetrically [Cranial Nerves Facial (VII)] : face symmetrical [Cranial Nerves Vestibulocochlear (VIII)] : hearing was intact bilaterally [Cranial Nerves Accessory (XI - Cranial And Spinal)] : head turning and shoulder shrug symmetric [Cranial Nerves Hypoglossal (XII)] : there was no tongue deviation with protrusion [Motor Tone] : muscle tone was normal in all four extremities [Motor Strength] : muscle strength was normal in all four extremities [Sclera] : the sclera and conjunctiva were normal [PERRL With Normal Accommodation] : pupils were equal in size, round, reactive to light, with normal accommodation [Outer Ear] : the ears and nose were normal in appearance [Both Tympanic Membranes Were Examined] : both tympanic membranes were normal [Neck Appearance] : the appearance of the neck was normal [] : no respiratory distress [Respiration, Rhythm And Depth] : normal respiratory rhythm and effort [General Appearance - Alert] : alert [General Appearance - In No Acute Distress] : in no acute distress [General Appearance - Well Nourished] : well nourished [General Appearance - Well-Appearing] : healthy appearing [Apical Impulse] : the apical impulse was normal [Heart Rate And Rhythm] : heart rate was normal and rhythm regular [Abnormal Walk] : normal gait [Involuntary Movements] : no involuntary movements were seen [Skin Color & Pigmentation] : normal skin color and pigmentation [FreeTextEntry8] : no drift, mild imbalance at tandem

## 2024-08-26 ENCOUNTER — OUTPATIENT (OUTPATIENT)
Dept: OUTPATIENT SERVICES | Facility: HOSPITAL | Age: 79
LOS: 1 days | End: 2024-08-26
Payer: MEDICARE

## 2024-08-26 ENCOUNTER — APPOINTMENT (OUTPATIENT)
Dept: CT IMAGING | Facility: IMAGING CENTER | Age: 79
End: 2024-08-26
Payer: MEDICARE

## 2024-08-26 DIAGNOSIS — S06.5XAA TRAUMATIC SUBDURAL HEMORRHAGE WITH LOSS OF CONSCIOUSNESS STATUS UNKNOWN, INITIAL ENCOUNTER: ICD-10-CM

## 2024-08-26 DIAGNOSIS — Z98.890 OTHER SPECIFIED POSTPROCEDURAL STATES: ICD-10-CM

## 2024-08-26 PROCEDURE — 70450 CT HEAD/BRAIN W/O DYE: CPT

## 2024-08-26 PROCEDURE — 70450 CT HEAD/BRAIN W/O DYE: CPT | Mod: 26

## 2024-08-27 RX ORDER — LEVETIRACETAM 250 MG/1
250 TABLET, FILM COATED ORAL TWICE DAILY
Qty: 60 | Refills: 6 | Status: ACTIVE | COMMUNITY
Start: 2024-08-27 | End: 1900-01-01

## 2024-08-27 RX ORDER — LACOSAMIDE 50 MG/1
50 TABLET ORAL TWICE DAILY
Qty: 60 | Refills: 0 | Status: ACTIVE | COMMUNITY
Start: 2024-08-27 | End: 1900-01-01

## 2024-08-27 RX ORDER — TOPIRAMATE 100 MG/1
100 TABLET, FILM COATED ORAL TWICE DAILY
Qty: 60 | Refills: 0 | Status: ACTIVE | COMMUNITY
Start: 2024-08-27 | End: 1900-01-01

## 2024-08-28 ENCOUNTER — APPOINTMENT (OUTPATIENT)
Dept: CARDIOLOGY | Facility: CLINIC | Age: 79
End: 2024-08-28
Payer: MEDICARE

## 2024-08-28 ENCOUNTER — APPOINTMENT (OUTPATIENT)
Dept: ELECTROPHYSIOLOGY | Facility: CLINIC | Age: 79
End: 2024-08-28

## 2024-08-28 ENCOUNTER — NON-APPOINTMENT (OUTPATIENT)
Age: 79
End: 2024-08-28

## 2024-08-28 VITALS
WEIGHT: 165 LBS | HEIGHT: 70 IN | SYSTOLIC BLOOD PRESSURE: 129 MMHG | DIASTOLIC BLOOD PRESSURE: 74 MMHG | OXYGEN SATURATION: 99 % | HEART RATE: 69 BPM | BODY MASS INDEX: 23.62 KG/M2

## 2024-08-28 DIAGNOSIS — Z86.39 PERSONAL HISTORY OF OTHER ENDOCRINE, NUTRITIONAL AND METABOLIC DISEASE: ICD-10-CM

## 2024-08-28 DIAGNOSIS — Z86.718 PERSONAL HISTORY OF OTHER VENOUS THROMBOSIS AND EMBOLISM: ICD-10-CM

## 2024-08-28 DIAGNOSIS — Z00.00 ENCOUNTER FOR GENERAL ADULT MEDICAL EXAMINATION W/OUT ABNORMAL FINDINGS: ICD-10-CM

## 2024-08-28 DIAGNOSIS — I48.0 PAROXYSMAL ATRIAL FIBRILLATION: ICD-10-CM

## 2024-08-28 PROCEDURE — G2211 COMPLEX E/M VISIT ADD ON: CPT

## 2024-08-28 PROCEDURE — 99214 OFFICE O/P EST MOD 30 MIN: CPT

## 2024-08-28 PROCEDURE — 99204 OFFICE O/P NEW MOD 45 MIN: CPT

## 2024-08-28 NOTE — ASSESSMENT
[FreeTextEntry1] : Assessment: 1. Bilateral Below the knee DVT 6/2024     CTA chest showed no PE     TTE with normal RV function  2. SDH    S/p R craniotomy for evacuation of SDH on 6/22. S/p R MMA embolization on 6/24 3. DM 4. HLD  5. History of Falls 6. History of Afib.  Plan: 1. History of DVT. Recent LE venous duplex stable and without propagation. Repeat LE venous duplex prior to next visit. Patient will see Dr. Goodman on 9/4. After this visit, will discuss if can transition to low dose Eliquis 2.5 mg if safe from a neurosurgical perspective.  Continue Heparin Subcutaneous 5000 units BID at this time.  2. History of Afib. Currently in SR in office on EKG. Zio patch for 1 week. In the future follow-up with Dr. Gutierrez after the next visit we will arrange. Continue Amiodarone and Metoprolol at this time. 3: HLD Continue Crestor, 4. Health Maintenance:      Labs today 5. Follow-up in 6 weeks. Call with any questions. Office will call with test results.     I, Dr. Jevon Palacio, personally performed the evaluation and management (E/M) services for this established patient who presents today.  That E/M includes conducting the examination, assessing all new/exacerbated conditions, and establishing a new plan of care.  Today, my ACP, Johan Lantigua, was here to observe my evaluation and management services for this new problem/exacerbated condition to be followed going forward.

## 2024-08-28 NOTE — REASON FOR VISIT
[Initial Evaluation] : an initial evaluation of [FreeTextEntry2] : VTE [FreeTextEntry1] : 8/28/2024  Recent hospital admission 6/22 - 7/10/2024   78 y/o male with PMHX SOC for benign ependymoma, seizures, DM, HLD, ASA non-compliant, frequent galls, who initially presented to  for AMS, found with acute on chronic R SDH, L SDH, parafalcine acute SDH, s/p R craniotomy for evacuation of SDH on 6/22. S/p R MMA embolization on 6/24. Noted to have Afib / Aflutter while in [facility]SCU. LE venous duplex on 6/30 showed R soleal and peroneal DVT, L soleal DVT. TTE on 6/27 showed normal RV size and function. CTA chest on 6/25 without PE noted. Patient denies C/P or SOB. No LE pain or edema. Vascular Cardiology consulted as inpatient.   Was in rehab post hospital, home for the past 3 weeks. Remains on Heparin Sub. Cut. 5000 units BID. No C/P or SOB. No LE pain or edema.   TTE 6/27/2024:  1. The endocardium is not well seen. In the setting of atrial fibrillation, there is mild regional hypokinesis seen at the inferior and inderolateral basal to mid segments. The over all EF is preserved 55-60%.  2. The right ventricle is not well visualized. Normal right ventricular cavity size and probably normal right ventricular systolic function.  3. No pericardial effusion seen.  LE venous duplex 8/5/2024: No significant interval change. Persistent bilateral calf DVT. No evidence of interval clot propagation.  Medication Amiodarone 200 mg daily Crestor 40 mg daily Hep Sub. Cut. 5000 units BID Keppra Metformin Metoprolol 50 mg BID Protonix Potassium / Sodium Phosphate TID Topiramate  Vimpat

## 2024-08-28 NOTE — PHYSICAL EXAM
[Normal Oral Mucosa] : normal oral mucosa [Heart Rate And Rhythm] : heart rate and rhythm were normal [Heart Sounds] : normal S1 and S2 [Respiration, Rhythm And Depth] : normal respiratory rhythm and effort [Exaggerated Use Of Accessory Muscles For Inspiration] : no accessory muscle use [Auscultation Breath Sounds / Voice Sounds] : lungs were clear to auscultation bilaterally [Abdomen Soft] : soft [Abdomen Tenderness] : non-tender [Abdomen Mass (___ Cm)] : no abdominal mass palpated [Abnormal Walk] : normal gait [] : no ischemic changes [No Skin Ulcers] : no skin ulcer [Oriented To Time, Place, And Person] : oriented to person, place, and time [FreeTextEntry1] : No LE edema

## 2024-08-29 LAB
ALBUMIN SERPL ELPH-MCNC: 4.4 G/DL
ALP BLD-CCNC: 72 U/L
ALT SERPL-CCNC: 24 U/L
ANION GAP SERPL CALC-SCNC: 12 MMOL/L
AST SERPL-CCNC: 15 U/L
BILIRUB SERPL-MCNC: 0.2 MG/DL
BUN SERPL-MCNC: 18 MG/DL
CALCIUM SERPL-MCNC: 9.7 MG/DL
CHLORIDE SERPL-SCNC: 105 MMOL/L
CHOLEST SERPL-MCNC: 162 MG/DL
CO2 SERPL-SCNC: 23 MMOL/L
CREAT SERPL-MCNC: 1.31 MG/DL
EGFR: 55 ML/MIN/1.73M2
ESTIMATED AVERAGE GLUCOSE: 114 MG/DL
GLUCOSE SERPL-MCNC: 97 MG/DL
HBA1C MFR BLD HPLC: 5.6 %
HCT VFR BLD CALC: 38.7 %
HDLC SERPL-MCNC: 73 MG/DL
HGB BLD-MCNC: 11.7 G/DL
LDLC SERPL CALC-MCNC: 64 MG/DL
MCHC RBC-ENTMCNC: 30.2 GM/DL
MCHC RBC-ENTMCNC: 31.4 PG
MCV RBC AUTO: 103.8 FL
NONHDLC SERPL-MCNC: 89 MG/DL
PLATELET # BLD AUTO: 178 K/UL
POTASSIUM SERPL-SCNC: 4.2 MMOL/L
PROT SERPL-MCNC: 7.4 G/DL
RBC # BLD: 3.73 M/UL
RBC # FLD: 14.6 %
SODIUM SERPL-SCNC: 140 MMOL/L
TRIGL SERPL-MCNC: 153 MG/DL
TSH SERPL-ACNC: 1.01 UIU/ML
WBC # FLD AUTO: 4.74 K/UL

## 2024-08-30 ENCOUNTER — APPOINTMENT (OUTPATIENT)
Dept: ELECTROPHYSIOLOGY | Facility: CLINIC | Age: 79
End: 2024-08-30

## 2024-09-03 ENCOUNTER — NON-APPOINTMENT (OUTPATIENT)
Age: 79
End: 2024-09-03

## 2024-09-04 ENCOUNTER — APPOINTMENT (OUTPATIENT)
Dept: NEUROSURGERY | Facility: CLINIC | Age: 79
End: 2024-09-04

## 2024-09-04 VITALS
HEART RATE: 55 BPM | OXYGEN SATURATION: 98 % | TEMPERATURE: 98.4 F | WEIGHT: 165 LBS | SYSTOLIC BLOOD PRESSURE: 115 MMHG | DIASTOLIC BLOOD PRESSURE: 69 MMHG | HEIGHT: 70 IN | BODY MASS INDEX: 23.62 KG/M2

## 2024-09-04 DIAGNOSIS — R56.9 UNSPECIFIED CONVULSIONS: ICD-10-CM

## 2024-09-04 PROCEDURE — 99024 POSTOP FOLLOW-UP VISIT: CPT

## 2024-09-09 NOTE — HISTORY OF PRESENT ILLNESS
[FreeTextEntry1] : 79M s/p fall with CTH w/ 1.9cm R mixed density convexity SDH, and smaller L convexity and interhemispheric SDHs. he is s/p craniotomy on 6/22/24 and Right MMA on 6/24/24 for SDH. He recovered in [place] and now recovering at home. Pt was seen for POA on 8/5. Pt oriented x4 with good strength in arms and legs. Pt now recovering at home with PT out pt.  He comes to the office today 2.5 months after surgery reporting that she feels 95 percent improvement in his symptoms since the surgery. His surgical wound healed well. Recent LE Doppler showed continued DVT, seen by Dr. Palacio recently who would like to know if pt can be on Eliquis. Pt on SQ heparin at home.  pt doing PT out pt and have OT evaluation today.  CT head repeated with left SDH resolved with right side significantly smaller SDH.

## 2024-09-09 NOTE — PHYSICAL EXAM
[General Appearance - Alert] : alert [General Appearance - In No Acute Distress] : in no acute distress [General Appearance - Well-Appearing] : healthy appearing [General Appearance - Well Nourished] : well nourished [Oriented To Time, Place, And Person] : oriented to person, place, and time [Affect] : the affect was normal [Memory Recent] : recent memory was not impaired [Person] : oriented to person [Place] : oriented to place [Time] : oriented to time [Short Term Intact] : short term memory intact [Cranial Nerves Optic (II)] : visual acuity intact bilaterally,  pupils equal round and reactive to light [Cranial Nerves Oculomotor (III)] : extraocular motion intact [Cranial Nerves Trigeminal (V)] : facial sensation intact symmetrically [Cranial Nerves Facial (VII)] : face symmetrical [Cranial Nerves Vestibulocochlear (VIII)] : hearing was intact bilaterally [Cranial Nerves Accessory (XI - Cranial And Spinal)] : head turning and shoulder shrug symmetric [Cranial Nerves Hypoglossal (XII)] : there was no tongue deviation with protrusion [Motor Tone] : muscle tone was normal in all four extremities [Motor Strength] : muscle strength was normal in all four extremities [Sclera] : the sclera and conjunctiva were normal [PERRL With Normal Accommodation] : pupils were equal in size, round, reactive to light, with normal accommodation [Outer Ear] : the ears and nose were normal in appearance [Both Tympanic Membranes Were Examined] : both tympanic membranes were normal [Neck Appearance] : the appearance of the neck was normal [] : no respiratory distress [Respiration, Rhythm And Depth] : normal respiratory rhythm and effort [Apical Impulse] : the apical impulse was normal [Heart Rate And Rhythm] : heart rate was normal and rhythm regular [Abnormal Walk] : normal gait [Involuntary Movements] : no involuntary movements were seen [Skin Color & Pigmentation] : normal skin color and pigmentation [FreeTextEntry8] : no drift, mild imbalance at tandem

## 2024-09-09 NOTE — ASSESSMENT
[FreeTextEntry1] : IMPRESSION 79M s/p fall with CTH w/ 1.9cm R mixed density convexity SDH, and smaller L convexity and interhemispheric SDHs. he is s/p craniotomy on 6/22/24 and Right MMA on 6/24/24 for SDH. He recovered in [place] and now recovering at home. Pt was seen for POA on 8/5. Pt oriented x4 with good strength in arms and legs. Pt now recovering at home with PT out pt. Recent  LE Doppler showed continued DVT, seen by Dr. Palacio recently who would like to know if pt can be on Eliquis. Pt on SQ heparin at home.  pt doing PT out pt and have OT evaluation today.  CT head repeated with left SDH resolved with right side significantly smaller SDH.   PLAN:  Will continue SQ Heparin for now, pt is not cleared for Eliquis yet Continue PT/OT as outpt as planned MRI Head w/o contrast in 1 month Referred to neurology for Seizure med management.  F/U in 1 month after imaging

## 2024-09-09 NOTE — PHYSICAL EXAM
[General Appearance - Alert] : alert [General Appearance - In No Acute Distress] : in no acute distress [General Appearance - Well Nourished] : well nourished [General Appearance - Well-Appearing] : healthy appearing [Oriented To Time, Place, And Person] : oriented to person, place, and time [Affect] : the affect was normal [Memory Recent] : recent memory was not impaired [Person] : oriented to person [Place] : oriented to place [Time] : oriented to time [Short Term Intact] : short term memory intact [Cranial Nerves Optic (II)] : visual acuity intact bilaterally,  pupils equal round and reactive to light [Cranial Nerves Oculomotor (III)] : extraocular motion intact [Cranial Nerves Trigeminal (V)] : facial sensation intact symmetrically [Cranial Nerves Facial (VII)] : face symmetrical [Cranial Nerves Vestibulocochlear (VIII)] : hearing was intact bilaterally [Cranial Nerves Accessory (XI - Cranial And Spinal)] : head turning and shoulder shrug symmetric [Cranial Nerves Hypoglossal (XII)] : there was no tongue deviation with protrusion [Motor Tone] : muscle tone was normal in all four extremities [Motor Strength] : muscle strength was normal in all four extremities [Sclera] : the sclera and conjunctiva were normal [PERRL With Normal Accommodation] : pupils were equal in size, round, reactive to light, with normal accommodation [Outer Ear] : the ears and nose were normal in appearance [Both Tympanic Membranes Were Examined] : both tympanic membranes were normal [Neck Appearance] : the appearance of the neck was normal [] : no respiratory distress [Respiration, Rhythm And Depth] : normal respiratory rhythm and effort [Apical Impulse] : the apical impulse was normal [Heart Rate And Rhythm] : heart rate was normal and rhythm regular [Abnormal Walk] : normal gait [Involuntary Movements] : no involuntary movements were seen [Skin Color & Pigmentation] : normal skin color and pigmentation [FreeTextEntry8] : no drift, mild imbalance at tandem

## 2024-09-12 PROCEDURE — 93244 EXT ECG>48HR<7D REV&INTERPJ: CPT

## 2024-09-20 ENCOUNTER — NON-APPOINTMENT (OUTPATIENT)
Age: 79
End: 2024-09-20

## 2024-09-20 ENCOUNTER — RX RENEWAL (OUTPATIENT)
Age: 79
End: 2024-09-20

## 2024-09-22 PROBLEM — I62.03 SUBDURAL HEMATOMA, CHRONIC: Status: ACTIVE | Noted: 2024-09-22

## 2024-09-26 NOTE — PROGRESS NOTE ADULT - ASSESSMENT
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     PLAN     N  # R acute on chronic SDH, L acute SDH, R parafalcine acute SDH with mass effect from the R with global atrophy   6/24 R MMAE at some point per NS   subdural drain per NS to pull today   #seizures  keppra 250bid   topiramate 100mg bid  vimpat 50mg bid  #Pain: Tylenol and oxycodone  #Activity: [] OOB as tolerated [] Bedrest [x] PT [x] OT [] PMNR    CV   #tachycardia, afib/flutter, 4 to 1 flutter, with pauses,would need to switch ccb to bb, diltiazem 60mg q6h with amiodarone 400mg bid po  #-160mmHg  #TTE 67%  lasix 20mg daily    P   #NC 5L   CTA chest no PE   #pulm infiltrates    R   #Strict I+Os   #IVF  #MIKAELA contrast induced     GI   #Diet: CCD  #Senna miralax  Last BM rectal tube to remove   #distended abdomen: kub enlarged bowels     ID  afebrile     E  Goal euglycemia (-180)  #A1c 6.4   iss   NPH 2u premeals    H  #DVT ppx:   SCDs  Chemoppx held per NS  LED 6/22 no dvt      #CODE STATUS: FULL CODE     #DISPOSITION: TO FLOOR    #NOT CRITICAL  Droplet+Contact precautions

## 2024-10-01 ENCOUNTER — APPOINTMENT (OUTPATIENT)
Dept: MRI IMAGING | Facility: IMAGING CENTER | Age: 79
End: 2024-10-01

## 2024-10-01 ENCOUNTER — OUTPATIENT (OUTPATIENT)
Dept: OUTPATIENT SERVICES | Facility: HOSPITAL | Age: 79
LOS: 1 days | End: 2024-10-01
Payer: MEDICARE

## 2024-10-01 DIAGNOSIS — Z00.8 ENCOUNTER FOR OTHER GENERAL EXAMINATION: ICD-10-CM

## 2024-10-01 DIAGNOSIS — Z98.890 OTHER SPECIFIED POSTPROCEDURAL STATES: ICD-10-CM

## 2024-10-01 PROCEDURE — 70551 MRI BRAIN STEM W/O DYE: CPT | Mod: 26,76

## 2024-10-01 PROCEDURE — 70551 MRI BRAIN STEM W/O DYE: CPT

## 2024-10-03 RX ORDER — LACOSAMIDE 50 MG/1
50 TABLET ORAL TWICE DAILY
Qty: 60 | Refills: 0 | Status: ACTIVE | COMMUNITY
Start: 2024-10-03 | End: 1900-01-01

## 2024-10-03 RX ORDER — TOPIRAMATE 100 MG/1
100 TABLET, FILM COATED ORAL TWICE DAILY
Qty: 60 | Refills: 2 | Status: ACTIVE | COMMUNITY
Start: 2024-10-03 | End: 1900-01-01

## 2024-10-04 ENCOUNTER — APPOINTMENT (OUTPATIENT)
Dept: NEUROSURGERY | Facility: CLINIC | Age: 79
End: 2024-10-04

## 2024-10-04 VITALS
OXYGEN SATURATION: 95 % | HEIGHT: 70 IN | SYSTOLIC BLOOD PRESSURE: 150 MMHG | BODY MASS INDEX: 23.62 KG/M2 | HEART RATE: 60 BPM | WEIGHT: 165 LBS | DIASTOLIC BLOOD PRESSURE: 76 MMHG

## 2024-10-04 PROCEDURE — 99214 OFFICE O/P EST MOD 30 MIN: CPT

## 2024-10-04 NOTE — REVIEW OF SYSTEMS
Mom aware forms ready for  at [facility] [Joint Pain] : joint pain [Limb Pain] : limb pain [Negative] : Genitourinary

## 2024-10-09 NOTE — HISTORY OF PRESENT ILLNESS
[FreeTextEntry1] : 79M s/p fall with CTH w/ 1.9cm R mixed density convexity SDH, and smaller L convexity and interhemispheric SDHs. he is s/p craniotomy on 6/22/24 and Right MMA on 6/24/24 for SDH. He recovered in [place] and now recovering at home. Pt was seen for POA on 8/5. Pt oriented x4 with good strength in arms and legs. Pt now recovering at home with PT out pt.  He comes to the office today for a regular follow up. He feels well. His surgical wound healed well. Pt seen by Dr. Palacio recently who did an LE Doppler showed continued DVT, he also did a Holter monitor study which was normal.  Pt on SQ heparin BID at home.  Pt doing PT and OT out pt and pt feels well with walking.

## 2024-10-09 NOTE — ASSESSMENT
[FreeTextEntry1] : IMPRESSION  79M s/p fall with CTH w/ 1.9cm R mixed density convexity SDH, and smaller L convexity and interhemispheric SDHs. he is s/p craniotomy on 6/22/24 and Right MMA on 6/24/24 for SDH. He recovered in [place] and now recovering at home. Pt was seen for POA on 8/5. Pt oriented x4 with good strength in arms and legs. Pt now recovering at home without pt therapy. Recent LE Doppler showed continued DVT, seen by Dr. Palacio, pt on SQ heparin at home.  Pt doing PT and OT out pt.  MRI head repeated with smaller left SDH smaller in size.   PLAN:  Will place him on Small dose of Eliquis, likely 2.5 mg and gradually increase to the optimal dose at some point. Will discuss with Dr. Palacio.  Continue out pt PT/OT as planned MRI Head w/o contrast in 1 month Continue follow up with Dr. Palacio.  F/U in 1 month after imaging

## 2024-10-09 NOTE — PHYSICAL EXAM
[General Appearance - Alert] : alert [General Appearance - In No Acute Distress] : in no acute distress [General Appearance - Well Nourished] : well nourished [General Appearance - Well-Appearing] : healthy appearing [Affect] : the affect was normal [Oriented To Time, Place, And Person] : oriented to person, place, and time [Memory Recent] : recent memory was not impaired [Person] : oriented to person [Place] : oriented to place [Time] : oriented to time [Short Term Intact] : short term memory intact [Cranial Nerves Optic (II)] : visual acuity intact bilaterally,  pupils equal round and reactive to light [Cranial Nerves Oculomotor (III)] : extraocular motion intact [Cranial Nerves Trigeminal (V)] : facial sensation intact symmetrically [Cranial Nerves Facial (VII)] : face symmetrical [Cranial Nerves Accessory (XI - Cranial And Spinal)] : head turning and shoulder shrug symmetric [Cranial Nerves Vestibulocochlear (VIII)] : hearing was intact bilaterally [Cranial Nerves Hypoglossal (XII)] : there was no tongue deviation with protrusion [Motor Tone] : muscle tone was normal in all four extremities [Motor Strength] : muscle strength was normal in all four extremities [Sclera] : the sclera and conjunctiva were normal [PERRL With Normal Accommodation] : pupils were equal in size, round, reactive to light, with normal accommodation [Outer Ear] : the ears and nose were normal in appearance [Both Tympanic Membranes Were Examined] : both tympanic membranes were normal [Neck Appearance] : the appearance of the neck was normal [] : no respiratory distress [Respiration, Rhythm And Depth] : normal respiratory rhythm and effort [Apical Impulse] : the apical impulse was normal [Heart Rate And Rhythm] : heart rate was normal and rhythm regular [Abnormal Walk] : normal gait [Involuntary Movements] : no involuntary movements were seen [Skin Color & Pigmentation] : normal skin color and pigmentation [FreeTextEntry8] : no drift, mild imbalance at tandem

## 2024-10-09 NOTE — PHYSICAL EXAM
[General Appearance - Alert] : alert [General Appearance - In No Acute Distress] : in no acute distress [General Appearance - Well Nourished] : well nourished [General Appearance - Well-Appearing] : healthy appearing [Affect] : the affect was normal [Oriented To Time, Place, And Person] : oriented to person, place, and time [Memory Recent] : recent memory was not impaired [Person] : oriented to person [Place] : oriented to place [Time] : oriented to time [Short Term Intact] : short term memory intact [Cranial Nerves Optic (II)] : visual acuity intact bilaterally,  pupils equal round and reactive to light [Cranial Nerves Oculomotor (III)] : extraocular motion intact [Cranial Nerves Trigeminal (V)] : facial sensation intact symmetrically [Cranial Nerves Facial (VII)] : face symmetrical [Cranial Nerves Vestibulocochlear (VIII)] : hearing was intact bilaterally [Cranial Nerves Accessory (XI - Cranial And Spinal)] : head turning and shoulder shrug symmetric [Cranial Nerves Hypoglossal (XII)] : there was no tongue deviation with protrusion [Motor Tone] : muscle tone was normal in all four extremities [Motor Strength] : muscle strength was normal in all four extremities [Sclera] : the sclera and conjunctiva were normal [PERRL With Normal Accommodation] : pupils were equal in size, round, reactive to light, with normal accommodation [Outer Ear] : the ears and nose were normal in appearance [Both Tympanic Membranes Were Examined] : both tympanic membranes were normal [Neck Appearance] : the appearance of the neck was normal [] : no respiratory distress [Respiration, Rhythm And Depth] : normal respiratory rhythm and effort [Apical Impulse] : the apical impulse was normal [Heart Rate And Rhythm] : heart rate was normal and rhythm regular [Abnormal Walk] : normal gait [Involuntary Movements] : no involuntary movements were seen [Skin Color & Pigmentation] : normal skin color and pigmentation [FreeTextEntry8] : no drift, mild imbalance at tandem

## 2024-10-11 ENCOUNTER — APPOINTMENT (OUTPATIENT)
Dept: ELECTROPHYSIOLOGY | Facility: CLINIC | Age: 79
End: 2024-10-11
Payer: MEDICARE

## 2024-10-11 ENCOUNTER — NON-APPOINTMENT (OUTPATIENT)
Age: 79
End: 2024-10-11

## 2024-10-11 ENCOUNTER — OUTPATIENT (OUTPATIENT)
Dept: OUTPATIENT SERVICES | Facility: HOSPITAL | Age: 79
LOS: 1 days | End: 2024-10-11
Payer: MEDICARE

## 2024-10-11 VITALS — DIASTOLIC BLOOD PRESSURE: 75 MMHG | OXYGEN SATURATION: 97 % | HEART RATE: 63 BPM | SYSTOLIC BLOOD PRESSURE: 149 MMHG

## 2024-10-11 DIAGNOSIS — I48.0 PAROXYSMAL ATRIAL FIBRILLATION: ICD-10-CM

## 2024-10-11 DIAGNOSIS — Z86.718 PERSONAL HISTORY OF OTHER VENOUS THROMBOSIS AND EMBOLISM: ICD-10-CM

## 2024-10-11 PROCEDURE — 93970 EXTREMITY STUDY: CPT

## 2024-10-11 PROCEDURE — 93970 EXTREMITY STUDY: CPT | Mod: 26

## 2024-10-11 PROCEDURE — 93000 ELECTROCARDIOGRAM COMPLETE: CPT

## 2024-10-11 PROCEDURE — 99215 OFFICE O/P EST HI 40 MIN: CPT | Mod: 25

## 2024-10-11 PROCEDURE — 99205 OFFICE O/P NEW HI 60 MIN: CPT | Mod: 25

## 2024-10-14 ENCOUNTER — APPOINTMENT (OUTPATIENT)
Dept: ULTRASOUND IMAGING | Facility: IMAGING CENTER | Age: 79
End: 2024-10-14

## 2024-10-23 ENCOUNTER — APPOINTMENT (OUTPATIENT)
Dept: NEUROLOGY | Facility: CLINIC | Age: 79
End: 2024-10-23

## 2024-10-23 ENCOUNTER — APPOINTMENT (OUTPATIENT)
Dept: CARDIOLOGY | Facility: CLINIC | Age: 79
End: 2024-10-23
Payer: MEDICARE

## 2024-10-23 VITALS — WEIGHT: 170 LBS | BODY MASS INDEX: 24.34 KG/M2 | HEIGHT: 70 IN

## 2024-10-23 VITALS
BODY MASS INDEX: 24.34 KG/M2 | HEART RATE: 56 BPM | SYSTOLIC BLOOD PRESSURE: 126 MMHG | WEIGHT: 170 LBS | HEIGHT: 70 IN | DIASTOLIC BLOOD PRESSURE: 67 MMHG

## 2024-10-23 DIAGNOSIS — Z86.718 PERSONAL HISTORY OF OTHER VENOUS THROMBOSIS AND EMBOLISM: ICD-10-CM

## 2024-10-23 DIAGNOSIS — Z78.9 OTHER SPECIFIED HEALTH STATUS: ICD-10-CM

## 2024-10-23 DIAGNOSIS — R56.9 UNSPECIFIED CONVULSIONS: ICD-10-CM

## 2024-10-23 DIAGNOSIS — Z86.39 PERSONAL HISTORY OF OTHER ENDOCRINE, NUTRITIONAL AND METABOLIC DISEASE: ICD-10-CM

## 2024-10-23 DIAGNOSIS — R25.1 TREMOR, UNSPECIFIED: ICD-10-CM

## 2024-10-23 PROCEDURE — 99204 OFFICE O/P NEW MOD 45 MIN: CPT

## 2024-10-23 PROCEDURE — G2211 COMPLEX E/M VISIT ADD ON: CPT

## 2024-10-23 PROCEDURE — 99214 OFFICE O/P EST MOD 30 MIN: CPT

## 2024-10-29 ENCOUNTER — APPOINTMENT (OUTPATIENT)
Dept: MRI IMAGING | Facility: IMAGING CENTER | Age: 79
End: 2024-10-29
Payer: MEDICARE

## 2024-10-29 ENCOUNTER — OUTPATIENT (OUTPATIENT)
Dept: OUTPATIENT SERVICES | Facility: HOSPITAL | Age: 79
LOS: 1 days | End: 2024-10-29
Payer: MEDICARE

## 2024-10-29 DIAGNOSIS — Z00.8 ENCOUNTER FOR OTHER GENERAL EXAMINATION: ICD-10-CM

## 2024-10-29 DIAGNOSIS — S06.5XAA TRAUMATIC SUBDURAL HEMORRHAGE WITH LOSS OF CONSCIOUSNESS STATUS UNKNOWN, INITIAL ENCOUNTER: ICD-10-CM

## 2024-10-29 PROCEDURE — 70551 MRI BRAIN STEM W/O DYE: CPT

## 2024-10-29 PROCEDURE — 70551 MRI BRAIN STEM W/O DYE: CPT | Mod: 26

## 2024-11-01 ENCOUNTER — APPOINTMENT (OUTPATIENT)
Dept: NEUROSURGERY | Facility: CLINIC | Age: 79
End: 2024-11-01

## 2024-11-01 ENCOUNTER — APPOINTMENT (OUTPATIENT)
Dept: ELECTROPHYSIOLOGY | Facility: CLINIC | Age: 79
End: 2024-11-01

## 2024-11-01 VITALS
HEART RATE: 58 BPM | WEIGHT: 170 LBS | BODY MASS INDEX: 24.34 KG/M2 | HEIGHT: 70 IN | DIASTOLIC BLOOD PRESSURE: 68 MMHG | SYSTOLIC BLOOD PRESSURE: 118 MMHG | OXYGEN SATURATION: 95 %

## 2024-11-01 PROCEDURE — 99214 OFFICE O/P EST MOD 30 MIN: CPT

## 2024-11-04 ENCOUNTER — APPOINTMENT (OUTPATIENT)
Dept: NEUROLOGY | Facility: CLINIC | Age: 79
End: 2024-11-04

## 2024-11-12 ENCOUNTER — OUTPATIENT (OUTPATIENT)
Dept: OUTPATIENT SERVICES | Facility: HOSPITAL | Age: 79
LOS: 1 days | End: 2024-11-12
Payer: MEDICARE

## 2024-11-12 ENCOUNTER — APPOINTMENT (OUTPATIENT)
Dept: ULTRASOUND IMAGING | Facility: IMAGING CENTER | Age: 79
End: 2024-11-12
Payer: MEDICARE

## 2024-11-12 DIAGNOSIS — Z86.718 PERSONAL HISTORY OF OTHER VENOUS THROMBOSIS AND EMBOLISM: ICD-10-CM

## 2024-11-12 PROCEDURE — 93970 EXTREMITY STUDY: CPT | Mod: 26

## 2024-11-12 PROCEDURE — 93970 EXTREMITY STUDY: CPT

## 2024-11-13 RX ORDER — APIXABAN 2.5 MG/1
2.5 TABLET, FILM COATED ORAL
Qty: 60 | Refills: 5 | Status: ACTIVE | COMMUNITY
Start: 2024-11-12 | End: 1900-01-01

## 2024-11-26 PROCEDURE — 93248 EXT ECG>7D<15D REV&INTERPJ: CPT

## 2024-12-02 ENCOUNTER — APPOINTMENT (OUTPATIENT)
Dept: MRI IMAGING | Facility: IMAGING CENTER | Age: 79
End: 2024-12-02
Payer: MEDICARE

## 2024-12-02 ENCOUNTER — OUTPATIENT (OUTPATIENT)
Dept: OUTPATIENT SERVICES | Facility: HOSPITAL | Age: 79
LOS: 1 days | End: 2024-12-02
Payer: MEDICARE

## 2024-12-02 DIAGNOSIS — S06.5XAA TRAUMATIC SUBDURAL HEMORRHAGE WITH LOSS OF CONSCIOUSNESS STATUS UNKNOWN, INITIAL ENCOUNTER: ICD-10-CM

## 2024-12-02 DIAGNOSIS — Z00.8 ENCOUNTER FOR OTHER GENERAL EXAMINATION: ICD-10-CM

## 2024-12-02 DIAGNOSIS — Z98.890 OTHER SPECIFIED POSTPROCEDURAL STATES: ICD-10-CM

## 2024-12-02 DIAGNOSIS — I62.03 NONTRAUMATIC CHRONIC SUBDURAL HEMORRHAGE: ICD-10-CM

## 2024-12-02 PROCEDURE — 70551 MRI BRAIN STEM W/O DYE: CPT

## 2024-12-02 PROCEDURE — 70551 MRI BRAIN STEM W/O DYE: CPT | Mod: 26

## 2024-12-03 DIAGNOSIS — Z86.718 PERSONAL HISTORY OF OTHER VENOUS THROMBOSIS AND EMBOLISM: ICD-10-CM

## 2024-12-06 ENCOUNTER — APPOINTMENT (OUTPATIENT)
Dept: NEUROSURGERY | Facility: CLINIC | Age: 79
End: 2024-12-06

## 2024-12-06 VITALS
RESPIRATION RATE: 16 BRPM | BODY MASS INDEX: 32.1 KG/M2 | DIASTOLIC BLOOD PRESSURE: 72 MMHG | HEART RATE: 62 BPM | HEIGHT: 61 IN | WEIGHT: 170 LBS | SYSTOLIC BLOOD PRESSURE: 147 MMHG | OXYGEN SATURATION: 97 %

## 2024-12-06 DIAGNOSIS — I62.03 NONTRAUMATIC CHRONIC SUBDURAL HEMORRHAGE: ICD-10-CM

## 2024-12-06 PROCEDURE — 99214 OFFICE O/P EST MOD 30 MIN: CPT

## 2024-12-12 ENCOUNTER — APPOINTMENT (OUTPATIENT)
Dept: NEUROLOGY | Facility: CLINIC | Age: 79
End: 2024-12-12

## 2024-12-12 PROCEDURE — 95816 EEG AWAKE AND DROWSY: CPT

## 2024-12-13 PROCEDURE — 95719 EEG PHYS/QHP EA INCR W/O VID: CPT

## 2024-12-13 PROCEDURE — 95700 EEG CONT REC W/VID EEG TECH: CPT

## 2024-12-13 PROCEDURE — 95708 EEG WO VID EA 12-26HR UNMNTR: CPT

## 2024-12-21 ENCOUNTER — NON-APPOINTMENT (OUTPATIENT)
Age: 79
End: 2024-12-21

## 2024-12-23 ENCOUNTER — APPOINTMENT (OUTPATIENT)
Dept: NEUROLOGY | Facility: CLINIC | Age: 79
End: 2024-12-23
Payer: MEDICARE

## 2024-12-23 VITALS
HEART RATE: 61 BPM | DIASTOLIC BLOOD PRESSURE: 73 MMHG | HEIGHT: 73 IN | BODY MASS INDEX: 22.93 KG/M2 | WEIGHT: 173 LBS | SYSTOLIC BLOOD PRESSURE: 148 MMHG

## 2024-12-23 DIAGNOSIS — R56.9 UNSPECIFIED CONVULSIONS: ICD-10-CM

## 2024-12-23 DIAGNOSIS — I62.03 NONTRAUMATIC CHRONIC SUBDURAL HEMORRHAGE: ICD-10-CM

## 2024-12-23 PROCEDURE — 99214 OFFICE O/P EST MOD 30 MIN: CPT

## 2024-12-30 ENCOUNTER — OUTPATIENT (OUTPATIENT)
Dept: OUTPATIENT SERVICES | Facility: HOSPITAL | Age: 79
LOS: 1 days | End: 2024-12-30

## 2024-12-30 ENCOUNTER — APPOINTMENT (OUTPATIENT)
Dept: ULTRASOUND IMAGING | Facility: IMAGING CENTER | Age: 79
End: 2024-12-30

## 2024-12-30 DIAGNOSIS — Z86.718 PERSONAL HISTORY OF OTHER VENOUS THROMBOSIS AND EMBOLISM: ICD-10-CM

## 2025-01-02 ENCOUNTER — APPOINTMENT (OUTPATIENT)
Dept: CARDIOLOGY | Facility: CLINIC | Age: 80
End: 2025-01-02

## 2025-01-15 ENCOUNTER — OUTPATIENT (OUTPATIENT)
Dept: OUTPATIENT SERVICES | Facility: HOSPITAL | Age: 80
LOS: 1 days | End: 2025-01-15
Payer: MEDICARE

## 2025-01-15 ENCOUNTER — APPOINTMENT (OUTPATIENT)
Dept: ULTRASOUND IMAGING | Facility: IMAGING CENTER | Age: 80
End: 2025-01-15

## 2025-01-15 DIAGNOSIS — Z86.718 PERSONAL HISTORY OF OTHER VENOUS THROMBOSIS AND EMBOLISM: ICD-10-CM

## 2025-01-15 DIAGNOSIS — Z00.8 ENCOUNTER FOR OTHER GENERAL EXAMINATION: ICD-10-CM

## 2025-01-15 PROCEDURE — 93970 EXTREMITY STUDY: CPT

## 2025-01-15 PROCEDURE — 93970 EXTREMITY STUDY: CPT | Mod: 26

## 2025-01-16 ENCOUNTER — APPOINTMENT (OUTPATIENT)
Dept: CARDIOLOGY | Facility: CLINIC | Age: 80
End: 2025-01-16
Payer: MEDICARE

## 2025-01-16 ENCOUNTER — APPOINTMENT (OUTPATIENT)
Dept: ELECTROPHYSIOLOGY | Facility: CLINIC | Age: 80
End: 2025-01-16

## 2025-01-16 ENCOUNTER — NON-APPOINTMENT (OUTPATIENT)
Age: 80
End: 2025-01-16

## 2025-01-16 VITALS
OXYGEN SATURATION: 98 % | HEART RATE: 56 BPM | SYSTOLIC BLOOD PRESSURE: 150 MMHG | DIASTOLIC BLOOD PRESSURE: 80 MMHG | BODY MASS INDEX: 22.43 KG/M2 | WEIGHT: 170 LBS

## 2025-01-16 VITALS — SYSTOLIC BLOOD PRESSURE: 143 MMHG | DIASTOLIC BLOOD PRESSURE: 74 MMHG | OXYGEN SATURATION: 99 %

## 2025-01-16 DIAGNOSIS — Z86.718 PERSONAL HISTORY OF OTHER VENOUS THROMBOSIS AND EMBOLISM: ICD-10-CM

## 2025-01-16 PROCEDURE — G2211 COMPLEX E/M VISIT ADD ON: CPT

## 2025-01-16 PROCEDURE — 99214 OFFICE O/P EST MOD 30 MIN: CPT

## 2025-02-03 ENCOUNTER — OUTPATIENT (OUTPATIENT)
Dept: OUTPATIENT SERVICES | Facility: HOSPITAL | Age: 80
LOS: 1 days | End: 2025-02-03
Payer: MEDICARE

## 2025-02-03 ENCOUNTER — APPOINTMENT (OUTPATIENT)
Dept: MRI IMAGING | Facility: IMAGING CENTER | Age: 80
End: 2025-02-03
Payer: MEDICARE

## 2025-02-03 DIAGNOSIS — Z00.8 ENCOUNTER FOR OTHER GENERAL EXAMINATION: ICD-10-CM

## 2025-02-03 DIAGNOSIS — S06.5XAA TRAUMATIC SUBDURAL HEMORRHAGE WITH LOSS OF CONSCIOUSNESS STATUS UNKNOWN, INITIAL ENCOUNTER: ICD-10-CM

## 2025-02-03 PROCEDURE — 70551 MRI BRAIN STEM W/O DYE: CPT

## 2025-02-03 PROCEDURE — 70551 MRI BRAIN STEM W/O DYE: CPT | Mod: 26

## 2025-02-05 PROCEDURE — 93248 EXT ECG>7D<15D REV&INTERPJ: CPT

## 2025-02-07 ENCOUNTER — APPOINTMENT (OUTPATIENT)
Dept: NEUROSURGERY | Facility: CLINIC | Age: 80
End: 2025-02-07

## 2025-02-07 VITALS
DIASTOLIC BLOOD PRESSURE: 61 MMHG | HEART RATE: 58 BPM | OXYGEN SATURATION: 95 % | RESPIRATION RATE: 15 BRPM | BODY MASS INDEX: 22.53 KG/M2 | SYSTOLIC BLOOD PRESSURE: 132 MMHG | TEMPERATURE: 97.9 F | WEIGHT: 170 LBS | HEIGHT: 73 IN

## 2025-02-07 DIAGNOSIS — I62.03 NONTRAUMATIC CHRONIC SUBDURAL HEMORRHAGE: ICD-10-CM

## 2025-02-07 PROCEDURE — 99214 OFFICE O/P EST MOD 30 MIN: CPT

## 2025-02-19 NOTE — PROVIDER CONTACT NOTE (OTHER) - NAME OF MD/NP/PA/DO NOTIFIED:
CC:  Smita R Lucien is here today for Ear Problem (Ear cleaning )    Medications: medications verified, no change  Added preferred pharmacy  Denies  known Latex allergy or symptoms of Latex sensitivity.  All allergies and medications reviewed.  Patient would like communication of their results via:    [employer]    Cell Phone:   Telephone Information:   Mobile [phone]     Okay to leave a message containing results? Yes      Rooming documentation of social history includes tobacco screening only.   
Cj Ibrahim (PA)
Omkar DOUGLAS, Nilson Gregory NP
PETER Gregory

## 2025-02-24 ENCOUNTER — APPOINTMENT (OUTPATIENT)
Dept: NEUROLOGY | Facility: CLINIC | Age: 80
End: 2025-02-24

## 2025-03-07 ENCOUNTER — APPOINTMENT (OUTPATIENT)
Dept: ELECTROPHYSIOLOGY | Facility: CLINIC | Age: 80
End: 2025-03-07

## 2025-03-07 ENCOUNTER — NON-APPOINTMENT (OUTPATIENT)
Age: 80
End: 2025-03-07

## 2025-03-07 VITALS
OXYGEN SATURATION: 99 % | HEIGHT: 73 IN | BODY MASS INDEX: 22.53 KG/M2 | SYSTOLIC BLOOD PRESSURE: 143 MMHG | HEART RATE: 57 BPM | WEIGHT: 170 LBS | DIASTOLIC BLOOD PRESSURE: 79 MMHG

## 2025-03-07 DIAGNOSIS — I48.0 PAROXYSMAL ATRIAL FIBRILLATION: ICD-10-CM

## 2025-03-07 PROCEDURE — 93000 ELECTROCARDIOGRAM COMPLETE: CPT

## 2025-03-07 PROCEDURE — 99214 OFFICE O/P EST MOD 30 MIN: CPT | Mod: 25

## 2025-03-31 ENCOUNTER — APPOINTMENT (OUTPATIENT)
Dept: ULTRASOUND IMAGING | Facility: IMAGING CENTER | Age: 80
End: 2025-03-31
Payer: MEDICARE

## 2025-03-31 ENCOUNTER — OUTPATIENT (OUTPATIENT)
Dept: OUTPATIENT SERVICES | Facility: HOSPITAL | Age: 80
LOS: 1 days | End: 2025-03-31
Payer: MEDICARE

## 2025-03-31 DIAGNOSIS — Z86.718 PERSONAL HISTORY OF OTHER VENOUS THROMBOSIS AND EMBOLISM: ICD-10-CM

## 2025-03-31 PROCEDURE — 93970 EXTREMITY STUDY: CPT

## 2025-03-31 PROCEDURE — 93970 EXTREMITY STUDY: CPT | Mod: 26

## 2025-04-01 ENCOUNTER — RX RENEWAL (OUTPATIENT)
Age: 80
End: 2025-04-01

## 2025-04-03 ENCOUNTER — APPOINTMENT (OUTPATIENT)
Dept: CARDIOLOGY | Facility: CLINIC | Age: 80
End: 2025-04-03
Payer: MEDICARE

## 2025-04-03 VITALS
HEART RATE: 61 BPM | SYSTOLIC BLOOD PRESSURE: 162 MMHG | DIASTOLIC BLOOD PRESSURE: 85 MMHG | WEIGHT: 170 LBS | OXYGEN SATURATION: 97 % | BODY MASS INDEX: 22.43 KG/M2

## 2025-04-03 VITALS — DIASTOLIC BLOOD PRESSURE: 74 MMHG | SYSTOLIC BLOOD PRESSURE: 127 MMHG

## 2025-04-03 DIAGNOSIS — Z86.718 PERSONAL HISTORY OF OTHER VENOUS THROMBOSIS AND EMBOLISM: ICD-10-CM

## 2025-04-03 PROCEDURE — G2211 COMPLEX E/M VISIT ADD ON: CPT

## 2025-04-03 PROCEDURE — 99214 OFFICE O/P EST MOD 30 MIN: CPT

## 2025-05-31 ENCOUNTER — INPATIENT (INPATIENT)
Facility: HOSPITAL | Age: 80
LOS: 1 days | Discharge: HOME | End: 2025-06-02
Attending: STUDENT IN AN ORGANIZED HEALTH CARE EDUCATION/TRAINING PROGRAM | Admitting: STUDENT IN AN ORGANIZED HEALTH CARE EDUCATION/TRAINING PROGRAM
Payer: COMMERCIAL

## 2025-05-31 VITALS
DIASTOLIC BLOOD PRESSURE: 70 MMHG | SYSTOLIC BLOOD PRESSURE: 115 MMHG | OXYGEN SATURATION: 93 % | HEIGHT: 70 IN | HEART RATE: 84 BPM | RESPIRATION RATE: 18 BRPM | WEIGHT: 169.98 LBS | TEMPERATURE: 99 F

## 2025-05-31 LAB
ACETONE SERPL-MCNC: NEGATIVE — SIGNIFICANT CHANGE UP
ALBUMIN SERPL ELPH-MCNC: 3.4 G/DL — SIGNIFICANT CHANGE UP (ref 3.3–5)
ALP SERPL-CCNC: 104 U/L — SIGNIFICANT CHANGE UP (ref 40–120)
ALT FLD-CCNC: 21 U/L — SIGNIFICANT CHANGE UP (ref 12–78)
ANION GAP SERPL CALC-SCNC: 3 MMOL/L — LOW (ref 5–17)
APPEARANCE UR: CLEAR — SIGNIFICANT CHANGE UP
AST SERPL-CCNC: 13 U/L — LOW (ref 15–37)
BASOPHILS # BLD AUTO: 0.03 K/UL — SIGNIFICANT CHANGE UP (ref 0–0.2)
BASOPHILS NFR BLD AUTO: 0.5 % — SIGNIFICANT CHANGE UP (ref 0–2)
BILIRUB SERPL-MCNC: 0.6 MG/DL — SIGNIFICANT CHANGE UP (ref 0.2–1.2)
BILIRUB UR-MCNC: NEGATIVE — SIGNIFICANT CHANGE UP
BUN SERPL-MCNC: 17 MG/DL — SIGNIFICANT CHANGE UP (ref 7–23)
CALCIUM SERPL-MCNC: 9.3 MG/DL — SIGNIFICANT CHANGE UP (ref 8.5–10.1)
CHLORIDE SERPL-SCNC: 106 MMOL/L — SIGNIFICANT CHANGE UP (ref 96–108)
CO2 SERPL-SCNC: 23 MMOL/L — SIGNIFICANT CHANGE UP (ref 22–31)
COLOR SPEC: YELLOW — SIGNIFICANT CHANGE UP
CREAT SERPL-MCNC: 1.55 MG/DL — HIGH (ref 0.5–1.3)
DIFF PNL FLD: NEGATIVE — SIGNIFICANT CHANGE UP
EGFR: 45 ML/MIN/1.73M2 — LOW
EGFR: 45 ML/MIN/1.73M2 — LOW
EOSINOPHIL # BLD AUTO: 0.05 K/UL — SIGNIFICANT CHANGE UP (ref 0–0.5)
EOSINOPHIL NFR BLD AUTO: 0.9 % — SIGNIFICANT CHANGE UP (ref 0–6)
FLUAV AG NPH QL: SIGNIFICANT CHANGE UP
FLUBV AG NPH QL: SIGNIFICANT CHANGE UP
GAS PNL BLDV: SIGNIFICANT CHANGE UP
GLUCOSE SERPL-MCNC: 398 MG/DL — HIGH (ref 70–99)
GLUCOSE UR QL: >=1000 MG/DL
HCT VFR BLD CALC: 42.1 % — SIGNIFICANT CHANGE UP (ref 39–50)
HGB BLD-MCNC: 13.4 G/DL — SIGNIFICANT CHANGE UP (ref 13–17)
IMM GRANULOCYTES NFR BLD AUTO: 0.2 % — SIGNIFICANT CHANGE UP (ref 0–0.9)
KETONES UR QL: NEGATIVE MG/DL — SIGNIFICANT CHANGE UP
LEUKOCYTE ESTERASE UR-ACNC: NEGATIVE — SIGNIFICANT CHANGE UP
LIDOCAIN IGE QN: 60 U/L — SIGNIFICANT CHANGE UP (ref 13–75)
LYMPHOCYTES # BLD AUTO: 1.69 K/UL — SIGNIFICANT CHANGE UP (ref 1–3.3)
LYMPHOCYTES # BLD AUTO: 29.6 % — SIGNIFICANT CHANGE UP (ref 13–44)
MAGNESIUM SERPL-MCNC: 1.9 MG/DL — SIGNIFICANT CHANGE UP (ref 1.6–2.6)
MCHC RBC-ENTMCNC: 31.3 PG — SIGNIFICANT CHANGE UP (ref 27–34)
MCHC RBC-ENTMCNC: 31.8 G/DL — LOW (ref 32–36)
MCV RBC AUTO: 98.4 FL — SIGNIFICANT CHANGE UP (ref 80–100)
MONOCYTES # BLD AUTO: 0.37 K/UL — SIGNIFICANT CHANGE UP (ref 0–0.9)
MONOCYTES NFR BLD AUTO: 6.5 % — SIGNIFICANT CHANGE UP (ref 2–14)
NEUTROPHILS # BLD AUTO: 3.56 K/UL — SIGNIFICANT CHANGE UP (ref 1.8–7.4)
NEUTROPHILS NFR BLD AUTO: 62.3 % — SIGNIFICANT CHANGE UP (ref 43–77)
NITRITE UR-MCNC: NEGATIVE — SIGNIFICANT CHANGE UP
NRBC BLD AUTO-RTO: 0 /100 WBCS — SIGNIFICANT CHANGE UP (ref 0–0)
PH UR: 7 — SIGNIFICANT CHANGE UP (ref 5–8)
PLATELET # BLD AUTO: 134 K/UL — LOW (ref 150–400)
POTASSIUM SERPL-MCNC: 4.5 MMOL/L — SIGNIFICANT CHANGE UP (ref 3.5–5.3)
POTASSIUM SERPL-SCNC: 4.5 MMOL/L — SIGNIFICANT CHANGE UP (ref 3.5–5.3)
PROT SERPL-MCNC: 7.3 GM/DL — SIGNIFICANT CHANGE UP (ref 6–8.3)
PROT UR-MCNC: 30 MG/DL
RBC # BLD: 4.28 M/UL — SIGNIFICANT CHANGE UP (ref 4.2–5.8)
RBC # FLD: 11.8 % — SIGNIFICANT CHANGE UP (ref 10.3–14.5)
RSV RNA NPH QL NAA+NON-PROBE: SIGNIFICANT CHANGE UP
SARS-COV-2 RNA SPEC QL NAA+PROBE: SIGNIFICANT CHANGE UP
SODIUM SERPL-SCNC: 132 MMOL/L — LOW (ref 135–145)
SOURCE RESPIRATORY: SIGNIFICANT CHANGE UP
SP GR SPEC: >1.03 — HIGH (ref 1–1.03)
UROBILINOGEN FLD QL: 1 MG/DL — SIGNIFICANT CHANGE UP (ref 0.2–1)
WBC # BLD: 5.71 K/UL — SIGNIFICANT CHANGE UP (ref 3.8–10.5)
WBC # FLD AUTO: 5.71 K/UL — SIGNIFICANT CHANGE UP (ref 3.8–10.5)

## 2025-05-31 PROCEDURE — 74177 CT ABD & PELVIS W/CONTRAST: CPT | Mod: 26

## 2025-05-31 PROCEDURE — 99285 EMERGENCY DEPT VISIT HI MDM: CPT

## 2025-05-31 PROCEDURE — 99223 1ST HOSP IP/OBS HIGH 75: CPT

## 2025-05-31 RX ORDER — ROSUVASTATIN CALCIUM 20 MG/1
40 TABLET, FILM COATED ORAL AT BEDTIME
Refills: 0 | Status: DISCONTINUED | OUTPATIENT
Start: 2025-05-31 | End: 2025-06-02

## 2025-05-31 RX ORDER — SODIUM CHLORIDE 9 G/1000ML
1000 INJECTION, SOLUTION INTRAVENOUS
Refills: 0 | Status: DISCONTINUED | OUTPATIENT
Start: 2025-05-31 | End: 2025-06-01

## 2025-05-31 RX ORDER — INSULIN LISPRO 100 U/ML
INJECTION, SOLUTION INTRAVENOUS; SUBCUTANEOUS
Refills: 0 | Status: DISCONTINUED | OUTPATIENT
Start: 2025-05-31 | End: 2025-06-02

## 2025-05-31 RX ORDER — GLUCAGON 3 MG/1
1 POWDER NASAL ONCE
Refills: 0 | Status: DISCONTINUED | OUTPATIENT
Start: 2025-05-31 | End: 2025-06-02

## 2025-05-31 RX ORDER — DEXTROSE 50 % IN WATER 50 %
15 SYRINGE (ML) INTRAVENOUS ONCE
Refills: 0 | Status: DISCONTINUED | OUTPATIENT
Start: 2025-05-31 | End: 2025-06-02

## 2025-05-31 RX ORDER — APIXABAN 2.5 MG/1
2.5 TABLET, FILM COATED ORAL EVERY 12 HOURS
Refills: 0 | Status: DISCONTINUED | OUTPATIENT
Start: 2025-05-31 | End: 2025-06-02

## 2025-05-31 RX ORDER — TOPIRAMATE 25 MG/1
100 TABLET, FILM COATED ORAL
Refills: 0 | Status: DISCONTINUED | OUTPATIENT
Start: 2025-05-31 | End: 2025-06-02

## 2025-05-31 RX ORDER — SODIUM CHLORIDE 9 G/1000ML
1000 INJECTION, SOLUTION INTRAVENOUS
Refills: 0 | Status: DISCONTINUED | OUTPATIENT
Start: 2025-05-31 | End: 2025-06-02

## 2025-05-31 RX ORDER — METOPROLOL SUCCINATE 50 MG/1
50 TABLET, EXTENDED RELEASE ORAL
Refills: 0 | Status: DISCONTINUED | OUTPATIENT
Start: 2025-05-31 | End: 2025-06-02

## 2025-05-31 RX ORDER — DEXTROSE 50 % IN WATER 50 %
25 SYRINGE (ML) INTRAVENOUS ONCE
Refills: 0 | Status: DISCONTINUED | OUTPATIENT
Start: 2025-05-31 | End: 2025-06-02

## 2025-05-31 RX ORDER — DEXTROSE 50 % IN WATER 50 %
12.5 SYRINGE (ML) INTRAVENOUS ONCE
Refills: 0 | Status: DISCONTINUED | OUTPATIENT
Start: 2025-05-31 | End: 2025-06-02

## 2025-05-31 RX ORDER — INSULIN LISPRO 100 U/ML
INJECTION, SOLUTION INTRAVENOUS; SUBCUTANEOUS AT BEDTIME
Refills: 0 | Status: DISCONTINUED | OUTPATIENT
Start: 2025-05-31 | End: 2025-06-02

## 2025-05-31 RX ORDER — LEVETIRACETAM 10 MG/ML
250 INJECTION, SOLUTION INTRAVENOUS
Refills: 0 | Status: DISCONTINUED | OUTPATIENT
Start: 2025-05-31 | End: 2025-06-02

## 2025-05-31 RX ADMIN — LEVETIRACETAM 250 MILLIGRAM(S): 10 INJECTION, SOLUTION INTRAVENOUS at 18:45

## 2025-05-31 RX ADMIN — Medication 1000 MILLILITER(S): at 11:27

## 2025-05-31 RX ADMIN — SODIUM CHLORIDE 100 MILLILITER(S): 9 INJECTION, SOLUTION INTRAVENOUS at 17:04

## 2025-05-31 RX ADMIN — TOPIRAMATE 100 MILLIGRAM(S): 25 TABLET, FILM COATED ORAL at 18:45

## 2025-05-31 RX ADMIN — METOPROLOL SUCCINATE 50 MILLIGRAM(S): 50 TABLET, EXTENDED RELEASE ORAL at 18:45

## 2025-05-31 RX ADMIN — INSULIN LISPRO 4: 100 INJECTION, SOLUTION INTRAVENOUS; SUBCUTANEOUS at 17:01

## 2025-05-31 RX ADMIN — INSULIN LISPRO 1: 100 INJECTION, SOLUTION INTRAVENOUS; SUBCUTANEOUS at 21:53

## 2025-05-31 RX ADMIN — APIXABAN 2.5 MILLIGRAM(S): 2.5 TABLET, FILM COATED ORAL at 18:45

## 2025-05-31 RX ADMIN — ROSUVASTATIN CALCIUM 40 MILLIGRAM(S): 20 TABLET, FILM COATED ORAL at 21:54

## 2025-05-31 NOTE — ED PROVIDER NOTE - CLINICAL SUMMARY MEDICAL DECISION MAKING FREE TEXT BOX
80-year-old male pmhx of hypertension, hyperlipidemia, diabetes on metformin, epilepsy, DVT on Eliquis comes to ED w/ elevated sugar. Started randomly, patient took their regular metformin dose without improvement, patient reports that they doubled their dose and sugars did not improve prompting them to come to the emergency department.  Patient in addition reports left flank pain.  Their pain/symptom is moderate, constant, non mediating with rest. Otherwise ROS negative.    General: NAD   HEENT: NCAT, PERRL   Cardiac: RRR, no murmurs, 2+ peripheral pulses   Chest: CTAB  Abdomen: soft, non-distended, bowel sounds present, no ttp, no rebound or guarding   Extremities: no peripheral edema, calf tenderness, or leg size discrepancies   Skin: no rashes   Neuro: Awake and alert, 5+motor, sensory grossly intact   Psych: mood and affect appropriate    Impression: 80-year-old male pmhx of hypertension, hyperlipidemia, diabetes on metformin, epilepsy, DVT on Eliquis comes to ED w/ elevated sugar. Their symptoms and exam findings are concerning for uncontrolled diabetes, hyperglycemia.  Plan to eval for DKA, UTI, pyelonephritis.    Ordered labs, imaging, medications for diagnosis, management, and treatment.

## 2025-05-31 NOTE — ED ADULT TRIAGE NOTE - CHIEF COMPLAINT QUOTE
79 yo male, A&Ox4, presents to ED from home, accompanied by spouse.  Patient elevated blood sugar, increased urination, thirst, fatigue and  L flank pain that started back pain that started three days ago, states he took double the dose of metformin without improvement. Did not take medication for pain. Hx DMT2, HLD, Epilepsy, Brain tumor. DVT on Eliquis 79 yo male, A&Ox4, presents to ED from home, accompanied by spouse.  Patient c/o elevated blood sugar, increased urination, thirst, fatigue and  L flank pain that started three days ago, states he took double the dose of metformin without improvement. Did not take medication for pain. Hx DMT2, HLD, Epilepsy, Brain tumor. DVT on Eliquis

## 2025-05-31 NOTE — PATIENT PROFILE ADULT - FALL HARM RISK - TYPE OF ASSESSMENT
Subjective     Abdominal Pain  Pain location:  LLQ  Pain quality: aching and sharp    Pain severity:  Severe  Onset quality:  Sudden  Duration:  1 day  Timing:  Constant  Progression:  Worsening  Chronicity:  Recurrent  Context: not alcohol use and not retching    Relieved by:  Antacids  Ineffective treatments:  None tried  Associated symptoms: nausea    Associated symptoms: no chills, no dysuria and no hematochezia    Risk factors: no aspirin use and no recent hospitalization        Review of Systems   Constitutional: Negative.  Negative for chills.   HENT: Negative.    Eyes: Negative.    Respiratory: Negative.    Cardiovascular: Negative.    Gastrointestinal: Positive for abdominal pain and nausea. Negative for hematochezia.   Endocrine: Negative.    Genitourinary: Negative.  Negative for dysuria.   Musculoskeletal: Negative.    Skin: Negative.    Allergic/Immunologic: Negative.    Neurological: Negative.    Hematological: Negative.    Psychiatric/Behavioral: Negative.        Past Medical History:   Diagnosis Date   • A-fib (HCC)    • Abnormal ECG    • Anemia    • Anxiety    • Asthma    • Cancer (HCC)     Ovarian   • Depression    • Diabetes mellitus (HCC)    • DVT (deep venous thrombosis) ([facility])    • Factor 5 Leiden mutation, heterozygous (HCC)    • Fibroid    • GERD (gastroesophageal reflux disease)    • Gout    • H/O abdominal abscess    • History of sepsis    • History of transfusion    • Hyperlipidemia    • Hypothyroid    • Kidney stone    • Migraines    • Neuropathy    • Ovarian cancer (HCC) 02/2021   • Ovarian cyst    • PE (pulmonary embolism)    • Polycystic ovary syndrome    • Preeclampsia    • Rh incompatibility    • Stroke (HCC)    • TIA (transient ischemic attack)    • Urinary tract infection    • Varicella        Allergies   Allergen Reactions   • Toradol [Ketorolac Tromethamine] Anaphylaxis and Hives   • Haldol [Haloperidol] Hives and Mental Status Change   • Tramadol Hives and Swelling   •  Amoxicillin Hives and Rash   • Penicillins Hives and Rash       Past Surgical History:   Procedure Laterality Date   • ABDOMINAL SURGERY     • CARDIAC CATHETERIZATION     •  SECTION     • CHOLECYSTECTOMY     • COLONOSCOPY     • ENDOSCOPY     • EXTRACORPOREAL SHOCK WAVE LITHOTRIPSY (ESWL) Left 10/22/2021    Procedure: EXTRACORPOREAL SHOCKWAVE LITHOTRIPSY;  Surgeon: Milan Motley MD;  Location: [facility];  Service: Urology;  Laterality: Left;   • LITHOTRIPSY     • RIGHT OOPHORECTOMY     • URETEROSCOPY LASER LITHOTRIPSY WITH STENT INSERTION Left 10/01/2021    Procedure: URETEROSCOPY WITH STENT PLACEMENT;  Surgeon: Milan Motley MD;  Location: [facility];  Service: Urology;  Laterality: Left;   • URETEROSCOPY LASER LITHOTRIPSY WITH STENT INSERTION Left 2022    Procedure: URETEROSCOPY STENT REMOVAL;  Surgeon: Milan Motley MD;  Location: [facility];  Service: Urology;  Laterality: Left;   • URETEROSCOPY LASER LITHOTRIPSY WITH STENT INSERTION Left 2022    Procedure: CYSTOSCOPY RETROGRADE LEFT WITH STENT PLACEMENT;  Surgeon: Milan Motley MD;  Location: [facility];  Service: Urology;  Laterality: Left;       Family History   Problem Relation Age of Onset   • Hypertension Mother    • Diabetes Father    • Hypertension Father    • Heart attack Father    • No Known Problems Sister    • No Known Problems Brother    • No Known Problems Son    • No Known Problems Daughter    • No Known Problems Maternal Grandmother    • No Known Problems Maternal Grandfather    • No Known Problems Paternal Grandmother    • No Known Problems Paternal Grandfather    • No Known Problems Cousin    • Rheum arthritis Neg Hx    • Osteoarthritis Neg Hx    • Asthma Neg Hx    • Heart failure Neg Hx    • Hyperlipidemia Neg Hx    • Migraines Neg Hx    • Rashes / Skin problems Neg Hx    • Seizures Neg Hx    • Stroke Neg Hx    • Thyroid disease Neg Hx        Social History     Socioeconomic History   •  "Marital status:    Tobacco Use   • Smoking status: Never Smoker   • Smokeless tobacco: Never Used   Vaping Use   • Vaping Use: Never used   Substance and Sexual Activity   • Alcohol use: No   • Drug use: Never     Comment: Pt has track marks on right arm. Pt states \"It's from my INR being drawn.\"    • Sexual activity: Not Currently     Partners: Male     Birth control/protection: None           Objective   Physical Exam  Vitals and nursing note reviewed.   Constitutional:       Appearance: She is well-developed.   HENT:      Head: Normocephalic.      Right Ear: External ear normal.      Left Ear: External ear normal.   Eyes:      Conjunctiva/sclera: Conjunctivae normal.      Pupils: Pupils are equal, round, and reactive to light.   Cardiovascular:      Rate and Rhythm: Normal rate and regular rhythm.      Heart sounds: Normal heart sounds.   Pulmonary:      Effort: Pulmonary effort is normal.      Breath sounds: Normal breath sounds.   Abdominal:      General: Bowel sounds are normal.      Palpations: Abdomen is soft.   Musculoskeletal:         General: Normal range of motion.      Cervical back: Normal range of motion and neck supple.   Skin:     General: Skin is warm and dry.      Capillary Refill: Capillary refill takes less than 2 seconds.   Neurological:      Mental Status: She is alert and oriented to person, place, and time.   Psychiatric:         Behavior: Behavior normal.         Thought Content: Thought content normal.         Procedures           ED Course                                           MDM    Final diagnoses:   Pelvic pain   Chronic UTI       ED Disposition  ED Disposition     ED Disposition   Discharge    Condition   Stable    Comment   --             Eddie Latif, APRN  [address] [facility] [address]  [phone]    Schedule an appointment as soon as possible for a visit   For further evaluation         Medication List      No changes were made to your prescriptions " during this visit.          Charly Fisher, APRN  07/23/22 6844     Admission

## 2025-05-31 NOTE — H&P ADULT - HISTORY OF PRESENT ILLNESS
Patient is a pleasant 80 year old male with hx of SDH  ( s/p 6/22/24 Right craniotomy for subdural hematoma and 6/24/24 s/p angio: right MMA embolization ) hx of Afib ( on eliquis and metoprolol but not amiodarone anymore), seizure disorder, chronic DVT who presents to the ED with weakness, increased thirst and urination and elevated sugars. History obtained from patient, wife, and daughter (who is an emergency medicine physician). Patient was in his usual state of health until 3 days ago when he noticed increased weakness and increased urge to urinate  and increased thirst. He also has been feeling weak. He also endorses left sided flank pain, which  is nonradiating, non debilitating, and has occurred for 2-3 days. Since Wednesday, his morning fingersticks have been 370s or high. However, wife states he normally does not check his fingersticks. After this elevation was noted, metformin was increased to twice a day for 2 days, but his symptoms did not improve. Otherwise no burning with urination, no diarrhea, no constipation, no fevers, chills, rhinorrhea.

## 2025-05-31 NOTE — H&P ADULT - ASSESSMENT
Patient is a pleasant 80 year old male with hx of SDH  ( s/p 6/22/24 Right craniotomy for subdural hematoma and 6/24/24 s/p angio: right MMA embolization ) hx of Afib ( on eliquis and metoprolol but not amiodarone anymore), seizure disorder, chronic DVT who presents to the ED with weakness, increased thirst and admitted for severe hyperglycemia and MIKAELA    #Diabetes with Hyperglycemia  - Presented to the ED with glucose of 411, with fasting fingersticks in the high 300s all week  - unclear why patients fingersicks all of a sudden are high, though patient does not regularly check   - UA normal, no pyelo on CT scan either   - obtain a1c   - start sliding scale insulin, will determine if patient needs long term insulin or not  - holding home metformin 500mg qD    #MIKAELA  #hyponatremia  - MIKAELA likely due to decreased po intake with increased thirst/urination   - received 1L IVF in the ED already, c/w maintenance IVF for 12 more hours  - hyponatremia likely related to decreased volume  - repeat BMP in AM     #Thrombocytopenia  - likely reactive in nature, monitor closely, if drops, may need heme eval     #Hepatic lobe lesion  -  CT scna on this admission shows "There is a 3.6 cm hypodense region identified within the superior/posterior right hepatic lobe, indeterminate. A 3.5 cm mass with peripheral nodular enhancement is identified within the left hepatic   lobe, likely hemangioma. Additional hypodense masses are identified within the right hepatic and left hepatic lobes measuring up to 2.3 cm, indeterminate. Contrast enhanced Hepatic MRI is recommended, if the patient is able to undergo that type of examination for further characterization."  - explained finding to patient and daughter  - outpt vs inpatient MRI (patient leaning toward outpatient)    #AFib  - c/w home metoprolol and eliquis    #Chronic DVT  - c/w home eliquis     #Seizure Disorder  - c/w home topiramate, keppra    #ETC  - Diet - CC  - DVT ppx -eliquis  - Dispo - observation, dc hopefully within 24 hours if scr improves and if fingersticks improve

## 2025-05-31 NOTE — H&P ADULT - NSHPPHYSICALEXAM_GEN_ALL_CORE
GENERAL: NAD, well-developed  HEAD:  Atraumatic, Normocephalic  EYES: EOMI, PERRLA, conjunctiva and sclera clear  NECK: Supple, No JVD  CHEST/LUNG: Clear to auscultation bilaterally; No wheeze  HEART: Regular rate and rhythm; No murmurs, rubs, or gallops  ABDOMEN: Soft, Nontender, Nondistended; Bowel sounds present  EXTREMITIES:  2+ Peripheral Pulses, No clubbing, cyanosis, or edema  PSYCH: AAOx3, appropriate affect  NEUROLOGY: non-focal, cruz  SKIN: No rashes or lesions

## 2025-05-31 NOTE — H&P ADULT - NSHPLABSRESULTS_GEN_ALL_CORE
CT scan:  < from: CT Abdomen and Pelvis w/ IV Cont (05.31.25 @ 12:32) >    IMPRESSION:  1. No renal calculi or obstructive uropathy. No CT evidence for   pyelonephritis.  2. There is a 3.6 cm hypodense region identified within the   superior/posterior right hepatic lobe, indeterminate. A 3.5 cm mass with   peripheral nodular enhancement is identified within the left hepatic   lobe, likely hemangioma. Additional hypodense masses are identified   within the right hepatic and left hepatic lobes measuring up to 2.3 cm,   indeterminate. Contrast enhanced Hepatic MRI is recommended, if the   patient is able to undergo that type of examination for further   characterization.    < end of copied text >      Labs below are personally reviewed:

## 2025-05-31 NOTE — ED ADULT NURSE NOTE - OBJECTIVE STATEMENT
Patient with pmhx DM, Parkinson's presents with hyperglycemia. States he has had high readings for past couple of days. Only medication is Rx Metformin. Endorses lower back pain that started today. Ambulates with cane, steady gait noted. No acute distress noted.

## 2025-05-31 NOTE — ED ADULT NURSE NOTE - NSFALLUNIVINTERV_ED_ALL_ED
Bed/Stretcher in lowest position, wheels locked, appropriate side rails in place/Call bell, personal items and telephone in reach/Instruct patient to call for assistance before getting out of bed/chair/stretcher/Non-slip footwear applied when patient is off stretcher/[place] to call system/Physically safe environment - no spills, clutter or unnecessary equipment/Purposeful proactive rounding/Room/bathroom lighting operational, light cord in reach

## 2025-05-31 NOTE — H&P ADULT - NSICDXPASTMEDICALHX_GEN_ALL_CORE_FT
PAST MEDICAL HISTORY:  Brain tumor     Diabetes mellitus, type 2     DM2 (diabetes mellitus, type 2)     HTN (hypertension)     Hyperlipidemia     Pulmonary embolism     Seizures     Seizures

## 2025-05-31 NOTE — ED PROVIDER NOTE - PRINCIPAL DIAGNOSIS
Narrative    Not on file     Current Outpatient Medications   Medication Sig Dispense Refill    HYDROcodone-acetaminophen (NORCO) 5-325 MG per tablet Take 1 tablet by mouth every 6 hours as needed for Pain for up to 5 days. Intended supply: 5 days. Take lowest dose possible to manage pain 20 tablet 0    bacitracin-polymyxin b (POLYSPORIN) 500-02200 UNIT/GM ointment Apply topically 2 times daily. 1 Tube 1    ondansetron (ZOFRAN) 4 MG tablet Take 1 tablet by mouth every 8 hours as needed for Nausea 10 tablet 0    ibuprofen (ADVIL;MOTRIN) 600 MG tablet Take 1 tablet by mouth every 8 hours as needed for Pain or Fever for up to 30 doses. 30 tablet 0    naproxen (NAPROSYN) 500 MG tablet Take 1 tablet by mouth 2 times daily for 10 days 20 tablet 0     No current facility-administered medications for this visit. No Known Allergies    Review of Systems:   Review of Systems   Constitutional: Negative for chills and fever. HENT: Negative for congestion and sneezing. Eyes: Negative for pain and redness. Respiratory: Negative for chest tightness, shortness of breath and wheezing. Cardiovascular: Negative for chest pain and palpitations. Gastrointestinal: Negative for vomiting. Musculoskeletal: Positive for arthralgias. Skin: Negative for color change and rash. Psychiatric/Behavioral: Negative for agitation. The patient is not nervous/anxious. Examination:  General Exam:  Vitals: Pulse 93   Resp 16   Ht 5' 11\" (1.803 m)   Wt 180 lb (81.6 kg)   SpO2 98%   BMI 25.10 kg/m²    Physical Exam  Vitals signs and nursing note reviewed. Constitutional:       Appearance: Normal appearance. HENT:      Head: Normocephalic and atraumatic. Eyes:      Conjunctiva/sclera: Conjunctivae normal.      Pupils: Pupils are equal, round, and reactive to light. Neck:      Musculoskeletal: Normal range of motion.    Pulmonary:      Effort: Pulmonary effort is normal. Musculoskeletal:      Right shoulder: Normal.      Left shoulder: Normal.      Left knee: He exhibits normal range of motion, no swelling, no effusion and no ecchymosis. No tenderness found. Right ankle: He exhibits normal range of motion, no swelling, no ecchymosis, no deformity, no laceration and normal pulse. No tenderness. Achilles tendon normal.      Comments: Left lower extremity:  There is tenderness to palpation and swelling overlying the medial malleolus and posterior medial ankle. There is no deformity of the ankle. There is mild swelling throughout the ankle joint anteriorly. No tenderness to palpation at the fibula. 2+ DP pulse and brisk cap refill present distally. There is numbness in the tibial nerve distribution extending at the medial aspect of the foot and toes. There are multiple abrasions and large areas including the lateral aspect of the leg which are healing well and show no evidence of infection or drainage or cellulitis. Multiple healing abrasions present throughout all 4 extremities which are healthy-appearing with no evidence of infection. Skin:     General: Skin is warm and dry. Neurological:      Mental Status: He is alert and oriented to person, place, and time. Psychiatric:         Mood and Affect: Mood normal.         Behavior: Behavior normal.            Diagnostic testing:  X-rays reviewed in office, I independently reviewed the films in the office today:   Xr Ankle Left (min 3 Views)    Result Date: 6/16/2020  3 views of the left ankle show evidence of a displaced comminuted medial malleolus fracture with multiple fragments present and a large extruded fragment posterior medial at the ankle. There is normal position of the talus within the tibial plafond. No widening of the syndesmosis. No evidence of fracture at the fibula. Office Procedures:  No orders of the defined types were placed in this encounter. Assessment and Plan  1.   Left comminuted displaced medial malleolus fracture     I reviewed the x-ray findings in detail with the patient. I have explained that the fracture pattern and displacement may lead to complications with nonoperative treatment. Specifically I am concerned about malalignment and posttraumatic arthritis of the ankle joint. Therefore I have recommended open reduction and internal fixation to restore the anatomy of the ankle bones and articular surfaces. We have discussed the risks, benefits, and alternatives to surgery. We discussed the goals of surgery and anticipated recovery process. We discussed specific pertinent risks including delayed fracture healing, nonunion, refracture, posttraumatic arthritis, infection, and DVT. The patient understands and wishes to proceed with surgical intervention. The patient was counseled at length about the risks of guille Covid-19 during their perioperative period and any recovery window from their procedure. The patient was made aware that guille Covid-19  may worsen their prognosis for recovering from their procedure  and lend to a higher morbidity and/or mortality risk. All material risks, benefits, and reasonable alternatives including postponing the procedure were discussed. The patient does wish to proceed with the procedure at this time.         Electronically signed by Riaz Conn MD on 6/16/2020 at 9:24 AM Hyperglycemia

## 2025-05-31 NOTE — H&P ADULT - NSHPREVIEWOFSYSTEMS_GEN_ALL_CORE
CONSTITUTIONAL/GENERAL: + weakness, no fevers or chills  EYES/OPHTHALMOLOGIC: No visual changes, No blurry vision, No vertigo   ENMT: No throat pain, or neck stiffness   RESPIRATORY/THORAX: No cough, wheezing, hemoptysis; No shortness of breath  CARDIOVASCULAR: No chest pain or palpitations  GASTROINTESTINAL: No abdominal or epigastric pain. No nausea, vomiting, or hematemesis; No diarrhea or constipation. No melena or hematochezia.  GENITOURINARY: No dysuria, +increased frequency, no hematuria  NEUROLOGICAL: No numbness or weakness  SKIN: No itching, rashes  ENDOCRINOLOGY: no heat intolerance, no cold intolerance, no weight gain, no weight loss  PSYCHIATRIC:  no si/no hi, mood stable, no anxiety  MUSCULOSKELETAL SYSTEM:  no joint pain, no myalgias, no arthralgias, no joint swelling

## 2025-05-31 NOTE — ED ADULT NURSE NOTE - CHIEF COMPLAINT QUOTE
79 yo male, A&Ox4, presents to ED from home, accompanied by spouse.  Patient c/o elevated blood sugar, increased urination, thirst, fatigue and  L flank pain that started three days ago, states he took double the dose of metformin without improvement. Did not take medication for pain. Hx DMT2, HLD, Epilepsy, Brain tumor. DVT on Eliquis

## 2025-05-31 NOTE — PATIENT PROFILE ADULT - FUNCTIONAL ASSESSMENT - DAILY ACTIVITY 1.
Problem: Patient Care Overview  Goal: Plan of Care/Patient Progress Review  Outcome: No Change  Patient is lethargic, soft BP otherwise VSS, on 2L NC, CMS intact, moderate carb diet, Montano patent, and IV infusing. Tele is 100% V-Paced, up with strong assist of 2, and schedule Tylenol for pain.  Will continue to monitor       3 = A little assistance

## 2025-05-31 NOTE — PATIENT PROFILE ADULT - FALL HARM RISK - HARM RISK INTERVENTIONS

## 2025-05-31 NOTE — ED PROVIDER NOTE - WET READ LAUNCH FT
PC to patient with low INR of 1.3. Leonora had dosed patient with 5 mgs qd except for Sun and Mon 2.5 mgs. Patient states he did not hear that and took 5 mgs qd all week.  Advised to continue same dosing due to Amiodarone, which can make INR go high. He will recheck on Tues.   
There are no Wet Read(s) to document.

## 2025-06-01 DIAGNOSIS — E11.65 TYPE 2 DIABETES MELLITUS WITH HYPERGLYCEMIA: ICD-10-CM

## 2025-06-01 LAB
A1C WITH ESTIMATED AVERAGE GLUCOSE RESULT: 13.4 % — HIGH (ref 4–5.6)
A1C WITH ESTIMATED AVERAGE GLUCOSE RESULT: 13.5 % — HIGH (ref 4–5.6)
ANION GAP SERPL CALC-SCNC: 5 MMOL/L — SIGNIFICANT CHANGE UP (ref 5–17)
BUN SERPL-MCNC: 14 MG/DL — SIGNIFICANT CHANGE UP (ref 7–23)
CALCIUM SERPL-MCNC: 8.5 MG/DL — SIGNIFICANT CHANGE UP (ref 8.5–10.1)
CHLORIDE SERPL-SCNC: 108 MMOL/L — SIGNIFICANT CHANGE UP (ref 96–108)
CO2 SERPL-SCNC: 24 MMOL/L — SIGNIFICANT CHANGE UP (ref 22–31)
CREAT SERPL-MCNC: 1.13 MG/DL — SIGNIFICANT CHANGE UP (ref 0.5–1.3)
EGFR: 66 ML/MIN/1.73M2 — SIGNIFICANT CHANGE UP
EGFR: 66 ML/MIN/1.73M2 — SIGNIFICANT CHANGE UP
ESTIMATED AVERAGE GLUCOSE: 338 MG/DL — HIGH (ref 68–114)
ESTIMATED AVERAGE GLUCOSE: 341 MG/DL — HIGH (ref 68–114)
GLUCOSE BLDC GLUCOMTR-MCNC: 231 MG/DL — HIGH (ref 70–99)
GLUCOSE BLDC GLUCOMTR-MCNC: 301 MG/DL — HIGH (ref 70–99)
GLUCOSE BLDC GLUCOMTR-MCNC: 302 MG/DL — HIGH (ref 70–99)
GLUCOSE SERPL-MCNC: 217 MG/DL — HIGH (ref 70–99)
MAGNESIUM SERPL-MCNC: 1.8 MG/DL — SIGNIFICANT CHANGE UP (ref 1.6–2.6)
PHOSPHATE SERPL-MCNC: 2.8 MG/DL — SIGNIFICANT CHANGE UP (ref 2.5–4.5)
POTASSIUM SERPL-MCNC: 3.6 MMOL/L — SIGNIFICANT CHANGE UP (ref 3.5–5.3)
POTASSIUM SERPL-SCNC: 3.6 MMOL/L — SIGNIFICANT CHANGE UP (ref 3.5–5.3)
SODIUM SERPL-SCNC: 137 MMOL/L — SIGNIFICANT CHANGE UP (ref 135–145)

## 2025-06-01 PROCEDURE — 99232 SBSQ HOSP IP/OBS MODERATE 35: CPT

## 2025-06-01 RX ORDER — SODIUM CHLORIDE 9 G/1000ML
500 INJECTION, SOLUTION INTRAVENOUS ONCE
Refills: 0 | Status: COMPLETED | OUTPATIENT
Start: 2025-06-01 | End: 2025-06-01

## 2025-06-01 RX ORDER — INSULIN LISPRO 100 U/ML
4 INJECTION, SOLUTION INTRAVENOUS; SUBCUTANEOUS
Refills: 0 | Status: DISCONTINUED | OUTPATIENT
Start: 2025-06-01 | End: 2025-06-02

## 2025-06-01 RX ORDER — INSULIN GLARGINE-YFGN 100 [IU]/ML
10 INJECTION, SOLUTION SUBCUTANEOUS AT BEDTIME
Refills: 0 | Status: DISCONTINUED | OUTPATIENT
Start: 2025-06-01 | End: 2025-06-02

## 2025-06-01 RX ORDER — METFORMIN HYDROCHLORIDE 850 MG/1
500 TABLET ORAL DAILY
Refills: 0 | Status: DISCONTINUED | OUTPATIENT
Start: 2025-06-01 | End: 2025-06-02

## 2025-06-01 RX ADMIN — TOPIRAMATE 100 MILLIGRAM(S): 25 TABLET, FILM COATED ORAL at 18:07

## 2025-06-01 RX ADMIN — INSULIN LISPRO 4 UNIT(S): 100 INJECTION, SOLUTION INTRAVENOUS; SUBCUTANEOUS at 16:54

## 2025-06-01 RX ADMIN — METOPROLOL SUCCINATE 50 MILLIGRAM(S): 50 TABLET, EXTENDED RELEASE ORAL at 06:26

## 2025-06-01 RX ADMIN — LEVETIRACETAM 250 MILLIGRAM(S): 10 INJECTION, SOLUTION INTRAVENOUS at 18:08

## 2025-06-01 RX ADMIN — METOPROLOL SUCCINATE 50 MILLIGRAM(S): 50 TABLET, EXTENDED RELEASE ORAL at 18:09

## 2025-06-01 RX ADMIN — INSULIN LISPRO 2: 100 INJECTION, SOLUTION INTRAVENOUS; SUBCUTANEOUS at 21:51

## 2025-06-01 RX ADMIN — INSULIN GLARGINE-YFGN 10 UNIT(S): 100 INJECTION, SOLUTION SUBCUTANEOUS at 21:50

## 2025-06-01 RX ADMIN — SODIUM CHLORIDE 100 MILLILITER(S): 9 INJECTION, SOLUTION INTRAVENOUS at 16:36

## 2025-06-01 RX ADMIN — INSULIN LISPRO 4: 100 INJECTION, SOLUTION INTRAVENOUS; SUBCUTANEOUS at 16:56

## 2025-06-01 RX ADMIN — TOPIRAMATE 100 MILLIGRAM(S): 25 TABLET, FILM COATED ORAL at 06:26

## 2025-06-01 RX ADMIN — ROSUVASTATIN CALCIUM 40 MILLIGRAM(S): 20 TABLET, FILM COATED ORAL at 21:50

## 2025-06-01 RX ADMIN — INSULIN LISPRO 2: 100 INJECTION, SOLUTION INTRAVENOUS; SUBCUTANEOUS at 11:27

## 2025-06-01 RX ADMIN — APIXABAN 2.5 MILLIGRAM(S): 2.5 TABLET, FILM COATED ORAL at 18:08

## 2025-06-01 RX ADMIN — INSULIN LISPRO 2: 100 INJECTION, SOLUTION INTRAVENOUS; SUBCUTANEOUS at 07:55

## 2025-06-01 RX ADMIN — APIXABAN 2.5 MILLIGRAM(S): 2.5 TABLET, FILM COATED ORAL at 06:26

## 2025-06-01 RX ADMIN — LEVETIRACETAM 250 MILLIGRAM(S): 10 INJECTION, SOLUTION INTRAVENOUS at 06:26

## 2025-06-01 NOTE — CONSULT NOTE ADULT - PROBLEM SELECTOR RECOMMENDATION 9
Patient's creatinine has now normalized so 500 mg of metformin can be added back.  Also add prandial lispro 4 unit and encourage more nutrition to avoid any hypoglycemia  May need more insulin  Goal is to have fingersticks 100-180  Thank you for the courtesy of this consultation

## 2025-06-01 NOTE — PHYSICAL THERAPY INITIAL EVALUATION ADULT - ADDITIONAL COMMENTS
Patient states he lives with his wife in a house with 3 steps to enter with 2 HR (widely spread), Patient reports he has a first floor setup. +reading eyeglasses. independent with use of cane. owns a rollator, walker and commode.

## 2025-06-01 NOTE — CONSULT NOTE ADULT - SUBJECTIVE AND OBJECTIVE BOX
Patient is a 80y old  Male who presents with a chief complaint of hyperglycemia, flank pain, kalyan (01 Jun 2025 10:06)      Reason For Consult:    uncontrolled diabetes   HPI:  Patient is a pleasant 80 year old male with hx of SDH  ( s/p 6/22/24 Right craniotomy for subdural hematoma and 6/24/24 s/p angio: right MMA embolization ) hx of Afib ( on eliquis and metoprolol but not amiodarone anymore), seizure disorder, chronic DVT who presents to the ED with weakness, increased thirst and urination and elevated sugars. History obtained from patient, wife, and daughter (who is an emergency medicine physician). Patient was in his usual state of health until 3 days ago when he noticed increased weakness and increased urge to urinate  and increased thirst. He also has been feeling weak. He also endorses left sided flank pain, which  is nonradiating, non debilitating, and has occurred for 2-3 days. Since Wednesday, his morning fingersticks have been 370s or high. However, wife states he normally does not check his fingersticks. After this elevation was noted, metformin was increased to twice a day for 2 days, but his symptoms did not improve. Otherwise no burning with urination, no diarrhea, no constipation, no fevers, chills, rhinorrhea.    (31 May 2025 14:56)  Patient has been on metformin 500 mg twice a day, HbA1c 13.4 and compliance with medication and diet is in question.  Presented with neurological symptoms also with thirst and urination and was found to be hyperglycemic.  Put on 10 units of Lantus and also sliding scale lispro coverage but the fingersticks are still in the low 300s    PAST MEDICAL & SURGICAL HISTORY:  Seizures      Diabetes mellitus, type 2      Hyperlipidemia      DM2 (diabetes mellitus, type 2)      HTN (hypertension)      Seizures      Brain tumor      Pulmonary embolism      No significant past surgical history          FAMILY HISTORY:  No pertinent family history in first degree relatives          Social History:    MEDICATIONS  (STANDING):  apixaban 2.5 milliGRAM(s) Oral every 12 hours  dextrose 5%. 1000 milliLiter(s) (100 mL/Hr) IV Continuous <Continuous>  dextrose 5%. 1000 milliLiter(s) (50 mL/Hr) IV Continuous <Continuous>  dextrose 50% Injectable 25 Gram(s) IV Push once  dextrose 50% Injectable 12.5 Gram(s) IV Push once  dextrose 50% Injectable 25 Gram(s) IV Push once  glucagon  Injectable 1 milliGRAM(s) IntraMuscular once  insulin glargine Injectable (LANTUS) 10 Unit(s) SubCutaneous at bedtime  insulin lispro (ADMELOG) corrective regimen sliding scale   SubCutaneous three times a day before meals  insulin lispro (ADMELOG) corrective regimen sliding scale   SubCutaneous at bedtime  insulin lispro Injectable (ADMELOG) 4 Unit(s) SubCutaneous three times a day before meals  levETIRAcetam 250 milliGRAM(s) Oral two times a day  metFORMIN 500 milliGRAM(s) Oral daily  metoprolol tartrate 50 milliGRAM(s) Oral two times a day  rosuvastatin 40 milliGRAM(s) Oral at bedtime  topiramate 100 milliGRAM(s) Oral two times a day    MEDICATIONS  (PRN):  dextrose Oral Gel 15 Gram(s) Oral once PRN Blood Glucose LESS THAN 70 milliGRAM(s)/deciliter          T(C): 36.6 (06-01-25 @ 11:21), Max: 36.8 (05-31-25 @ 23:43)  HR: 66 (06-01-25 @ 11:21) (66 - 76)  BP: 125/69 (06-01-25 @ 11:21) (109/68 - 152/89)  RR: 17 (06-01-25 @ 11:21) (17 - 18)  SpO2: 95% (06-01-25 @ 11:21) (95% - 100%)  Wt(kg): --    PHYSICAL EXAM:  GENERAL: NAD, well-groomed, well-developed  HEAD:  Atraumatic, Normocephalic  EYES: PERRLA, conjunctiva and sclera clear  ENMT: No  exudates,, Moist mucous membranes,, No lesions        CAPILLARY BLOOD GLUCOSE      POCT Blood Glucose.: 231 mg/dL (01 Jun 2025 10:53)  POCT Blood Glucose.: 212 mg/dL (01 Jun 2025 07:21)  POCT Blood Glucose.: 270 mg/dL (31 May 2025 21:26)  POCT Blood Glucose.: 337 mg/dL (31 May 2025 16:21)                            13.4   5.71  )-----------( 134      ( 31 May 2025 11:04 )             42.1       CMP:  06-01 @ 05:46  SGPT --  Albumin --   Alk Phos --   Anion Gap 5   SGOT --   Total Bili --   BUN 14   Calcium Total 8.5   CO2 24   Chloride 108   Creatinine 1.13   eGFR if AA --   eGFR if non AA --   Glucose 217   Potassium 3.6   Protein --   Sodium 137      Thyroid Function Tests:      Diabetes Tests:     Parathyroids:     Adrenals:       Radiology:

## 2025-06-01 NOTE — PHYSICAL THERAPY INITIAL EVALUATION ADULT - CRITERIA FOR SKILLED THERAPEUTIC INTERVENTIONS
home with outpatient services/impairments found/functional limitations in following categories/risk reduction/prevention/rehab potential/therapy frequency/predicted duration of therapy intervention/anticipated equipment needs at discharge/anticipated discharge recommendation

## 2025-06-01 NOTE — PHYSICAL THERAPY INITIAL EVALUATION ADULT - PERTINENT HX OF CURRENT PROBLEM, REHAB EVAL
This is the hx of I.L. a 81 y/o male patient who was admitted to [facility] due to complications of Hyperglycemia affecting medical condition and with subsequent affection on functional mobility.

## 2025-06-01 NOTE — PROGRESS NOTE ADULT - SUBJECTIVE AND OBJECTIVE BOX
Hien Crespo MD  [facility] of [facility] Medicine    SUBJECTIVE / OVERNIGHT EVENTS:  - no events overnight, this morning patient is in good spirits, denies any thirst, denies chest pain, headaches, shortness of breath. wife at bedside     MEDICATIONS  (STANDING):  apixaban 2.5 milliGRAM(s) Oral every 12 hours  dextrose 5%. 1000 milliLiter(s) (100 mL/Hr) IV Continuous <Continuous>  dextrose 5%. 1000 milliLiter(s) (50 mL/Hr) IV Continuous <Continuous>  dextrose 50% Injectable 25 Gram(s) IV Push once  dextrose 50% Injectable 12.5 Gram(s) IV Push once  dextrose 50% Injectable 25 Gram(s) IV Push once  glucagon  Injectable 1 milliGRAM(s) IntraMuscular once  insulin glargine Injectable (LANTUS) 10 Unit(s) SubCutaneous at bedtime  insulin lispro (ADMELOG) corrective regimen sliding scale   SubCutaneous three times a day before meals  insulin lispro (ADMELOG) corrective regimen sliding scale   SubCutaneous at bedtime  levETIRAcetam 250 milliGRAM(s) Oral two times a day  metoprolol tartrate 50 milliGRAM(s) Oral two times a day  rosuvastatin 40 milliGRAM(s) Oral at bedtime  topiramate 100 milliGRAM(s) Oral two times a day    MEDICATIONS  (PRN):  dextrose Oral Gel 15 Gram(s) Oral once PRN Blood Glucose LESS THAN 70 milliGRAM(s)/deciliter      I&O's Summary    31 May 2025 07:01  -  01 Jun 2025 07:00  --------------------------------------------------------  IN: 920 mL / OUT: 600 mL / NET: 320 mL        PHYSICAL EXAM:  Vital Signs Last 24 Hrs  T(C): 36.3 (01 Jun 2025 05:13), Max: 36.8 (31 May 2025 13:25)  T(F): 97.4 (01 Jun 2025 05:13), Max: 98.3 (31 May 2025 13:25)  HR: 71 (01 Jun 2025 05:13) (67 - 80)  BP: 109/68 (01 Jun 2025 05:13) (109/68 - 152/89)  BP(mean): --  RR: 17 (01 Jun 2025 05:13) (17 - 18)  SpO2: 100% (01 Jun 2025 05:13) (96% - 100%)    Parameters below as of 01 Jun 2025 05:13  Patient On (Oxygen Delivery Method): room air      GENERAL: NAD, well-developed  EYES: EOMI, PERRLA, conjunctiva and sclera clear  NECK: Supple, No JVD  CHEST/LUNG: Clear to auscultation bilaterally; No wheeze  HEART: Regular rate and rhythm; No murmurs, rubs, or gallops  ABDOMEN: Soft, Nontender, Nondistended; Bowel sounds present  EXTREMITIES:  2+ Peripheral Pulses, No clubbing, cyanosis, or edema  PSYCH: AAOx3, appropriate affect  NEUROLOGY: non-focal, cruz  SKIN: No rashes or lesions    LABS:                        13.4   5.71  )-----------( 134      ( 31 May 2025 11:04 )             42.1     06-01    137  |  108  |  14  ----------------------------<  217[H]  3.6   |  24  |  1.13    Ca    8.5      01 Jun 2025 05:46  Phos  2.8     06-01  Mg     1.8     06-01    TPro  7.3  /  Alb  3.4  /  TBili  0.6  /  DBili  x   /  AST  13[L]  /  ALT  21  /  AlkPhos  104  05-31          Urinalysis Basic - ( 01 Jun 2025 05:46 )    Color: x / Appearance: x / SG: x / pH: x  Gluc: 217 mg/dL / Ketone: x  / Bili: x / Urobili: x   Blood: x / Protein: x / Nitrite: x   Leuk Esterase: x / RBC: x / WBC x   Sq Epi: x / Non Sq Epi: x / Bacteria: x        Urinalysis with Rflx Culture (collected 31 May 2025 11:23)

## 2025-06-02 ENCOUNTER — TRANSCRIPTION ENCOUNTER (OUTPATIENT)
Age: 80
End: 2025-06-02

## 2025-06-02 VITALS
TEMPERATURE: 98 F | OXYGEN SATURATION: 94 % | SYSTOLIC BLOOD PRESSURE: 126 MMHG | RESPIRATION RATE: 17 BRPM | HEART RATE: 94 BPM | DIASTOLIC BLOOD PRESSURE: 77 MMHG

## 2025-06-02 LAB
GLUCOSE BLDC GLUCOMTR-MCNC: 221 MG/DL — HIGH (ref 70–99)
GLUCOSE BLDC GLUCOMTR-MCNC: 261 MG/DL — HIGH (ref 70–99)
GLUCOSE BLDC GLUCOMTR-MCNC: 349 MG/DL — HIGH (ref 70–99)

## 2025-06-02 PROCEDURE — 99239 HOSP IP/OBS DSCHRG MGMT >30: CPT

## 2025-06-02 RX ORDER — LEVETIRACETAM 10 MG/ML
1 INJECTION, SOLUTION INTRAVENOUS
Qty: 0 | Refills: 0 | DISCHARGE
Start: 2025-06-02

## 2025-06-02 RX ORDER — APIXABAN 2.5 MG/1
1 TABLET, FILM COATED ORAL
Refills: 0 | DISCHARGE

## 2025-06-02 RX ORDER — ISOPROPYL ALCOHOL, BENZOCAINE .7; .06 ML/ML; ML/ML
0 SWAB TOPICAL
Qty: 100 | Refills: 1
Start: 2025-06-02

## 2025-06-02 RX ORDER — TOPIRAMATE 25 MG/1
1 TABLET, FILM COATED ORAL
Qty: 0 | Refills: 0 | DISCHARGE
Start: 2025-06-02

## 2025-06-02 RX ORDER — ROSUVASTATIN CALCIUM 20 MG/1
1 TABLET, FILM COATED ORAL
Qty: 0 | Refills: 0 | DISCHARGE
Start: 2025-06-02

## 2025-06-02 RX ORDER — METOPROLOL SUCCINATE 50 MG/1
1 TABLET, EXTENDED RELEASE ORAL
Qty: 0 | Refills: 0 | DISCHARGE
Start: 2025-06-02

## 2025-06-02 RX ORDER — INSULIN GLARGINE-YFGN 100 [IU]/ML
10 INJECTION, SOLUTION SUBCUTANEOUS
Qty: 1 | Refills: 3
Start: 2025-06-02 | End: 2025-09-29

## 2025-06-02 RX ORDER — GLUCAGON 3 MG/1
1 POWDER NASAL
Qty: 1 | Refills: 0
Start: 2025-06-02 | End: 2025-06-02

## 2025-06-02 RX ORDER — APIXABAN 2.5 MG/1
1 TABLET, FILM COATED ORAL
Qty: 0 | Refills: 0 | DISCHARGE
Start: 2025-06-02

## 2025-06-02 RX ADMIN — INSULIN LISPRO 3: 100 INJECTION, SOLUTION INTRAVENOUS; SUBCUTANEOUS at 11:46

## 2025-06-02 RX ADMIN — TOPIRAMATE 100 MILLIGRAM(S): 25 TABLET, FILM COATED ORAL at 06:29

## 2025-06-02 RX ADMIN — LEVETIRACETAM 250 MILLIGRAM(S): 10 INJECTION, SOLUTION INTRAVENOUS at 06:28

## 2025-06-02 RX ADMIN — INSULIN LISPRO 4 UNIT(S): 100 INJECTION, SOLUTION INTRAVENOUS; SUBCUTANEOUS at 09:18

## 2025-06-02 RX ADMIN — APIXABAN 2.5 MILLIGRAM(S): 2.5 TABLET, FILM COATED ORAL at 06:28

## 2025-06-02 RX ADMIN — INSULIN LISPRO 4 UNIT(S): 100 INJECTION, SOLUTION INTRAVENOUS; SUBCUTANEOUS at 11:47

## 2025-06-02 RX ADMIN — INSULIN LISPRO 2: 100 INJECTION, SOLUTION INTRAVENOUS; SUBCUTANEOUS at 09:17

## 2025-06-02 RX ADMIN — METFORMIN HYDROCHLORIDE 500 MILLIGRAM(S): 850 TABLET ORAL at 11:48

## 2025-06-02 NOTE — DISCHARGE NOTE PROVIDER - CARE PROVIDERS DIRECT ADDRESSES
[email or web address]

## 2025-06-02 NOTE — DISCHARGE NOTE PROVIDER - NSDCCPCAREPLAN_GEN_ALL_CORE_FT
PRINCIPAL DISCHARGE DIAGNOSIS  Diagnosis: Uncontrolled type 2 diabetes mellitus with hyperglycemia  Assessment and Plan of Treatment: You came in with significantly elevated blood sugars and we found that your a1c is 13.5. At this time you should be on insulin. Please take lantus 10u at bedtime. You must also follow up with an endocrinologist within 1-2 weeks of your discharge. You can continue to take meformin at home as well.      SECONDARY DISCHARGE DIAGNOSES  Diagnosis: Liver lesion  Assessment and Plan of Treatment: We found a spot in the liver which may be a collection of blood called a hemangioma or it can be other tumors. You should obtain an MRI of the abdomen for further characterization.

## 2025-06-02 NOTE — PROGRESS NOTE ADULT - SUBJECTIVE AND OBJECTIVE BOX
Patient is a 80y old  Male who presents with a chief complaint of hyperglycemia, flank pain, kalyan (02 Jun 2025 14:39)      Interval History: finger sticks are in 250-300 range   discharge planning is on     MEDICATIONS  (STANDING):  apixaban 2.5 milliGRAM(s) Oral every 12 hours  dextrose 5%. 1000 milliLiter(s) (100 mL/Hr) IV Continuous <Continuous>  dextrose 5%. 1000 milliLiter(s) (50 mL/Hr) IV Continuous <Continuous>  dextrose 50% Injectable 25 Gram(s) IV Push once  dextrose 50% Injectable 12.5 Gram(s) IV Push once  dextrose 50% Injectable 25 Gram(s) IV Push once  glucagon  Injectable 1 milliGRAM(s) IntraMuscular once  insulin glargine Injectable (LANTUS) 10 Unit(s) SubCutaneous at bedtime  insulin lispro (ADMELOG) corrective regimen sliding scale   SubCutaneous three times a day before meals  insulin lispro (ADMELOG) corrective regimen sliding scale   SubCutaneous at bedtime  insulin lispro Injectable (ADMELOG) 4 Unit(s) SubCutaneous three times a day before meals  levETIRAcetam 250 milliGRAM(s) Oral two times a day  metFORMIN 500 milliGRAM(s) Oral daily  metoprolol tartrate 50 milliGRAM(s) Oral two times a day  rosuvastatin 40 milliGRAM(s) Oral at bedtime  topiramate 100 milliGRAM(s) Oral two times a day    MEDICATIONS  (PRN):  dextrose Oral Gel 15 Gram(s) Oral once PRN Blood Glucose LESS THAN 70 milliGRAM(s)/deciliter      Allergies    No Known Allergies    Intolerances        REVIEW OF SYSTEMS:  CONSTITUTIONAL: no changes  EYES: No eye pain, visual disturbances, or discharge  ENMT:  No difficulty hearing, No sinus or throat pain  NECK: No pain or stiffness  RESPIRATORY: No cough, wheezing, chills or hemoptysis; No shortness of breath  CARDIOVASCULAR: No chest pain, palpitations or leg swelling  GASTROINTESTINAL: No abdominal or epigastric pain. No nausea, vomiting, or hematemesis; No diarrhea or constipation. No melena or hematochezia.  GENITOURINARY: No dysuria, frequency, hematuria, or incontinence  NEUROLOGICAL: No headaches, memory loss, loss of strength, numbness, or tremors  SKIN: No itching, burning, rashes, or lesions   ENDOCRINE: No heat or cold intolerance; No hair loss  MUSCULOSKELETAL: No joint pain or swelling; No muscle, back, or extremity pain  PSYCHIATRIC: No depression, anxiety, mood swings, or difficulty sleeping  HEME/LYMPH: No easy bruising, or bleeding gums  ALLERY AND IMMUNOLOGIC: No hives or eczema    Vital Signs Last 24 Hrs  T(C): 36.6 (02 Jun 2025 17:08), Max: 36.9 (02 Jun 2025 11:43)  T(F): 97.9 (02 Jun 2025 17:08), Max: 98.5 (02 Jun 2025 11:43)  HR: 94 (02 Jun 2025 17:08) (57 - 94)  BP: 126/77 (02 Jun 2025 17:08) (112/69 - 126/77)  BP(mean): --  RR: 17 (02 Jun 2025 17:08) (17 - 18)  SpO2: 94% (02 Jun 2025 17:08) (94% - 97%)    Parameters below as of 02 Jun 2025 17:08  Patient On (Oxygen Delivery Method): room air        PHYSICAL EXAM:  GENERAL:   HEAD: Atraumatic, Normocephalic  EYES: PERRLA, conjunctiva and sclera clear  ENMT: No  exudates,; Moist mucous membranes,, No lesions  NECK: Supple, No JVD, Normal thyroid  NERVOUS SYSTEM:  Alert & Oriented,   CHEST/LUNG: Clear to auscultation bilaterally; No rales, rhonchi, wheezing, or rubs  HEART: Regular rate and rhythm; No murmurs, rubs, or gallops  ABDOMEN: Soft, Nontender, Nondistended; Bowel sounds present  EXTREMITIES:  2+ Peripheral Pulses, no edema  SKIN: No rashes or lesions    LABS:      Urinalysis Basic - ( 01 Jun 2025 05:46 )    Color: x / Appearance: x / SG: x / pH: x  Gluc: 217 mg/dL / Ketone: x  / Bili: x / Urobili: x   Blood: x / Protein: x / Nitrite: x   Leuk Esterase: x / RBC: x / WBC x   Sq Epi: x / Non Sq Epi: x / Bacteria: x      CAPILLARY BLOOD GLUCOSE      POCT Blood Glucose.: 261 mg/dL (02 Jun 2025 11:05)  POCT Blood Glucose.: 221 mg/dL (02 Jun 2025 08:20)  POCT Blood Glucose.: 301 mg/dL (01 Jun 2025 21:34)    Lipid panel:           Thyroid:  Diabetes Tests:  Parathyroid Panel:  Adrenals:  RADIOLOGY & ADDITIONAL TESTS:    Imaging Personally Reviewed:  [ ] YES  [ ] NO    Consultant(s) Notes Reviewed:  [ ] YES  [ ] NO    Care Discussed with Consultants/Other Providers [ ] YES  [ ] NO

## 2025-06-02 NOTE — DISCHARGE NOTE PROVIDER - NSDCFUADDAPPT_GEN_ALL_CORE_FT
APPTS ARE READY TO BE MADE: [X] YES    Best Family or Patient Contact (if needed):    Additional Information about above appointments (if needed):    1:   2:   3:     Other comments or requests:    APPTS ARE READY TO BE MADE: [X] YES    Best Family or Patient Contact (if needed):    Additional Information about above appointments (if needed):    1:   2:   3:     Other comments or requests:   Patient is being outreached because they requested a callback

## 2025-06-02 NOTE — DISCHARGE NOTE PROVIDER - PROVIDER TOKENS
PROVIDER:[TOKEN:[[id number]],FOLLOWUP:[1 week]],PROVIDER:[TOKEN:[[id number]],FOLLOWUP:[1 week]]

## 2025-06-02 NOTE — DISCHARGE NOTE PROVIDER - HOSPITAL COURSE
Patient is a pleasant 80 year old male with hx of SDH  ( s/p 6/22/24 Right craniotomy for subdural hematoma and 6/24/24 s/p angio: right MMA embolization ) hx of Afib ( on eliquis and metoprolol but not amiodarone anymore), seizure disorder, chronic DVT who presents to the ED with weakness, increased thirst and admitted for severe hyperglycemia and MIKAELA    #Diabetes with Hyperglycemia  - Presented to the ED with glucose of 411, with fasting fingersticks in the high 300s all week  - unclear why patients fingersicks all of a sudden are high, though patient does not regularly check   - UA normal, no pyelo on CT scan either   - a1c 13.5 -> will need to be on insulin now, explained this to wife at bedside who will also help teach him  - monitor on lantus 10u + insulin sliding scale  - holding home metformin 500mg qD - restart on dc     #MIKAELA  #hyponatremia  - MIKAELA likely due to decreased po intake with increased thirst/urination   - received 1L IVF in the ED already, c/w maintenance IVF for 12 more hours  - hyponatremia likely related to decreased volume  - repeat BMP -> scr normalized     #Hepatic lobe lesion  -  CT scna on this admission shows "There is a 3.6 cm hypodense region identified within the superior/posterior right hepatic lobe, indeterminate. A 3.5 cm mass with peripheral nodular enhancement is identified within the left hepatic   lobe, likely hemangioma. Additional hypodense masses are identified within the right hepatic and left hepatic lobes measuring up to 2.3 cm, indeterminate. Contrast enhanced Hepatic MRI is recommended, if the patient is able to undergo that type of examination for further characterization."  - explained finding to patient and daughter  - outpt MRI (patient leaning toward outpatient)    #Chronic AFib  - c/w home metoprolol and eliquis    #Chronic DVT  - c/w home eliquis     #Seizure Disorder  - c/w home topiramate, keppra    Medically cleared for discharge with close outpatient followup

## 2025-06-02 NOTE — DISCHARGE NOTE PROVIDER - NSDCFUSCHEDAPPT_GEN_ALL_CORE_FT
[facility]  MRI  [facility]  Scheduled Appointment: 08/05/2025    Fuad Goodman  [facility]  NEUROSURG [address]  Scheduled Appointment: 08/08/2025

## 2025-06-02 NOTE — DISCHARGE NOTE NURSING/CASE MANAGEMENT/SOCIAL WORK - PATIENT PORTAL LINK FT
You can access the FollowMyHealth Patient Portal offered by [facility] by registering at the following website: [email or web address]. By joining [facility]’s FollowMyHealth portal, you will also be able to view your health information using other applications (apps) compatible with our system.

## 2025-06-02 NOTE — DISCHARGE NOTE NURSING/CASE MANAGEMENT/SOCIAL WORK - FINANCIAL ASSISTANCE
[facility] provides services at a reduced cost to those who are determined to be eligible through [facility]’s financial assistance program. Information regarding [facility]’s financial assistance program can be found by going to [email or web address] or by calling [phone].

## 2025-06-02 NOTE — DISCHARGE NOTE PROVIDER - CARE PROVIDER_API CALL
Gavin Issa  Internal Medicine  [address]  [place] [address]  Phone: [phone]  Fax: [phone]  Follow Up Time: 1 week    Karlos Hurtado  Endocrinology/Metab/Diabetes  [address], [address]  [place] [address]  Phone: [phone]  Fax: [phone]  Follow Up Time: 1 week

## 2025-06-02 NOTE — PROGRESS NOTE ADULT - PROBLEM SELECTOR PLAN 1
Continue with the current  regimen while inpatient   can be discharged on current dose/ regimen   Patient can resume  home regimen upon discharge   Patient should have strict diet control and do exercise as tolerated upon discharge
CHARLOTTE Uriostegui

## 2025-06-02 NOTE — DISCHARGE NOTE PROVIDER - NSDCCPTREATMENT_GEN_ALL_CORE_FT
PRINCIPAL PROCEDURE  Procedure: Abdomen CT  Findings and Treatment: IMPRESSION:  1. No renal calculi or obstructive uropathy. No CT evidence for   pyelonephritis.  2. There is a 3.6 cm hypodense region identified within the   superior/posterior right hepatic lobe, indeterminate. A 3.5 cm mass with   peripheral nodular enhancement is identified within the left hepatic   lobe, likely hemangioma. Additional hypodense masses are identified   within the right hepatic and left hepatic lobes measuring up to 2.3 cm,   indeterminate. Contrast enhanced Hepatic MRI is recommended, if the   patient is able to undergo that type of examination for further   characterization.

## 2025-06-02 NOTE — DISCHARGE NOTE PROVIDER - ATTENDING DISCHARGE PHYSICAL EXAMINATION:
GENERAL: NAD, well-developed  EYES: EOMI, PERRLA, conjunctiva and sclera clear  NECK: Supple, No JVD  CHEST/LUNG: Clear to auscultation bilaterally; No wheeze  HEART: Regular rate and rhythm; No murmurs, rubs, or gallops  ABDOMEN: Soft, Nontender, Nondistended; Bowel sounds present  EXTREMITIES:  2+ Peripheral Pulses, No clubbing, cyanosis, or edema  PSYCH: AAOx3, appropriate affect  NEUROLOGY: non-focal, cruz  SKIN: No rashes or lesions

## 2025-06-02 NOTE — DISCHARGE NOTE PROVIDER - NSDCMRMEDTOKEN_GEN_ALL_CORE_FT
alcohol swabs: Apply topically to affected area 4 times a day  apixaban 2.5 mg oral tablet: 1 tab(s) orally every 12 hours  Glucagon Emergency Kit for Low Blood Sugar 1 mg injection: 1 milligram(s) intramuscularly once 1 milligram(s) intramuscular once as needed for severe hypoglycemia, then call 911. only take if needed for severe hypoglycemia  glucometer (per patient&#x27;s insurance): Test blood sugars four times a day. Dispense #1 glucometer.  Insulin Pen Needles, 4mm: 1 application subcutaneously 4 times a day. ** Use with insulin pen **  lancets: 1 application subcutaneously 4 times a day  Lantus Solostar Pen 100 units/mL subcutaneous solution: 10 unit(s) subcutaneous once a day (at bedtime) unit(s) subcutaneous once a day  levETIRAcetam 250 mg oral tablet: 1 tab(s) orally 2 times a day  metFORMIN 500 mg oral tablet: 1 tab(s) orally once a day  metoprolol tartrate 50 mg oral tablet: 1 tab(s) orally 2 times a day  rosuvastatin 40 mg oral tablet: 1 tab(s) orally once a day (at bedtime)  test strips (per patient&#x27;s insurance): 1 application subcutaneously 4 times a day. ** Compatible with patient&#x27;s glucometer **  topiramate 100 mg oral tablet: 1 tab(s) orally 2 times a day

## 2025-06-02 NOTE — PROGRESS NOTE ADULT - ASSESSMENT
Patient is a pleasant 80 year old male with hx of SDH  ( s/p 6/22/24 Right craniotomy for subdural hematoma and 6/24/24 s/p angio: right MMA embolization ) hx of Afib ( on eliquis and metoprolol but not amiodarone anymore), seizure disorder, chronic DVT who presents to the ED with weakness, increased thirst and admitted for severe hyperglycemia and MIKAELA    #Diabetes with Hyperglycemia  - Presented to the ED with glucose of 411, with fasting fingersticks in the high 300s all week  - unclear why patients fingersicks all of a sudden are high, though patient does not regularly check   - UA normal, no pyelo on CT scan either   - a1c 13.5 -> will need to be on insulin now, explained this to wife at bedside who will also help teach him  - monitor on lantus 10u + insulin sliding scale  - holding home metformin 500mg qD    #MIKAELA  #hyponatremia  - MIKAELA likely due to decreased po intake with increased thirst/urination   - received 1L IVF in the ED already, c/w maintenance IVF for 12 more hours  - hyponatremia likely related to decreased volume  - repeat BMP -> scr normalized     #Thrombocytopenia  - likely reactive in nature, monitor closely, if drops, may need heme eval     #Hepatic lobe lesion  -  CT scna on this admission shows "There is a 3.6 cm hypodense region identified within the superior/posterior right hepatic lobe, indeterminate. A 3.5 cm mass with peripheral nodular enhancement is identified within the left hepatic   lobe, likely hemangioma. Additional hypodense masses are identified within the right hepatic and left hepatic lobes measuring up to 2.3 cm, indeterminate. Contrast enhanced Hepatic MRI is recommended, if the patient is able to undergo that type of examination for further characterization."  - explained finding to patient and daughter  - outpt vs inpatient MRI (patient leaning toward outpatient)    #AFib  - c/w home metoprolol and eliquis    #Chronic DVT  - c/w home eliquis     #Seizure Disorder  - c/w home topiramate, keppra    #ETC  - Diet - CC  - DVT ppx -eliquis  - Dispo - likely dc 6/2
diabetes

## 2025-08-05 ENCOUNTER — APPOINTMENT (OUTPATIENT)
Dept: MRI IMAGING | Facility: IMAGING CENTER | Age: 80
End: 2025-08-05

## 2025-08-05 ENCOUNTER — OUTPATIENT (OUTPATIENT)
Dept: OUTPATIENT SERVICES | Facility: HOSPITAL | Age: 80
LOS: 1 days | End: 2025-08-05
Payer: MEDICARE

## 2025-08-05 DIAGNOSIS — I62.03 NONTRAUMATIC CHRONIC SUBDURAL HEMORRHAGE: ICD-10-CM

## 2025-08-05 DIAGNOSIS — Z98.890 OTHER SPECIFIED POSTPROCEDURAL STATES: ICD-10-CM

## 2025-08-05 PROCEDURE — 70553 MRI BRAIN STEM W/O & W/DYE: CPT | Mod: 26

## 2025-08-05 PROCEDURE — 70553 MRI BRAIN STEM W/O & W/DYE: CPT

## 2025-08-05 PROCEDURE — A9585: CPT

## 2025-08-08 ENCOUNTER — APPOINTMENT (OUTPATIENT)
Dept: ELECTROPHYSIOLOGY | Facility: CLINIC | Age: 80
End: 2025-08-08
Payer: MEDICARE

## 2025-08-08 ENCOUNTER — NON-APPOINTMENT (OUTPATIENT)
Age: 80
End: 2025-08-08

## 2025-08-08 ENCOUNTER — APPOINTMENT (OUTPATIENT)
Dept: NEUROSURGERY | Facility: CLINIC | Age: 80
End: 2025-08-08
Payer: MEDICARE

## 2025-08-08 VITALS
SYSTOLIC BLOOD PRESSURE: 160 MMHG | WEIGHT: 170 LBS | HEIGHT: 69 IN | OXYGEN SATURATION: 95 % | HEART RATE: 65 BPM | BODY MASS INDEX: 25.18 KG/M2 | DIASTOLIC BLOOD PRESSURE: 80 MMHG

## 2025-08-08 VITALS
OXYGEN SATURATION: 98 % | RESPIRATION RATE: 14 BRPM | BODY MASS INDEX: 25.18 KG/M2 | DIASTOLIC BLOOD PRESSURE: 77 MMHG | HEART RATE: 62 BPM | WEIGHT: 170 LBS | HEIGHT: 69 IN | SYSTOLIC BLOOD PRESSURE: 146 MMHG

## 2025-08-08 DIAGNOSIS — I48.0 PAROXYSMAL ATRIAL FIBRILLATION: ICD-10-CM

## 2025-08-08 DIAGNOSIS — I62.03 NONTRAUMATIC CHRONIC SUBDURAL HEMORRHAGE: ICD-10-CM

## 2025-08-08 DIAGNOSIS — E11.9 TYPE 2 DIABETES MELLITUS W/OUT COMPLICATIONS: ICD-10-CM

## 2025-08-08 PROCEDURE — 99215 OFFICE O/P EST HI 40 MIN: CPT | Mod: 25

## 2025-08-08 PROCEDURE — 93000 ELECTROCARDIOGRAM COMPLETE: CPT

## 2025-08-08 PROCEDURE — 99214 OFFICE O/P EST MOD 30 MIN: CPT

## 2025-08-08 RX ORDER — METFORMIN HYDROCHLORIDE 500 MG/1
500 TABLET, COATED ORAL TWICE DAILY
Refills: 0 | Status: ACTIVE | COMMUNITY

## 2025-08-08 RX ORDER — INSULIN GLARGINE 100 [IU]/ML
100 INJECTION, SOLUTION SUBCUTANEOUS
Refills: 0 | Status: ACTIVE | COMMUNITY

## (undated) DEVICE — DRAIN JACKSON PRATT 7FR ROUND END NO TROCAR

## (undated) DEVICE — DRSG TAPE HYPAFIX 4"

## (undated) DEVICE — SNAP ON SPHERZ 5 PACK

## (undated) DEVICE — DRSG TELFA 3 X 8

## (undated) DEVICE — WARMING BLANKET LOWER ADULT

## (undated) DEVICE — GLV 7 PROTEXIS (WHITE)

## (undated) DEVICE — SUT VICRYL PLUS 3-0 18" X-1

## (undated) DEVICE — DRAPE TOWEL BLUE 17" X 24"

## (undated) DEVICE — DRAIN JACKSON PRATT 10MM FLAT FULL NO TROCAR

## (undated) DEVICE — SUT VICRYL 2-0 18" CP-2 UNDYED (POP-OFF)

## (undated) DEVICE — SOL IRR POUR H2O 250ML

## (undated) DEVICE — WOUND IRR SURGIPHOR

## (undated) DEVICE — FOLEY TRAY 16FR LF URINE METER SURESTEP

## (undated) DEVICE — DRSG CURITY GAUZE SPONGE 4 X 4" 12-PLY

## (undated) DEVICE — ELCTR BIPOLAR CORD J&J 12FT DISP

## (undated) DEVICE — SUT NUROLON 4-0 8-18" TF (POP-OFF)

## (undated) DEVICE — DRAPE 1/2 SHEET 40X57"

## (undated) DEVICE — CODMAN PERFORATOR 14MM (BLUE)

## (undated) DEVICE — MARKING PEN W RULER

## (undated) DEVICE — GLV 7 DERMAPRENE ULTRA

## (undated) DEVICE — SPECIMEN CONTAINER 100ML

## (undated) DEVICE — ELCTR BOVIE TIP BLADE INSULATED 2.75" EDGE

## (undated) DEVICE — SOL IRR POUR NS 0.9% 500ML

## (undated) DEVICE — TUBING BIPOLAR IRRIGATOR AND CORD SET

## (undated) DEVICE — DRAPE NEUROLOGICAL

## (undated) DEVICE — STAPLER SKIN VISI-STAT 35 WIDE

## (undated) DEVICE — BLADE SCALPEL SAFETYLOCK #11

## (undated) DEVICE — PREP CHLORAPREP HI-LITE ORANGE 26ML

## (undated) DEVICE — DRAIN RESERVOIR FOR JACKSON PRATT 100CC CARDINAL

## (undated) DEVICE — VENODYNE/SCD SLEEVE CALF MEDIUM

## (undated) DEVICE — GOWN TRIMAX LG

## (undated) DEVICE — POSITIONER FOAM EGG CRATE ULNAR 2PCS (PINK)

## (undated) DEVICE — Device

## (undated) DEVICE — MEDICATION LABELS W MARKER

## (undated) DEVICE — PREP CHLORAPREP HI-LITE ORANGE 10.5ML

## (undated) DEVICE — DRAIN JACKSON PRATT 7MM FLAT FULL NO TROCAR

## (undated) DEVICE — DRAPE CAMERA VIDEO 7"X96"

## (undated) DEVICE — ELCTR GROUNDING PAD ADULT COVIDIEN

## (undated) DEVICE — SUT MONOSOF 3-0 18" C-14

## (undated) DEVICE — MIDAS REX MR7 LUBRICANT DIFFUSER CARTRIDGE

## (undated) DEVICE — DRSG XEROFORM 1 X 8"

## (undated) DEVICE — DRAPE 3/4 SHEET W REINFORCEMENT 56X77"